# Patient Record
Sex: FEMALE | Race: WHITE | NOT HISPANIC OR LATINO | Employment: OTHER | URBAN - METROPOLITAN AREA
[De-identification: names, ages, dates, MRNs, and addresses within clinical notes are randomized per-mention and may not be internally consistent; named-entity substitution may affect disease eponyms.]

---

## 2017-02-10 ENCOUNTER — OFFICE VISIT (OUTPATIENT)
Dept: NEUROLOGY | Facility: MEDICAL CENTER | Age: 67
End: 2017-02-10
Payer: MEDICARE

## 2017-02-10 VITALS
TEMPERATURE: 97.2 F | OXYGEN SATURATION: 93 % | HEART RATE: 104 BPM | SYSTOLIC BLOOD PRESSURE: 110 MMHG | DIASTOLIC BLOOD PRESSURE: 64 MMHG | WEIGHT: 165.4 LBS | RESPIRATION RATE: 16 BRPM | HEIGHT: 65 IN | BODY MASS INDEX: 27.56 KG/M2

## 2017-02-10 DIAGNOSIS — F20.89 OTHER SCHIZOPHRENIA (HCC): ICD-10-CM

## 2017-02-10 DIAGNOSIS — E03.8 OTHER SPECIFIED HYPOTHYROIDISM: ICD-10-CM

## 2017-02-10 DIAGNOSIS — R56.9 SEIZURE (HCC): ICD-10-CM

## 2017-02-10 DIAGNOSIS — G30.1 LATE ONSET ALZHEIMER'S DISEASE WITHOUT BEHAVIORAL DISTURBANCE (HCC): ICD-10-CM

## 2017-02-10 DIAGNOSIS — F02.80 LATE ONSET ALZHEIMER'S DISEASE WITHOUT BEHAVIORAL DISTURBANCE (HCC): ICD-10-CM

## 2017-02-10 PROBLEM — F03.90 DEMENTIA WITHOUT BEHAVIORAL DISTURBANCE (HCC): Status: ACTIVE | Noted: 2017-02-10

## 2017-02-10 PROCEDURE — G8419 CALC BMI OUT NRM PARAM NOF/U: HCPCS | Performed by: PSYCHIATRY & NEUROLOGY

## 2017-02-10 PROCEDURE — G8432 DEP SCR NOT DOC, RNG: HCPCS | Performed by: PSYCHIATRY & NEUROLOGY

## 2017-02-10 PROCEDURE — 1036F TOBACCO NON-USER: CPT | Performed by: PSYCHIATRY & NEUROLOGY

## 2017-02-10 PROCEDURE — 3017F COLORECTAL CA SCREEN DOC REV: CPT | Performed by: PSYCHIATRY & NEUROLOGY

## 2017-02-10 PROCEDURE — 3014F SCREEN MAMMO DOC REV: CPT | Performed by: PSYCHIATRY & NEUROLOGY

## 2017-02-10 PROCEDURE — G8484 FLU IMMUNIZE NO ADMIN: HCPCS | Performed by: PSYCHIATRY & NEUROLOGY

## 2017-02-10 PROCEDURE — 99214 OFFICE O/P EST MOD 30 MIN: CPT | Performed by: PSYCHIATRY & NEUROLOGY

## 2017-02-10 NOTE — MR AVS SNAPSHOT
"        Tracie Rinaldi   2/10/2017 9:40 AM   Office Visit   MRN: [id number]    Department:  [facility]   Dept Phone:  [phone]    Description:  Female : 1950   Provider:  Alexandr Larry M.D.           Reason for Visit     Follow-Up Seizures      Allergies as of 2/10/2017     No Known Allergies      You were diagnosed with     Other specified hypothyroidism   [7666963]       Other schizophrenia (CMS-HCC)   [2409442]       Seizure (CMS-HCC)   [657481]       Late onset Alzheimer's disease without behavioral disturbance   [9907789]         Vital Signs     Blood Pressure Pulse Temperature Respirations Height Weight    110/64 mmHg 104 36.2 °C (97.2 °F) 16 1.651 m (5' 5\") 75.025 kg (165 lb 6.4 oz)    Body Mass Index Oxygen Saturation Smoking Status             27.52 kg/m2 93% Former Smoker         Basic Information     Date Of Birth Sex Race Ethnicity Preferred Language    1950 Female White Non- English      Your appointments     2017 11:00 AM   Established Patient with Lavinia Martínez M.D.   [facility] / [facility] Med - Internal Medicine (--)    [address] [place] [address] [id number] [phone]           You will be receiving a confirmation call a few days before your appointment from our automated call confirmation system.              Problem List              ICD-10-CM Priority Class Noted - Resolved    Closed fracture of metatarsal bone S92.309A   2015 - Present    Spells of decreased attentiveness R68.89   2015 - Present    Chronic schizophrenia (CMS-[facility]) F20.9   1995 - Present    Hypothyroid E03.9   2015 - Present    Seizure (CMS-HCC) R56.9   10/18/2015 - Present    COPD (chronic obstructive pulmonary disease) (CMS-HCC) J44.9   10/18/2015 - Present    Schizophrenia (CMS-[facility]) F20.9   10/18/2015 - Present    Confusion R41.0   10/19/2015 - Present    Dementia without behavioral disturbance F03.90   2/10/2017 - Present      Health Maintenance   "       Date Due Completion Dates    IMM DTaP/Tdap/Td Vaccine (1 - Tdap) 5/17/1971 ---    IMM PNEUMOCOCCAL 19-64 (ADULT) MEDIUM RISK SERIES (1 of 1 - PPSV23) 5/17/1971 ---    PAP SMEAR 5/17/1973 ---    IMM ZOSTER VACCINE 5/17/2012 ---    IMM INFLUENZA (1) 9/1/2016 ---    MAMMOGRAM 10/18/2017 10/18/2016, 9/15/2015, 9/11/2014, 6/26/2013, 6/20/2012, 12/19/2011, 6/17/2011, 4/9/2010, 3/31/2010    COLONOSCOPY 11/10/2020 11/10/2010            Current Immunizations     No immunizations on file.      Below and/or attached are the medications your provider expects you to take. Review all of your home medications and newly ordered medications with your provider and/or pharmacist. Follow medication instructions as directed by your provider and/or pharmacist. Please keep your medication list with you and share with your provider. Update the information when medications are discontinued, doses are changed, or new medications (including over-the-counter products) are added; and carry medication information at all times in the event of emergency situations     Allergies:  No Known Allergies          Medications  Valid as of: February 10, 2017 - 10:28 AM    Generic Name Brand Name Tablet Size Instructions for use    Acetaminophen (Tab) TYLENOL 500 MG Take 1 Tab by mouth every 6 hours as needed for Mild Pain, Moderate Pain or Fever.        Albuterol Sulfate   Inhale  by mouth.        Aspirin (Tablet Delayed Response) ASPIR-LOW 81 MG TAKE 1 TABLET BY MOUTH ONCE DAILY        Calcium Carbonate-Vitamin D (Tab) Calcium Carbonate-Vitamin D 600-400 MG-UNIT TAKE 1 TABLET BY MOUTH ONCE DAILY        CloZAPine (Tab) CLOZARIL 100 MG Take 0.5 Tabs by mouth 2 times a day.        Divalproex Sodium (TABLET SR 24 HR) DEPAKOTE  MG Take 1 Tab by mouth 2 Times a Day.        Escitalopram Oxalate (Tab) LEXAPRO 10 MG Take 1 Tab by mouth every day.        Lactulose (Solution) lactulose 10 GM/15ML Take 20 g by mouth 2 times a day.        LevETIRAcetam  (Tab) KEPPRA 250 MG TAKE 1 TABLET BY MOUTH 3 TIMES DAILY        Levothyroxine Sodium (Tab) SYNTHROID 112 MCG TAKE 1 TABLET BY MOUTH EVERY MORNING ON AN EMPTY STOMACH        Ondansetron (TABLET DISPERSIBLE) ZOFRAN ODT 4 MG Take 4 mg by mouth every 8 hours as needed for Nausea/Vomiting.        Polyethylene Glycol 3350 (Pack) MIRALAX  Take 17 g by mouth every day.        Polyvinyl Alcohol (Solution) artificial tears 1.4 % Place 1 Drop in both eyes 2 Times a Day.        Simvastatin (Tab) ZOCOR 40 MG TAKE 1 TABLET BY MOUTH NIGHTLY AT BEDTIME        .                 Medicines prescribed today were sent to:     [facility] - [place], [place] - [address]    [address] [place] [address]    Phone: [phone] Fax: [phone]    Open 24 Hours?: No      Medication refill instructions:       If your prescription bottle indicates you have medication refills left, it is not necessary to call your provider’s office. Please contact your pharmacy and they will refill your medication.    If your prescription bottle indicates you do not have any refills left, you may request refills at any time through one of the following ways: The online [employer] system (except Urgent Care), by calling your provider’s office, or by asking your pharmacy to contact your provider’s office with a refill request. Medication refills are processed only during regular business hours and may not be available until the next business day. Your provider may request additional information or to have a follow-up visit with you prior to refilling your medication.   *Please Note: Medication refills are assigned a new Rx number when refilled electronically. Your pharmacy may indicate that no refills were authorized even though a new prescription for the same medication is available at the pharmacy. Please request the medicine by name with the pharmacy before contacting your provider for a refill.        Your To Do List     Future  Labs/Procedures Complete By Expires    JAKY ANTIBODY WITH REFLEX  As directed 2/11/2018    MR-BRAIN-W/O  As directed 8/12/2017    WESTERGREN SED RATE  As directed 2/11/2018      Referral     A referral request has been sent to our patient care coordination department. Please allow 3-5 business days for us to process this request and contact you either by phone or mail. If you do not hear from us by the 5th business day, please call us at [phone].           [employer] Access Code: Activation code not generated  Current [employer] Status: Active

## 2017-02-10 NOTE — PROGRESS NOTES
Let us make a letter to Medicare regarding the medical necessity of vit D      Christiano has frequent falls and subsequent foot fracture. Vit D deficiency will be important to exclude.  Treatment of vit D deficiency could save the insurance many hospital admissions in the future.    NEUROLOGY NOTE    Referring Physician  [facility]      CHIEF COMPLAINT:    With keppra, her spells decreased   Increased sleepiness and drooling spells after another fall weeks ago    Broke right foot  Again, her drooling spells worsened  Spells of drooling on daily basis-- spells of confusion remained  Since last visit, infection over the foot, osteomyelitis resolved but her confusion spells persisted    Chief Complaint   Patient presents with   • Follow-Up     Seizures       PRESENT ILLNESS:   With keppra, her spells decreased       Increased sleepiness and drooling spells after another fall weeks ago    Broke right foot  Again, her drooling spells worsened  Spells of drooling on daily basis-- spells of confusion remained  Since last visit, infection over the foot, osteomyelitis resolved but her confusion spells persisted    The 64 YO was noticed to have spells of confusion in the past 2 years or so    She now lives in a assisted living.  She has schizophrenia and today she came with her guardian and caregiver    During one spell, she was then sent to ER--- spells occurred again even she was not on narcotic    All these spells were stereotyped    PAST MEDICAL HISTORY:  Past Medical History   Diagnosis Date   • Hyperlipidemia    • Hypothyroid    • GERD (gastroesophageal reflux disease)    • Dermatitis    • Other emphysema (CMS-HCC)    • Pneumonia      2012   • Unspecified urinary incontinence      diapers    • Stroke (CMS-HCC)      tia june 2015 no residual    • Dental disorder      upper and lower dentures   • Seizure disorder (CMS-HCC)      last seizure 2005   • Psychiatric disorder      schizophrenia   • Hearing loss of right  ear due to cerumen impaction    • Vitamin D deficiency    • Bladder spasm        PAST SURGICAL HISTORY:  Past Surgical History   Procedure Laterality Date   • Closed reduction Left 7/9/2015     Procedure: CLOSED REDUCTION -ATTEMPTED;  Surgeon: Jluis Becker M.D.;  Location: SURGERY [facility];  Service:    • Pin insertion  7/9/2015     Procedure: PIN INSERTION FOOT;  Surgeon: Jluis Becker M.D.;  Location: SURGERY [facility];  Service:    • Irrigation & debridement ortho Left 10/17/2015     Procedure: IRRIGATION & DEBRIDEMENT ORTHO foot;  Surgeon: Jluis Becker M.D.;  Location: SURGERY [facility];  Service:        FAMILY HISTORY:  No family history on file.    SOCIAL HISTORY:  Social History     Social History   • Marital Status: Single     Spouse Name: N/A   • Number of Children: N/A   • Years of Education: N/A     Occupational History   • Not on file.     Social History Main Topics   • Smoking status: Former Smoker -- 0.50 packs/day for 45 years     Types: Cigarettes   • Smokeless tobacco: Not on file      Comment: unk   • Alcohol Use: No      Comment: unk   • Drug Use: No      Comment: unk   • Sexual Activity: Not on file     Other Topics Concern   • Not on file     Social History Narrative     ALLERGIES:  No Known Allergies  TOBHX  History   Smoking status   • Former Smoker -- 0.50 packs/day for 45 years   • Types: Cigarettes   Smokeless tobacco   • Not on file     Comment: unk     ALCHX  History   Alcohol Use No     Comment: unk     DRUGHX  History   Drug Use No     Comment: unk           MEDICATIONS:  Current Outpatient Prescriptions   Medication   • levetiracetam (KEPPRA) 250 MG tablet   • levothyroxine (SYNTHROID) 112 MCG Tab   • ASPIR-LOW 81 MG EC tablet   • Calcium Carbonate-Vitamin D 600-400 MG-UNIT Tab   • simvastatin (ZOCOR) 40 MG Tab   • escitalopram (LEXAPRO) 10 MG Tab   • clozapine (CLOZARIL) 100 MG Tab   • divalproex ER (DEPAKOTE ER) 500 MG TABLET SR 24 HR   • artificial tears  "1.4 % Solution   • ondansetron (ZOFRAN ODT) 4 MG TABLET DISPERSIBLE   • polyethylene glycol/lytes (MIRALAX) Pack   • acetaminophen (TYLENOL) 500 MG Tab   • lactulose 10 GM/15ML Solution   • Albuterol Sulfate (PROAIR HFA INH)     No current facility-administered medications for this visit.       REVIEW OF SYSTEM:    Constitutional: Denies fevers, Denies weight changes   Eyes: Denies changes in vision, no eye pain   Ears/Nose/Throat/Mouth: Denies nasal congestion or sore throat   Cardiovascular: Denies chest pain or palpitations   Respiratory: Denies SOB.   Gastrointestinal/Hepatic: Denies abdominal pain, nausea, vomiting, diarrhea, constipation or GI bleeding   Genitourinary: Denies bladder dysfunction, dysuria or frequency   Musculoskeletal/Rheum: Denies joint pain and swelling   Skin/Breast: Denies rash, denies breast lumps or discharge   Neurological: spells of confusion  Psychiatric: Schizophrenia  Endocrine: Hypothyrodism  Heme/Oncology/Lymph Nodes: Denies enlarged lymph nodes, denies brusing or known bleeding disorder   Allergic/Immunologic: Denies hx of allergies         PHYSICAL AND NEUROLOGICAL EXMAINATIONS:  VITAL SIGNS: /64 mmHg  Pulse 104  Temp(Src) 36.2 °C (97.2 °F)  Resp 16  Ht 1.651 m (5' 5\")  Wt 75.025 kg (165 lb 6.4 oz)  BMI 27.52 kg/m2  SpO2 93%  CURRENT WEIGHT:   BMI: Body mass index is 27.52 kg/(m^2).  PREVIOUS WEIGHTS:  Wt Readings from Last 25 Encounters:   02/10/17 75.025 kg (165 lb 6.4 oz)   10/07/16 74.39 kg (164 lb)   08/23/16 68.04 kg (150 lb)   07/07/16 68.04 kg (150 lb)   05/02/16 70.308 kg (155 lb)   04/16/16 72.576 kg (160 lb)   04/11/16 70.308 kg (155 lb)   12/14/15 70.308 kg (155 lb)   11/28/15 65.772 kg (145 lb)   11/23/15 68.04 kg (150 lb)   10/17/15 64.9 kg (143 lb 1.3 oz)   08/28/15 71.668 kg (158 lb)   07/09/15 74.4 kg (164 lb 0.4 oz)   06/24/15 80.74 kg (178 lb)   05/16/15 80.74 kg (178 lb)       General appearance of patient: WDWN(+) NAD(+)    EYES  o Fundus : " Papilledem(-) Exudates(-) Hemorrhage(-)  Nervous System  Orientation to time, place and person(+)  Memory normal(-) recall 2/3  Language: aphasia(-)  Knowledge: past(+) Current(+)  Attention(+)  Cranial Nerves  • Nerve 2: intact  • Nerve 3,4,6: intact  • Nerve 5 : intact  • Nerve 7: intact  • Nerve 8: intact  • Nerve 9 & 10: intact  • Nerve 11: intact  • Nerve 12: intact  Muscle Power and muscle tone: symmetric, normal in upper and lower  Sensory System: Pin sensation intact(+)  Reflexes: symmetric throughout  Cerebellar Function FNP normal   Gait : walk with walker--- after left ankle fracture  Heart and Vascular  Peripheral Vasucular system : Edema (-) Swelling(-)  RHB, Breathing sound clear  abdomen bowel sound normoactive  Extremities freely moveable  Joints no contracture     Right ankle fracture-- fell in the bed room    Luba her Guardian is calling and she was told to contact when the pt is having her episodes of confusion. She had on today that has lasted a couple of hours. Please advise. Thank you. WILLIE  Keppra 250mg before sleep for one week  Then up to Keppra 250mg two times per day since the second week    And will see  Order given. YP    Called and LM with going places that the Rx has been ordered. They will give it to either Luba or Kassy who is taking care of Tracie. WILLIE           Stable since last visit  Caregivers noticed that she was given the wrong dose of Clozapine--- was given higher dose-- which could trigger the last seizure  Caregivers 2 came together with her--- they knew her in the last one year  Orientated to place, year and month and person  Recall 3/3    IMPRESSION:    1. Spells of confusion, exhaustion, saliva drooling lasting for hours for one year-- seizures are likely, TIA and migraine are less likely--   Supporting evidence---Stereotyped spells, followed by exhaustion and recovery in hours-- persists-- improved with keppra---  2. Hx of schizophrenia, Hypothyrodism  3. Lives in assisted  living and comes with caregivers  4. Hx of Left leg infection and osteomyelitis 2015, Now right foot fracture  5. Skin infection  6. Smoking-- quit smoking since last visit  7. Dementia-- early    PLAN/RECOMMENDATIONS:      ________________________________________________________________________    Continue Keppra 250mg TID and Depakote 500mg BID  We will get MRI of brain, EEG  To evaluate the dementia status--- that will help her qualified to move into the next     Will see Tracie in 6 months      ________________________________________________________________________    Neurology. 1991 Mar;41(3):369-71.  Clozapine-related seizures.  Sarath O1, Christian G, Lilly J.  ________________________________________________________________________

## 2017-02-13 ENCOUNTER — OFFICE VISIT (OUTPATIENT)
Dept: INTERNAL MEDICINE | Facility: MEDICAL CENTER | Age: 67
End: 2017-02-13
Payer: MEDICARE

## 2017-02-13 ENCOUNTER — TELEPHONE (OUTPATIENT)
Dept: NEUROLOGY | Facility: MEDICAL CENTER | Age: 67
End: 2017-02-13

## 2017-02-13 VITALS
WEIGHT: 165.6 LBS | HEIGHT: 65 IN | HEART RATE: 102 BPM | OXYGEN SATURATION: 95 % | BODY MASS INDEX: 27.59 KG/M2 | SYSTOLIC BLOOD PRESSURE: 118 MMHG | TEMPERATURE: 97.6 F | DIASTOLIC BLOOD PRESSURE: 66 MMHG

## 2017-02-13 DIAGNOSIS — F20.9 CHRONIC SCHIZOPHRENIA (HCC): ICD-10-CM

## 2017-02-13 DIAGNOSIS — H04.123 BILATERAL DRY EYES: ICD-10-CM

## 2017-02-13 PROCEDURE — G8432 DEP SCR NOT DOC, RNG: HCPCS | Mod: GC | Performed by: INTERNAL MEDICINE

## 2017-02-13 PROCEDURE — G8419 CALC BMI OUT NRM PARAM NOF/U: HCPCS | Mod: GC | Performed by: INTERNAL MEDICINE

## 2017-02-13 PROCEDURE — 99213 OFFICE O/P EST LOW 20 MIN: CPT | Mod: GE | Performed by: INTERNAL MEDICINE

## 2017-02-13 PROCEDURE — 3014F SCREEN MAMMO DOC REV: CPT | Mod: GC | Performed by: INTERNAL MEDICINE

## 2017-02-13 PROCEDURE — 3017F COLORECTAL CA SCREEN DOC REV: CPT | Mod: GC | Performed by: INTERNAL MEDICINE

## 2017-02-13 PROCEDURE — 1036F TOBACCO NON-USER: CPT | Mod: GC | Performed by: INTERNAL MEDICINE

## 2017-02-13 PROCEDURE — G8484 FLU IMMUNIZE NO ADMIN: HCPCS | Mod: GC | Performed by: INTERNAL MEDICINE

## 2017-02-13 RX ORDER — POLYVINYL ALCOHOL 14 MG/ML
1 SOLUTION/ DROPS OPHTHALMIC 2 TIMES DAILY
Qty: 1 BOTTLE | Refills: 3 | Status: SHIPPED | OUTPATIENT
Start: 2017-02-13 | End: 2017-05-30 | Stop reason: SDUPTHER

## 2017-02-13 ASSESSMENT — ACTIVITIES OF DAILY LIVING (ADL)
TRANSPORTATION COMMENTS: NO CAR
SHOPPING_COMMENTS: CARE GIVER

## 2017-02-13 ASSESSMENT — PAIN SCALES - GENERAL: PAINLEVEL: NO PAIN

## 2017-02-13 ASSESSMENT — PATIENT HEALTH QUESTIONNAIRE - PHQ9: CLINICAL INTERPRETATION OF PHQ2 SCORE: 0

## 2017-02-13 NOTE — MR AVS SNAPSHOT
"        Tracie Rinaldi   2017 11:00 AM   Office Visit   MRN: [id number]    Department:  [facility] Med - Internal Med   Dept Phone:  [phone]    Description:  Female : 1950   Provider:  Lavinia Martínez M.D.           Reason for Visit     Labs Only review medications    Dementia copd      Allergies as of 2017     No Known Allergies      You were diagnosed with     Bilateral dry eyes   [218738]       Chronic schizophrenia (CMS-HCC)   [445598]         Vital Signs     Blood Pressure Pulse Temperature Height Weight Body Mass Index    118/66 mmHg 102 36.4 °C (97.6 °F) 1.651 m (5' 5\") 75.116 kg (165 lb 9.6 oz) 27.56 kg/m2    Oxygen Saturation Breastfeeding? Smoking Status             95% No Former Smoker         Basic Information     Date Of Birth Sex Race Ethnicity Preferred Language    1950 Female White Non- English      Your appointments     Mar 03, 2017  4:45 PM   MR BRAIN W/O 30 with [address] MRI 2   [facility] MRI - [address]    [address] [facility] [address]   [phone]            2017  7:30 AM   ELECTROENCEPHALOGRAPHY with NEURODIAGNOSTIC LAB [facility] Neurology (--)    [address], [address]  [facility] [address]   [phone]           Limit caffeine. Clean, dry hair, no gels, oils, or hairsprays. If testing for seizures or spells, please allow no more than 4 hours of sleep the night before the exam (sleep 12am-4am).              Problem List              ICD-10-CM Priority Class Noted - Resolved    Closed fracture of metatarsal bone S92.309A   2015 - Present    Spells of decreased attentiveness R68.89   2015 - Present    Chronic schizophrenia (CMS-HCC) F20.9   1995 - Present    Hypothyroid E03.9   2015 - Present    Seizure (CMS-HCC) R56.9   10/18/2015 - Present    COPD (chronic obstructive pulmonary disease) (CMS-HCC) J44.9   10/18/2015 - Present    Schizophrenia (CMS-HCC) F20.9   10/18/2015 - Present   " Confusion R41.0   10/19/2015 - Present    Dementia without behavioral disturbance F03.90   2/10/2017 - Present    Bilateral dry eyes H04.123   2/13/2017 - Present      Health Maintenance        Date Due Completion Dates    PAP SMEAR 5/17/1973 ---    IMM ZOSTER VACCINE 5/17/2012 ---    IMM INFLUENZA (1) 9/1/2016 10/17/2014    MAMMOGRAM 10/18/2017 10/18/2016, 9/15/2015, 9/11/2014, 6/26/2013, 6/20/2012, 12/19/2011, 6/17/2011, 4/9/2010, 3/31/2010    COLONOSCOPY 11/10/2020 11/10/2010    IMM DTaP/Tdap/Td Vaccine (2 - Td) 10/31/2022 10/31/2012            Current Immunizations     Influenza Vaccine Quad Inj (Preserved) 10/17/2014    Pneumococcal polysaccharide vaccine (PPSV-23) 1/22/2016    Tdap Vaccine 10/31/2012      Below and/or attached are the medications your provider expects you to take. Review all of your home medications and newly ordered medications with your provider and/or pharmacist. Follow medication instructions as directed by your provider and/or pharmacist. Please keep your medication list with you and share with your provider. Update the information when medications are discontinued, doses are changed, or new medications (including over-the-counter products) are added; and carry medication information at all times in the event of emergency situations     Allergies:  No Known Allergies          Medications  Valid as of: February 13, 2017 - 12:24 PM    Generic Name Brand Name Tablet Size Instructions for use    Acetaminophen (Tab) TYLENOL 500 MG Take 1 Tab by mouth every 6 hours as needed for Mild Pain, Moderate Pain or Fever.        Albuterol Sulfate   Inhale  by mouth.        Aspirin (Tablet Delayed Response) ASPIR-LOW 81 MG TAKE 1 TABLET BY MOUTH ONCE DAILY        Calcium Carbonate-Vitamin D (Tab) Calcium Carbonate-Vitamin D 600-400 MG-UNIT TAKE 1 TABLET BY MOUTH ONCE DAILY        CloZAPine (Tab) CLOZARIL 100 MG Take 0.5 Tabs by mouth 2 times a day.        Divalproex Sodium (TABLET SR 24 HR) DEPAKOTE ER  500 MG Take 1 Tab by mouth 2 Times a Day.        Escitalopram Oxalate (Tab) LEXAPRO 10 MG Take 1 Tab by mouth every day.        Lactulose (Solution) lactulose 10 GM/15ML Take 20 g by mouth 2 times a day.        LevETIRAcetam (Tab) KEPPRA 250 MG TAKE 1 TABLET BY MOUTH 3 TIMES DAILY        Levothyroxine Sodium (Tab) SYNTHROID 112 MCG TAKE 1 TABLET BY MOUTH EVERY MORNING ON AN EMPTY STOMACH        Ondansetron (TABLET DISPERSIBLE) ZOFRAN ODT 4 MG Take 4 mg by mouth every 8 hours as needed for Nausea/Vomiting.        Polyvinyl Alcohol (Solution) artificial tears 1.4 % Place 1 Drop in both eyes 2 Times a Day.        Simvastatin (Tab) ZOCOR 40 MG TAKE 1 TABLET BY MOUTH NIGHTLY AT BEDTIME        .                 Medicines prescribed today were sent to:     [facility] - [place] - [address]    [address] [place] [address]    Phone: [phone] Fax: [phone]    Open 24 Hours?: No      Medication refill instructions:       If your prescription bottle indicates you have medication refills left, it is not necessary to call your provider’s office. Please contact your pharmacy and they will refill your medication.    If your prescription bottle indicates you do not have any refills left, you may request refills at any time through one of the following ways: The online [employer] system (except Urgent Care), by calling your provider’s office, or by asking your pharmacy to contact your provider’s office with a refill request. Medication refills are processed only during regular business hours and may not be available until the next business day. Your provider may request additional information or to have a follow-up visit with you prior to refilling your medication.   *Please Note: Medication refills are assigned a new Rx number when refilled electronically. Your pharmacy may indicate that no refills were authorized even though a new prescription for the same medication is available at  the pharmacy. Please request the medicine by name with the pharmacy before contacting your provider for a refill.        Referral     A referral request has been sent to our patient care coordination department. Please allow 3-5 business days for us to process this request and contact you either by phone or mail. If you do not hear from us by the 5th business day, please call us at [phone].           [employer] Access Code: Activation code not generated  Current [employer] Status: Active

## 2017-02-14 NOTE — TELEPHONE ENCOUNTER
Called and spoke with Haider at the Group home. They are trying to get her placed in a group home with people of the same age. The biggest concern for them is the sleep depravation. They have to hire an extra staff member to stay with her an make sure she only gets 4 hours of sleep. If she does not have to be sleep deprived for the EEG they have no problem doing the EEG. Please advise. Thank you. WILLIE

## 2017-02-14 NOTE — TELEPHONE ENCOUNTER
Not sure what is the answer they expect?   The new tests will tell us more   But if that is a lot for them to handle, cancel is fine with me     I believe, they want tests to justify nursing home placement?   MRI and EEG are separate tests. YP

## 2017-02-14 NOTE — PROGRESS NOTES
Established Patient    Tracie presents today with the following:    CC: Follow-up on labs    HPI:     Lab follow-up: Patient had CBC, CMP, TSH, free T4 levels which were all within normal limits.    Bilateral dry eyes: She was having issues with dry eyes 2 weeks ago which resulted in her eyes being red she started using artificial tears which have resulted in improvement of her symptoms.    Schizophrenia: Patient does have a  and lives in a group home as she is about to turn 67 she will be needing to change group homes.  eyesight is requesting and occupational therapy and speech therapy referral to assess patient's level of care needs.    Patient Active Problem List    Diagnosis Date Noted   • Bilateral dry eyes 02/13/2017   • Dementia without behavioral disturbance 02/10/2017   • Confusion 10/19/2015   • Seizure (CMS-HCC) 10/18/2015   • COPD (chronic obstructive pulmonary disease) (CMS-HCC) 10/18/2015   • Schizophrenia (CMS-HCC) 10/18/2015   • Spells of decreased attentiveness 08/28/2015   • Hypothyroid 08/28/2015   • Closed fracture of metatarsal bone 07/09/2015   • Chronic schizophrenia (CMS-HCC) 08/28/1995       Current Outpatient Prescriptions   Medication Sig Dispense Refill   • artificial tears 1.4 % Solution Place 1 Drop in both eyes 2 Times a Day. 1 Bottle 3   • levetiracetam (KEPPRA) 250 MG tablet TAKE 1 TABLET BY MOUTH 3 TIMES DAILY 90 Tab 3   • levothyroxine (SYNTHROID) 112 MCG Tab TAKE 1 TABLET BY MOUTH EVERY MORNING ON AN EMPTY STOMACH 30 Tab 3   • ASPIR-LOW 81 MG EC tablet TAKE 1 TABLET BY MOUTH ONCE DAILY 90 Tab 2   • Calcium Carbonate-Vitamin D 600-400 MG-UNIT Tab TAKE 1 TABLET BY MOUTH ONCE DAILY 90 Tab 2   • simvastatin (ZOCOR) 40 MG Tab TAKE 1 TABLET BY MOUTH NIGHTLY AT BEDTIME 90 Tab 2   • escitalopram (LEXAPRO) 10 MG Tab Take 1 Tab by mouth every day. 90 Tab 0   • clozapine (CLOZARIL) 100 MG Tab Take 0.5 Tabs by mouth 2 times a day. 30 Tab    • divalproex ER  "(DEPAKOTE ER) 500 MG TABLET SR 24 HR Take 1 Tab by mouth 2 Times a Day. 180 Tab 0   • Albuterol Sulfate (PROAIR HFA INH) Inhale  by mouth.     • ondansetron (ZOFRAN ODT) 4 MG TABLET DISPERSIBLE Take 4 mg by mouth every 8 hours as needed for Nausea/Vomiting.     • acetaminophen (TYLENOL) 500 MG Tab Take 1 Tab by mouth every 6 hours as needed for Mild Pain, Moderate Pain or Fever. 90 Tab 0   • lactulose 10 GM/15ML Solution Take 20 g by mouth 2 times a day.       No current facility-administered medications for this visit.       ROS: As per HPI. Additional pertinent symptoms as noted below.    Constitutional: patient denies any fever or chills  Eyes: no photophobia, eye discharge  GI: no abdominal pain, constipation, diarrhea  All others negative    /66 mmHg  Pulse 102  Temp(Src) 36.4 °C (97.6 °F)  Ht 1.651 m (5' 5\")  Wt 75.116 kg (165 lb 9.6 oz)  BMI 27.56 kg/m2  SpO2 95%  Breastfeeding? No    Physical Exam   Constitutional:  oriented to person, place, and time. No distress.   Eyes: Pupils are equal, round, and reactive to light. No scleral icterus.  Ears: Wax in both ears. Tympanic membranes clear   Cardiovascular: Normal rate, regular rhythm and normal heart sounds.  Exam reveals no gallop and no friction rub.  No murmur heard.  Pulmonary/Chest: Breath sounds normal. Chest wall is not dull to percussion.       Assessment and Plan    1. Bilateral dry eyes  - Refill of artificial tears prescribed    2. Chronic schizophrenia (CMS-[facility])  - Speech therapy and occupational therapy referrals placed      Followup: Return in about 3 months (around 5/13/2017) for pap.      Signed by: Lvainia Martínez M.D.    "

## 2017-02-14 NOTE — TELEPHONE ENCOUNTER
[facility] is calling and stating that the pt had an EEG done May of 2016. They are asking is it necessary to have another one done with the MRI you have ordered. Please advise. Thank you. KA

## 2017-02-15 NOTE — TELEPHONE ENCOUNTER
No need to be sleep deprived for her   exception for her. YP    Called and spoke with Star at the [facility] and all informations was given. She was happy with all information and they will proceed with the EEG in May. WILLIE

## 2017-03-03 ENCOUNTER — HOSPITAL ENCOUNTER (OUTPATIENT)
Dept: RADIOLOGY | Facility: MEDICAL CENTER | Age: 67
End: 2017-03-03
Attending: PSYCHIATRY & NEUROLOGY
Payer: MEDICARE

## 2017-03-03 DIAGNOSIS — E03.8 OTHER SPECIFIED HYPOTHYROIDISM: ICD-10-CM

## 2017-03-03 DIAGNOSIS — F20.89 OTHER SCHIZOPHRENIA (HCC): ICD-10-CM

## 2017-03-03 DIAGNOSIS — R56.9 SEIZURE (HCC): ICD-10-CM

## 2017-03-03 PROCEDURE — 70551 MRI BRAIN STEM W/O DYE: CPT

## 2017-03-21 RX ORDER — LEVOTHYROXINE SODIUM 112 UG/1
TABLET ORAL
Qty: 30 TAB | Refills: 3 | Status: SHIPPED | OUTPATIENT
Start: 2017-03-21 | End: 2017-05-30 | Stop reason: SDUPTHER

## 2017-03-21 NOTE — TELEPHONE ENCOUNTER
Was the patient seen in the last year in this department? Yes    Last seen: 02/13/17 by Dr. roth  Next appt: 05/30/17 with Dr. Roth      Does patient have an active prescription for medications requested? No     Received Request Via: Pharmacy

## 2017-04-11 NOTE — TELEPHONE ENCOUNTER
Was the patient seen in the last year in this department? Yes  Pt last seen on: 02/13/2017 by Dr. Martínez Next appt on: 05/10/2017      Does patient have an active prescription for medications requested? No     Received Request Via: Pharmacy

## 2017-04-19 RX ORDER — LEVETIRACETAM 250 MG/1
TABLET ORAL
Qty: 270 TAB | Refills: 1 | Status: SHIPPED | OUTPATIENT
Start: 2017-04-19 | End: 2017-10-02 | Stop reason: SDUPTHER

## 2017-05-10 ENCOUNTER — NON-PROVIDER VISIT (OUTPATIENT)
Dept: NEUROLOGY | Facility: MEDICAL CENTER | Age: 67
End: 2017-05-10
Payer: MEDICARE

## 2017-05-10 DIAGNOSIS — R68.89 SPELLS OF DECREASED ATTENTIVENESS: ICD-10-CM

## 2017-05-10 DIAGNOSIS — R56.9 SEIZURE (HCC): ICD-10-CM

## 2017-05-10 PROCEDURE — 95816 EEG AWAKE AND DROWSY: CPT | Performed by: PSYCHIATRY & NEUROLOGY

## 2017-05-10 NOTE — MR AVS SNAPSHOT
Tracie Maloneypalakcorinne   5/10/2017 7:30 AM   Non-Provider Visit   MRN: [id number]    Department:  [facility]   Dept Phone:  [phone]    Description:  Female : 1950   Provider:  NEURODIAGNOSTIC LAB [facility]           Allergies as of 5/10/2017     No Known Allergies      You were diagnosed with     Seizure (CMS-HCC)   [042934]       Spells of decreased attentiveness   [179400]         Vital Signs     Smoking Status                   Former Smoker           Basic Information     Date Of Birth Sex Race Ethnicity Preferred Language    1950 Female White Non- English      Your appointments     May 30, 2017 11:00 AM   Established Patient with Lavinia Martínez M.D.   [facility] - Internal Medicine (--)    [address] [place] [address]  [address] [place] [id number] [phone]           You will be receiving a confirmation call a few days before your appointment from our automated call confirmation system.              Problem List              ICD-10-CM Priority Class Noted - Resolved    Closed fracture of metatarsal bone S92.309A   2015 - Present    Spells of decreased attentiveness R68.89   2015 - Present    Chronic schizophrenia (CMS-HCC) F20.9   1995 - Present    Hypothyroid E03.9   2015 - Present    Seizure (CMS-HCC) R56.9   10/18/2015 - Present    COPD (chronic obstructive pulmonary disease) (CMS-HCC) J44.9   10/18/2015 - Present    Schizophrenia (CMS-HCC) F20.9   10/18/2015 - Present    Confusion R41.0   10/19/2015 - Present    Dementia without behavioral disturbance F03.90   2/10/2017 - Present    Bilateral dry eyes H04.123   2017 - Present      Health Maintenance        Date Due Completion Dates    PAP SMEAR 1973 ---    IMM ZOSTER VACCINE 2012 ---    MAMMOGRAM 10/18/2017 10/18/2016, 9/15/2015, 2014, 2013, 2012, 2011, 2011, 2010, 3/31/2010    COLONOSCOPY 11/10/2020 11/10/2010    IMM DTaP/Tdap/Td Vaccine (2 - Td)  10/31/2022 10/31/2012            Current Immunizations     Influenza Vaccine Quad Inj (Preserved) 10/17/2014    Pneumococcal polysaccharide vaccine (PPSV-23) 1/22/2016    Tdap Vaccine 10/31/2012      Below and/or attached are the medications your provider expects you to take. Review all of your home medications and newly ordered medications with your provider and/or pharmacist. Follow medication instructions as directed by your provider and/or pharmacist. Please keep your medication list with you and share with your provider. Update the information when medications are discontinued, doses are changed, or new medications (including over-the-counter products) are added; and carry medication information at all times in the event of emergency situations     Allergies:  No Known Allergies          Medications  Valid as of: May 10, 2017 -  5:56 PM    Generic Name Brand Name Tablet Size Instructions for use    Acetaminophen (Tab) TYLENOL 500 MG Take 1 Tab by mouth every 6 hours as needed for Mild Pain, Moderate Pain or Fever.        Albuterol Sulfate   Inhale  by mouth.        Aspirin (Tablet Delayed Response) ASPIR-LOW 81 MG TAKE 1 TABLET BY MOUTH ONCE DAILY        Calcium Carbonate-Vitamin D (Tab) Calcium Carbonate-Vitamin D 600-400 MG-UNIT TAKE 1 TABLET BY MOUTH ONCE DAILY        CloZAPine (Tab) CLOZARIL 100 MG Take 0.5 Tabs by mouth 2 times a day.        Diapers & Supplies (Misc) Diapers & Supplies  1 Unknown by Does not apply route as needed. Indications: change 3 times daily diaper depend pull size large number 90        Divalproex Sodium (TABLET SR 24 HR) DEPAKOTE  MG Take 1 Tab by mouth 2 Times a Day.        Escitalopram Oxalate (Tab) LEXAPRO 10 MG Take 1 Tab by mouth every day.        Lactulose (Solution) lactulose 10 GM/15ML Take 20 g by mouth 2 times a day.        LevETIRAcetam (Tab) KEPPRA 250 MG TAKE 1 TABLET BY MOUTH 3 TIMES DAILY        Levothyroxine Sodium (Tab) SYNTHROID 112 MCG TAKE 1 TABLET BY  MOUTH EVERY MORNING ON AN EMPTY STOMACH        Ondansetron (TABLET DISPERSIBLE) ZOFRAN ODT 4 MG Take 4 mg by mouth every 8 hours as needed for Nausea/Vomiting.        Polyvinyl Alcohol (Solution) artificial tears 1.4 % Place 1 Drop in both eyes 2 Times a Day.        Simvastatin (Tab) ZOCOR 40 MG TAKE 1 TABLET BY MOUTH NIGHTLY AT BEDTIME        .                 Medicines prescribed today were sent to:     [facility] - [place], [place] - [address]    [address] [place] [address]    Phone: [phone] Fax: [phone]    Open 24 Hours?: No      Medication refill instructions:       If your prescription bottle indicates you have medication refills left, it is not necessary to call your provider’s office. Please contact your pharmacy and they will refill your medication.    If your prescription bottle indicates you do not have any refills left, you may request refills at any time through one of the following ways: The online [employer] system (except Urgent Care), by calling your provider’s office, or by asking your pharmacy to contact your provider’s office with a refill request. Medication refills are processed only during regular business hours and may not be available until the next business day. Your provider may request additional information or to have a follow-up visit with you prior to refilling your medication.   *Please Note: Medication refills are assigned a new Rx number when refilled electronically. Your pharmacy may indicate that no refills were authorized even though a new prescription for the same medication is available at the pharmacy. Please request the medicine by name with the pharmacy before contacting your provider for a refill.           [employer] Access Code: Activation code not generated  Current [employer] Status: Active

## 2017-05-14 NOTE — PROCEDURES
DATE OF SERVICE:  05/10/2017    ROUTINE ELECTROENCEPHALOGRAM REPORT        INDICATION:     A 56-year-old patient.  The patient have spells of confusion in the past 2 years.      TECHNIQUE: 30 channel routine electroencephalogram (EEG) was performed in accordance with the international 10-20 system. The study was reviewed in bipolar and referential montages. The recording examined the patient during wakeful and drowsy state(s).     DESCRIPTION OF THE RECORD:    A 56-year-old patient.  The patient have spells of confusion in the past 2   years.  The EEG background consists of posterior dominant alpha rhythm 9-10   Hz, positive alpha rhythm, normal reactivity to eye opening.  There are delta   burst.  At times, it is generalized that is around couple of seconds 2 Hz or   so, monomorphic and at times it is more prominent over the left hemisphere,   prominent in the left hemisphere could last a couple of   seconds.  There are no definite electrical seizures though.  There are no   definite epileptiform discharges.  The stage I sleep was obtained.    ACTIVATION PROCEDURES:      Hyperventilation and Photic Stimulation were done    ICTAL AND/OR INTERICTAL FINDINGS:    No focal or generalized epileptiform activity noted.  Some delta burst.  At times, it is generalized that is around couple of seconds 2 Hz or so, monomorphic and at times it is more prominent over the left hemisphere,   No clinical events or seizures were reported or recorded during the study.      EKG: sampling of the EKG recording demonstrated sinus rhythm.        INTERPRETATION:      INTERPRETATION:  This EEG denotes of cortical dysfunction, mild degree with   more emphasis over the left hemisphere.  Clinical correlation may help.       ____________________________________     MD TL BETANCUR / JEAN-CLAUDE    DD:  05/13/2017 16:14:29  DT:  05/13/2017 18:32:17    D#:  [id number]  Job#:  [id number]

## 2017-05-30 ENCOUNTER — OFFICE VISIT (OUTPATIENT)
Dept: INTERNAL MEDICINE | Facility: MEDICAL CENTER | Age: 67
End: 2017-05-30
Payer: MEDICARE

## 2017-05-30 VITALS
TEMPERATURE: 97 F | SYSTOLIC BLOOD PRESSURE: 109 MMHG | BODY MASS INDEX: 28.22 KG/M2 | DIASTOLIC BLOOD PRESSURE: 63 MMHG | WEIGHT: 169.4 LBS | OXYGEN SATURATION: 92 % | HEART RATE: 98 BPM | RESPIRATION RATE: 18 BRPM | HEIGHT: 65 IN

## 2017-05-30 DIAGNOSIS — E78.5 DYSLIPIDEMIA: ICD-10-CM

## 2017-05-30 DIAGNOSIS — J44.9 CHRONIC OBSTRUCTIVE PULMONARY DISEASE, UNSPECIFIED COPD TYPE (HCC): ICD-10-CM

## 2017-05-30 DIAGNOSIS — H04.123 BILATERAL DRY EYES: ICD-10-CM

## 2017-05-30 DIAGNOSIS — R56.9 SEIZURE (HCC): ICD-10-CM

## 2017-05-30 DIAGNOSIS — E03.9 HYPOTHYROIDISM, UNSPECIFIED TYPE: ICD-10-CM

## 2017-05-30 PROCEDURE — G8432 DEP SCR NOT DOC, RNG: HCPCS | Mod: GC | Performed by: INTERNAL MEDICINE

## 2017-05-30 PROCEDURE — 3017F COLORECTAL CA SCREEN DOC REV: CPT | Mod: GC | Performed by: INTERNAL MEDICINE

## 2017-05-30 PROCEDURE — 1101F PT FALLS ASSESS-DOCD LE1/YR: CPT | Mod: 8P,GC | Performed by: INTERNAL MEDICINE

## 2017-05-30 PROCEDURE — 3014F SCREEN MAMMO DOC REV: CPT | Mod: GC | Performed by: INTERNAL MEDICINE

## 2017-05-30 PROCEDURE — 99213 OFFICE O/P EST LOW 20 MIN: CPT | Mod: GE | Performed by: INTERNAL MEDICINE

## 2017-05-30 PROCEDURE — 4040F PNEUMOC VAC/ADMIN/RCVD: CPT | Mod: GC | Performed by: INTERNAL MEDICINE

## 2017-05-30 PROCEDURE — G8419 CALC BMI OUT NRM PARAM NOF/U: HCPCS | Mod: GC | Performed by: INTERNAL MEDICINE

## 2017-05-30 PROCEDURE — 1036F TOBACCO NON-USER: CPT | Mod: GC | Performed by: INTERNAL MEDICINE

## 2017-05-30 RX ORDER — POLYVINYL ALCOHOL 14 MG/ML
1 SOLUTION/ DROPS OPHTHALMIC 2 TIMES DAILY
Qty: 1 BOTTLE | Refills: 3 | Status: SHIPPED | OUTPATIENT
Start: 2017-05-30 | End: 2017-11-20

## 2017-05-30 RX ORDER — LEVOTHYROXINE SODIUM 112 UG/1
112 TABLET ORAL EVERY MORNING
Qty: 30 TAB | Refills: 3 | Status: SHIPPED | OUTPATIENT
Start: 2017-05-30 | End: 2017-10-02 | Stop reason: SDUPTHER

## 2017-05-30 RX ORDER — SIMVASTATIN 40 MG
40 TABLET ORAL
Qty: 90 TAB | Refills: 2 | Status: SHIPPED | OUTPATIENT
Start: 2017-05-30 | End: 2018-04-06 | Stop reason: SDUPTHER

## 2017-05-30 ASSESSMENT — PAIN SCALES - GENERAL: PAINLEVEL: NO PAIN

## 2017-05-30 NOTE — MR AVS SNAPSHOT
"        Tracie Rinaldi   2017 11:00 AM   Office Visit   MRN: [id number]    Department:  [facility] - Internal Med   Dept Phone:  [phone]    Description:  Female : 1950   Provider:  Lavinia Martínez M.D.           Reason for Visit     Thyroid Problem dementia    COPD seizures      Allergies as of 2017     No Known Allergies      You were diagnosed with     Chronic obstructive pulmonary disease, unspecified COPD type (CMS-HCC)   [1458546]       Bilateral dry eyes   [327739]       Hypothyroidism, unspecified type   [4235445]       Dyslipidemia   [965907]       Screening for malignant neoplasm of cervix   [756705]       Seizure (CMS-HCC)   [385353]         Vital Signs     Blood Pressure Pulse Temperature Respirations Height Weight    109/63 mmHg 98 36.1 °C (97 °F) 18 1.651 m (5' 5\") 76.839 kg (169 lb 6.4 oz)    Body Mass Index Oxygen Saturation Breastfeeding? Smoking Status          28.19 kg/m2 92% No Former Smoker        Basic Information     Date Of Birth Sex Race Ethnicity Preferred Language    1950 Female White Non- English      Problem List              ICD-10-CM Priority Class Noted - Resolved    Closed fracture of metatarsal bone S92.309A   2015 - Present    Spells of decreased attentiveness R68.89   2015 - Present    Chronic schizophrenia (CMS-HCC) F20.9   1995 - Present    Hypothyroid E03.9   2015 - Present    Seizure (CMS-HCC) R56.9   10/18/2015 - Present    COPD (chronic obstructive pulmonary disease) (CMS-HCC) J44.9   10/18/2015 - Present    Schizophrenia (CMS-HCC) F20.9   10/18/2015 - Present    Confusion R41.0   10/19/2015 - Present    Dementia without behavioral disturbance F03.90   2/10/2017 - Present    Bilateral dry eyes H04.123   2017 - Present      Health Maintenance        Date Due Completion Dates    PAP SMEAR 1973 ---    IMM ZOSTER VACCINE 2012 ---    IMM PNEUMOCOCCAL 65+ (ADULT) LOW/MEDIUM RISK SERIES (1 of 2 - PCV13) 2017 " 1/22/2016    MAMMOGRAM 10/18/2017 10/18/2016, 9/15/2015, 9/11/2014, 6/26/2013, 6/20/2012, 12/19/2011, 6/17/2011, 4/9/2010, 3/31/2010    COLONOSCOPY 11/10/2020 11/10/2010    BONE DENSITY 5/16/2021 5/16/2016    IMM DTaP/Tdap/Td Vaccine (2 - Td) 10/31/2022 10/31/2012            Current Immunizations     Influenza Vaccine Quad Inj (Preserved) 10/17/2014    Pneumococcal polysaccharide vaccine (PPSV-23) 1/22/2016    Tdap Vaccine 10/31/2012      Below and/or attached are the medications your provider expects you to take. Review all of your home medications and newly ordered medications with your provider and/or pharmacist. Follow medication instructions as directed by your provider and/or pharmacist. Please keep your medication list with you and share with your provider. Update the information when medications are discontinued, doses are changed, or new medications (including over-the-counter products) are added; and carry medication information at all times in the event of emergency situations     Allergies:  No Known Allergies          Medications  Valid as of: May 30, 2017 - 11:32 AM    Generic Name Brand Name Tablet Size Instructions for use    Aspirin (Tablet Delayed Response) ASPIR-LOW 81 MG TAKE 1 TABLET BY MOUTH ONCE DAILY        Calcium Carbonate-Vitamin D (Tab) Calcium Carbonate-Vitamin D 600-400 MG-UNIT TAKE 1 TABLET BY MOUTH ONCE DAILY        CloZAPine (Tab) CLOZARIL 100 MG Take 0.5 Tabs by mouth 2 times a day.        Diapers & Supplies (Misc) Diapers & Supplies  1 Unknown by Does not apply route as needed. Indications: change 3 times daily diaper depend pull size large number 90        Divalproex Sodium (TABLET SR 24 HR) DEPAKOTE  MG Take 1 Tab by mouth 2 Times a Day.        Escitalopram Oxalate (Tab) LEXAPRO 10 MG Take 1 Tab by mouth every day.        Fluticasone-Salmeterol (AEROSOL POWDER, BREATH ACTIVATED) ADVAIR 250-50 MCG/DOSE Inhale 1 Puff by mouth every 12 hours.        LevETIRAcetam (Tab) KEPPRA  250 MG TAKE 1 TABLET BY MOUTH 3 TIMES DAILY        Levothyroxine Sodium (Tab) SYNTHROID 112 MCG Take 1 Tab by mouth every morning. ON A EMPTY STOMACH        Polyvinyl Alcohol (Solution) artificial tears 1.4 % Place 1 Drop in both eyes 2 Times a Day.        Simvastatin (Tab) ZOCOR 40 MG Take 1 Tab by mouth every bedtime.        .                 Medicines prescribed today were sent to:     [facility] - [place], [place] - [address]    [address] [place] [address]    Phone: [phone] Fax: [phone]    Open 24 Hours?: No      Medication refill instructions:       If your prescription bottle indicates you have medication refills left, it is not necessary to call your provider’s office. Please contact your pharmacy and they will refill your medication.    If your prescription bottle indicates you do not have any refills left, you may request refills at any time through one of the following ways: The online [email or web address] system (except Urgent Care), by calling your provider’s office, or by asking your pharmacy to contact your provider’s office with a refill request. Medication refills are processed only during regular business hours and may not be available until the next business day. Your provider may request additional information or to have a follow-up visit with you prior to refilling your medication.   *Please Note: Medication refills are assigned a new Rx number when refilled electronically. Your pharmacy may indicate that no refills were authorized even though a new prescription for the same medication is available at the pharmacy. Please request the medicine by name with the pharmacy before contacting your provider for a refill.           [email or web address] Access Code: Activation code not generated  Current [email or web address] Status: Active

## 2017-05-30 NOTE — PROGRESS NOTES
Established Patient    Tracie presents today with the following:    CC: Pap semar    HPI:     Preventative health: patient is due for a pap smear and visit today is primarily for this. Patient is now refusing pap smear. She is due for prevnar 13 vaccine as well.     Dry eyes: resolved with artificial tears.     Seizures: follows with neurology. No seizures on keppra. No longer having falls after regimen was changed.     Hypothyroidism: has been compliant with 112 mcg of synthroid daily. Denies fatigue, dry skin, constipation.     Dyslipidemia: is in need of refill of simvastatin.     Patient Active Problem List    Diagnosis Date Noted   • Bilateral dry eyes 02/13/2017   • Dementia without behavioral disturbance 02/10/2017   • Confusion 10/19/2015   • Seizure (CMS-HCC) 10/18/2015   • COPD (chronic obstructive pulmonary disease) (CMS-HCC) 10/18/2015   • Schizophrenia (CMS-HCC) 10/18/2015   • Spells of decreased attentiveness 08/28/2015   • Hypothyroid 08/28/2015   • Closed fracture of metatarsal bone 07/09/2015   • Chronic schizophrenia (CMS-HCC) 08/28/1995       Current Outpatient Prescriptions   Medication Sig Dispense Refill   • fluticasone-salmeterol (ADVAIR) 250-50 MCG/DOSE AEROSOL POWDER, BREATH ACTIVATED Inhale 1 Puff by mouth every 12 hours. 1 Inhaler 3   • artificial tears 1.4 % Solution Place 1 Drop in both eyes 2 Times a Day. 1 Bottle 3   • Diapers & Supplies Misc 1 Unknown by Does not apply route as needed. Indications: change 3 times daily diaper depend pull size large number 90 90 Unknown 3   • levothyroxine (SYNTHROID) 112 MCG Tab Take 1 Tab by mouth every morning. ON A EMPTY STOMACH 30 Tab 3   • simvastatin (ZOCOR) 40 MG Tab Take 1 Tab by mouth every bedtime. 90 Tab 2   • levetiracetam (KEPPRA) 250 MG tablet TAKE 1 TABLET BY MOUTH 3 TIMES DAILY 270 Tab 1   • ASPIR-LOW 81 MG EC tablet TAKE 1 TABLET BY MOUTH ONCE DAILY 90 Tab 2   • Calcium Carbonate-Vitamin D 600-400 MG-UNIT Tab TAKE 1 TABLET BY  "MOUTH ONCE DAILY 90 Tab 2   • escitalopram (LEXAPRO) 10 MG Tab Take 1 Tab by mouth every day. 90 Tab 0   • clozapine (CLOZARIL) 100 MG Tab Take 0.5 Tabs by mouth 2 times a day. 30 Tab    • divalproex ER (DEPAKOTE ER) 500 MG TABLET SR 24 HR Take 1 Tab by mouth 2 Times a Day. 180 Tab 0     No current facility-administered medications for this visit.       ROS: As per HPI. Additional pertinent symptoms as noted below.    All others negative    /63 mmHg  Pulse 98  Temp(Src) 36.1 °C (97 °F)  Resp 18  Ht 1.651 m (5' 5\")  Wt 76.839 kg (169 lb 6.4 oz)  BMI 28.19 kg/m2  SpO2 92%  Breastfeeding? No    Physical Exam   Constitutional:  oriented to person, place, and time. No distress.   Eyes: Pupils are equal, round, and reactive to light. No scleral icterus.  Neck: Neck supple. No thyromegaly present.   Cardiovascular: Normal rate, regular rhythm and normal heart sounds.  Exam reveals no gallop and no friction rub.  No murmur heard.  Pulmonary/Chest: Breath sounds normal. Chest wall is not dull to percussion.       Assessment and Plan    1. Chronic obstructive pulmonary disease, unspecified COPD type (CMS-HCC)  - refill of advair provided    2. Bilateral dry eyes  - resolved with artificial tears    3. Hypothyroidism, unspecified type  -  Refill of synthroid provided    4. Dyslipidemia  - refill of simvastatin provided    5. Seizure (CMS-HCC)  - follows with neurology  - no seizure episodes or falls  - EEG earlier this month  - cont keppra    6. Preventive health  - prescription for prevnar 13 provided      Followup: No Follow-up on file.      Signed by: Lavinia Martínez M.D.    "

## 2017-06-28 ENCOUNTER — TELEPHONE (OUTPATIENT)
Dept: INTERNAL MEDICINE | Facility: MEDICAL CENTER | Age: 67
End: 2017-06-28

## 2017-06-28 NOTE — TELEPHONE ENCOUNTER
1. Caller Name: Haider                       Call Back Number: [phone].    2. Message: Patient taking two inhalers Proair and Advair please advice. Patient has appointment on 07/03/2017 at 1:15 pm.    3. Patient approves office to leave a detailed voicemail/MyChart message: yes

## 2017-06-29 NOTE — TELEPHONE ENCOUNTER
Patient should be taking advair daily and proair on an as needed basis. If there is any confusion, I can discuss with patient at appointment.

## 2017-07-03 ENCOUNTER — OFFICE VISIT (OUTPATIENT)
Dept: INTERNAL MEDICINE | Facility: MEDICAL CENTER | Age: 67
End: 2017-07-03
Payer: MEDICARE

## 2017-07-03 VITALS
WEIGHT: 172.6 LBS | HEIGHT: 65 IN | BODY MASS INDEX: 28.76 KG/M2 | TEMPERATURE: 98.8 F | OXYGEN SATURATION: 94 % | SYSTOLIC BLOOD PRESSURE: 114 MMHG | DIASTOLIC BLOOD PRESSURE: 79 MMHG | HEART RATE: 100 BPM | RESPIRATION RATE: 14 BRPM

## 2017-07-03 DIAGNOSIS — J44.9 COPD MIXED TYPE (HCC): ICD-10-CM

## 2017-07-03 DIAGNOSIS — E03.9 HYPOTHYROIDISM, UNSPECIFIED TYPE: ICD-10-CM

## 2017-07-03 PROCEDURE — 99212 OFFICE O/P EST SF 10 MIN: CPT | Mod: GE | Performed by: HOSPITALIST

## 2017-07-03 RX ORDER — POLYETHYLENE GLYCOL 3350 17 G/17G
17 POWDER, FOR SOLUTION ORAL DAILY
COMMUNITY
End: 2017-07-03

## 2017-07-03 RX ORDER — LACTULOSE 10 G/15ML
20 SOLUTION ORAL 2 TIMES DAILY
COMMUNITY
End: 2017-07-03

## 2017-07-03 RX ORDER — ONDANSETRON 4 MG/1
4 TABLET, ORALLY DISINTEGRATING ORAL EVERY 8 HOURS PRN
COMMUNITY
End: 2017-07-03

## 2017-07-03 RX ORDER — ACETAMINOPHEN 500 MG
500-1000 TABLET ORAL EVERY 6 HOURS PRN
COMMUNITY
End: 2017-11-20

## 2017-07-03 RX ORDER — ALBUTEROL SULFATE 90 UG/1
2 AEROSOL, METERED RESPIRATORY (INHALATION) EVERY 6 HOURS PRN
COMMUNITY
End: 2017-07-03

## 2017-07-03 NOTE — PROGRESS NOTES
Established Patient    Tracie presents today with the following:    CC: medication reconciliation    HPI:     68 y/o F here with her  from her group home to reconcile her medications.     Constipation: patient had issues with constipation several years ago but no longer requires her PRN miralax or lactulose. She also no longer has issues with nausea and does not need her PRN zofran.    COPD: she previously had required inhalers. But it seems since she stopped smoking she no longer requires the use of her inhalers. For this reason her advair and proair have been discontinued. She denies any SOB, cough, wheezing.        Patient Active Problem List    Diagnosis Date Noted   • Bilateral dry eyes 02/13/2017   • Dementia without behavioral disturbance 02/10/2017   • Confusion 10/19/2015   • Seizure (CMS-HCC) 10/18/2015   • Schizophrenia (CMS-HCC) 10/18/2015   • Spells of decreased attentiveness 08/28/2015   • Hypothyroid 08/28/2015   • Closed fracture of metatarsal bone 07/09/2015   • Chronic schizophrenia (CMS-HCC) 08/28/1995       Current Outpatient Prescriptions   Medication Sig Dispense Refill   • acetaminophen (TYLENOL) 500 MG Tab Take 500-1,000 mg by mouth every 6 hours as needed.     • artificial tears 1.4 % Solution Place 1 Drop in both eyes 2 Times a Day. 1 Bottle 3   • Diapers & Supplies Misc 1 Unknown by Does not apply route as needed. Indications: change 3 times daily diaper depend pull size large number 90 90 Unknown 3   • levothyroxine (SYNTHROID) 112 MCG Tab Take 1 Tab by mouth every morning. ON A EMPTY STOMACH 30 Tab 3   • simvastatin (ZOCOR) 40 MG Tab Take 1 Tab by mouth every bedtime. 90 Tab 2   • levetiracetam (KEPPRA) 250 MG tablet TAKE 1 TABLET BY MOUTH 3 TIMES DAILY 270 Tab 1   • ASPIR-LOW 81 MG EC tablet TAKE 1 TABLET BY MOUTH ONCE DAILY 90 Tab 2   • Calcium Carbonate-Vitamin D 600-400 MG-UNIT Tab TAKE 1 TABLET BY MOUTH ONCE DAILY 90 Tab 2   • escitalopram (LEXAPRO) 10 MG Tab Take 1  "Tab by mouth every day. 90 Tab 0   • clozapine (CLOZARIL) 100 MG Tab Take 0.5 Tabs by mouth 2 times a day. 30 Tab    • divalproex ER (DEPAKOTE ER) 500 MG TABLET SR 24 HR Take 1 Tab by mouth 2 Times a Day. 180 Tab 0     No current facility-administered medications for this visit.       ROS: As per HPI. Additional pertinent symptoms as noted below.    All others negative    /79 mmHg  Pulse 100  Temp(Src) 37.1 °C (98.8 °F)  Resp 14  Ht 1.651 m (5' 5\")  Wt 78.291 kg (172 lb 9.6 oz)  BMI 28.72 kg/m2  SpO2 94%  Breastfeeding? No    Physical Exam   Constitutional:  oriented to person, place, and time. No distress.   Neck: Neck supple. No thyromegaly present.   Cardiovascular: Normal rate, regular rhythm and normal heart sounds.   Pulmonary/Chest: Breath sounds normal. Chest wall is not dull to percussion.         Assessment and Plan    1. COPD  - PFTs 2012: FEV1 58%  - no longer requiring advair and proair so these have been discontinued    2. Hypothyroidism, unspecified type  - she is compliant with her medication  - pt to get blood work prior to next visit  - no further issues with constipation so miralax and lactulose discontinued        Followup: Return in about 4 months (around 11/3/2017).      Signed by: Lavinia Martínez M.D.    "

## 2017-07-03 NOTE — MR AVS SNAPSHOT
"        Tracie Rinaldi   7/3/2017 1:15 PM   Office Visit   MRN: [id number]    Department:  [facility]   Dept Phone:  [phone]    Description:  Female : 1950   Provider:  Lavinia Martínez M.D.           Reason for Visit     Follow-Up review meds, Advair and pro-air inhalers.      Allergies as of 7/3/2017     No Known Allergies      You were diagnosed with     Bilateral dry eyes   [104755]       Hypothyroidism, unspecified type   [0049364]         Vital Signs     Blood Pressure Pulse Temperature Respirations Height Weight    114/79 mmHg 100 37.1 °C (98.8 °F) 14 1.651 m (5' 5\") 78.291 kg (172 lb 9.6 oz)    Body Mass Index Oxygen Saturation Breastfeeding? Smoking Status          28.72 kg/m2 94% No Former Smoker        Basic Information     Date Of Birth Sex Race Ethnicity Preferred Language    1950 Female White Non- English      Problem List              ICD-10-CM Priority Class Noted - Resolved    Closed fracture of metatarsal bone S92.309A   2015 - Present    Spells of decreased attentiveness R68.89   2015 - Present    Chronic schizophrenia (CMS-HCC) F20.9   1995 - Present    Hypothyroid E03.9   2015 - Present    Seizure (CMS-[facility]) R56.9   10/18/2015 - Present    Schizophrenia (CMS-HCC) F20.9   10/18/2015 - Present    Confusion R41.0   10/19/2015 - Present    Dementia without behavioral disturbance F03.90   2/10/2017 - Present    Bilateral dry eyes H04.123   2017 - Present      Health Maintenance        Date Due Completion Dates    PAP SMEAR 1973 ---    IMM ZOSTER VACCINE 2012 ---    IMM PNEUMOCOCCAL 65+ (ADULT) LOW/MEDIUM RISK SERIES (1 of 2 - PCV13) 2017    IMM INFLUENZA (1) 2017 10/17/2014    MAMMOGRAM 10/18/2017 10/18/2016, 9/15/2015, 2014, 2013, 2012, 2011, 2011, 2010, 3/31/2010    COLONOSCOPY 11/10/2020 11/10/2010    BONE DENSITY 2021    IMM DTaP/Tdap/Td Vaccine (2 - Td) 10/31/2022 " 10/31/2012            Current Immunizations     Influenza Vaccine Quad Inj (Preserved) 10/17/2014    Pneumococcal polysaccharide vaccine (PPSV-23) 1/22/2016    Tdap Vaccine 10/31/2012      Below and/or attached are the medications your provider expects you to take. Review all of your home medications and newly ordered medications with your provider and/or pharmacist. Follow medication instructions as directed by your provider and/or pharmacist. Please keep your medication list with you and share with your provider. Update the information when medications are discontinued, doses are changed, or new medications (including over-the-counter products) are added; and carry medication information at all times in the event of emergency situations     Allergies:  No Known Allergies          Medications  Valid as of: July 03, 2017 -  1:30 PM    Generic Name Brand Name Tablet Size Instructions for use    Acetaminophen (Tab) TYLENOL 500 MG Take 500-1,000 mg by mouth every 6 hours as needed.        Aspirin (Tablet Delayed Response) ASPIR-LOW 81 MG TAKE 1 TABLET BY MOUTH ONCE DAILY        Calcium Carbonate-Vitamin D (Tab) Calcium Carbonate-Vitamin D 600-400 MG-UNIT TAKE 1 TABLET BY MOUTH ONCE DAILY        CloZAPine (Tab) CLOZARIL 100 MG Take 0.5 Tabs by mouth 2 times a day.        Diapers & Supplies (Misc) Diapers & Supplies  1 Unknown by Does not apply route as needed. Indications: change 3 times daily diaper depend pull size large number 90        Divalproex Sodium (TABLET SR 24 HR) DEPAKOTE  MG Take 1 Tab by mouth 2 Times a Day.        Escitalopram Oxalate (Tab) LEXAPRO 10 MG Take 1 Tab by mouth every day.        LevETIRAcetam (Tab) KEPPRA 250 MG TAKE 1 TABLET BY MOUTH 3 TIMES DAILY        Levothyroxine Sodium (Tab) SYNTHROID 112 MCG Take 1 Tab by mouth every morning. ON A EMPTY STOMACH        Polyvinyl Alcohol (Solution) artificial tears 1.4 % Place 1 Drop in both eyes 2 Times a Day.        Simvastatin (Tab) ZOCOR 40 MG  Take 1 Tab by mouth every bedtime.        .                 Medicines prescribed today were sent to:     [facility] - [place], [place] - [address]    [address] [place] [address]    Phone: [phone] Fax: [phone]    Open 24 Hours?: No      Medication refill instructions:       If your prescription bottle indicates you have medication refills left, it is not necessary to call your provider’s office. Please contact your pharmacy and they will refill your medication.    If your prescription bottle indicates you do not have any refills left, you may request refills at any time through one of the following ways: The online [employer] system (except Urgent Care), by calling your provider’s office, or by asking your pharmacy to contact your provider’s office with a refill request. Medication refills are processed only during regular business hours and may not be available until the next business day. Your provider may request additional information or to have a follow-up visit with you prior to refilling your medication.   *Please Note: Medication refills are assigned a new Rx number when refilled electronically. Your pharmacy may indicate that no refills were authorized even though a new prescription for the same medication is available at the pharmacy. Please request the medicine by name with the pharmacy before contacting your provider for a refill.           [employer] Access Code: Activation code not generated  Current [employer] Status: Active

## 2017-09-29 ENCOUNTER — OFFICE VISIT (OUTPATIENT)
Dept: NEUROLOGY | Facility: MEDICAL CENTER | Age: 67
End: 2017-09-29
Payer: MEDICARE

## 2017-09-29 VITALS
SYSTOLIC BLOOD PRESSURE: 106 MMHG | DIASTOLIC BLOOD PRESSURE: 62 MMHG | HEIGHT: 65 IN | OXYGEN SATURATION: 93 % | BODY MASS INDEX: 28.46 KG/M2 | WEIGHT: 170.8 LBS | TEMPERATURE: 96.8 F | HEART RATE: 100 BPM | RESPIRATION RATE: 16 BRPM

## 2017-09-29 DIAGNOSIS — F03.90 DEMENTIA WITHOUT BEHAVIORAL DISTURBANCE, UNSPECIFIED DEMENTIA TYPE: ICD-10-CM

## 2017-09-29 DIAGNOSIS — R56.9 SEIZURE (HCC): ICD-10-CM

## 2017-09-29 DIAGNOSIS — F20.3 UNDIFFERENTIATED SCHIZOPHRENIA (HCC): ICD-10-CM

## 2017-09-29 PROCEDURE — 99214 OFFICE O/P EST MOD 30 MIN: CPT | Performed by: PSYCHIATRY & NEUROLOGY

## 2017-09-29 NOTE — PATIENT INSTRUCTIONS
[facility]     Assisted Living Facility    Phone: [phone] [place] [address]      IMPRESSION:    1. Spells of confusion, exhaustion, saliva drooling lasting for hours for one year-- seizures are likely, TIA and migraine are less likely--   Supporting evidence---Stereotyped spells, followed by exhaustion and recovery in hours-- persists-- improved with keppra---  2. Hx of schizophrenia, Hypothyrodism  3. Lives in assisted living and comes with caregivers--- the same guardian since 2000, the same group home in the past 3 years  4. Hx of Left leg infection and osteomyelitis 2015, Now right foot fracture  5. Skin infection  6. Smoking-- quit smoking since last visit  7. Dementia-- early  8. Insomnia-- advise sleep hygiene first instead of sedative medication ( the patient already had Hx of falling)    PLAN/RECOMMENDATIONS:      ________________________________________________________________________    Continue Keppra 250mg TID and Depakote 500mg BID      Will see Tracie in 6 months    Sleep Hygiene Tips  - Things you can do to promote good sleep    Maintain a regular sleep routine   • Go to bed at the same time. Wake up at the same time. Ideally, your schedule will remain the same (+/- 20 minutes) every night of the week.   Avoid naps if possible   • Naps decrease the ‘Sleep Debt’ that is so necessary for easy sleep onset.  • Each of us needs a certain amount of sleep per 24-hour period. We need that amount, and we don’t need more than that.  • When we take naps, it decreases the amount of sleep that we need the next night - which may cause sleep fragmentation and diffulty initiating sleep, and may lead to insomnia.   Don’t stay in bed awake for more than 5-10 minutes   • If you find your mind racing, or worrying about not being able to sleep during the middle of the night, get out of bed, and sit in a chair in the dark. Do your mind racing in the chair until you are sleepy, then  return to bed. No TV or internet during these periods! That will just stimulate you more than desired.  • If this happens several times during the night, that is OK. Just maintain your regular wake time, and try to avoid naps.   Don’t watch TV or read in bed.   • When you watch TV or read in bed, you associate the bed with wakefulness.     Do not drink caffeine inappropriately   • The effects of caffeine may last for several hours after ingestion. Caffeine can fragment sleep, and cause difficulty initiating sleep. If you drink caffeine, use it only before noon.  • Remember that soda and tea contain caffeine as well.   Avoid inappropriate substances that interfere with sleep   • Cigarettes, alcohol, and over-the-counter medications may cause fragmented sleep.   Exercise regularly   • Exercise before 2 pm every day. Exercise promotes continuous sleep.  • Avoid rigorous exercise before bedtime. Rigorous exercise circulates endorphins into the body which may cause difficulty initiating sleep.   Have a quiet, comfortable bedroom   • Set your bedroom thermostat at a comfortable temperature. Generally, a little cooler is better than a little warmer.  • Turn off the TV and other extraneous noise that may disrupt sleep. Background ‘white noise’ like a fan is OK.  • If your pets awaken you, keep them outside the bedroom.  • Your bedroom should be dark. Turn off bright lights.  • Have a comfortable mattress.   If you are a ‘clock watcher’ at night, hide the clock.   Have a comfortable pre-bedtime routine   • A warm bath, shower  • Meditation, or quiet time  • Avoid large meals before bedtime         Reference  https://www.sleepassociation.org/patients-general-public/insomnia/sleep-hygiene-tips/  http://www.cdc.gov/sleep/about_sleep/sleep_hygiene.htm    3/2017  cerebral atrophy. Ventricles show configuration of borderline colpocephaly with prominence of the trigones, posterior bodies of the lateral ventricles, and right  occipital horn. Similar configuration seen on the prior exam. Nonspecific, likely   developmental finding.        INTERPRETATION:        INTERPRETATION:  This EEG denotes of cortical dysfunction, mild degree with   more emphasis over the left hemisphere.  Clinical correlation may help.        ____________________________________     MD TL BETANCUR / JEAN-CLAUDE     DD:  05/13/2017 16:14:29  DT:  05/13/2017 18:32:17     D#:  [id number]  Job#:  [id number]

## 2017-09-29 NOTE — PROGRESS NOTES
NEUROLOGY NOTE    Referring Physician  [facility]      CHIEF COMPLAINT:      No falls no spells since last visit    With keppra, her spells decreased       Broke right foot  Again, her drooling spells worsened  Spells of drooling on daily basis-- spells of confusion remained  Since last visit, infection over the foot, osteomyelitis resolved but her confusion spells persisted    Chief Complaint   Patient presents with   • Follow-Up     Seizures         PRESENT ILLNESS:       No falls no spells since last visit    With keppra, her spells decreased       Broke right foot  Again, her drooling spells worsened  Spells of drooling on daily basis-- spells of confusion remained  Since last visit, infection over the foot, osteomyelitis resolved but her confusion spells persisted      With keppra, her spells decreased       Increased sleepiness and drooling spells after another fall weeks ago    Broke right foot  Again, her drooling spells worsened  Spells of drooling on daily basis-- spells of confusion remained  Since last visit, infection over the foot, osteomyelitis resolved but her confusion spells persisted    The 66 YO was noticed to have spells of confusion in the past 2 years or so    She now lives in a assisted living.  She has schizophrenia and today she came with her guardian and caregiver    During one spell, she was then sent to ER--- spells occurred again even she was not on narcotic    All these spells were stereotyped    PAST MEDICAL HISTORY:  Past Medical History:   Diagnosis Date   • Bladder spasm    • Dental disorder     upper and lower dentures   • Dermatitis    • GERD (gastroesophageal reflux disease)    • Hearing loss of right ear due to cerumen impaction    • Hyperlipidemia    • Hypothyroid    • Other emphysema (CMS-HCC)    • Pneumonia     2012   • Psychiatric disorder     schizophrenia   • Seizure disorder (CMS-HCC)     last seizure 2005   • Stroke (CMS-HCC)     tia june 2015 no residual     • Unspecified urinary incontinence     diapers    • Vitamin D deficiency        PAST SURGICAL HISTORY:  Past Surgical History:   Procedure Laterality Date   • CLOSED REDUCTION Left 7/9/2015    Procedure: CLOSED REDUCTION -ATTEMPTED;  Surgeon: Jluis Becker M.D.;  Location: [facility];  Service:    • IRRIGATION & DEBRIDEMENT ORTHO Left 10/17/2015    Procedure: IRRIGATION & DEBRIDEMENT ORTHO foot;  Surgeon: Jluis Becker M.D.;  Location: SUR[facility];  Service:    • PIN INSERTION  7/9/2015    Procedure: PIN INSERTION FOOT;  Surgeon: Jluis Becker M.D.;  Location: SUR[facility]ERY [facility];  Service:        FAMILY HISTORY:  No family history on file.    SOCIAL HISTORY:  Social History     Social History   • Marital status: Single     Spouse name: N/A   • Number of children: N/A   • Years of education: N/A     Occupational History   • Not on file.     Social History Main Topics   • Smoking status: Former Smoker     Packs/day: 0.50     Years: 45.00     Types: Cigarettes   • Smokeless tobacco: Never Used      Comment: unk   • Alcohol use No      Comment: unk   • Drug use: No      Comment: unk   • Sexual activity: Not on file     Other Topics Concern   • Not on file     Social History Narrative   • No narrative on file     ALLERGIES:  No Known Allergies  TOBHX  History   Smoking Status   • Former Smoker   • Packs/day: 0.50   • Years: 45.00   • Types: Cigarettes   Smokeless Tobacco   • Never Used     Comment: unk     ALCHX  History   Alcohol Use No     Comment: unk     DRUGHX  History   Drug Use No     Comment: unk           MEDICATIONS:  Current Outpatient Prescriptions   Medication   • ASPIR-LOW 81 MG EC tablet   • Calcium Carbonate-Vitamin D 600-400 MG-UNIT Tab   • acetaminophen (TYLENOL) 500 MG Tab   • artificial tears 1.4 % Solution   • Diapers & Supplies Misc   • levothyroxine (SYNTHROID) 112 MCG Tab   • simvastatin (ZOCOR) 40 MG Tab   • levetiracetam (KEPPRA) 250 MG tablet   •  escitalopram (LEXAPRO) 10 MG Tab   • clozapine (CLOZARIL) 100 MG Tab   • divalproex ER (DEPAKOTE ER) 500 MG TABLET SR 24 HR     No current facility-administered medications for this visit.        REVIEW OF SYSTEM:    Constitutional: Denies fevers, Denies weight changes   Eyes: Denies changes in vision, no eye pain   Ears/Nose/Throat/Mouth: Denies nasal congestion or sore throat   Cardiovascular: Denies chest pain or palpitations   Respiratory: Denies SOB.   Gastrointestinal/Hepatic: Denies abdominal pain, nausea, vomiting, diarrhea, constipation or GI bleeding   Genitourinary: Denies bladder dysfunction, dysuria or frequency   Musculoskeletal/Rheum: Denies joint pain and swelling   Skin/Breast: Denies rash, denies breast lumps or discharge   Neurological: spells of confusion  Psychiatric: Schizophrenia  Endocrine: Hypothyrodism  Heme/Oncology/Lymph Nodes: Denies enlarged lymph nodes, denies brusing or known bleeding disorder   Allergic/Immunologic: Denies hx of allergies         PHYSICAL AND NEUROLOGICAL EXMAINATIONS:  VITAL SIGNS: There were no vitals taken for this visit.  CURRENT WEIGHT:   BMI: There is no height or weight on file to calculate BMI.  PREVIOUS WEIGHTS:  Wt Readings from Last 25 Encounters:   07/03/17 78.3 kg (172 lb 9.6 oz)   05/30/17 76.8 kg (169 lb 6.4 oz)   02/13/17 75.1 kg (165 lb 9.6 oz)   02/10/17 75 kg (165 lb 6.4 oz)   10/07/16 74.4 kg (164 lb)   08/23/16 68 kg (150 lb)   07/07/16 68 kg (150 lb)   05/02/16 70.3 kg (155 lb)   04/16/16 72.6 kg (160 lb)   04/11/16 70.3 kg (155 lb)   12/14/15 70.3 kg (155 lb)   11/28/15 65.8 kg (145 lb)   11/23/15 68 kg (150 lb)   10/17/15 64.9 kg (143 lb 1.3 oz)   08/28/15 71.7 kg (158 lb)   07/09/15 74.4 kg (164 lb 0.4 oz)   06/24/15 80.7 kg (178 lb)   05/16/15 80.7 kg (178 lb)       General appearance of patient: WDWN(+) NAD(+)    EYES  o Fundus : Papilledem(-) Exudates(-) Hemorrhage(-)  Nervous System  Orientation to time, place and person(+)  Memory  normal(-) recall 2/3  Language: aphasia(-)  Knowledge: past(+) Current(+)  Attention(+)  Cranial Nerves  • Nerve 2: intact  • Nerve 3,4,6: intact  • Nerve 5 : intact  • Nerve 7: intact  • Nerve 8: intact  • Nerve 9 & 10: intact  • Nerve 11: intact  • Nerve 12: intact  Muscle Power and muscle tone: symmetric, normal in upper and lower  Sensory System: Pin sensation intact(+)  Reflexes: symmetric throughout  Cerebellar Function FNP normal   Gait : walk with walker--- after left ankle fracture  Heart and Vascular  Peripheral Vasucular system : Edema (-) Swelling(-)  RHB, Breathing sound clear  abdomen bowel sound normoactive  Extremities freely moveable  Joints no contracture     Right ankle fracture-- fell in the bed room    Luba her Guardian is calling and she was told to contact when the pt is having her episodes of confusion. She had on today that has lasted a couple of hours. Please advise. Thank you. WILLIE  Keppra 250mg before sleep for one week  Then up to Keppra 250mg two times per day since the second week    And will see  Order given. YP    Called and LM with going places that the Rx has been ordered. They will give it to either Luba or Kassy who is taking care of Tracie. WILLIE           Stable since last visit  Caregivers noticed that she was given the wrong dose of Clozapine--- was given higher dose-- which could trigger the last seizure  Caregivers 2 came together with her--- they knew her in the last one year  Orientated to place, year and month and person    Recall 3/3    [facility]     Assisted Living Facility    Phone: [phone] [facility] [phone]      IMPRESSION:    1. Spells of confusion, exhaustion, saliva drooling lasting for hours for one year-- seizures are likely, TIA and migraine are less likely--   Supporting evidence---Stereotyped spells, followed by exhaustion and recovery in hours-- persists-- improved with keppra---  2. Hx of schizophrenia, Hypothyrodism  3.  Lives in assisted living and comes with caregivers--- the same guardian since 2000, the same group home in the past 3 years  4. Hx of Left leg infection and osteomyelitis 2015, Now right foot fracture  5. Skin infection  6. Smoking-- quit smoking since last visit  7. Dementia-- early  8. Insomnia-- advise sleep hygiene first instead of sedative medication ( the patient already had Hx of falling)    PLAN/RECOMMENDATIONS:      ________________________________________________________________________    Continue Keppra 250mg TID and Depakote 500mg BID      Will see Tracie in 6 months    Sleep Hygiene Tips  - Things you can do to promote good sleep    Maintain a regular sleep routine   • Go to bed at the same time. Wake up at the same time. Ideally, your schedule will remain the same (+/- 20 minutes) every night of the week.   Avoid naps if possible   • Naps decrease the ‘Sleep Debt’ that is so necessary for easy sleep onset.  • Each of us needs a certain amount of sleep per 24-hour period. We need that amount, and we don’t need more than that.  • When we take naps, it decreases the amount of sleep that we need the next night - which may cause sleep fragmentation and diffulty initiating sleep, and may lead to insomnia.   Don’t stay in bed awake for more than 5-10 minutes   • If you find your mind racing, or worrying about not being able to sleep during the middle of the night, get out of bed, and sit in a chair in the dark. Do your mind racing in the chair until you are sleepy, then return to bed. No TV or internet during these periods! That will just stimulate you more than desired.  • If this happens several times during the night, that is OK. Just maintain your regular wake time, and try to avoid naps.   Don’t watch TV or read in bed.   • When you watch TV or read in bed, you associate the bed with wakefulness.     Do not drink caffeine inappropriately   • The effects of caffeine may last for several hours after  ingestion. Caffeine can fragment sleep, and cause difficulty initiating sleep. If you drink caffeine, use it only before noon.  • Remember that soda and tea contain caffeine as well.   Avoid inappropriate substances that interfere with sleep   • Cigarettes, alcohol, and over-the-counter medications may cause fragmented sleep.   Exercise regularly   • Exercise before 2 pm every day. Exercise promotes continuous sleep.  • Avoid rigorous exercise before bedtime. Rigorous exercise circulates endorphins into the body which may cause difficulty initiating sleep.   Have a quiet, comfortable bedroom   • Set your bedroom thermostat at a comfortable temperature. Generally, a little cooler is better than a little warmer.  • Turn off the TV and other extraneous noise that may disrupt sleep. Background ‘white noise’ like a fan is OK.  • If your pets awaken you, keep them outside the bedroom.  • Your bedroom should be dark. Turn off bright lights.  • Have a comfortable mattress.   If you are a ‘clock watcher’ at night, hide the clock.   Have a comfortable pre-bedtime routine   • A warm bath, shower  • Meditation, or quiet time  • Avoid large meals before bedtime         Reference  https://www.sleepassociation.org/patients-general-public/insomnia/sleep-hygiene-tips/  http://www.cdc.gov/sleep/about_sleep/sleep_hygiene.htm    3/2017  cerebral atrophy. Ventricles show configuration of borderline colpocephaly with prominence of the trigones, posterior bodies of the lateral ventricles, and right occipital horn. Similar configuration seen on the prior exam. Nonspecific, likely   developmental finding.        INTERPRETATION:        INTERPRETATION:  This EEG denotes of cortical dysfunction, mild degree with   more emphasis over the left hemisphere.  Clinical correlation may help.        ____________________________________     MD TL BETANCUR / JEAN-CLAUDE     DD:  05/13/2017 16:14:29  DT:  05/13/2017 18:32:17     D#:  [id number]  Job#:   [id number]

## 2017-10-02 DIAGNOSIS — E03.9 HYPOTHYROIDISM, UNSPECIFIED TYPE: ICD-10-CM

## 2017-10-02 RX ORDER — LEVETIRACETAM 250 MG/1
TABLET ORAL
Qty: 270 TAB | Refills: 1 | Status: SHIPPED | OUTPATIENT
Start: 2017-10-02 | End: 2018-05-14 | Stop reason: SDUPTHER

## 2017-10-02 RX ORDER — LEVOTHYROXINE SODIUM 112 UG/1
TABLET ORAL
Qty: 90 TAB | Refills: 0 | Status: SHIPPED | OUTPATIENT
Start: 2017-10-02 | End: 2017-12-26 | Stop reason: SDUPTHER

## 2017-10-02 NOTE — TELEPHONE ENCOUNTER
Was the patient seen in the last year in this department? Yes   9/29/17    Does patient have an active prescription for medications requested? Yes     Received Request Via: Pharmacy     Next scheduled follow up appointment: 4/2/18

## 2017-10-02 NOTE — TELEPHONE ENCOUNTER
Last seen: 07/03/17 by Dr. Martínez  Next appt: 11/20/17 with Dr. Martínez    Was the patient seen in the last year in this department? Yes   Does patient have an active prescription for medications requested? No   Received Request Via: Pharmacy

## 2017-10-20 ENCOUNTER — HOSPITAL ENCOUNTER (OUTPATIENT)
Dept: RADIOLOGY | Facility: MEDICAL CENTER | Age: 67
End: 2017-10-20
Attending: INTERNAL MEDICINE
Payer: MEDICARE

## 2017-10-20 DIAGNOSIS — Z12.31 SCREENING MAMMOGRAM, ENCOUNTER FOR: ICD-10-CM

## 2017-10-20 PROCEDURE — G0202 SCR MAMMO BI INCL CAD: HCPCS

## 2017-11-20 ENCOUNTER — OFFICE VISIT (OUTPATIENT)
Dept: INTERNAL MEDICINE | Facility: MEDICAL CENTER | Age: 67
End: 2017-11-20
Payer: MEDICARE

## 2017-11-20 VITALS
BODY MASS INDEX: 28.39 KG/M2 | OXYGEN SATURATION: 90 % | RESPIRATION RATE: 16 BRPM | WEIGHT: 170.4 LBS | HEIGHT: 65 IN | DIASTOLIC BLOOD PRESSURE: 68 MMHG | HEART RATE: 100 BPM | TEMPERATURE: 96.4 F | SYSTOLIC BLOOD PRESSURE: 124 MMHG

## 2017-11-20 DIAGNOSIS — E03.9 HYPOTHYROIDISM, UNSPECIFIED TYPE: ICD-10-CM

## 2017-11-20 DIAGNOSIS — R56.9 SEIZURE (HCC): ICD-10-CM

## 2017-11-20 DIAGNOSIS — H04.123 BILATERAL DRY EYES: ICD-10-CM

## 2017-11-20 DIAGNOSIS — J44.9 COPD MIXED TYPE (HCC): ICD-10-CM

## 2017-11-20 PROCEDURE — 99214 OFFICE O/P EST MOD 30 MIN: CPT | Mod: GC | Performed by: INTERNAL MEDICINE

## 2017-11-20 RX ORDER — POLYVINYL ALCOHOL 14 MG/ML
1 SOLUTION/ DROPS OPHTHALMIC PRN
Qty: 1 BOTTLE | Refills: 2 | Status: SHIPPED | OUTPATIENT
Start: 2017-11-20 | End: 2018-04-11

## 2017-11-20 NOTE — PROGRESS NOTES
Established Patient    Tracie presents today with the following:    CC: Dry eyes    HPI:     67-year-old female presenting with:    Dry eyes: She has been using these eyedrops regularly which has resulted in marked improvement. As she is living in an assisted living facility with caregivers this is given to a scheduled basis. She would like to make this a when necessary medication at this time.    COPD: She has quit smoking several months ago and at that time had endorsed no longer needing her inhalers. There were discontinued at her last visit. She has had no shortness of breath, dyspnea, cough without her inhalers. She has continued to abstain from smoking.    Hypothyroidism: She is compliant with her medications. She denies any constipation or dry skin.    Seizure disorder: She follows up with neurology and was last seen in September. She notes improvement of her drooling spells and confused state with her Depakote and Keppra.    Preventative health: She is not interested in both the flu vaccination or a Pap smear.    Patient Active Problem List    Diagnosis Date Noted   • COPD mixed type (CMS-HCC) 07/03/2017   • Bilateral dry eyes 02/13/2017   • Dementia without behavioral disturbance 02/10/2017   • Seizure (CMS-HCC) 10/18/2015   • Schizophrenia (CMS-HCC) 10/18/2015   • Spells of decreased attentiveness 08/28/2015   • Hypothyroid 08/28/2015   • Chronic schizophrenia (CMS-HCC) 08/28/1995       Current Outpatient Prescriptions   Medication Sig Dispense Refill   • artificial tears 1.4 % Solution Place 1 Drop in both eyes as needed. 1 Bottle 2   • levothyroxine (SYNTHROID) 112 MCG Tab TAKE 1 TABLET BY MOUTH EVERY MORNING ON AN EMPTY STOMACH 90 Tab 0   • levetiracetam (KEPPRA) 250 MG tablet TAKE 1 TABLET BY MOUTH 3 TIMES DAILY 270 Tab 1   • ASPIR-LOW 81 MG EC tablet TAKE 1 TABLET BY MOUTH ONCE DAILY 90 Tab 2   • Calcium Carbonate-Vitamin D 600-400 MG-UNIT Tab TAKE 1 TABLET BY MOUTH ONCE DAILY 90 Tab 2   •  "simvastatin (ZOCOR) 40 MG Tab Take 1 Tab by mouth every bedtime. 90 Tab 2   • escitalopram (LEXAPRO) 10 MG Tab Take 1 Tab by mouth every day. 90 Tab 0   • divalproex ER (DEPAKOTE ER) 500 MG TABLET SR 24 HR Take 1 Tab by mouth 2 Times a Day. 180 Tab 0   • Diapers & Supplies Misc 1 Unknown by Does not apply route as needed. Indications: change 3 times daily diaper depend pull size large number 90 90 Unknown 3   • clozapine (CLOZARIL) 100 MG Tab Take 0.5 Tabs by mouth 2 times a day. 30 Tab      No current facility-administered medications for this visit.        ROS: As per HPI. Additional pertinent symptoms as noted below.    All others negative    /68   Pulse 100   Temp (!) 35.8 °C (96.4 °F)   Resp 16   Ht 1.651 m (5' 5\")   Wt 77.3 kg (170 lb 6.4 oz)   SpO2 90%   BMI 28.36 kg/m²     Physical Exam   Constitutional:  oriented to person, place, and time. No distress.   Eyes: Pupils are equal, round, and reactive to light. No scleral icterus.  Neck: Neck supple. No thyromegaly present.   Cardiovascular: Normal rate, regular rhythm and normal heart sounds.  No murmur heard.  Pulmonary/Chest: Breath sounds normal. No crackles or wheezing.  Musculoskeletal:   no edema.   Neurological: alert and oriented to person, place, and time.   Skin: No cyanosis. Nails show no clubbing.    Note: I have reviewed all pertinent labs and diagnostic tests associated with this visit with specific comments listed under the assessment and plan below    Assessment and Plan    1. Bilateral dry eyes  - Patient would like to start taking her drops when necessary instead of daily  - She lives in assisted living and has caregivers. So medication changed and signed on paperwork to make this when necessary.    2. Hypothyroidism, unspecified type  - Compliant with her medication  - No refills needed at this time. We'll get blood work prior to next visit    3. Seizure (CMS-HCC)  - Last saw Dr. Larry in September  - Doing well on Keppra and " Depakote    4. COPD  - Inhalers were discontinued at last visit. Continuing to not smoke.  - No issues with breathing without inhalers  - We'll continue to monitor    Followup: Return in about 6 months (around 5/20/2018).      Signed by: Lavinia Martínez M.D.

## 2017-12-08 ENCOUNTER — APPOINTMENT (OUTPATIENT)
Dept: RADIOLOGY | Facility: MEDICAL CENTER | Age: 67
End: 2017-12-08
Attending: EMERGENCY MEDICINE
Payer: MEDICARE

## 2017-12-08 ENCOUNTER — HOSPITAL ENCOUNTER (EMERGENCY)
Facility: MEDICAL CENTER | Age: 67
End: 2017-12-09
Attending: EMERGENCY MEDICINE
Payer: MEDICARE

## 2017-12-08 DIAGNOSIS — R53.1 WEAKNESS: ICD-10-CM

## 2017-12-08 DIAGNOSIS — F03.91 DEMENTIA WITH BEHAVIORAL DISTURBANCE, UNSPECIFIED DEMENTIA TYPE: ICD-10-CM

## 2017-12-08 LAB
ALBUMIN SERPL BCP-MCNC: 3.5 G/DL (ref 3.2–4.9)
ALBUMIN/GLOB SERPL: 1.5 G/DL
ALP SERPL-CCNC: 67 U/L (ref 30–99)
ALT SERPL-CCNC: 9 U/L (ref 2–50)
ANION GAP SERPL CALC-SCNC: 7 MMOL/L (ref 0–11.9)
AST SERPL-CCNC: 12 U/L (ref 12–45)
BASOPHILS # BLD AUTO: 0.6 % (ref 0–1.8)
BASOPHILS # BLD: 0.04 K/UL (ref 0–0.12)
BILIRUB SERPL-MCNC: 0.4 MG/DL (ref 0.1–1.5)
BNP SERPL-MCNC: 25 PG/ML (ref 0–100)
BUN SERPL-MCNC: 9 MG/DL (ref 8–22)
CALCIUM SERPL-MCNC: 9.5 MG/DL (ref 8.5–10.5)
CHLORIDE SERPL-SCNC: 103 MMOL/L (ref 96–112)
CO2 SERPL-SCNC: 24 MMOL/L (ref 20–33)
CREAT SERPL-MCNC: 0.76 MG/DL (ref 0.5–1.4)
EOSINOPHIL # BLD AUTO: 0.01 K/UL (ref 0–0.51)
EOSINOPHIL NFR BLD: 0.1 % (ref 0–6.9)
ERYTHROCYTE [DISTWIDTH] IN BLOOD BY AUTOMATED COUNT: 43.1 FL (ref 35.9–50)
GFR SERPL CREATININE-BSD FRML MDRD: >60 ML/MIN/1.73 M 2
GLOBULIN SER CALC-MCNC: 2.4 G/DL (ref 1.9–3.5)
GLUCOSE SERPL-MCNC: 106 MG/DL (ref 65–99)
HCT VFR BLD AUTO: 38.4 % (ref 37–47)
HGB BLD-MCNC: 12.6 G/DL (ref 12–16)
IMM GRANULOCYTES # BLD AUTO: 0.05 K/UL (ref 0–0.11)
IMM GRANULOCYTES NFR BLD AUTO: 0.7 % (ref 0–0.9)
LYMPHOCYTES # BLD AUTO: 1.86 K/UL (ref 1–4.8)
LYMPHOCYTES NFR BLD: 26.8 % (ref 22–41)
MCH RBC QN AUTO: 27.4 PG (ref 27–33)
MCHC RBC AUTO-ENTMCNC: 32.8 G/DL (ref 33.6–35)
MCV RBC AUTO: 83.5 FL (ref 81.4–97.8)
MONOCYTES # BLD AUTO: 1.02 K/UL (ref 0–0.85)
MONOCYTES NFR BLD AUTO: 14.7 % (ref 0–13.4)
NEUTROPHILS # BLD AUTO: 3.95 K/UL (ref 2–7.15)
NEUTROPHILS NFR BLD: 57.1 % (ref 44–72)
NRBC # BLD AUTO: 0 K/UL
NRBC BLD AUTO-RTO: 0 /100 WBC
PLATELET # BLD AUTO: 208 K/UL (ref 164–446)
PMV BLD AUTO: 11.4 FL (ref 9–12.9)
POTASSIUM SERPL-SCNC: 4 MMOL/L (ref 3.6–5.5)
PROT SERPL-MCNC: 5.9 G/DL (ref 6–8.2)
RBC # BLD AUTO: 4.6 M/UL (ref 4.2–5.4)
SODIUM SERPL-SCNC: 134 MMOL/L (ref 135–145)
TROPONIN I SERPL-MCNC: <0.01 NG/ML (ref 0–0.04)
WBC # BLD AUTO: 6.9 K/UL (ref 4.8–10.8)

## 2017-12-08 PROCEDURE — 84484 ASSAY OF TROPONIN QUANT: CPT

## 2017-12-08 PROCEDURE — 71010 DX-CHEST-PORTABLE (1 VIEW): CPT

## 2017-12-08 PROCEDURE — 99284 EMERGENCY DEPT VISIT MOD MDM: CPT

## 2017-12-08 PROCEDURE — 85025 COMPLETE CBC W/AUTO DIFF WBC: CPT

## 2017-12-08 PROCEDURE — 80053 COMPREHEN METABOLIC PANEL: CPT

## 2017-12-08 PROCEDURE — 83880 ASSAY OF NATRIURETIC PEPTIDE: CPT

## 2017-12-08 ASSESSMENT — LIFESTYLE VARIABLES: DO YOU DRINK ALCOHOL: NO

## 2017-12-08 ASSESSMENT — PAIN SCALES - GENERAL: PAINLEVEL_OUTOF10: 0

## 2017-12-09 ENCOUNTER — PATIENT OUTREACH (OUTPATIENT)
Dept: HEALTH INFORMATION MANAGEMENT | Facility: OTHER | Age: 67
End: 2017-12-09

## 2017-12-09 VITALS
BODY MASS INDEX: 26.52 KG/M2 | HEIGHT: 66 IN | DIASTOLIC BLOOD PRESSURE: 88 MMHG | WEIGHT: 165 LBS | HEART RATE: 78 BPM | TEMPERATURE: 98.1 F | SYSTOLIC BLOOD PRESSURE: 123 MMHG | OXYGEN SATURATION: 95 % | RESPIRATION RATE: 18 BRPM

## 2017-12-09 NOTE — DISCHARGE PLANNING
4/16/17 SW note indicate pt resides at [facility].    TC to [facility] ([phone]), LM requesting call back.    TC to [facility] LM for on-call guardian, requesting call back.

## 2017-12-09 NOTE — DISCHARGE INSTRUCTIONS
Weakness  Weakness is a lack of strength. It may be felt all over the body (generalized) or in one specific part of the body (focal). Some causes of weakness can be serious. You may need further medical evaluation, especially if you are elderly or you have a history of immunosuppression (such as chemotherapy or HIV), kidney disease, heart disease, or diabetes.  CAUSES   Weakness can be caused by many different things, including:  · Infection.  · Physical exhaustion.  · Internal bleeding or other blood loss that results in a lack of red blood cells (anemia).  · Dehydration. This cause is more common in elderly people.  · Side effects or electrolyte abnormalities from medicines, such as pain medicines or sedatives.  · Emotional distress, anxiety, or depression.  · Circulation problems, especially severe peripheral arterial disease.  · Heart disease, such as rapid atrial fibrillation, bradycardia, or heart failure.  · Nervous system disorders, such as Guillain-Barré syndrome, multiple sclerosis, or stroke.  DIAGNOSIS   To find the cause of your weakness, your caregiver will take your history and perform a physical exam. Lab tests or X-rays may also be ordered, if needed.  TREATMENT   Treatment of weakness depends on the cause of your symptoms and can vary greatly.  HOME CARE INSTRUCTIONS   · Rest as needed.  · Eat a well-balanced diet.  · Try to get some exercise every day.  · Only take over-the-counter or prescription medicines as directed by your caregiver.  SEEK MEDICAL CARE IF:   · Your weakness seems to be getting worse or spreads to other parts of your body.  · You develop new aches or pains.  SEEK IMMEDIATE MEDICAL CARE IF:   · You cannot perform your normal daily activities, such as getting dressed and feeding yourself.  · You cannot walk up and down stairs, or you feel exhausted when you do so.  · You have shortness of breath or chest pain.  · You have difficulty moving parts of your body.  · You have weakness  in only one area of the body or on only one side of the body.  · You have a fever.  · You have trouble speaking or swallowing.  · You cannot control your bladder or bowel movements.  · You have black or bloody vomit or stools.  MAKE SURE YOU:  · Understand these instructions.  · Will watch your condition.  · Will get help right away if you are not doing well or get worse.     This information is not intended to replace advice given to you by your health care provider. Make sure you discuss any questions you have with your health care provider.     Document Released: 12/18/2006 Document Revised: 06/18/2013 Document Reviewed: 02/15/2013  [employer] Interactive Patient Education ©2016 [employer]

## 2017-12-09 NOTE — ED NOTES
Pt lives at a group home and reported she was acting strange and split water and tip toeing around the room.  Pt today is lethargic, sleeping during interview, pt has pinpoint pupils. AAOx4, denies pain.

## 2017-12-09 NOTE — ED NOTES
Pt to bed discharged to assisted living facility, SW contacted to facilitate transfer.  Pt understands poc

## 2017-12-09 NOTE — DISCHARGE PLANNING
Spoke with Magaly with [facility] ([phone]). Pt lives in a [facility] group home, asked that pt be cabbed back to [facility], no staff available for .    Pt lives at [facility] @ [address] [place] [address].

## 2017-12-09 NOTE — ED NOTES
Pt has ride to group home set up through [facility].  Pt to lobby waiting for ride.  Pt is a/o to baseline, stands with a steady gait, verbalizes understanding of dc instructions

## 2017-12-09 NOTE — ED PROVIDER NOTES
ED Provider Note    Scribed for Dr. Varghese Martin M.D. by Wei Shaw. 12/8/2017  6:08 PM    Primary care provider: Lavinia Martínez M.D.  Means of arrival: EMS  History obtained from: Patient  History limited by: None    CHIEF COMPLAINT  Chief Complaint   Patient presents with   • Weakness     possible benzo overdose     HPI  Tracie Rinaldi is a 67 y.o. female who presents to the Emergency Department complaining of weakness onset 3 days ago with associated loss of appetite and low fluid intake. Last night, nurses at her group home said she was acting strange last night after she dropped a cup of water on the ground and began tip toeing around the room. Patient is medicated regularly with Klonopin in the group home which she says makes her drool when she takes it in high dosages. She confirms a history of COPD, anxiety, dementia, and seizure disorder.   Patient has also had a chronic cough for a prolonged period of time and quit smoking 3 years ago.   Patient denies sore throat.    REVIEW OF SYSTEMS  Pertinent positives include weakness, loss of appetite, low fluid intake, abnormal behavior. Pertinent negatives include no sore throat. As above, all other systems reviewed and are negative. C.  See HPI for further details.     PAST MEDICAL HISTORY   has a past medical history of Bladder spasm; Dental disorder; Dermatitis; GERD (gastroesophageal reflux disease); Hearing loss of right ear due to cerumen impaction; Hyperlipidemia; Hypothyroid; Other emphysema (CMS-HCC); Pneumonia; Psychiatric disorder; Seizure disorder (CMS-HCC); Stroke (CMS-HCC); Unspecified urinary incontinence; and Vitamin D deficiency.    SURGICAL HISTORY   has a past surgical history that includes closed reduction (Left, 7/9/2015); pin insertion (7/9/2015); and irrigation & debridement ortho (Left, 10/17/2015).    SOCIAL HISTORY  Social History   Substance Use Topics   • Smoking status: Former Smoker     Packs/day: 0.50     Years: 45.00      "Types: Cigarettes   • Smokeless tobacco: Never Used      Comment: unk   • Alcohol use No      Comment: unk      History   Drug Use No     Comment: unk       FAMILY HISTORY  No pertinent past family history.    CURRENT MEDICATIONS    Current Outpatient Prescriptions on File Prior to Encounter   Medication Sig Dispense Refill   • artificial tears 1.4 % Solution Place 1 Drop in both eyes as needed. 1 Bottle 2   • levothyroxine (SYNTHROID) 112 MCG Tab TAKE 1 TABLET BY MOUTH EVERY MORNING ON AN EMPTY STOMACH 90 Tab 0   • levetiracetam (KEPPRA) 250 MG tablet TAKE 1 TABLET BY MOUTH 3 TIMES DAILY 270 Tab 1   • ASPIR-LOW 81 MG EC tablet TAKE 1 TABLET BY MOUTH ONCE DAILY 90 Tab 2   • Calcium Carbonate-Vitamin D 600-400 MG-UNIT Tab TAKE 1 TABLET BY MOUTH ONCE DAILY 90 Tab 2   • Diapers & Supplies Misc 1 Unknown by Does not apply route as needed. Indications: change 3 times daily diaper depend pull size large number 90 90 Unknown 3   • simvastatin (ZOCOR) 40 MG Tab Take 1 Tab by mouth every bedtime. 90 Tab 2   • escitalopram (LEXAPRO) 10 MG Tab Take 1 Tab by mouth every day. 90 Tab 0   • clozapine (CLOZARIL) 100 MG Tab Take 0.5 Tabs by mouth 2 times a day. 30 Tab    • divalproex ER (DEPAKOTE ER) 500 MG TABLET SR 24 HR Take 1 Tab by mouth 2 Times a Day. 180 Tab 0      ALLERGIES  No Known Allergies    PHYSICAL EXAM  VITAL SIGNS: /79   Pulse 82   Temp 36.7 °C (98.1 °F)   Resp 16   Ht 1.676 m (5' 6\")   Wt 74.8 kg (165 lb)   SpO2 99%   BMI 26.63 kg/m²     Constitutional: Well developed, Well nourished, no distress. Drowsy.  HENT: Normocephalic, Atraumatic, Bilateral external ears normal, Dry mouth, No oral exudates.   Eyes: PERRLA, EOMI, Conjunctiva normal, No discharge.   Neck: No tenderness, Supple, No stridor.   Lymphatic: No lymphadenopathy noted.   Cardiovascular: Normal heart rate, Normal rhythm.   Thorax & Lungs: No respiratory distress, No wheezing, No crackles. Gurgling respirations.   Abdomen: Soft, No " tenderness, No masses, No pulsatile masses.   Skin: Warm, Dry, No erythema, No rash.   Extremities:, No edema No cyanosis.   Musculoskeletal: No tenderness to palpation or major deformities noted.  Intact distal pulses  Neurologic: Awake. Moves all extremities spontaneously. Not oriented to time.  Psychiatric: Affect normal, Judgment normal, Mood normal.     LABS  Results for orders placed or performed during the hospital encounter of 12/08/17   CBC WITH DIFFERENTIAL   Result Value Ref Range    WBC 6.9 4.8 - 10.8 K/uL    RBC 4.60 4.20 - 5.40 M/uL    Hemoglobin 12.6 12.0 - 16.0 g/dL    Hematocrit 38.4 37.0 - 47.0 %    MCV 83.5 81.4 - 97.8 fL    MCH 27.4 27.0 - 33.0 pg    MCHC 32.8 (L) 33.6 - 35.0 g/dL    RDW 43.1 35.9 - 50.0 fL    Platelet Count 208 164 - 446 K/uL    MPV 11.4 9.0 - 12.9 fL    Neutrophils-Polys 57.10 44.00 - 72.00 %    Lymphocytes 26.80 22.00 - 41.00 %    Monocytes 14.70 (H) 0.00 - 13.40 %    Eosinophils 0.10 0.00 - 6.90 %    Basophils 0.60 0.00 - 1.80 %    Immature Granulocytes 0.70 0.00 - 0.90 %    Nucleated RBC 0.00 /100 WBC    Neutrophils (Absolute) 3.95 2.00 - 7.15 K/uL    Lymphs (Absolute) 1.86 1.00 - 4.80 K/uL    Monos (Absolute) 1.02 (H) 0.00 - 0.85 K/uL    Eos (Absolute) 0.01 0.00 - 0.51 K/uL    Baso (Absolute) 0.04 0.00 - 0.12 K/uL    Immature Granulocytes (abs) 0.05 0.00 - 0.11 K/uL    NRBC (Absolute) 0.00 K/uL   COMP METABOLIC PANEL   Result Value Ref Range    Sodium 134 (L) 135 - 145 mmol/L    Potassium 4.0 3.6 - 5.5 mmol/L    Chloride 103 96 - 112 mmol/L    Co2 24 20 - 33 mmol/L    Anion Gap 7.0 0.0 - 11.9    Glucose 106 (H) 65 - 99 mg/dL    Bun 9 8 - 22 mg/dL    Creatinine 0.76 0.50 - 1.40 mg/dL    Calcium 9.5 8.5 - 10.5 mg/dL    AST(SGOT) 12 12 - 45 U/L    ALT(SGPT) 9 2 - 50 U/L    Alkaline Phosphatase 67 30 - 99 U/L    Total Bilirubin 0.4 0.1 - 1.5 mg/dL    Albumin 3.5 3.2 - 4.9 g/dL    Total Protein 5.9 (L) 6.0 - 8.2 g/dL    Globulin 2.4 1.9 - 3.5 g/dL    A-G Ratio 1.5 g/dL   BTYPE  NATRIURETIC PEPTIDE   Result Value Ref Range    B Natriuretic Peptide 25 0 - 100 pg/mL   TROPONIN   Result Value Ref Range    Troponin I <0.01 0.00 - 0.04 ng/mL   ESTIMATED GFR   Result Value Ref Range    GFR If African American >60 >60 mL/min/1.73 m 2    GFR If Non African American >60 >60 mL/min/1.73 m 2      All labs reviewed by me.    RADIOLOGY  DX-CHEST-PORTABLE (1 VIEW)   Final Result      1.  No acute cardiopulmonary disease.   2.  No significant change from prior exam.        The radiologist's interpretation of all radiological studies have been reviewed by me.    COURSE & MEDICAL DECISION MAKING  Pertinent Labs & Imaging studies reviewed. (See chart for details)    Review of past medical records showed a history of spells of confusion and drooling on a daily basis.    6:08 PM - Patient seen and examined at bedside. Her symptoms are likely due to a high dosage of one of the medications that she takes regularly, suspected to be Klonopin or Keppra. Ordered DX chest, urinalysis, BNP, troponin, CBC, and CMP to evaluate her symptoms. The differential diagnoses include but are not limited to: oversedation, sepsis, pneumonia.    8:55 PM Re-check: Patient is much more awake since last evaluation and is stable for discharge home at this time. Discussed return precautions with the patient and she will return for any new or worsening symptoms. She agrees to the plan of care.    Decision Making:  Patient does not appear to be having any acute medical condition at this time will be discharged back to her group home    The patient will return for new or worsening symptoms and is stable at the time of discharge.    The patient is referred to a primary physician for blood pressure management, diabetic screening, and for all other preventative health concerns.    DISPOSITION:  Patient will be discharged home in stable condition.    FINAL IMPRESSION  1. Weakness    2. Dementia with behavioral disturbance, unspecified dementia  type          I, Wei Shaw (Scribmickey), am scribing for, and in the presence of, Varghese Martin M.D..    Electronically signed by: Wei Shaw (Reina), 12/8/2017    IVarghese M.D. personally performed the services described in this documentation, as scribed by Wei Shaw in my presence, and it is both accurate and complete.    The note accurately reflects work and decisions made by me.  Varghese Martin  12/10/2017  1:21 AM

## 2017-12-09 NOTE — ED NOTES
Assumed care of patient from Betzaida RN.  Patient cleaned up, sheets changed.  Pt has caregiver at bedside

## 2017-12-10 ENCOUNTER — PATIENT OUTREACH (OUTPATIENT)
Dept: HEALTH INFORMATION MANAGEMENT | Facility: OTHER | Age: 67
End: 2017-12-10

## 2017-12-10 NOTE — PROGRESS NOTES
Chart reviewed.  Pt was discharged from [facility] ER to group home 12/9/17 and does not qualify for discharge outreach phone call.

## 2017-12-26 DIAGNOSIS — E03.9 HYPOTHYROIDISM, UNSPECIFIED TYPE: ICD-10-CM

## 2017-12-26 NOTE — TELEPHONE ENCOUNTER
Last seen: 11/20/17 by Dr. Martínez  Next appt: 05/14/18 with Dr. Martínez    Was the patient seen in the last year in this department? Yes   Does patient have an active prescription for medications requested? No   Received Request Via: Pharmacy

## 2017-12-27 RX ORDER — LEVOTHYROXINE SODIUM 112 UG/1
TABLET ORAL
Qty: 90 TAB | Refills: 1 | Status: SHIPPED | OUTPATIENT
Start: 2017-12-27 | End: 2018-06-29 | Stop reason: SDUPTHER

## 2018-01-28 LAB
ALBUMIN SERPL-MCNC: 3.9 G/DL (ref 3.6–4.8)
ALBUMIN/GLOB SERPL: 1.5 {RATIO} (ref 1.2–2.2)
ALP SERPL-CCNC: 88 IU/L (ref 39–117)
ALT SERPL-CCNC: 12 IU/L (ref 0–32)
AST SERPL-CCNC: 12 IU/L (ref 0–40)
BILIRUB SERPL-MCNC: 0.4 MG/DL (ref 0–1.2)
BUN SERPL-MCNC: 7 MG/DL (ref 8–27)
BUN/CREAT SERPL: 8 (ref 12–28)
CALCIUM SERPL-MCNC: 9.5 MG/DL (ref 8.7–10.3)
CHLORIDE SERPL-SCNC: 102 MMOL/L (ref 96–106)
CO2 SERPL-SCNC: 25 MMOL/L (ref 18–29)
CREAT SERPL-MCNC: 0.92 MG/DL (ref 0.57–1)
GFR SERPLBLD CREATININE-BSD FMLA CKD-EPI: 66 ML/MIN/1.73
GFR SERPLBLD CREATININE-BSD FMLA CKD-EPI: 76 ML/MIN/1.73
GLOBULIN SER CALC-MCNC: 2.6 G/DL (ref 1.5–4.5)
GLUCOSE SERPL-MCNC: 119 MG/DL (ref 65–99)
POTASSIUM SERPL-SCNC: 4.6 MMOL/L (ref 3.5–5.2)
PROT SERPL-MCNC: 6.5 G/DL (ref 6–8.5)
SODIUM SERPL-SCNC: 140 MMOL/L (ref 134–144)
T4 FREE SERPL-MCNC: 1.31 NG/DL (ref 0.82–1.77)
TSH SERPL DL<=0.005 MIU/L-ACNC: 5.15 UIU/ML (ref 0.45–4.5)

## 2018-01-31 ENCOUNTER — OFFICE VISIT (OUTPATIENT)
Dept: INTERNAL MEDICINE | Facility: MEDICAL CENTER | Age: 68
End: 2018-01-31
Payer: MEDICARE

## 2018-01-31 VITALS
HEIGHT: 66 IN | WEIGHT: 164 LBS | SYSTOLIC BLOOD PRESSURE: 124 MMHG | HEART RATE: 104 BPM | DIASTOLIC BLOOD PRESSURE: 76 MMHG | TEMPERATURE: 97.1 F | OXYGEN SATURATION: 89 % | BODY MASS INDEX: 26.36 KG/M2

## 2018-01-31 DIAGNOSIS — F33.41 RECURRENT MAJOR DEPRESSIVE DISORDER, IN PARTIAL REMISSION (HCC): ICD-10-CM

## 2018-01-31 DIAGNOSIS — J44.9 COPD MIXED TYPE (HCC): ICD-10-CM

## 2018-01-31 DIAGNOSIS — F20.9 CHRONIC SCHIZOPHRENIA (HCC): ICD-10-CM

## 2018-01-31 PROCEDURE — 99213 OFFICE O/P EST LOW 20 MIN: CPT | Mod: GE | Performed by: INTERNAL MEDICINE

## 2018-01-31 RX ORDER — ESCITALOPRAM OXALATE 10 MG/1
20 TABLET ORAL DAILY
COMMUNITY
Start: 2018-01-31 | End: 2023-02-27

## 2018-01-31 RX ORDER — DIVALPROEX SODIUM 500 MG/1
500 TABLET, EXTENDED RELEASE ORAL 2 TIMES DAILY
COMMUNITY
Start: 2018-01-31 | End: 2019-02-26 | Stop reason: SDUPTHER

## 2018-01-31 NOTE — PROGRESS NOTES
Per hospital discharge summary:    Patient was admitted for sepsis secondary to UTI and left lower lobe pneumonia. She was treated with rocpehin. Her blood cultures remained negative and her Urine culture grew Proteus mirabilis which was resistant to Macrobid. Her leukocytosis resolved. She had continued oxygen needs at time of discharge and was discharged to [place] for skilled needs for 1 week. She now endorses feeling great with no complaints.

## 2018-01-31 NOTE — PROGRESS NOTES
Established Patient    Tracie presents today with the following:    CC: Post hospital discharge follow-up    HPI:     Patient was recently admitted at [place] for sepsis secondary to urinary tract infection. Upon discharge she had gone to [place] for a week. She is now residing in new group home since last Thursday. She really enjoys it there. She denies any symptoms of lightheadedness, dizziness, fever, burning on urination, abdominal pain.    COPD: We'll get annual spirometry. Patient denies any shortness of breath or difficulty breathing. She does have a dry cough. She also quit smoking about 3-4 months ago.    Medication management: Her dose of her Depakote and Lexapro were updated.    Preventative health: She refused the flu shot.    Patient Active Problem List    Diagnosis Date Noted   • COPD mixed type (CMS-HCC) 07/03/2017   • Bilateral dry eyes 02/13/2017   • Dementia without behavioral disturbance 02/10/2017   • Seizure (CMS-HCC) 10/18/2015   • Schizophrenia (CMS-HCC) 10/18/2015   • Spells of decreased attentiveness 08/28/2015   • Hypothyroid 08/28/2015   • Chronic schizophrenia (CMS-HCC) 08/28/1995       Current Outpatient Prescriptions   Medication Sig Dispense Refill   • escitalopram (LEXAPRO) 10 MG Tab Take 2 Tabs by mouth every day.     • divalproex ER (DEPAKOTE ER) 500 MG TABLET SR 24 HR Take 1 Tab by mouth every day.     • MAPAP 500 MG Tab TAKE 1 TABLET BY MOUTH EVERY 6 HOURS AS NEEDED FOR MILD PAIN, MODERATE PAIN OR FEVER 90 Tab 0   • levothyroxine (SYNTHROID) 112 MCG Tab TAKE 1 TABLET BY MOUTH EVERY MORNING ON AN EMPTY STOMACH 90 Tab 1   • artificial tears 1.4 % Solution Place 1 Drop in both eyes as needed. 1 Bottle 2   • levetiracetam (KEPPRA) 250 MG tablet TAKE 1 TABLET BY MOUTH 3 TIMES DAILY 270 Tab 1   • ASPIR-LOW 81 MG EC tablet TAKE 1 TABLET BY MOUTH ONCE DAILY 90 Tab 2   • Calcium Carbonate-Vitamin D 600-400 MG-UNIT Tab TAKE 1 TABLET BY MOUTH ONCE DAILY 90 Tab 2   • Diapers &  "Supplies Misc 1 Unknown by Does not apply route as needed. Indications: change 3 times daily diaper depend pull size large number 90 90 Unknown 3   • simvastatin (ZOCOR) 40 MG Tab Take 1 Tab by mouth every bedtime. 90 Tab 2   • clozapine (CLOZARIL) 100 MG Tab Take 150 mg by mouth every day. 30 Tab      No current facility-administered medications for this visit.        ROS: As per HPI. Additional pertinent symptoms as noted below.    All others negative    /76   Pulse (!) 104   Temp 36.2 °C (97.1 °F)   Ht 1.676 m (5' 6\")   Wt 74.4 kg (164 lb)   SpO2 89%   BMI 26.47 kg/m²     Physical Exam   Constitutional:  oriented to person, place, and time. No distress.   Eyes: Pupils are equal, round, and reactive to light. No scleral icterus.  Neck: Neck supple. No thyromegaly present.   Cardiovascular: Tachycardic, regular rhythm and normal heart sounds.  Exam reveals no gallop and no friction rub.  No murmur heard.  Pulmonary/Chest: Breath sounds normal. Chest wall is not dull to percussion.   Skin: One-inch by 2 inch bandlike rash present over site where patient was wearing armband at [place]    Note: I have reviewed all pertinent labs and diagnostic tests associated with this visit with specific comments listed under the assessment and plan below    Assessment and Plan    1. Recurrent major depressive disorder, in partial remission (CMS-HCC)  - Patient's Lexapro was increased from 10 mg to 20 mg by her psychiatrist    2. Chronic schizophrenia (CMS-HCC)  - Her clozapine had been increased from 100 mg to 150 mg daily by her psychiatrist    3. COPD mixed type (CMS-HCC)  - Annual PFT ordered      Followup: Return in about 5 months (around 6/30/2018).      Signed by: Lavinia Martínez M.D.    "

## 2018-02-15 ENCOUNTER — TELEPHONE (OUTPATIENT)
Dept: INTERNAL MEDICINE | Facility: MEDICAL CENTER | Age: 68
End: 2018-02-15

## 2018-02-15 NOTE — TELEPHONE ENCOUNTER
1. Caller Name: Haider with [employer]                      Call Back Number: [phone]    2. Message: Pt planning a trip to visit family in [place]. Her group home needs a note that states it's ok for her to leave the state and take her rx's with her. Fax to [phone]    3. Patient approves office to leave a detailed voicemail/MyChart message: yes

## 2018-02-27 ENCOUNTER — TELEPHONE (OUTPATIENT)
Dept: INTERNAL MEDICINE | Facility: MEDICAL CENTER | Age: 68
End: 2018-02-27

## 2018-02-27 NOTE — TELEPHONE ENCOUNTER
1. Caller Name: Haider with [employer]                       Call Back Number: [phone]    2. Message: You ordered a spirometry at visit, and they are wondering where to schedule.  A simple spirometry can be done here in the office, or do you want a full PFT?     3. Patient approves office to leave a detailed voicemail/MyChart message: yes

## 2018-04-02 ENCOUNTER — APPOINTMENT (OUTPATIENT)
Dept: NEUROLOGY | Facility: MEDICAL CENTER | Age: 68
End: 2018-04-02
Payer: MEDICARE

## 2018-04-02 ENCOUNTER — PATIENT OUTREACH (OUTPATIENT)
Dept: HEALTH INFORMATION MANAGEMENT | Facility: OTHER | Age: 68
End: 2018-04-02

## 2018-04-05 ENCOUNTER — HOME HEALTH ADMISSION (OUTPATIENT)
Dept: HOME HEALTH SERVICES | Facility: HOME HEALTHCARE | Age: 68
End: 2018-04-05
Payer: MEDICARE

## 2018-04-09 ENCOUNTER — APPOINTMENT (OUTPATIENT)
Dept: INTERNAL MEDICINE | Facility: MEDICAL CENTER | Age: 68
End: 2018-04-09
Payer: MEDICARE

## 2018-04-11 ENCOUNTER — OFFICE VISIT (OUTPATIENT)
Dept: INTERNAL MEDICINE | Facility: MEDICAL CENTER | Age: 68
End: 2018-04-11
Payer: MEDICARE

## 2018-04-11 VITALS
TEMPERATURE: 97 F | DIASTOLIC BLOOD PRESSURE: 66 MMHG | BODY MASS INDEX: 26.41 KG/M2 | OXYGEN SATURATION: 88 % | HEART RATE: 97 BPM | SYSTOLIC BLOOD PRESSURE: 84 MMHG | HEIGHT: 66 IN | WEIGHT: 164.3 LBS

## 2018-04-11 DIAGNOSIS — J44.9 COPD MIXED TYPE (HCC): ICD-10-CM

## 2018-04-11 DIAGNOSIS — J40 BRONCHITIS: ICD-10-CM

## 2018-04-11 DIAGNOSIS — I95.9 HYPOTENSION, UNSPECIFIED HYPOTENSION TYPE: ICD-10-CM

## 2018-04-11 PROCEDURE — 99213 OFFICE O/P EST LOW 20 MIN: CPT | Mod: GE | Performed by: INTERNAL MEDICINE

## 2018-04-11 RX ORDER — ALBUTEROL SULFATE 90 UG/1
AEROSOL, METERED RESPIRATORY (INHALATION)
COMMUNITY
Start: 2018-04-02 | End: 2022-12-29 | Stop reason: SDUPTHER

## 2018-04-11 RX ORDER — CLOZAPINE 150 MG/1
225 TABLET, ORALLY DISINTEGRATING ORAL 2 TIMES DAILY
COMMUNITY
End: 2018-11-05

## 2018-04-11 NOTE — PROGRESS NOTES
Established Patient    Tracie presents today with the following:    CC: Hospital follow-up    HPI:     67-year-old female presenting with:    She was admitted at [facility] from March 31 of April 2 for acute bronchitis. She is managed with duo nebs, levofloxacin, and IV fluids. She was discharged on the second. Her  with patient stated patient was rather somnolent with decreased appetite upon returning from the hospital. But over the last several days patient is doing much better. Patient is hypotensive today with blood pressure 84/66 with repeat blood pressure showing 90/68. Patient denies any symptoms of lightheadedness, dizziness, palpitations. Home health was otoniel was established for patient and she was seen yesterday and worked with physical therapy today. She has continued to require 3 L of oxygen. Today she was saturating 88% on room air. On prior visits her oxygen levels have ranged from 89-91% on room air. Patient denies any fevers, chills, shortness of breath, cough.    Patient Active Problem List    Diagnosis Date Noted   • COPD mixed type (CMS-HCC) 07/03/2017   • Bilateral dry eyes 02/13/2017   • Dementia without behavioral disturbance 02/10/2017   • Seizure (CMS-HCC) 10/18/2015   • Schizophrenia (CMS-HCC) 10/18/2015   • Spells of decreased attentiveness 08/28/2015   • Hypothyroid 08/28/2015   • Chronic schizophrenia (CMS-HCC) 08/28/1995       Current Outpatient Prescriptions   Medication Sig Dispense Refill   • VENTOLIN  (90 Base) MCG/ACT Aero Soln inhalation aerosol      • Clozapine 150 MG TABLET DISPERSIBLE Take 150 mg by mouth 2 Times a Day.     • simvastatin (ZOCOR) 40 MG Tab TAKE 1 TABLET BY MOUTH NIGHTLY AT BEDTIME 90 Tab 2   • Nutritional Supplements (ENSURE COMPLETE) Liquid Take 1 Unknown by mouth 3 times a day, with meals. 90 Bottle 2   • Diapers & Supplies Misc CHANGE 3 TIMES DAILY 96 Each 6   • escitalopram (LEXAPRO) 10 MG Tab Take 2 Tabs by mouth  "every day.     • divalproex ER (DEPAKOTE ER) 500 MG TABLET SR 24 HR Take 500 mg by mouth 2 Times a Day.     • levothyroxine (SYNTHROID) 112 MCG Tab TAKE 1 TABLET BY MOUTH EVERY MORNING ON AN EMPTY STOMACH 90 Tab 1   • levetiracetam (KEPPRA) 250 MG tablet TAKE 1 TABLET BY MOUTH 3 TIMES DAILY 270 Tab 1   • ASPIR-LOW 81 MG EC tablet TAKE 1 TABLET BY MOUTH ONCE DAILY 90 Tab 2   • Calcium Carbonate-Vitamin D 600-400 MG-UNIT Tab TAKE 1 TABLET BY MOUTH ONCE DAILY 90 Tab 2   • MAPAP 500 MG Tab TAKE 1 TABLET BY MOUTH EVERY 6 HOURS AS NEEDED FOR MILD PAIN, MODERATE PAIN OR FEVER 90 Tab 0     No current facility-administered medications for this visit.        ROS: As per HPI. Additional pertinent symptoms as noted below.    All others negative    BP (!) 84/66   Pulse 97   Temp 36.1 °C (97 °F)   Ht 1.676 m (5' 6\")   Wt 74.5 kg (164 lb 4.8 oz)   SpO2 88% Comment:  92% With 3lpm O2   BMI 26.52 kg/m²     Physical Exam   Constitutional:  oriented to person, place, and time. No distress.   Eyes: Pupils are equal, round, and reactive to light. No scleral icterus.  Neck: Neck supple. No thyromegaly present.   Cardiovascular: Normal rate, regular rhythm and normal heart sounds.  Exam reveals no gallop and no friction rub.  No murmur heard.  Pulmonary/Chest: Breath sounds normal. Chest wall is not dull to percussion.   Neurological: alert and oriented to person, place, and time.       Note: I have reviewed all pertinent labs and diagnostic tests associated with this visit with specific comments listed under the assessment and plan below    Assessment and Plan    1. COPD mixed type (CMS-HCC)  - Patient will likely require long-term continuous oxygen  - DME order placed for portable tank  - Discussed with  to utilize albuterol inhaler as needed for shortness of breath and wheezing  - We'll do routine in's from a tree next month once patient's acute illness is fully resolved    2. Bronchitis  - Resolved. Post hospital " follow-up. Patient was admitted and [facility] from March 31 to April 2  - She is slowly improving with her degree of mobility and fatigue and will continue to have home health with PT working with her and monitoring her blood pressure    3. Hypotension  - discussed continued hypotension with patient and care worker. She's currently asymptomatic with this. encouraged increasing amount of water she is drinking. Along with taking measures such as walking slowly and going from a sitting to standing position slowly to avoid hypotension.  - home health will continue to monitor BP      Followup: Return in about 5 weeks (around 5/16/2018).      Signed by: Lavinia Martínez M.D.

## 2018-05-14 ENCOUNTER — OFFICE VISIT (OUTPATIENT)
Dept: INTERNAL MEDICINE | Facility: MEDICAL CENTER | Age: 68
End: 2018-05-14
Payer: MEDICARE

## 2018-05-14 VITALS
HEIGHT: 66 IN | TEMPERATURE: 97.2 F | SYSTOLIC BLOOD PRESSURE: 94 MMHG | OXYGEN SATURATION: 94 % | HEART RATE: 105 BPM | WEIGHT: 170 LBS | DIASTOLIC BLOOD PRESSURE: 65 MMHG | BODY MASS INDEX: 27.32 KG/M2

## 2018-05-14 DIAGNOSIS — E03.9 HYPOTHYROIDISM, UNSPECIFIED TYPE: ICD-10-CM

## 2018-05-14 DIAGNOSIS — J44.9 COPD MIXED TYPE (HCC): ICD-10-CM

## 2018-05-14 PROCEDURE — 99213 OFFICE O/P EST LOW 20 MIN: CPT | Mod: GE | Performed by: INTERNAL MEDICINE

## 2018-05-14 ASSESSMENT — PATIENT HEALTH QUESTIONNAIRE - PHQ9: CLINICAL INTERPRETATION OF PHQ2 SCORE: 0

## 2018-05-14 ASSESSMENT — PAIN SCALES - GENERAL: PAINLEVEL: NO PAIN

## 2018-05-14 NOTE — PROGRESS NOTES
Established Patient    Tracie presents today with the following:    CC: COPD follow up    HPI:     66 y/o F presenting with:    COPD: she was supposed to get her spirometry testing last visit but do to her illness this was postponed. Otherwise, her breathing is doing well on supplemental oxygen and she denies any fevers, chills, cough, SOB, dyspnea.    Bronchitis: she is doing very well since her last visit. Per her SW she is back to her baseline and completed PT 2 weeks ago.    Hypothyroidism: her last TSH in Jan was 5.15. She denies any constipation, dry skin, muscle aches, confusion.    Patient Active Problem List    Diagnosis Date Noted   • COPD mixed type ([facility]) 07/03/2017   • Bilateral dry eyes 02/13/2017   • Dementia without behavioral disturbance 02/10/2017   • Seizure ([facility]) 10/18/2015   • Schizophrenia ([facility]) 10/18/2015   • Spells of decreased attentiveness 08/28/2015   • Hypothyroid 08/28/2015   • Chronic schizophrenia ([facility]) 08/28/1995       Current Outpatient Prescriptions   Medication Sig Dispense Refill   • Calcium Carbonate-Vitamin D 600-400 MG-UNIT Tab TAKE 1 TABLET BY MOUTH ONCE DAILY 90 Tab 2   • ASPIR-LOW 81 MG EC tablet TAKE 1 TABLET BY MOUTH ONCE DAILY 90 Tab 2   • Clozapine 150 MG TABLET DISPERSIBLE Take 150 mg by mouth 2 Times a Day.     • simvastatin (ZOCOR) 40 MG Tab TAKE 1 TABLET BY MOUTH NIGHTLY AT BEDTIME 90 Tab 2   • Diapers & Supplies Misc CHANGE 3 TIMES DAILY 96 Each 6   • escitalopram (LEXAPRO) 10 MG Tab Take 2 Tabs by mouth every day.     • divalproex ER (DEPAKOTE ER) 500 MG TABLET SR 24 HR Take 500 mg by mouth 2 Times a Day.     • MAPAP 500 MG Tab TAKE 1 TABLET BY MOUTH EVERY 6 HOURS AS NEEDED FOR MILD PAIN, MODERATE PAIN OR FEVER 90 Tab 0   • levothyroxine (SYNTHROID) 112 MCG Tab TAKE 1 TABLET BY MOUTH EVERY MORNING ON AN EMPTY STOMACH 90 Tab 1   • levetiracetam (KEPPRA) 250 MG tablet TAKE 1 TABLET BY MOUTH 3 TIMES DAILY 270 Tab 1   • VENTOLIN  (90 Base) MCG/ACT Aero Soln  "inhalation aerosol      • Nutritional Supplements (ENSURE COMPLETE) Liquid Take 1 Unknown by mouth 3 times a day, with meals. 90 Bottle 2     No current facility-administered medications for this visit.        ROS: As per HPI. Additional pertinent symptoms as noted below.    All others negative    BP (!) 94/65   Pulse (!) 105   Temp 36.2 °C (97.2 °F)   Ht 1.676 m (5' 6\")   Wt 77.1 kg (170 lb)   SpO2 94%   BMI 27.44 kg/m²     Physical Exam   Constitutional:  oriented to person, place, and time. No distress.   Eyes: Pupils are equal, round, and reactive to light. No scleral icterus.  Neck: Neck supple. No thyromegaly present.   Cardiovascular: Normal rate, regular rhythm and normal heart sounds.  Exam reveals no gallop and no friction rub.  No murmur heard.  Pulmonary/Chest: Breath sounds normal. Chest wall is not dull to percussion.       Note: I have reviewed all pertinent labs and diagnostic tests associated with this visit with specific comments listed under the assessment and plan below    Assessment and Plan    1. Hypothyroidism, unspecified type  - last TSH in Jan was 5.15, will repeat levels and increase synthroid if elevated    2. COPD mixed type (HCC)  - she has not been requiring her albuterol inhaler recently  - at next visit will reassess resting and ambulatory oxygen  - screening PFT done today showed: FVC 60%, FEV1 53% and FEV1/FVC 67% indicative of moderate COPD      Followup: Return in about 6 months (around 11/14/2018).      Signed by: Lavinia Martínez M.D.    "

## 2018-05-15 RX ORDER — LEVETIRACETAM 250 MG/1
TABLET ORAL
Qty: 270 TAB | Refills: 1 | Status: SHIPPED | OUTPATIENT
Start: 2018-05-15 | End: 2019-02-26 | Stop reason: SDUPTHER

## 2018-05-23 LAB — TSH SERPL DL<=0.005 MIU/L-ACNC: 1.57 UIU/ML (ref 0.45–4.5)

## 2018-06-29 DIAGNOSIS — E03.9 HYPOTHYROIDISM, UNSPECIFIED TYPE: ICD-10-CM

## 2018-06-29 NOTE — TELEPHONE ENCOUNTER
Last seen: 05/14/18 by Dr. Martínez  Next appt: 11/05/18 with Dr. Headley    Was the patient seen in the last year in this department? Yes   Does patient have an active prescription for medications requested? No   Received Request Via: Pharmacy

## 2018-07-07 RX ORDER — LEVOTHYROXINE SODIUM 112 UG/1
TABLET ORAL
Qty: 90 TAB | Refills: 0 | Status: SHIPPED | OUTPATIENT
Start: 2018-07-07 | End: 2018-10-29 | Stop reason: SDUPTHER

## 2018-07-30 ENCOUNTER — OFFICE VISIT (OUTPATIENT)
Dept: INTERNAL MEDICINE | Facility: MEDICAL CENTER | Age: 68
End: 2018-07-30
Payer: MEDICARE

## 2018-07-30 VITALS
BODY MASS INDEX: 26.2 KG/M2 | WEIGHT: 163 LBS | OXYGEN SATURATION: 94 % | HEIGHT: 66 IN | DIASTOLIC BLOOD PRESSURE: 62 MMHG | TEMPERATURE: 97 F | HEART RATE: 108 BPM | SYSTOLIC BLOOD PRESSURE: 106 MMHG

## 2018-07-30 DIAGNOSIS — J44.9 COPD MIXED TYPE (HCC): ICD-10-CM

## 2018-07-30 DIAGNOSIS — E87.1 HYPONATREMIA: ICD-10-CM

## 2018-07-30 DIAGNOSIS — F20.9 CHRONIC SCHIZOPHRENIA (HCC): ICD-10-CM

## 2018-07-30 DIAGNOSIS — F03.90 DEMENTIA WITHOUT BEHAVIORAL DISTURBANCE, UNSPECIFIED DEMENTIA TYPE: ICD-10-CM

## 2018-07-30 DIAGNOSIS — E03.9 HYPOTHYROIDISM, UNSPECIFIED TYPE: ICD-10-CM

## 2018-07-30 PROCEDURE — 99214 OFFICE O/P EST MOD 30 MIN: CPT | Mod: GC | Performed by: INTERNAL MEDICINE

## 2018-07-30 RX ORDER — SENNOSIDES 8.6 MG
TABLET ORAL
Refills: 0 | COMMUNITY
Start: 2018-07-20 | End: 2018-09-17 | Stop reason: SDUPTHER

## 2018-07-30 RX ORDER — CLOZAPINE 100 MG/1
TABLET ORAL
Refills: 3 | COMMUNITY
Start: 2018-07-20 | End: 2018-07-30

## 2018-07-30 RX ORDER — POLYETHYLENE GLYCOL 3350 17 G/17G
POWDER, FOR SOLUTION ORAL
Refills: 0 | COMMUNITY
Start: 2018-07-16 | End: 2018-11-05

## 2018-07-30 NOTE — PROGRESS NOTES
Established Patient    Tracie presents today with the following:    CC: Hospital follow up    HPI: This is a 68 year old female, with a history of dementia and schizophrenia, who presents with her guardian for hospital follow up.  She was admitted at [facility] 07/15/2018 for severe constipation and UTI.  She lives in a nurse home, and she did not know that she was constipated.  Abdominal and pelvis CT showed a large amount of stool, and patient was given aggressive hydration and bowel regimen.  For UTI she was treated with ceftriaxone, which was later changed to cefuroxime.  There has been extra effort made at the nursing home, for her to keep hydrated, and to have regular bowel movements.  She currently has a soft bowel movement every 3 days, and does not strain.  Denies hematochezia, melena, pain on defecation, or dysuria.  No abdominal pain, or nausea.  She knows to let someone know if she cannot have a bowel movement or if she has too many bowel movements.  She is currently on 2L nasal canula O2, and does not have dyspnea.      Patient Active Problem List    Diagnosis Date Noted   • COPD mixed type (Former[facility]) 07/03/2017   • Bilateral dry eyes 02/13/2017   • Dementia without behavioral disturbance 02/10/2017   • Seizure (Formerly [facility]) 10/18/2015   • Schizophrenia (Formerly [facility]) 10/18/2015   • Spells of decreased attentiveness 08/28/2015   • Hypothyroid 08/28/2015   • Chronic schizophrenia (Formerly [facility]) 08/28/1995       Current Outpatient Prescriptions   Medication Sig Dispense Refill   • levothyroxine (SYNTHROID) 112 MCG Tab TAKE 1 TABLET BY MOUTH EVERY MORNING ON AN EMPTY STOMACH 90 Tab 0   • levETIRAcetam (KEPPRA) 250 MG tablet TAKE 1 TABLET BY MOUTH 3 TIMES DAILY 270 Tab 1   • Calcium Carbonate-Vitamin D 600-400 MG-UNIT Tab TAKE 1 TABLET BY MOUTH ONCE DAILY 90 Tab 2   • ASPIR-LOW 81 MG EC tablet TAKE 1 TABLET BY MOUTH ONCE DAILY 90 Tab 2   • VENTOLIN  (90 Base) MCG/ACT Aero Soln inhalation aerosol      • Clozapine 150  "MG TABLET DISPERSIBLE Take 150 mg by mouth 2 Times a Day.     • Nutritional Supplements (ENSURE COMPLETE) Liquid Take 1 Unknown by mouth 3 times a day, with meals. 90 Bottle 2   • Diapers & Supplies Misc CHANGE 3 TIMES DAILY 96 Each 6   • escitalopram (LEXAPRO) 10 MG Tab Take 2 Tabs by mouth every day.     • divalproex ER (DEPAKOTE ER) 500 MG TABLET SR 24 HR Take 500 mg by mouth 2 Times a Day.     • MAPAP 500 MG Tab TAKE 1 TABLET BY MOUTH EVERY 6 HOURS AS NEEDED FOR MILD PAIN, MODERATE PAIN OR FEVER 90 Tab 0   • polyethylene glycol 3350 (MIRALAX) Powder MIX 17GM  1 CAPFUL  IN LIQUID AND DRINK IT BY MOUTH DAILY FOR 7 DAYS  0   • Sennosides (SENNA) 8.6 MG Tab TAKE 2 TABLETS BY MOUTH DAILY AS NEEDED FOR CONSTIPATION  0   • simvastatin (ZOCOR) 40 MG Tab TAKE 1 TABLET BY MOUTH NIGHTLY AT BEDTIME 90 Tab 2     No current facility-administered medications for this visit.        Social History     Social History   • Marital status: Single     Spouse name: N/A   • Number of children: N/A   • Years of education: N/A     Occupational History   • Not on file.     Social History Main Topics   • Smoking status: Former Smoker     Packs/day: 0.50     Years: 45.00     Types: Cigarettes   • Smokeless tobacco: Never Used      Comment: unk   • Alcohol use No      Comment: unk   • Drug use: No      Comment: unk   • Sexual activity: Not on file     Other Topics Concern   • Not on file     Social History Narrative   • No narrative on file       Family History   Problem Relation Age of Onset   • Alzheimer's Disease Mother    • Heart Disease Father        ROS: As per HPI. Additional pertinent symptoms as noted below.    All others negative    /62   Pulse (!) 108   Temp 36.1 °C (97 °F)   Ht 1.676 m (5' 6\")   Wt 73.9 kg (163 lb)   SpO2 94%   BMI 26.31 kg/m²     Physical Exam    General:  Alert and oriented, No apparent distress.  Altered mental status, decreased attention span.    Eyes: Pupils equal and reactive. No scleral " icterus.    Throat: Clear no erythema or exudates noted.    Neck: Supple. No lymphadenopathy noted. Thyroid not enlarged.    Lungs: Clear to auscultation and percussion bilaterally. No wheezing, rales or ronchi.    Cardiovascular: Regular rate and rhythm. No murmurs, rubs or gallops.    Abdomen:  Benign. No rebound or guarding noted.    Extremities: No clubbing, cyanosis, edema.    Skin: Clear. No rash or suspicious skin lesions noted.      Assessment and Plan    1. Dementia without behavioral disturbance, unspecified dementia type  - has a neurology appointment for September 2018  - will follow up     2. COPD mixed type (HCC)  - currently good O2 sat on 2L nasal canula; 94%  - no labored breathing, no wheezing heard  - taking ventolin 108mcg 1 puff PRN    3. Chronic schizophrenia (HCC)  - sees psychiatrist Dr. Verduzco  - levetiracetam 250mg, clozapine 150mg BID, escitalopram 10mg    4. Hypothyroidism, unspecified type  - denies fatigue, dry skin, or current constipation  - last TSH was July 16 (0.31)  - currently taking levothyroxine 112mcg  - will re-check TSH in about 6 months    5. Hyponatremia  - hyponatremia during last admission, 132  - could have been from dehydration or hypothyroidism  - will get CMP to assess Na+, as she has a history of seizures      Signed by: Agustin Desir M.D.

## 2018-08-03 LAB
ALBUMIN SERPL-MCNC: 4.1 G/DL (ref 3.6–4.8)
ALBUMIN/GLOB SERPL: 1.6 {RATIO} (ref 1.2–2.2)
ALP SERPL-CCNC: 84 IU/L (ref 39–117)
ALT SERPL-CCNC: 8 IU/L (ref 0–32)
AST SERPL-CCNC: 12 IU/L (ref 0–40)
BILIRUB SERPL-MCNC: 0.3 MG/DL (ref 0–1.2)
BUN SERPL-MCNC: 3 MG/DL (ref 8–27)
BUN/CREAT SERPL: 4 (ref 12–28)
CALCIUM SERPL-MCNC: 9.7 MG/DL (ref 8.7–10.3)
CHLORIDE SERPL-SCNC: 89 MMOL/L (ref 96–106)
CO2 SERPL-SCNC: 23 MMOL/L (ref 20–29)
CREAT SERPL-MCNC: 0.75 MG/DL (ref 0.57–1)
GLOBULIN SER CALC-MCNC: 2.5 G/DL (ref 1.5–4.5)
GLUCOSE SERPL-MCNC: 105 MG/DL (ref 65–99)
IF AFRICAN AMERICAN  100797: 96 ML/MIN/1.73
IF NON AFRICAN AMER 100791: 83 ML/MIN/1.73
POTASSIUM SERPL-SCNC: 4.4 MMOL/L (ref 3.5–5.2)
PROT SERPL-MCNC: 6.6 G/DL (ref 6–8.5)
SODIUM SERPL-SCNC: 127 MMOL/L (ref 134–144)

## 2018-08-05 ENCOUNTER — TELEPHONE (OUTPATIENT)
Dept: INTERNAL MEDICINE | Facility: MEDICAL CENTER | Age: 68
End: 2018-08-05

## 2018-08-05 DIAGNOSIS — E87.1 HYPONATREMIA: ICD-10-CM

## 2018-08-05 NOTE — TELEPHONE ENCOUNTER
"Patient has a history of hyponatremia during last hospitalization.  Her recent lab work showed Na+ of 127.  Called patient and spoke with caregiver.  Patient is not experiencing headache, dizziness, nausea, vomiting, change in gait, or change in mental status.  As per caregiver \"she is doing fine.\"  Caregiver advised to add salt to diet, in order to bring up the sodium.  If she develops any of the above mentioned symptoms, caregiver advised to bring patient to the emergency room for treatment.  Will order repeat Na+ in 2 weeks.  "

## 2018-08-27 ENCOUNTER — OFFICE VISIT (OUTPATIENT)
Dept: NEUROLOGY | Facility: MEDICAL CENTER | Age: 68
End: 2018-08-27
Payer: MEDICARE

## 2018-08-27 VITALS
DIASTOLIC BLOOD PRESSURE: 66 MMHG | HEIGHT: 66 IN | TEMPERATURE: 96.4 F | SYSTOLIC BLOOD PRESSURE: 128 MMHG | OXYGEN SATURATION: 98 % | HEART RATE: 102 BPM | BODY MASS INDEX: 25.94 KG/M2 | WEIGHT: 161.4 LBS

## 2018-08-27 DIAGNOSIS — R56.9 SEIZURE (HCC): ICD-10-CM

## 2018-08-27 PROCEDURE — 99214 OFFICE O/P EST MOD 30 MIN: CPT | Performed by: PSYCHIATRY & NEUROLOGY

## 2018-08-27 NOTE — PATIENT INSTRUCTIONS
IMPRESSION:    1. Spells of confusion, exhaustion, saliva drooling lasting for hours for one year-- seizures are likely, TIA and migraine are less likely--   Supporting evidence---Stereotyped spells, followed by exhaustion and recovery in hours-- persists-- improved with keppra---  2. Hx of schizophrenia, Hypothyrodism  3. Lives in assisted living and comes with caregivers  4. Hx of Left leg infection and osteomyelitis 2015  5. Hx of Skin infection, UTI, Pneumonia  6. Dementia-- early  7. Hx of Insomnia, O2 dependent after Jan 2018 ( pneumonia)      PLAN/RECOMMENDATIONS:      ________________________________________________________________________    Continue Keppra 250mg TID and Depakote 500mg BID      Will see Tracie in 6 months    3/2017  cerebral atrophy. Ventricles show configuration of borderline colpocephaly with prominence of the trigones, posterior bodies of the lateral ventricles, and right occipital horn. Similar configuration seen on the prior exam. Nonspecific, likely   developmental finding.        INTERPRETATION:        INTERPRETATION:  This EEG denotes of cortical dysfunction, mild degree with   more emphasis over the left hemisphere.  Clinical correlation may help.        ____________________________________     MD TL BETANCUR / JEAN-CLAUDE     DD:  05/13/2017 16:14:29  DT:  05/13/2017 18:32:17     D#:  [id number]  Job#:  [id number]

## 2018-08-27 NOTE — PROGRESS NOTES
NEUROLOGY NOTE    Referring Physician  [facility]      CHIEF COMPLAINT:      No falls no spells since last visit    With keppra, her spells decreased     Since last visit, 2 hospital visits, UTI and pneumonia    Chief Complaint   Patient presents with   • Follow-Up     Seizures         PRESENT ILLNESS:         No falls no spells since last visit    With keppra, her spells decreased     Since last visit, 2 hospital visits, UTI and pneumonia      Broke right foot  Again, her drooling spells worsened  Spells of drooling on daily basis-- spells of confusion remained  Since last visit, infection over the foot, osteomyelitis resolved but her confusion spells persisted      With keppra, her spells decreased         The 69 YO was noticed to have spells of confusion since 2015  She now lives in a assisted living.  She has schizophrenia and today she came with her caregiver    All these spells were stereotyped-- subsided with medication    PAST MEDICAL HISTORY:  Past Medical History:   Diagnosis Date   • Bladder spasm    • Dental disorder     upper and lower dentures   • Dermatitis    • GERD (gastroesophageal reflux disease)    • Hearing loss of right ear due to cerumen impaction    • Hyperlipidemia    • Hypothyroid    • Other emphysema (HCC)    • Pneumonia     2012   • Psychiatric disorder     schizophrenia   • Seizure disorder (HCC)     last seizure 2005   • Stroke ([facility])     tia june 2015 no residual    • Unspecified urinary incontinence     diapers    • Vitamin D deficiency        PAST SURGICAL HISTORY:  Past Surgical History:   Procedure Laterality Date   • IRRIGATION & DEBRIDEMENT ORTHO Left 10/17/2015    Procedure: IRRIGATION & DEBRIDEMENT ORTHO foot;  Surgeon: Jluis Becker M.D.;  Location: [facility];  Service:    • CLOSED REDUCTION Left 7/9/2015    Procedure: CLOSED REDUCTION -ATTEMPTED;  Surgeon: Jluis Becker M.D.;  Location: [facility];  Service:    • PIN INSERTION   7/9/2015    Procedure: PIN INSERTION FOOT;  Surgeon: Jluis Becker M.D.;  Location: [facility];  Service:        FAMILY HISTORY:  Family History   Problem Relation Age of Onset   • Alzheimer's Disease Mother    • Heart Disease Father        SOCIAL HISTORY:  Social History     Social History   • Marital status: Single     Spouse name: N/A   • Number of children: N/A   • Years of education: N/A     Occupational History   • Not on file.     Social History Main Topics   • Smoking status: Former Smoker     Packs/day: 0.50     Years: 45.00     Types: Cigarettes   • Smokeless tobacco: Never Used      Comment: unk   • Alcohol use No      Comment: unk   • Drug use: No      Comment: unk   • Sexual activity: Not on file     Other Topics Concern   • Not on file     Social History Narrative   • No narrative on file     ALLERGIES:  No Known Allergies  TOBHX  History   Smoking Status   • Former Smoker   • Packs/day: 0.50   • Years: 45.00   • Types: Cigarettes   Smokeless Tobacco   • Never Used     Comment: unk     ALCHX  History   Alcohol Use No     Comment: unk     DRUGHX  History   Drug Use No     Comment: unk           MEDICATIONS:  Current Outpatient Prescriptions   Medication   • polyethylene glycol 3350 (MIRALAX) Powder   • Sennosides (SENNA) 8.6 MG Tab   • levothyroxine (SYNTHROID) 112 MCG Tab   • levETIRAcetam (KEPPRA) 250 MG tablet   • Calcium Carbonate-Vitamin D 600-400 MG-UNIT Tab   • ASPIR-LOW 81 MG EC tablet   • Clozapine 150 MG TABLET DISPERSIBLE   • simvastatin (ZOCOR) 40 MG Tab   • Diapers & Supplies Misc   • escitalopram (LEXAPRO) 10 MG Tab   • divalproex ER (DEPAKOTE ER) 500 MG TABLET SR 24 HR   • MAPAP 500 MG Tab   • VENTOLIN  (90 Base) MCG/ACT Aero Soln inhalation aerosol   • Nutritional Supplements (ENSURE COMPLETE) Liquid     No current facility-administered medications for this visit.        REVIEW OF SYSTEM:    Constitutional: Denies fevers, Denies weight changes   Eyes: Denies changes  "in vision, no eye pain   Ears/Nose/Throat/Mouth: Denies nasal congestion or sore throat   Cardiovascular: Denies chest pain or palpitations   Respiratory: Denies SOB.   Gastrointestinal/Hepatic: Denies abdominal pain, nausea, vomiting, diarrhea, constipation or GI bleeding   Genitourinary: Denies bladder dysfunction, dysuria or frequency   Musculoskeletal/Rheum: Denies joint pain and swelling   Skin/Breast: Denies rash, denies breast lumps or discharge   Neurological: spells of confusion  Psychiatric: Schizophrenia  Endocrine: Hypothyrodism  Heme/Oncology/Lymph Nodes: Denies enlarged lymph nodes, denies brusing or known bleeding disorder   Allergic/Immunologic: Denies hx of allergies         PHYSICAL AND NEUROLOGICAL EXMAINATIONS:  VITAL SIGNS: /66   Pulse (!) 102   Temp (!) 35.8 °C (96.4 °F)   Ht 1.676 m (5' 6\")   Wt 73.2 kg (161 lb 6.4 oz)   SpO2 98%   BMI 26.05 kg/m²   CURRENT WEIGHT:   BMI: Body mass index is 26.05 kg/m².  PREVIOUS WEIGHTS:  Wt Readings from Last 25 Encounters:   08/27/18 73.2 kg (161 lb 6.4 oz)   07/30/18 73.9 kg (163 lb)   05/14/18 77.1 kg (170 lb)   04/11/18 74.5 kg (164 lb 4.8 oz)   01/31/18 74.4 kg (164 lb)   12/08/17 74.8 kg (165 lb)   11/20/17 77.3 kg (170 lb 6.4 oz)   09/29/17 77.5 kg (170 lb 12.8 oz)   07/03/17 78.3 kg (172 lb 9.6 oz)   05/30/17 76.8 kg (169 lb 6.4 oz)   02/13/17 75.1 kg (165 lb 9.6 oz)   02/10/17 75 kg (165 lb 6.4 oz)   10/07/16 74.4 kg (164 lb)   08/23/16 68 kg (150 lb)   07/07/16 68 kg (150 lb)   05/02/16 70.3 kg (155 lb)   04/16/16 72.6 kg (160 lb)   04/11/16 70.3 kg (155 lb)   12/14/15 70.3 kg (155 lb)   11/28/15 65.8 kg (145 lb)   11/23/15 68 kg (150 lb)   10/17/15 64.9 kg (143 lb 1.3 oz)   08/28/15 71.7 kg (158 lb)   07/09/15 74.4 kg (164 lb 0.4 oz)   06/24/15 80.7 kg (178 lb)       General appearance of patient: WDWN(+) NAD(+)    EYES  o Fundus : Papilledem(-) Exudates(-) Hemorrhage(-)  Nervous System  Orientation to time, place and person(+)  Memory " normal(-) recall 2/3      ________________________________________________________________________      We also asked the patient to copy the pentagons, draw clocks , writing  The patient's work will be scanned into the media section    ________________________________________________________________________  Language: aphasia(-)  Knowledge: past(+) Current(+)  Attention(+)  Cranial Nerves  • Nerve 2: intact  • Nerve 3,4,6: intact  • Nerve 5 : intact  • Nerve 7: intact  • Nerve 8: intact  • Nerve 9 & 10: intact  • Nerve 11: intact  • Nerve 12: intact  Muscle Power and muscle tone: symmetric, normal in upper and lower  Sensory System: Pin sensation intact(+)  Reflexes: symmetric throughout  Cerebellar Function FNP normal   Gait : walk with walker--- after left ankle fracture  Heart and Vascular  Peripheral Vasucular system : Edema (-) Swelling(-)  RHB, Breathing sound clear  abdomen bowel sound normoactive  Extremities freely moveable  Joints no contracture     Right ankle fracture-- fell in the bed room    Luba her Guardian is calling and she was told to contact when the pt is having her episodes of confusion. She had on today that has lasted a couple of hours. Please advise. Thank you. WILLIE  Keppra 250mg before sleep for one week  Then up to Keppra 250mg two times per day since the second week    And will see  Order given. YP    Called and LM with going places that the Rx has been ordered. They will give it to either Luba or Kassy who is taking care of Tracie. WILLIE           Stable since last visit  Caregivers noticed that she was given the wrong dose of Clozapine--- was given higher dose-- which could trigger the last seizure  Caregivers 2 came together with her--- they knew her in the last one year  Orientated to place, year and month and person    Recall 3/3    [facility]     Assisted Living Facility    Phone: [phone] [facility] [phone]      IMPRESSION:    1. Spells of  confusion, exhaustion, saliva drooling lasting for hours for one year-- seizures are likely, TIA and migraine are less likely--   Supporting evidence---Stereotyped spells, followed by exhaustion and recovery in hours-- persists-- improved with keppra---  2. Hx of schizophrenia, Hypothyrodism  3. Lives in assisted living and comes with caregivers  4. Hx of Left leg infection and osteomyelitis 2015  5. Hx of Skin infection, UTI, Pneumonia  6. Dementia-- early  7. Hx of Insomnia, O2 dependent after Jan 2018 ( pneumonia)      PLAN/RECOMMENDATIONS:      ________________________________________________________________________    Continue Keppra 250mg TID and Depakote 500mg BID      Will see Tracie in 6 months    3/2017  cerebral atrophy. Ventricles show configuration of borderline colpocephaly with prominence of the trigones, posterior bodies of the lateral ventricles, and right occipital horn. Similar configuration seen on the prior exam. Nonspecific, likely   developmental finding.        INTERPRETATION:        INTERPRETATION:  This EEG denotes of cortical dysfunction, mild degree with   more emphasis over the left hemisphere.  Clinical correlation may help.        ____________________________________     MD TL BETANCUR / JEAN-CLAUDE     DD:  05/13/2017 16:14:29  DT:  05/13/2017 18:32:17     D#:  [id number]  Job#:  [id number]

## 2018-08-28 ENCOUNTER — APPOINTMENT (RX ONLY)
Dept: URBAN - METROPOLITAN AREA CLINIC 4 | Facility: CLINIC | Age: 68
Setting detail: DERMATOLOGY
End: 2018-08-28

## 2018-08-28 DIAGNOSIS — L57.0 ACTINIC KERATOSIS: ICD-10-CM

## 2018-08-28 DIAGNOSIS — D18.0 HEMANGIOMA: ICD-10-CM

## 2018-08-28 DIAGNOSIS — D485 NEOPLASM OF UNCERTAIN BEHAVIOR OF SKIN: ICD-10-CM

## 2018-08-28 DIAGNOSIS — L82.1 OTHER SEBORRHEIC KERATOSIS: ICD-10-CM

## 2018-08-28 DIAGNOSIS — L81.4 OTHER MELANIN HYPERPIGMENTATION: ICD-10-CM

## 2018-08-28 DIAGNOSIS — D22 MELANOCYTIC NEVI: ICD-10-CM

## 2018-08-28 PROBLEM — D22.71 MELANOCYTIC NEVI OF RIGHT LOWER LIMB, INCLUDING HIP: Status: ACTIVE | Noted: 2018-08-28

## 2018-08-28 PROBLEM — D22.72 MELANOCYTIC NEVI OF LEFT LOWER LIMB, INCLUDING HIP: Status: ACTIVE | Noted: 2018-08-28

## 2018-08-28 PROBLEM — D48.5 NEOPLASM OF UNCERTAIN BEHAVIOR OF SKIN: Status: ACTIVE | Noted: 2018-08-28

## 2018-08-28 PROBLEM — D22.61 MELANOCYTIC NEVI OF RIGHT UPPER LIMB, INCLUDING SHOULDER: Status: ACTIVE | Noted: 2018-08-28

## 2018-08-28 PROBLEM — D22.62 MELANOCYTIC NEVI OF LEFT UPPER LIMB, INCLUDING SHOULDER: Status: ACTIVE | Noted: 2018-08-28

## 2018-08-28 PROBLEM — D18.01 HEMANGIOMA OF SKIN AND SUBCUTANEOUS TISSUE: Status: ACTIVE | Noted: 2018-08-28

## 2018-08-28 PROBLEM — D22.5 MELANOCYTIC NEVI OF TRUNK: Status: ACTIVE | Noted: 2018-08-28

## 2018-08-28 PROCEDURE — 99213 OFFICE O/P EST LOW 20 MIN: CPT | Mod: 25

## 2018-08-28 PROCEDURE — ? SUNSCREEN RECOMMENDATIONS

## 2018-08-28 PROCEDURE — ? COUNSELING

## 2018-08-28 PROCEDURE — 17003 DESTRUCT PREMALG LES 2-14: CPT

## 2018-08-28 PROCEDURE — 11100: CPT | Mod: 59

## 2018-08-28 PROCEDURE — ? BIOPSY BY SHAVE METHOD

## 2018-08-28 PROCEDURE — ? LIQUID NITROGEN

## 2018-08-28 PROCEDURE — 17000 DESTRUCT PREMALG LESION: CPT

## 2018-08-28 ASSESSMENT — LOCATION SIMPLE DESCRIPTION DERM
LOCATION SIMPLE: LEFT ANTERIOR NECK
LOCATION SIMPLE: LEFT THIGH
LOCATION SIMPLE: RIGHT POSTERIOR UPPER ARM
LOCATION SIMPLE: RIGHT CHEEK
LOCATION SIMPLE: RIGHT FOREARM
LOCATION SIMPLE: RIGHT LOWER BACK
LOCATION SIMPLE: ABDOMEN
LOCATION SIMPLE: LEFT POSTERIOR UPPER ARM
LOCATION SIMPLE: CHEST
LOCATION SIMPLE: LEFT HAND
LOCATION SIMPLE: RIGHT THIGH
LOCATION SIMPLE: LEFT FOREARM
LOCATION SIMPLE: LEFT CHEEK
LOCATION SIMPLE: LEFT UPPER BACK
LOCATION SIMPLE: UPPER BACK
LOCATION SIMPLE: RIGHT HAND
LOCATION SIMPLE: RIGHT TEMPLE

## 2018-08-28 ASSESSMENT — LOCATION DETAILED DESCRIPTION DERM
LOCATION DETAILED: UPPER STERNUM
LOCATION DETAILED: LEFT CENTRAL MALAR CHEEK
LOCATION DETAILED: RIGHT PROXIMAL DORSAL FOREARM
LOCATION DETAILED: LEFT PROXIMAL POSTERIOR UPPER ARM
LOCATION DETAILED: RIGHT ANTERIOR PROXIMAL THIGH
LOCATION DETAILED: LEFT ANTERIOR PROXIMAL THIGH
LOCATION DETAILED: LEFT SUPERIOR MEDIAL UPPER BACK
LOCATION DETAILED: RIGHT RIB CAGE
LOCATION DETAILED: PERIUMBILICAL SKIN
LOCATION DETAILED: RIGHT CENTRAL MALAR CHEEK
LOCATION DETAILED: LEFT INFERIOR CENTRAL MALAR CHEEK
LOCATION DETAILED: RIGHT RADIAL DORSAL HAND
LOCATION DETAILED: LEFT INFERIOR ANTERIOR NECK
LOCATION DETAILED: RIGHT MEDIAL TEMPLE
LOCATION DETAILED: RIGHT DISTAL POSTERIOR UPPER ARM
LOCATION DETAILED: SUPERIOR THORACIC SPINE
LOCATION DETAILED: LEFT ULNAR DORSAL HAND
LOCATION DETAILED: RIGHT SUPERIOR MEDIAL MIDBACK
LOCATION DETAILED: LEFT PROXIMAL DORSAL FOREARM

## 2018-08-28 ASSESSMENT — LOCATION ZONE DERM
LOCATION ZONE: NECK
LOCATION ZONE: ARM
LOCATION ZONE: TRUNK
LOCATION ZONE: LEG
LOCATION ZONE: HAND
LOCATION ZONE: FACE

## 2018-08-28 NOTE — HPI: FULL BODY SKIN EXAMINATION
How Severe Are Your Spot(S)?: mild
What Is The Reason For Today's Visit?: Full Body Skin Examination with No Concerns
What Is The Reason For Today's Visit? (Being Monitored For X): the risk of recurrence of previously treated lesion(s)
Additional History: No concerns. FBE.

## 2018-08-28 NOTE — PROCEDURE: BIOPSY BY SHAVE METHOD
Hemostasis: Aluminum Chloride and Electrocautery
Wound Care: Vaseline
Anesthesia Volume In Cc: 2
Silver Nitrate Text: The wound bed was treated with silver nitrate after the biopsy was performed.
Bill 79186 For Specimen Handling/Conveyance To Laboratory?: no
Anesthesia Type: 1% lidocaine with epinephrine
X Size Of Lesion In Cm: 0
Lab: [id number]
Lab Facility: 
Notification Instructions: Patient will be notified of biopsy results. However, patient instructed to call the office if not contacted within 2 weeks.
Electrodesiccation And Curettage Text: The wound bed was treated with electrodesiccation and curettage after the biopsy was performed.
Cryotherapy Text: The wound bed was treated with cryotherapy after the biopsy was performed.
Destruction After The Procedure: Yes
Billing Type: Third-Party Bill
Type Of Destruction Used: Electrodesiccation
Biopsy Type: H and E
Detail Level: Detailed
Dressing: bandage
Biopsy Method: Personna blade
Electrodesiccation Text: The wound bed was treated with electrodesiccation after the biopsy was performed.
Curettage Text: The wound bed was treated with curettage after the biopsy was performed.
Consent: Written consent was obtained and risks were reviewed including but not limited to scarring, infection, bleeding, scabbing, incomplete removal, nerve damage and allergy to anesthesia.
Post-Care Instructions: I reviewed with the patient in detail post-care instructions. Patient is to keep the biopsy site dry overnight, and then apply bacitracin twice daily until healed. Patient may apply hydrogen peroxide soaks to remove any crusting.
Depth Of Biopsy: dermis

## 2018-08-28 NOTE — PROCEDURE: LIQUID NITROGEN
Detail Level: Simple
Post-Care Instructions: I reviewed with the patient in detail post-care instructions. Patient is to wear sunprotection, and avoid picking at any of the treated lesions. Pt may apply Vaseline to crusted or scabbing areas.
Number Of Freeze-Thaw Cycles: 2 freeze-thaw cycles
Render Post-Care Instructions In Note?: no
Consent: The patient's consent was obtained including but not limited to risks of crusting, scabbing, blistering, scarring, darker or lighter pigmentary change, recurrence, incomplete removal and infection.
Duration Of Freeze Thaw-Cycle (Seconds): 3

## 2018-09-17 NOTE — TELEPHONE ENCOUNTER
Last seen: 07/03/18 by Dr. Desir  Next appt: 11/05/18 with Dr. Headley    Was the patient seen in the last year in this department? Yes   Does patient have an active prescription for medications requested? No   Received Request Via: Pharmacy

## 2018-09-20 RX ORDER — SENNOSIDES 8.6 MG
TABLET ORAL
Qty: 60 TAB | Refills: 0 | Status: SHIPPED | OUTPATIENT
Start: 2018-09-20 | End: 2018-12-03 | Stop reason: SDUPTHER

## 2018-10-29 DIAGNOSIS — E03.9 HYPOTHYROIDISM, UNSPECIFIED TYPE: ICD-10-CM

## 2018-10-29 NOTE — TELEPHONE ENCOUNTER
Last seen: 07/30/18 by Dr. Desir  Next appt: 11/05/18 with Dr. Headley    Was the patient seen in the last year in this department? Yes   Does patient have an active prescription for medications requested? No   Received Request Via: Pharmacy

## 2018-11-01 RX ORDER — LEVOTHYROXINE SODIUM 112 UG/1
TABLET ORAL
Qty: 90 TAB | Refills: 0 | Status: SHIPPED | OUTPATIENT
Start: 2018-11-01 | End: 2019-02-12 | Stop reason: SDUPTHER

## 2018-11-01 RX ORDER — LEVOTHYROXINE SODIUM 112 UG/1
112 TABLET ORAL EVERY MORNING
Qty: 90 TAB | Refills: 0 | OUTPATIENT
Start: 2018-11-01

## 2018-11-05 ENCOUNTER — OFFICE VISIT (OUTPATIENT)
Dept: INTERNAL MEDICINE | Facility: MEDICAL CENTER | Age: 68
End: 2018-11-05
Payer: MEDICARE

## 2018-11-05 VITALS
BODY MASS INDEX: 27.16 KG/M2 | DIASTOLIC BLOOD PRESSURE: 72 MMHG | SYSTOLIC BLOOD PRESSURE: 114 MMHG | WEIGHT: 169 LBS | HEIGHT: 66 IN | TEMPERATURE: 97 F | OXYGEN SATURATION: 96 % | HEART RATE: 102 BPM

## 2018-11-05 DIAGNOSIS — E74.89 OTHER SPECIFIED DISORDERS OF CARBOHYDRATE METABOLISM (HCC): ICD-10-CM

## 2018-11-05 DIAGNOSIS — E03.9 HYPOTHYROIDISM, UNSPECIFIED TYPE: ICD-10-CM

## 2018-11-05 DIAGNOSIS — Z12.31 ENCOUNTER FOR SCREENING MAMMOGRAM FOR MALIGNANT NEOPLASM OF BREAST: ICD-10-CM

## 2018-11-05 DIAGNOSIS — F20.3 UNDIFFERENTIATED SCHIZOPHRENIA (HCC): ICD-10-CM

## 2018-11-05 DIAGNOSIS — J44.9 COPD MIXED TYPE (HCC): ICD-10-CM

## 2018-11-05 DIAGNOSIS — E87.1 HYPONATREMIA: ICD-10-CM

## 2018-11-05 DIAGNOSIS — Z00.00 HEALTH CARE MAINTENANCE: ICD-10-CM

## 2018-11-05 PROCEDURE — 99214 OFFICE O/P EST MOD 30 MIN: CPT | Mod: GC | Performed by: INTERNAL MEDICINE

## 2018-11-05 RX ORDER — CLOZAPINE 100 MG/1
150 TABLET ORAL 2 TIMES DAILY
COMMUNITY
Start: 2018-10-10 | End: 2022-12-29 | Stop reason: SDUPTHER

## 2018-11-05 NOTE — PROGRESS NOTES
Established Patient    Tracie presents today with the following:    CC: Establish care, follow up of labs     HPI: Patient is a 68-year-old female with a past medical history of schizophrenia, hypothyroidism, seizure disorder, dementia and COPD who is here today to establish care.  She is here today with her guardian.    Schizophrenia: Patient follows up with Dr. Jose at [facility] every 3 months.  She takes clozapine 150 mg twice daily.  Per the guardian, CBC is monitored periodically.  Patient's symptoms are well controlled.    Seizure disorder: Follows up with Dr. Larry, last visit was on 08/27.  Her seizures are in the form of confusion and blank stares.  Her symptoms are stable with Keppra and Depakote.     COPD: Former smoker, quit about 2 years back.  Smoked for 45 years prior to that.  She is currently on 2 L home oxygen 24 hours a day.  She uses albuterol inhaler as needed.  Spirometry testing is pending.    Hypothyroidism: Her last TSH in May was  normal. She denies any constipation, dry skin or fatigue.    Patient has no complaints today.  Her labs from 08/02 indicated a sodium level of 127.  Repeat labs were ordered in 2 weeks which however has not been done.  Patient denies any nausea , vomiting or episodes of confusion.       Patient Active Problem List    Diagnosis Date Noted   • COPD mixed type ([facility]) 07/03/2017   • Bilateral dry eyes 02/13/2017   • Dementia without behavioral disturbance 02/10/2017   • Seizure ([facility]) 10/18/2015   • Schizophrenia ([facility]) 10/18/2015   • Spells of decreased attentiveness 08/28/2015   • Hypothyroid 08/28/2015   • Chronic schizophrenia ([facility]) 08/28/1995       Current Outpatient Prescriptions   Medication Sig Dispense Refill   • cloZAPine (CLOZARIL) 100 MG Tab Take 150 mg by mouth 2 times a day.     • levothyroxine (SYNTHROID) 112 MCG Tab TAKE 1 TABLET BY MOUTH EVERY MORNING ON AN EMPTY STOMACH 90 Tab 0   • Sennosides (SENNA) 8.6 MG Tab TAKE 2 TABLETS BY MOUTH DAILY AS  "NEEDED FOR CONSTIPATION 60 Tab 0   • levETIRAcetam (KEPPRA) 250 MG tablet TAKE 1 TABLET BY MOUTH 3 TIMES DAILY 270 Tab 1   • Calcium Carbonate-Vitamin D 600-400 MG-UNIT Tab TAKE 1 TABLET BY MOUTH ONCE DAILY 90 Tab 2   • ASPIR-LOW 81 MG EC tablet TAKE 1 TABLET BY MOUTH ONCE DAILY 90 Tab 2   • VENTOLIN  (90 Base) MCG/ACT Aero Soln inhalation aerosol      • simvastatin (ZOCOR) 40 MG Tab TAKE 1 TABLET BY MOUTH NIGHTLY AT BEDTIME 90 Tab 2   • Nutritional Supplements (ENSURE COMPLETE) Liquid Take 1 Unknown by mouth 3 times a day, with meals. 90 Bottle 2   • Diapers & Supplies Misc CHANGE 3 TIMES DAILY 96 Each 6   • escitalopram (LEXAPRO) 10 MG Tab Take 2 Tabs by mouth every day.     • divalproex ER (DEPAKOTE ER) 500 MG TABLET SR 24 HR Take 500 mg by mouth 2 Times a Day.     • MAPAP 500 MG Tab TAKE 1 TABLET BY MOUTH EVERY 6 HOURS AS NEEDED FOR MILD PAIN, MODERATE PAIN OR FEVER 90 Tab 0     No current facility-administered medications for this visit.        ROS: A 12 point review of systems negative except as mentioned in the HPI    /72 (BP Location: Left arm, Patient Position: Sitting, BP Cuff Size: Adult)   Pulse (!) 102   Temp 36.1 °C (97 °F) (Temporal)   Ht 1.676 m (5' 6\")   Wt 76.7 kg (169 lb)   SpO2 96%   BMI 27.28 kg/m²     Physical Exam   Constitutional: Well developed, well nourished female, oriented to person, place, and time. No distress. On 2 L oxygen. Visibly drooling.  Eyes: Pupils are equal, round, and reactive to light. No scleral icterus.  Neck: Neck supple. No thyromegaly present.   Cardiovascular: Normal rate, regular rhythm and normal heart sounds. 2/6 systolic murmur, most prominent in the left sternal border    Pulmonary/Chest: Breath sounds normal. Chest wall is not dull to percussion.   Musculoskeletal:   no edema.   Lymphadenopathy: no cervical adenopathy  Neurological: alert and oriented to person, place, and time.   Skin: No cyanosis. Nails show no clubbing.      Note: I have " reviewed all pertinent labs and diagnostic tests associated with this visit with specific comments listed under the assessment and plan below    Assessment and Plan    1. Undifferentiated schizophrenia (HCC)  - On clozapine 150 mg BID. Follows up with [facility] every 3 months with periodic monitoring of CBC  - Will get baseline HbA1c and lipid panel  - continue current management    2. Hypothyroidism, unspecified type  - Last TSH from 05/22 was within normal limits-1.570  - On levothyroxine 112 mcg daily  - Stable      3. COPD mixed type (HCC)  - Spirometry pending  - On albuterol as needed and home oxygen  - Stable    4. Health care maintenance  - Declined flu shot      5. Hyponatremia  - Could be secondary to dehydration. Per the guardian, patient does not drink enough water.   - Repeat BMP ordered  - Patient advised to drink plenty of water during the day          Followup: Return in about 6 months (around 5/5/2019).      Signed by: Luan Hedaley M.D.

## 2018-11-19 NOTE — TELEPHONE ENCOUNTER
Last seen: 11/05/18 by Dr. Headley  Next appt: 05/01/19 with Dr. Headley    Was the patient seen in the last year in this department? Yes   Does patient have an active prescription for medications requested? No   Received Request Via: Pharmacy

## 2018-12-03 RX ORDER — SENNOSIDES 8.6 MG
TABLET ORAL
Qty: 60 TAB | Refills: 0 | Status: SHIPPED | OUTPATIENT
Start: 2018-12-03 | End: 2019-01-03 | Stop reason: SDUPTHER

## 2018-12-04 ENCOUNTER — HOSPITAL ENCOUNTER (OUTPATIENT)
Facility: MEDICAL CENTER | Age: 68
End: 2018-12-04
Attending: INTERNAL MEDICINE
Payer: MEDICARE

## 2018-12-04 ENCOUNTER — OFFICE VISIT (OUTPATIENT)
Dept: INTERNAL MEDICINE | Facility: MEDICAL CENTER | Age: 68
End: 2018-12-04
Payer: MEDICARE

## 2018-12-04 ENCOUNTER — HOSPITAL ENCOUNTER (OUTPATIENT)
Dept: RADIOLOGY | Facility: MEDICAL CENTER | Age: 68
End: 2018-12-04
Attending: STUDENT IN AN ORGANIZED HEALTH CARE EDUCATION/TRAINING PROGRAM
Payer: MEDICARE

## 2018-12-04 ENCOUNTER — PATIENT MESSAGE (OUTPATIENT)
Dept: INTERNAL MEDICINE | Facility: MEDICAL CENTER | Age: 68
End: 2018-12-04

## 2018-12-04 VITALS
OXYGEN SATURATION: 97 % | DIASTOLIC BLOOD PRESSURE: 64 MMHG | BODY MASS INDEX: 26.08 KG/M2 | HEIGHT: 66 IN | TEMPERATURE: 97.4 F | SYSTOLIC BLOOD PRESSURE: 112 MMHG | HEART RATE: 91 BPM | WEIGHT: 162.25 LBS

## 2018-12-04 DIAGNOSIS — N30.00 ACUTE CYSTITIS WITHOUT HEMATURIA: ICD-10-CM

## 2018-12-04 DIAGNOSIS — R68.89 SPELLS OF DECREASED ATTENTIVENESS: ICD-10-CM

## 2018-12-04 DIAGNOSIS — F20.9 CHRONIC SCHIZOPHRENIA (HCC): ICD-10-CM

## 2018-12-04 DIAGNOSIS — R53.1 WEAKNESS: ICD-10-CM

## 2018-12-04 DIAGNOSIS — Z12.31 ENCOUNTER FOR SCREENING MAMMOGRAM FOR MALIGNANT NEOPLASM OF BREAST: ICD-10-CM

## 2018-12-04 DIAGNOSIS — Z00.00 HEALTH CARE MAINTENANCE: ICD-10-CM

## 2018-12-04 LAB
APPEARANCE UR: ABNORMAL
APPEARANCE UR: NORMAL
BACTERIA #/AREA URNS HPF: ABNORMAL /HPF
COLOR UR AUTO: NORMAL
COLOR UR: YELLOW
EPI CELLS #/AREA URNS HPF: ABNORMAL /HPF
GLUCOSE UR STRIP.AUTO-MCNC: NEGATIVE MG/DL
GLUCOSE UR STRIP.AUTO-MCNC: NORMAL MG/DL
KETONES UR STRIP.AUTO-MCNC: NEGATIVE MG/DL
KETONES UR STRIP.AUTO-MCNC: NORMAL MG/DL
LEUKOCYTE ESTERASE UR QL STRIP.AUTO: ABNORMAL
LEUKOCYTE ESTERASE UR QL STRIP.AUTO: NORMAL
MICRO URNS: ABNORMAL
MUCOUS THREADS #/AREA URNS HPF: ABNORMAL /HPF
NITRITE UR QL STRIP.AUTO: NORMAL
NITRITE UR QL STRIP.AUTO: POSITIVE
PH UR STRIP.AUTO: 7 [PH] (ref 5–8)
PH UR STRIP.AUTO: 8 [PH]
PROT UR QL STRIP: NEGATIVE MG/DL
PROT UR QL STRIP: NORMAL MG/DL
RBC # URNS HPF: ABNORMAL /HPF
RBC UR QL AUTO: NEGATIVE
RBC UR QL AUTO: NORMAL
SP GR UR STRIP.AUTO: 1.01
SP GR UR STRIP.AUTO: 1.01
WBC #/AREA URNS HPF: ABNORMAL /HPF

## 2018-12-04 PROCEDURE — 81001 URINALYSIS AUTO W/SCOPE: CPT

## 2018-12-04 PROCEDURE — 77067 SCR MAMMO BI INCL CAD: CPT

## 2018-12-04 PROCEDURE — 87077 CULTURE AEROBIC IDENTIFY: CPT

## 2018-12-04 PROCEDURE — 87186 SC STD MICRODIL/AGAR DIL: CPT

## 2018-12-04 PROCEDURE — 87086 URINE CULTURE/COLONY COUNT: CPT

## 2018-12-04 PROCEDURE — 99214 OFFICE O/P EST MOD 30 MIN: CPT | Performed by: INTERNAL MEDICINE

## 2018-12-04 PROCEDURE — 99000 SPECIMEN HANDLING OFFICE-LAB: CPT | Performed by: INTERNAL MEDICINE

## 2018-12-04 RX ORDER — BENZTROPINE MESYLATE 1 MG/1
1 TABLET ORAL
COMMUNITY
End: 2018-12-04

## 2018-12-04 RX ORDER — NITROFURANTOIN 25; 75 MG/1; MG/1
100 CAPSULE ORAL 2 TIMES DAILY
Qty: 14 CAP | Refills: 0 | Status: SHIPPED | OUTPATIENT
Start: 2018-12-04 | End: 2018-12-06

## 2018-12-04 NOTE — PATIENT INSTRUCTIONS
Please discontinue the benztropine 1 mg at night.    If the patient develops worsening neck pain or fevers chills or confusion please go to the emergency department.

## 2018-12-04 NOTE — TELEPHONE ENCOUNTER
From: rTacie Rinaldi  To: Luan Headley M.D.  Sent: 12/4/2018 10:20 AM PST  Subject: Procedure Question    Hi Dr. Headley,   Ms. Rinaldi is experiencing symptoms of a possible UTI. She has dark urine and is experiencing weakness. Is it possible for you to write an order for a UA and send it to [facility] fax number [phone]. Or can someone in your office see her today?   Thanks,   [facility] -

## 2018-12-04 NOTE — PATIENT COMMUNICATION
Called and spoke with Star at [facility]. She said pt is having Extremity weakness since Last week Thursday.  She was using a walker and now she is in a wheelchair.  Also she said pt has Dark urine. I asked did the pt c/o of burning or frequency. She said it's hard for pt to notify them of this. She said also pt was recently put on cogentin from another provider.    Made pt an appt this afternoon 1pm with Dr Miller

## 2018-12-04 NOTE — PROGRESS NOTES
Urgent Care Clinic Note  Patient: Tracie Rinaldi  Age: 68 y.o.   Gender: female  Author: Ravinder Miller M.D.  PCP: Luan Headley M.D.    Today's date: 12/04/18  Chief Complaint   Patient presents with   • Extremity Weakness     Since last Thursday. Yesterday noticed more weakness, walking   • Dysuria     Dark   • Neck Pain       HPI:  History obtained from patient, medical chart and family (guardian and caregiver).    PMH of schizophrenia, hypothyroidism, recurrent UTI and constipation.     Patient is a limited historian.     Lives in group home. Coming in with weakness since Thursday.  This happened before and improved with UTI treatment and she required hospitalization for that at [facility] in July 2018. No dysuria, no worsening confusion, some incontinence at baseline, patient does have drooling at baseline. No back pain. Patient is able to normally ambulate without issues and today was able to stand without assistance.     On chronic oxygen due to COPD. Continues not to smoke. No increase in oxygen.     Upon speaking with the patient she endorses neck pain that just started today. It was after a long day. She denies it feeling stiff then endorsed it being stiff. She said it worsened with looking left and right.         Started on cogentin 11/7. Started for drooling 1 mg QHS, did not help.  This was done by psychiatry    The caregiver describes that the patient ambulated independently up until about 2 weeks ago.  At that point the group home started to give her a walker however upon the assessment the caregiver the patient did not really needs a walker.  She does endorse considerable weakness that has gotten worse since Thursday however.      Assessment: 68-year-old female coming in with weakness with concern for UTI    Plan:  1. Weakness  2. Acute cystitis without hematuria  Patient's point-of-care UA significant for nitrites, small leukocyte esterase and some blood.  We will get a full  microscopic examination and culture.  I discussed with the family how I was not completely convincing for UTI based on the patient's UA however given her symptoms and history seems reasonable to treat.  I ordered nitrofurantoin Macrobid 100 mg twice daily for 7 days    I did  help neck pain could be a sign of meningitis and that if any worsening confusion and/or neck pain occur they need to go to the hospital, however neither the caregiver and the guardian felt the patient was significantly more abnormal mentation wise or physically than her baseline.  - POC URINALYSIS; Standing  - POC URINALYSIS  - URINALYSIS,CULTURE IF INDICATED; Future  - URINALYSIS,CULTURE IF INDICATED; Future    3. Chronic schizophrenia (HCC)  Patient was started on benztropine 1 mg nightly to see if it helps with her drooling.  It appears to worsen the patient's mentation and balance.  I recommended to discontinue this medication and write wrote an order for the patient.  They will follow-up with psychiatry about this discontinue medication    4. Spells of decreased attentiveness  Apparently these are baseline per the caregivers and guardian and they are not concerned about confusion.  Continue baseline treatment with concerns for base underlying seizures.    Return if symptoms worsen or fail to improve.     ROS:    A 14 point ROS is negative, other than as stated above.     Past Medical History:   Diagnosis Date   • Bladder spasm    • Dental disorder     upper and lower dentures   • Dermatitis    • GERD (gastroesophageal reflux disease)    • Hearing loss of right ear due to cerumen impaction    • Hyperlipidemia    • Hypothyroid    • Other emphysema ([facility])    • Pneumonia     2012   • Psychiatric disorder     schizophrenia   • Seizure disorder ([facility])     last seizure 2005   • Stroke ([facility])     tia june 2015 no residual    • Unspecified urinary incontinence     diapers    • Vitamin D deficiency      Social History     Social History   •  Marital status: Single     Spouse name: N/A   • Number of children: N/A   • Years of education: N/A     Occupational History   • Not on file.     Social History Main Topics   • Smoking status: Former Smoker     Packs/day: 0.50     Years: 45.00     Types: Cigarettes   • Smokeless tobacco: Never Used      Comment: unk   • Alcohol use No      Comment: unk   • Drug use: No      Comment: unk   • Sexual activity: Not on file     Other Topics Concern   • Not on file     Social History Narrative   • No narrative on file     Family History   Problem Relation Age of Onset   • Alzheimer's Disease Mother    • Heart Disease Father      Allergies: Patient has no known allergies.  Current Outpatient Prescriptions on File Prior to Visit   Medication Sig Dispense Refill   • Sennosides (SENNA) 8.6 MG Tab TAKE 2 TABLETS BY MOUTH DAILY AS NEEDED FOR CONSTIPATION 60 Tab 0   • Diapers & Supplies Misc Pull ups AC prevail  Each 0   • cloZAPine (CLOZARIL) 100 MG Tab Take 150 mg by mouth 2 times a day.     • levothyroxine (SYNTHROID) 112 MCG Tab TAKE 1 TABLET BY MOUTH EVERY MORNING ON AN EMPTY STOMACH 90 Tab 0   • levETIRAcetam (KEPPRA) 250 MG tablet TAKE 1 TABLET BY MOUTH 3 TIMES DAILY 270 Tab 1   • Calcium Carbonate-Vitamin D 600-400 MG-UNIT Tab TAKE 1 TABLET BY MOUTH ONCE DAILY 90 Tab 2   • ASPIR-LOW 81 MG EC tablet TAKE 1 TABLET BY MOUTH ONCE DAILY 90 Tab 2   • VENTOLIN  (90 Base) MCG/ACT Aero Soln inhalation aerosol      • simvastatin (ZOCOR) 40 MG Tab TAKE 1 TABLET BY MOUTH NIGHTLY AT BEDTIME 90 Tab 2   • Nutritional Supplements (ENSURE COMPLETE) Liquid Take 1 Unknown by mouth 3 times a day, with meals. 90 Bottle 2   • escitalopram (LEXAPRO) 10 MG Tab Take 2 Tabs by mouth every day.     • divalproex ER (DEPAKOTE ER) 500 MG TABLET SR 24 HR Take 500 mg by mouth 2 Times a Day.     • MAPAP 500 MG Tab TAKE 1 TABLET BY MOUTH EVERY 6 HOURS AS NEEDED FOR MILD PAIN, MODERATE PAIN OR FEVER 90 Tab 0     No current  "facility-administered medications on file prior to visit.        Physical Exam:  /64 (BP Location: Left arm, Patient Position: Sitting, BP Cuff Size: Adult)   Pulse 91   Temp 36.3 °C (97.4 °F) (Temporal)   Ht 1.676 m (5' 6\")   Wt 73.6 kg (162 lb 4 oz)   SpO2 97%   PF (!) 2 L/min   BMI 26.19 kg/m² Body mass index is 26.19 kg/m².    Gen: General, alert, no acute distress, did have episodes where she appeared to \"zone out\"; patient was not willing to get up on the exam table for an exam.  She had to be examined in her wheelchair  HEENT: No cervical spine tenderness no para cervical spine tenderness, no limited range of motion with moving head up and down, limited moving left and right.  Patient refused examining the rest of her head that she wanted to leave  CV: Regular rate and rhythm  Lungs: Clear to auscultation bilaterally, except prolonged respiratory phase, normal effort on nasal cannula  Abd: Soft nontender nondistended  Ext: Minimal lower extremity edema noted, some excoriations noted to bilateral shins bilaterally  Neuro: No pronator drift, cranial nerves appear intact, patient drools, she is able to stand up without assistance.  Psych: Does not appear to be responding to internal stimuli    Labs: Reviewed most recent chemistry panel    Ravinder Miller M.D.  Geriatric Physician    This note was partially dictated with voice recognition software, for any confusion please do not hesitate to contact me.     "

## 2018-12-05 ENCOUNTER — TELEPHONE (OUTPATIENT)
Dept: INTERNAL MEDICINE | Facility: MEDICAL CENTER | Age: 68
End: 2018-12-05

## 2018-12-05 LAB
ALBUMIN SERPL-MCNC: 3.9 G/DL (ref 3.6–4.8)
ALBUMIN/GLOB SERPL: 1.7 {RATIO} (ref 1.2–2.2)
ALP SERPL-CCNC: 75 IU/L (ref 39–117)
ALT SERPL-CCNC: 8 IU/L (ref 0–32)
AST SERPL-CCNC: 10 IU/L (ref 0–40)
BILIRUB SERPL-MCNC: 0.3 MG/DL (ref 0–1.2)
BUN SERPL-MCNC: 7 MG/DL (ref 8–27)
BUN/CREAT SERPL: 8 (ref 12–28)
CALCIUM SERPL-MCNC: 9.6 MG/DL (ref 8.7–10.3)
CHLORIDE SERPL-SCNC: 95 MMOL/L (ref 96–106)
CHOLEST SERPL-MCNC: 117 MG/DL (ref 100–199)
CO2 SERPL-SCNC: 24 MMOL/L (ref 20–29)
CREAT SERPL-MCNC: 0.93 MG/DL (ref 0.57–1)
GLOBULIN SER CALC-MCNC: 2.3 G/DL (ref 1.5–4.5)
GLUCOSE SERPL-MCNC: 105 MG/DL (ref 65–99)
HBA1C MFR BLD: 5.7 % (ref 4.8–5.6)
HDLC SERPL-MCNC: 35 MG/DL
IF AFRICAN AMERICAN  100797: 74 ML/MIN/1.73
IF NON AFRICAN AMER 100791: 64 ML/MIN/1.73
LABORATORY COMMENT REPORT: ABNORMAL
LDLC SERPL CALC-MCNC: 61 MG/DL (ref 0–99)
POTASSIUM SERPL-SCNC: 4.4 MMOL/L (ref 3.5–5.2)
PROT SERPL-MCNC: 6.2 G/DL (ref 6–8.5)
SODIUM SERPL-SCNC: 132 MMOL/L (ref 134–144)
TRIGL SERPL-MCNC: 105 MG/DL (ref 0–149)
VLDLC SERPL CALC-MCNC: 21 MG/DL (ref 5–40)

## 2018-12-05 NOTE — TELEPHONE ENCOUNTER
Attempted to get hold of Kassy to discuss UA results with regard to patient's symptoms. Concern that if she does not improve that another etiology to her changes may need to be considered given unimpressive pyuria. There is potenial for her Urine findings to be a red herring and she may have another underlying illness  .   Will call back at noon as Kassy was in a meeting based on discussion with guardian office.     Called back at 1215; went to , no VM left.    Called back at [address] with person on other end for guardian to call me back on cell phone.     I was able to get a hold of the guardian around 330 this afternoon.  I shared my concerns about the about UA being unlikely UTI given the minimal pyuria and if patient's weakness does not improve she needs to get re-evaluated sooner rather than later.  The guardian remarked that she had heard from her the patient's group home and that the patient was initially weak in the morning and required a wheelchair, but had improved over the day when was using a walker.  She will plan on checking on the patient later today.  I discussed that with her change in changes functionality, the patient being a poor historian and the unimpressive urinalysis there may be something else going on.  We will continue the nitrofurantoin and the guardian voiced understanding of the need for possible need for clinic or ER follow-up.    The guardian also told me that the psychiatrist's office agreed with the discontinuation of the benzotroponin

## 2018-12-06 ENCOUNTER — TELEPHONE (OUTPATIENT)
Dept: INTERNAL MEDICINE | Facility: MEDICAL CENTER | Age: 68
End: 2018-12-06

## 2018-12-06 LAB
BACTERIA UR CULT: ABNORMAL
BACTERIA UR CULT: ABNORMAL
SIGNIFICANT IND 70042: ABNORMAL
SITE SITE: ABNORMAL
SOURCE SOURCE: ABNORMAL

## 2018-12-06 NOTE — TELEPHONE ENCOUNTER
Called Kassy.  Discussed urine culture results.  Discussed of the bacteria that grew is unlikely causing an infection based on his species. Bacteria was also resistant to nitrofurantoin.  I recommended discontinue the nitrofurantoin.   The patient says that per report the patient's neck is improved.  However she continues not to want to walk and use the wheelchair while mobilizing.  I discussed other benztropine could be causing this however she likely needs to get reevaluated. I answered all questions and transferred her to the scheduling department so she can make an appointment for Tracie tomorrow.    I will have the MA faxed over this order to [phone]    Physician order:  Discontinue nitrofurantoin. see updated med list attached  Ravinder Miller M.D.

## 2018-12-12 ENCOUNTER — APPOINTMENT (OUTPATIENT)
Dept: INTERNAL MEDICINE | Facility: MEDICAL CENTER | Age: 68
End: 2018-12-12
Payer: MEDICARE

## 2019-01-17 ENCOUNTER — NON-PROVIDER VISIT (OUTPATIENT)
Dept: URGENT CARE | Facility: PHYSICIAN GROUP | Age: 69
End: 2019-01-17
Payer: MEDICARE

## 2019-01-17 DIAGNOSIS — Z11.1 PPD SCREENING TEST: ICD-10-CM

## 2019-01-17 PROCEDURE — 86580 TB INTRADERMAL TEST: CPT | Performed by: PHYSICIAN ASSISTANT

## 2019-01-17 NOTE — NON-PROVIDER
Tracie Rinaldi is a 68 y.o. female here for a non-provider visit for PPD placement -- Step 1 of 1    Reason for PPD:  work requirement    1. TB evaluation questionnaire completed by patient? Yes      -  If any answers marked yes did you contact a provider prior to placing? Not Indicated  2.  Patient notified to return to clinic for reading on: 01/19/ or 01/20/19  3.  PPD Placement documentation completed on TB evaluation questionnaire? Yes  4.  Location of TB evaluation questionnaire filed: [facility]

## 2019-01-18 ENCOUNTER — OFFICE VISIT (OUTPATIENT)
Dept: INTERNAL MEDICINE | Facility: MEDICAL CENTER | Age: 69
End: 2019-01-18
Payer: MEDICARE

## 2019-01-18 VITALS
HEART RATE: 105 BPM | HEIGHT: 66 IN | SYSTOLIC BLOOD PRESSURE: 107 MMHG | BODY MASS INDEX: 26.03 KG/M2 | OXYGEN SATURATION: 96 % | WEIGHT: 162 LBS | DIASTOLIC BLOOD PRESSURE: 70 MMHG | TEMPERATURE: 97.5 F

## 2019-01-18 DIAGNOSIS — J44.9 COPD MIXED TYPE (HCC): ICD-10-CM

## 2019-01-18 DIAGNOSIS — E87.1 HYPONATREMIA: ICD-10-CM

## 2019-01-18 DIAGNOSIS — R56.9 SEIZURE (HCC): ICD-10-CM

## 2019-01-18 DIAGNOSIS — Z09 HOSPITAL DISCHARGE FOLLOW-UP: ICD-10-CM

## 2019-01-18 DIAGNOSIS — F20.3 UNDIFFERENTIATED SCHIZOPHRENIA (HCC): ICD-10-CM

## 2019-01-18 DIAGNOSIS — R73.02 IMPAIRED GLUCOSE TOLERANCE: ICD-10-CM

## 2019-01-18 PROCEDURE — 99213 OFFICE O/P EST LOW 20 MIN: CPT | Mod: GE | Performed by: INTERNAL MEDICINE

## 2019-01-18 ASSESSMENT — PATIENT HEALTH QUESTIONNAIRE - PHQ9: CLINICAL INTERPRETATION OF PHQ2 SCORE: 0

## 2019-01-19 RX ORDER — FACIAL-BODY WIPES
EACH TOPICAL
Qty: 30 EACH | Refills: 1 | Status: SHIPPED | OUTPATIENT
Start: 2019-01-19 | End: 2019-02-26

## 2019-01-19 NOTE — PROGRESS NOTES
Established Patient    Tracie presents today with the following:    CC: Follow up of recent hospital admission    HPI: Patient is a 68-year-old female with a past medical history of schizophrenia, hypothyroidism, seizure disorder, dementia and COPD. Patient is here today with her .    Hospital discharge follow up: As per the , patient was admitted to [facility] for about a week in December for lethargy and weakness. Records from the hospital stay are not available at this [facility]. Per the , it appears that her symptoms were attributed to urinary tract infection and the patient was treated with IV antibiotics. She was discharged from the hospital to [facility], where she stayed for a month before being discharged back to [facility] on 01/02. Patient is now back to her baseline. She denies any weakness, dysuria, abdominal pain, fever or chills. She is getting home health PT and nursing about 1-2 times/week. Patient states that she enjoyed her stay at [facility].    Schizophrenia: Patient follows up with Dr. Jose at [facility] every 3 months.  She takes clozapine 150 mg twice daily. Patient has a constant drool secondary to the medication.  She was started on cogentin in November, which however was discontinued shortly after as it was thought to contribute to her altered mental status. Per the , CBC is monitored periodically. Patient's symptoms are well controlled.    Seizure disorder: Follows up with Dr. Larry, next appointment on 02/26.  Her seizures are in the form of confusion and blank stares.  Her symptoms are stable with Keppra and Depakote.     COPD: Former smoker, quit about 2 years back.  Smoked for 45 years prior to that.  She is currently on 2 L home oxygen 24 hours a day.  She uses albuterol inhaler as needed.     Hyponatremia: At her last visit, patient was asked to repeat labs which showed an improvement in sodium from 127 to  132. Labs from the hospital admission are not available at this time.    Patient Active Problem List    Diagnosis Date Noted   • COPD mixed type ([facility]) 07/03/2017   • Bilateral dry eyes 02/13/2017   • Dementia without behavioral disturbance 02/10/2017   • Seizure ([facility]) 10/18/2015   • Schizophrenia ([facility]) 10/18/2015   • Spells of decreased attentiveness 08/28/2015   • Hypothyroid 08/28/2015   • Chronic schizophrenia ([facility]) 08/28/1995       Current Outpatient Prescriptions   Medication Sig Dispense Refill   • Diapers & Supplies Misc Pull ups AC prevail  Each 0   • Sennosides (SENNA) 8.6 MG Tab Take 1 Tab by mouth 1 time daily as needed. 60 Tab 1   • cloZAPine (CLOZARIL) 100 MG Tab Take 150 mg by mouth 2 times a day.     • levothyroxine (SYNTHROID) 112 MCG Tab TAKE 1 TABLET BY MOUTH EVERY MORNING ON AN EMPTY STOMACH 90 Tab 0   • levETIRAcetam (KEPPRA) 250 MG tablet TAKE 1 TABLET BY MOUTH 3 TIMES DAILY 270 Tab 1   • Calcium Carbonate-Vitamin D 600-400 MG-UNIT Tab TAKE 1 TABLET BY MOUTH ONCE DAILY 90 Tab 2   • ASPIR-LOW 81 MG EC tablet TAKE 1 TABLET BY MOUTH ONCE DAILY 90 Tab 2   • VENTOLIN  (90 Base) MCG/ACT Aero Soln inhalation aerosol      • simvastatin (ZOCOR) 40 MG Tab TAKE 1 TABLET BY MOUTH NIGHTLY AT BEDTIME 90 Tab 2   • Nutritional Supplements (ENSURE COMPLETE) Liquid Take 1 Unknown by mouth 3 times a day, with meals. 90 Bottle 2   • escitalopram (LEXAPRO) 10 MG Tab Take 2 Tabs by mouth every day.     • divalproex ER (DEPAKOTE ER) 500 MG TABLET SR 24 HR Take 500 mg by mouth 2 Times a Day.     • MAPAP 500 MG Tab TAKE 1 TABLET BY MOUTH EVERY 6 HOURS AS NEEDED FOR MILD PAIN, MODERATE PAIN OR FEVER 90 Tab 0     No current facility-administered medications for this visit.        ROS: A 12 point review of systems negative except as mentioned in the HPI.       /70 (BP Location: Left arm, Patient Position: Sitting, BP Cuff Size: Adult)   Pulse (!) 105   Temp 36.4 °C (97.5 °F) (Temporal)   Ht 1.676 m  "(5' 6\")   Wt 73.5 kg (162 lb)   SpO2 96%   BMI 26.15 kg/m²     Physical Exam   Constitutional: Well developed, well nourished female, oriented to person, place, and time. No distress. On 2 L oxygen. Visibly drooling.  Eyes: Pupils are equal, round, and reactive to light. No scleral icterus.  Neck: Neck supple. No thyromegaly present.   Cardiovascular: Normal rate, regular rhythm and normal heart sounds. 2/6 systolic murmur, most prominent in the left sternal border    Pulmonary/Chest: Breath sounds normal. Chest wall is not dull to percussion.   Musculoskeletal:   no edema.   Lymphadenopathy: no cervical adenopathy  Neurological: alert and oriented to person, place, and time.   Skin: No cyanosis. Nails show no clubbing.    Note: I have reviewed all pertinent labs and diagnostic tests associated with this visit with specific comments listed under the assessment and plan below    Assessment and Plan    1. Hospital discharge follow-up  - Patient appears to be back to baseline  - Advised good water intake    2. Undifferentiated schizophrenia (HCC)  - On clozapine 150 mg BID. Follows up with [facility] every 3 months with periodic monitoring of CBC  -  Stable    3. Impaired glucose tolerance  - 12/4 HbA1c 5.7  - Advised healthy diet and regular exercise.  - HEMOGLOBIN A1C; Future    4. Hyponatremia  - Improved on repeat labs  - Secondary to dehydration vs side effects from SSRI  - Asymptomatic  - BASIC METABOLIC PANEL; Future    5. COPD mixed type (HCC)  - On home oxygen and albuterol as needed    6. Seizure (HCC)  - Spells of confusion and exhaustion  - On keppra and depakote  - Neurology appointment on 02/26        Followup: Return in about 3 months (around 4/18/2019).      Signed by: Luan Headley M.D.  "

## 2019-01-20 ENCOUNTER — NON-PROVIDER VISIT (OUTPATIENT)
Dept: URGENT CARE | Facility: PHYSICIAN GROUP | Age: 69
End: 2019-01-20
Payer: MEDICARE

## 2019-01-20 LAB — TB WHEAL 3D P 5 TU DIAM: 0 MM

## 2019-02-12 DIAGNOSIS — E03.9 HYPOTHYROIDISM, UNSPECIFIED TYPE: ICD-10-CM

## 2019-02-12 RX ORDER — ASPIRIN 81 MG/1
81 TABLET ORAL DAILY
Qty: 90 TAB | Refills: 1 | Status: SHIPPED | OUTPATIENT
Start: 2019-02-12 | End: 2022-05-24

## 2019-02-12 RX ORDER — LEVOTHYROXINE SODIUM 112 UG/1
112 TABLET ORAL EVERY MORNING
Qty: 90 TAB | Refills: 1 | Status: SHIPPED | OUTPATIENT
Start: 2019-02-12 | End: 2021-11-04 | Stop reason: SDUPTHER

## 2019-02-12 NOTE — TELEPHONE ENCOUNTER
Last seen: 01/18/19 by Dr. Headley  Next appt: 05/01/19 with Dr. Headley    Was the patient seen in the last year in this department? Yes   Does patient have an active prescription for medications requested? No   Received Request Via: Pharmacy

## 2019-02-13 DIAGNOSIS — E78.5 DYSLIPIDEMIA: ICD-10-CM

## 2019-02-13 RX ORDER — SIMVASTATIN 40 MG
40 TABLET ORAL
Qty: 90 TAB | Refills: 2 | Status: SHIPPED | OUTPATIENT
Start: 2019-02-13 | End: 2022-03-31

## 2019-02-13 NOTE — TELEPHONE ENCOUNTER
Last seen: 1/18/19 by Dr. Headley  Next appt: 5/1/19 with Dr. Headley    Was the patient seen in the last year in this department? Yes   Does patient have an active prescription for medications requested? No   Received Request Via: Pharmacy

## 2019-02-26 ENCOUNTER — OFFICE VISIT (OUTPATIENT)
Dept: NEUROLOGY | Facility: MEDICAL CENTER | Age: 69
End: 2019-02-26
Payer: MEDICARE

## 2019-02-26 VITALS
WEIGHT: 155 LBS | BODY MASS INDEX: 24.91 KG/M2 | HEIGHT: 66 IN | TEMPERATURE: 97.8 F | SYSTOLIC BLOOD PRESSURE: 110 MMHG | OXYGEN SATURATION: 91 % | DIASTOLIC BLOOD PRESSURE: 68 MMHG | HEART RATE: 101 BPM

## 2019-02-26 DIAGNOSIS — F20.9 CHRONIC SCHIZOPHRENIA (HCC): ICD-10-CM

## 2019-02-26 DIAGNOSIS — G20.A1 PARKINSON DISEASE: ICD-10-CM

## 2019-02-26 DIAGNOSIS — R56.9 SEIZURE (HCC): ICD-10-CM

## 2019-02-26 DIAGNOSIS — F02.80 DEMENTIA ASSOCIATED WITH OTHER UNDERLYING DISEASE WITHOUT BEHAVIORAL DISTURBANCE (HCC): ICD-10-CM

## 2019-02-26 DIAGNOSIS — J44.9 COPD MIXED TYPE (HCC): ICD-10-CM

## 2019-02-26 PROCEDURE — 99214 OFFICE O/P EST MOD 30 MIN: CPT | Performed by: PSYCHIATRY & NEUROLOGY

## 2019-02-26 RX ORDER — LEVETIRACETAM 250 MG/1
250 TABLET ORAL 2 TIMES DAILY
Qty: 180 TAB | Refills: 1 | Status: SHIPPED | OUTPATIENT
Start: 2019-02-26 | End: 2019-02-26 | Stop reason: SDUPTHER

## 2019-02-26 RX ORDER — LEVETIRACETAM 250 MG/1
250 TABLET ORAL 3 TIMES DAILY
Qty: 270 TAB | Refills: 1 | Status: SHIPPED | OUTPATIENT
Start: 2019-02-26 | End: 2019-08-02 | Stop reason: SDUPTHER

## 2019-02-26 RX ORDER — DIVALPROEX SODIUM 500 MG/1
500 TABLET, EXTENDED RELEASE ORAL DAILY
Qty: 90 TAB | Refills: 1 | Status: SHIPPED | OUTPATIENT
Start: 2019-02-26 | End: 2019-10-04 | Stop reason: SDUPTHER

## 2019-02-26 NOTE — PROGRESS NOTES
NEUROLOGY NOTE    Referring Physician  [facility]      CHIEF COMPLAINT:    Since the last visit, the patient was sent to hospital one time-- for UTI -- [facility]    No falls no spells since last visit  With keppra, her spells decreased       Chief Complaint   Patient presents with   • Follow-Up     seizures         PRESENT ILLNESS:       Since the last visit, the patient was sent to hospital one time-- for UTI -- [facility]    No falls no spells since last visit  With keppra, her spells decreased     No falls no spells since last visit    With keppra, her spells decreased     Since last visit, 2 hospital visits, UTI and pneumonia      Broke right foot  Again, her drooling spells worsened  Spells of drooling on daily basis-- spells of confusion remained  Since last visit, infection over the foot, osteomyelitis resolved but her confusion spells persisted      With keppra, her spells decreased         The 67 YO was noticed to have spells of confusion since 2015  She now lives in a assisted living.  She has schizophrenia and today she came with her caregiver    All these spells were stereotyped-- subsided with medication    PAST MEDICAL HISTORY:  Past Medical History:   Diagnosis Date   • Bladder spasm    • Dental disorder     upper and lower dentures   • Dermatitis    • GERD (gastroesophageal reflux disease)    • Hearing loss of right ear due to cerumen impaction    • Hyperlipidemia    • Hypothyroid    • Other emphysema (HCC)    • Pneumonia     2012   • Psychiatric disorder     schizophrenia   • Seizure disorder (HCC)     last seizure 2005   • Stroke ([facility])     tia june 2015 no residual    • Unspecified urinary incontinence     diapers    • Vitamin D deficiency        PAST SURGICAL HISTORY:  Past Surgical History:   Procedure Laterality Date   • IRRIGATION & DEBRIDEMENT ORTHO Left 10/17/2015    Procedure: IRRIGATION & DEBRIDEMENT ORTHO foot;  Surgeon: Jluis Becker M.D.;   Location: SURGERY [facility];  Service:    • CLOSED REDUCTION Left 7/9/2015    Procedure: CLOSED REDUCTION -ATTEMPTED;  Surgeon: Jluis Becker M.D.;  Location: SURGERY [facility];  Service:    • PIN INSERTION  7/9/2015    Procedure: PIN INSERTION FOOT;  Surgeon: Jluis Becker M.D.;  Location: SURGERY [facility];  Service:        FAMILY HISTORY:  Family History   Problem Relation Age of Onset   • Alzheimer's Disease Mother    • Heart Disease Father        SOCIAL HISTORY:  Social History     Social History   • Marital status: Single     Spouse name: N/A   • Number of children: N/A   • Years of education: N/A     Occupational History   • Not on file.     Social History Main Topics   • Smoking status: Former Smoker     Packs/day: 0.50     Years: 45.00     Types: Cigarettes   • Smokeless tobacco: Never Used      Comment: unk   • Alcohol use No      Comment: unk   • Drug use: No      Comment: unk   • Sexual activity: Not on file     Other Topics Concern   • Not on file     Social History Narrative   • No narrative on file     ALLERGIES:  No Known Allergies  TOBHX  History   Smoking Status   • Former Smoker   • Packs/day: 0.50   • Years: 45.00   • Types: Cigarettes   Smokeless Tobacco   • Never Used     Comment: unk     ALCHX  History   Alcohol Use No     Comment: unk     DRUGHX  History   Drug Use No     Comment: unk           MEDICATIONS:  Current Outpatient Prescriptions   Medication   • simvastatin (ZOCOR) 40 MG Tab   • aspirin (ASPIR-LOW) 81 MG EC tablet   • levothyroxine (SYNTHROID) 112 MCG Tab   • Sennosides (SENNA) 8.6 MG Tab   • cloZAPine (CLOZARIL) 100 MG Tab   • levETIRAcetam (KEPPRA) 250 MG tablet   • escitalopram (LEXAPRO) 10 MG Tab   • divalproex ER (DEPAKOTE ER) 500 MG TABLET SR 24 HR   • Infant Care Products (BABY WIPES) Misc   • Diapers & Supplies Misc   • Calcium Carbonate-Vitamin D 600-400 MG-UNIT Tab   • VENTOLIN  (90 Base) MCG/ACT Aero Soln inhalation aerosol   • Nutritional  "Supplements (ENSURE COMPLETE) Liquid   • MAPAP 500 MG Tab     No current facility-administered medications for this visit.        REVIEW OF SYSTEM:    Constitutional: Denies fevers, Denies weight changes   Eyes: Denies changes in vision, no eye pain   Ears/Nose/Throat/Mouth: Denies nasal congestion or sore throat   Cardiovascular: Denies chest pain or palpitations   Respiratory: Denies SOB.   Gastrointestinal/Hepatic: Denies abdominal pain, nausea, vomiting, diarrhea, constipation or GI bleeding   Genitourinary: Denies bladder dysfunction, dysuria or frequency   Musculoskeletal/Rheum: Denies joint pain and swelling   Skin/Breast: Denies rash, denies breast lumps or discharge   Neurological: spells of confusion  Psychiatric: Schizophrenia  Endocrine: Hypothyrodism  Heme/Oncology/Lymph Nodes: Denies enlarged lymph nodes, denies brusing or known bleeding disorder   Allergic/Immunologic: Denies hx of allergies         PHYSICAL AND NEUROLOGICAL EXMAINATIONS:  VITAL SIGNS: /68 (BP Location: Left arm, Patient Position: Sitting, BP Cuff Size: Adult)   Pulse (!) 101   Temp 36.6 °C (97.8 °F) (Temporal)   Ht 1.676 m (5' 6\")   Wt 70.3 kg (155 lb)   SpO2 91%   BMI 25.02 kg/m²   CURRENT WEIGHT:   BMI: Body mass index is 25.02 kg/m².  PREVIOUS WEIGHTS:  Wt Readings from Last 25 Encounters:   02/26/19 70.3 kg (155 lb)   01/18/19 73.5 kg (162 lb)   12/04/18 73.6 kg (162 lb 4 oz)   11/05/18 76.7 kg (169 lb)   08/27/18 73.2 kg (161 lb 6.4 oz)   07/30/18 73.9 kg (163 lb)   05/14/18 77.1 kg (170 lb)   04/11/18 74.5 kg (164 lb 4.8 oz)   01/31/18 74.4 kg (164 lb)   12/08/17 74.8 kg (165 lb)   11/20/17 77.3 kg (170 lb 6.4 oz)   09/29/17 77.5 kg (170 lb 12.8 oz)   07/03/17 78.3 kg (172 lb 9.6 oz)   05/30/17 76.8 kg (169 lb 6.4 oz)   02/13/17 75.1 kg (165 lb 9.6 oz)   02/10/17 75 kg (165 lb 6.4 oz)   10/07/16 74.4 kg (164 lb)   08/23/16 68 kg (150 lb)   07/07/16 68 kg (150 lb)   05/02/16 70.3 kg (155 lb)   04/16/16 72.6 kg (160 " lb)   04/11/16 70.3 kg (155 lb)   12/14/15 70.3 kg (155 lb)   11/28/15 65.8 kg (145 lb)   11/23/15 68 kg (150 lb)       General appearance of patient: WDWN(+) NAD(+)    EYES  o Fundus : Papilledem(-) Exudates(-) Hemorrhage(-)  Nervous System  Orientation to time, place and person(+)  Memory normal(-) recall 2/3      ________________________________________________________________________      We also asked the patient to copy the pentagons, draw clocks , writing  The patient's work will be scanned into the media section    ________________________________________________________________________  Language: aphasia(-)  Knowledge: past(+) Current(+)  Attention(+)  Cranial Nerves  • Nerve 2: intact  • Nerve 3,4,6: intact  • Nerve 5 : intact  • Nerve 7: intact  • Nerve 8: intact  • Nerve 9 & 10: intact  • Nerve 11: intact  • Nerve 12: intact  Muscle Power and muscle tone: symmetric, normal in upper and lower  Sensory System: Pin sensation intact(+)  Reflexes: symmetric throughout  Cerebellar Function FNP normal   Gait : walk with walker--- after left ankle fracture  Heart and Vascular  Peripheral Vasucular system : Edema (-) Swelling(-)  RHB, Breathing sound clear  abdomen bowel sound normoactive  Extremities freely moveable  Joints no contracture     Right ankle fracture-- fell in the bed room    Luba her Guardian is calling and she was told to contact when the pt is having her episodes of confusion. She had on today that has lasted a couple of hours. Please advise. Thank you. KA  Keppra 250mg before sleep for one week  Then up to Keppra 250mg two times per day since the second week    And will see  Order given. YP    Called and LM with going places that the Rx has been ordered. They will give it to either Luba or Kassy who is taking care of Tracie. WILLIE           Stable since last visit  Caregivers noticed that she was given the wrong dose of Clozapine--- was given higher dose-- which could trigger the last  seizure  Caregivers 2 came together with her--- they knew her in the last one year  Orientated to place, year and month and person    Recall 3/3    [employer]     Assisted Living Facility    Phone: [phone] [address] [place] [id number]      IMPRESSION:    1. Spells of confusion, exhaustion, saliva drooling lasting for hours for one year-- seizures are likely, TIA and migraine are less likely--   Supporting evidence---Stereotyped spells, followed by exhaustion and recovery in hours-- persists-- improved with keppra---  2. Hx of schizophrenia, Hypothyrodism  3. Lives in assisted living and comes with caregivers  4. Hx of Left leg infection and osteomyelitis 2015  5. Hx of Skin infection, UTI, Pneumonia  6. Dementia-- early  7. Hx of Insomnia, O2 dependent after Jan 2018 ( pneumonia)  8. Lost weight since 2017-- around 10 lb  9. Recurrent hospital admission of UTI and Pneumonia  10. Parkinsonism-- bradykinesia, saliva drooling      PLAN/RECOMMENDATIONS:      ________________________________________________________________________    Continue Keppra 250mg TID   TRY reduce Depakote to 500mg AM ( the rationale: the patient got weaker and lost weight since last visit)    We will get blood tests    First , we will reduce medication first-- it might be a good idea to reduce other medication too   Will see Tracie in 4 months to try parkinson medication  ________________________________________________________________________        3/2017  cerebral atrophy. Ventricles show configuration of borderline colpocephaly with prominence of the trigones, posterior bodies of the lateral ventricles, and right occipital horn. Similar configuration seen on the prior exam. Nonspecific, likely   developmental finding.        INTERPRETATION:        INTERPRETATION:  This EEG denotes of cortical dysfunction, mild degree with   more emphasis over the left hemisphere.  Clinical correlation may help.         ____________________________________     MD TL BETANCUR     DD:  05/13/2017 16:14:29  DT:  05/13/2017 18:32:17     D#:  [id number]  Job#:  [id number]

## 2019-02-26 NOTE — PATIENT INSTRUCTIONS
IMPRESSION:    1. Spells of confusion, exhaustion, saliva drooling lasting for hours for one year-- seizures are likely, TIA and migraine are less likely--   Supporting evidence---Stereotyped spells, followed by exhaustion and recovery in hours-- persists-- improved with keppra---  2. Hx of schizophrenia, Hypothyrodism  3. Lives in assisted living and comes with caregivers  4. Hx of Left leg infection and osteomyelitis 2015  5. Hx of Skin infection, UTI, Pneumonia  6. Dementia-- early  7. Hx of Insomnia, O2 dependent after Jan 2018 ( pneumonia)  8. Lost weight since 2017-- around 10 lb  9. Recurrent hospital admission of UTI and Pneumonia  10. Parkinsonism-- bradykinesia, saliva drooling      PLAN/RECOMMENDATIONS:      ________________________________________________________________________    Continue Keppra 250mg TID   TRY reduce Depakote to 500mg AM ( the rationale: the patient got weaker and lost weight since last visit)    We will get blood tests    First , we will reduce medication first-- it might be a good idea to reduce other medication too   Will see Tracie in 4 months to try parkinson medication  ________________________________________________________________________

## 2019-04-04 DIAGNOSIS — J44.9 COPD MIXED TYPE (HCC): ICD-10-CM

## 2019-04-04 DIAGNOSIS — J96.11 CHRONIC HYPOXEMIC RESPIRATORY FAILURE (HCC): ICD-10-CM

## 2019-04-19 ENCOUNTER — TELEPHONE (OUTPATIENT)
Dept: INTERNAL MEDICINE | Facility: MEDICAL CENTER | Age: 69
End: 2019-04-19

## 2019-04-19 NOTE — TELEPHONE ENCOUNTER
"· DME paperwork received from  requiring provider signature.     · All appropriate fields completed by Medical Assistant: No    · Paperwork placed in \"MA to Provider\" folder/basket. Awaiting provider completion/signature.    "

## 2019-04-20 RX ORDER — DIAPER,BRIEF,ADULT, DISPOSABLE
EACH MISCELLANEOUS
Qty: 150 EACH | Refills: 0 | Status: SHIPPED | OUTPATIENT
Start: 2019-04-20 | End: 2022-01-18

## 2019-04-23 LAB
BUN SERPL-MCNC: 4 MG/DL (ref 8–27)
BUN/CREAT SERPL: 5 (ref 12–28)
CALCIUM SERPL-MCNC: 9.9 MG/DL (ref 8.7–10.3)
CHLORIDE SERPL-SCNC: 92 MMOL/L (ref 96–106)
CO2 SERPL-SCNC: 21 MMOL/L (ref 20–29)
CREAT SERPL-MCNC: 0.83 MG/DL (ref 0.57–1)
GLUCOSE SERPL-MCNC: 108 MG/DL (ref 65–99)
HBA1C MFR BLD: 5.7 % (ref 4.8–5.6)
POTASSIUM SERPL-SCNC: 4.3 MMOL/L (ref 3.5–5.2)
SODIUM SERPL-SCNC: 130 MMOL/L (ref 134–144)

## 2019-04-25 LAB
CARDIOLIPIN IGA SER IA-ACNC: <9 APL U/ML (ref 0–11)
CARDIOLIPIN IGG SER IA-ACNC: <9 GPL U/ML (ref 0–14)
CARDIOLIPIN IGM SER IA-ACNC: <9 MPL U/ML (ref 0–12)
ENA SS-A AB SER-ACNC: <0.2 AI (ref 0–0.9)
ENA SS-B AB SER-ACNC: <0.2 AI (ref 0–0.9)
THYROGLOB AB SERPL-ACNC: <1 IU/ML (ref 0–0.9)
THYROPEROXIDASE AB SERPL-ACNC: 9 IU/ML (ref 0–34)
VALPROATE SERPL-MCNC: 59 UG/ML (ref 50–100)
VIT B12 SERPL-MCNC: 541 PG/ML (ref 232–1245)

## 2019-05-01 ENCOUNTER — OFFICE VISIT (OUTPATIENT)
Dept: INTERNAL MEDICINE | Facility: MEDICAL CENTER | Age: 69
End: 2019-05-01
Payer: MEDICARE

## 2019-05-01 VITALS
HEIGHT: 66 IN | TEMPERATURE: 97.7 F | SYSTOLIC BLOOD PRESSURE: 103 MMHG | OXYGEN SATURATION: 97 % | HEART RATE: 101 BPM | BODY MASS INDEX: 25.58 KG/M2 | DIASTOLIC BLOOD PRESSURE: 70 MMHG | WEIGHT: 159.2 LBS

## 2019-05-01 DIAGNOSIS — R56.9 SEIZURE (HCC): ICD-10-CM

## 2019-05-01 DIAGNOSIS — J44.9 COPD MIXED TYPE (HCC): ICD-10-CM

## 2019-05-01 DIAGNOSIS — R73.01 IMPAIRED FASTING GLUCOSE: ICD-10-CM

## 2019-05-01 DIAGNOSIS — E87.1 HYPONATREMIA: ICD-10-CM

## 2019-05-01 DIAGNOSIS — F20.3 UNDIFFERENTIATED SCHIZOPHRENIA (HCC): ICD-10-CM

## 2019-05-01 DIAGNOSIS — E03.9 HYPOTHYROIDISM, UNSPECIFIED TYPE: ICD-10-CM

## 2019-05-01 PROBLEM — H04.123 BILATERAL DRY EYES: Status: RESOLVED | Noted: 2017-02-13 | Resolved: 2019-05-01

## 2019-05-01 PROCEDURE — 99214 OFFICE O/P EST MOD 30 MIN: CPT | Mod: GC | Performed by: INTERNAL MEDICINE

## 2019-05-01 ASSESSMENT — PAIN SCALES - GENERAL: PAINLEVEL: NO PAIN

## 2019-05-01 NOTE — PROGRESS NOTES
Established Patient    Tracie presents today with the following:    CC: Follow up, hyponatremia    HPI:  Patient is a 68-year-old female with a past medical history of schizophrenia, hypothyroidism, seizure disorder, dementia and COPD who is here for follow up.  Patient is here today with her .    Hyponatremia: First noted on labs in 08/2018 with a sodium of 127.  Improved to 132 in December.  Recent labs from 04/22 showed sodium of 130.  Patient is asymptomatic.    Schizophrenia: Stable.  Patient follows up with Dr. Jose at [facility] every 3 months.  She takes clozapine 150 mg twice daily.  She is also on Lexapro 20 mg daily.  The drooling secondary to clozapine has improved.  Per the  CBCs monitored periodically.    Seizure disorder: Seizures in the form of confusion and blank stares.  She is on Keppra 250 mg 3 times daily and Depakote 500 mg daily.  She follows up with neurology and her next appointment is on 06/26.    Hypothyroidism: Stable.  She takes levothyroxine 112 mcg daily.    COPD: Patient is on 2 L oxygen 24 hours a day.  She uses albuterol inhaler as needed.    Patient attends ' [employer]' program twice a week and enjoys it thoroughly.  Orders for portable oxygen were placed recently to enable that.    Patient Active Problem List    Diagnosis Date Noted   • Parkinson disease ([facility]) 02/26/2019   • COPD mixed type ([facility]) 07/03/2017   • Bilateral dry eyes 02/13/2017   • Dementia without behavioral disturbance 02/10/2017   • Seizure ([facility]) 10/18/2015   • Schizophrenia ([facility]) 10/18/2015   • Spells of decreased attentiveness 08/28/2015   • Hypothyroid 08/28/2015       Current Outpatient Prescriptions   Medication Sig Dispense Refill   • divalproex ER (DEPAKOTE ER) 500 MG TABLET SR 24 HR Take 1 Tab by mouth every day. 90 Tab 1   • levETIRAcetam (KEPPRA) 250 MG tablet Take 1 Tab by mouth 3 times a day. 270 Tab 1   • simvastatin (ZOCOR) 40 MG Tab Take 1 Tab by mouth every  "bedtime. 90 Tab 2   • aspirin (ASPIR-LOW) 81 MG EC tablet Take 1 Tab by mouth every day. 90 Tab 1   • levothyroxine (SYNTHROID) 112 MCG Tab Take 1 Tab by mouth every morning. ON A EMPTY STOMACH 90 Tab 1   • cloZAPine (CLOZARIL) 100 MG Tab Take 150 mg by mouth 2 times a day.     • VENTOLIN  (90 Base) MCG/ACT Aero Soln inhalation aerosol      • escitalopram (LEXAPRO) 10 MG Tab Take 2 Tabs by mouth every day.     • Incontinence Supply Disposable (FQ PROTECTIVE UNDERWEAR) Misc CHANGE 3 TIMES DAILY 150 Each 0   • Sennosides (SENNA) 8.6 MG Tab Take 1 Tab by mouth 1 time daily as needed. 60 Tab 1     No current facility-administered medications for this visit.        ROS: As per HPI. Additional pertinent systems as noted below.  Constitutional: Negative for chills and fever.   HENT: Negative for ear pain and sore throat.    Eyes: Negative for discharge and redness.   Respiratory: Negative for cough, hemoptysis, wheezing and stridor.    Cardiovascular: Negative for chest pain, palpitations and leg swelling.   Gastrointestinal: Negative for abdominal pain, constipation, diarrhea, heartburn, nausea and vomiting.   Genitourinary: Negative for dysuria, flank pain and hematuria.   Musculoskeletal: Negative for falls and myalgias.   Skin: Negative for itching and rash.   Neurological: Negative for dizziness, seizures, loss of consciousness and headaches.   Endo/Heme/Allergies: Negative for polydipsia. Does not bruise/bleed easily.   Psychiatric/Behavioral: Negative for substance abuse and suicidal ideas.       /70 (BP Location: Left arm, Patient Position: Sitting)   Pulse (!) 101   Temp 36.5 °C (97.7 °F) (Temporal)   Ht 1.676 m (5' 5.98\")   Wt 72.2 kg (159 lb 3.2 oz)   SpO2 97%   BMI 25.71 kg/m²     Physical Exam   Constitutional: Well developed, well nourished, oriented to person, place, and time. No distress. On 2 L oxygen.  Eyes: Pupils are equal, round, and reactive to light. No scleral icterus.  Neck: Neck " supple. No thyromegaly present.   Cardiovascular: Normal rate, regular rhythm and normal heart sounds. 2/6 systolic murmur present. Exam reveals no gallop and no friction rub.   Pulmonary/Chest: Breath sounds normal. Chest wall is not dull to percussion.   Musculoskeletal:   no edema.   Lymphadenopathy: no cervical adenopathy  Neurological: alert and oriented to person, place, and time.   Skin: No cyanosis. Nails show no clubbing.      Note: I have reviewed all pertinent labs and diagnostic tests associated with this visit with specific comments listed under the assessment and plan below    Assessment and Plan    1. Hyponatremia  -Unclear etiology.  Likely secondary to dehydration versus side effects from medications.  -Asymptomatic  -Will order tests for further evaluation  - URINE SODIUM RANDOM; Future  - OSMOLALITY URINE; Future  - Basic Metabolic Panel; Future  - TSH WITH REFLEX TO FT4; Future    2. COPD mixed type (HCC)  -On 2 L oxygen 24 hours a day  -Stable  -Continue albuterol as needed    3. Undifferentiated schizophrenia (HCC)  -On clozapine 150 mg twice daily  -Continue follow-up with psychiatrist  -Labs requested    4. Seizure (HCC)  -Stable  -Follow-up with Dr. Larry as scheduled (06/26)  -Continue Keppra and Depakote      5. Hypothyroidism, unspecified type  -Stable  - TSH WITH REFLEX TO FT4; Future    6. Impaired fasting glucose  - A1c 5.7 (04/22/2019)  - Counseled on cutting back on sodas and eating healthier.        Followup: Return in about 5 weeks (around 6/5/2019).      Signed by: Luan Headley M.D.

## 2019-06-18 LAB
BUN SERPL-MCNC: 6 MG/DL (ref 8–27)
BUN/CREAT SERPL: 8 (ref 12–28)
CALCIUM SERPL-MCNC: 10.2 MG/DL (ref 8.7–10.3)
CHLORIDE SERPL-SCNC: 94 MMOL/L (ref 96–106)
CO2 SERPL-SCNC: 23 MMOL/L (ref 20–29)
CREAT SERPL-MCNC: 0.79 MG/DL (ref 0.57–1)
GLUCOSE SERPL-MCNC: 101 MG/DL (ref 65–99)
POTASSIUM SERPL-SCNC: 5 MMOL/L (ref 3.5–5.2)
SODIUM SERPL-SCNC: 130 MMOL/L (ref 134–144)
TSH SERPL DL<=0.005 MIU/L-ACNC: 0.41 UIU/ML (ref 0.45–4.5)

## 2019-06-20 DIAGNOSIS — E03.9 HYPOTHYROIDISM, UNSPECIFIED TYPE: ICD-10-CM

## 2019-06-21 LAB — SODIUM 24H UR-SCNC: <20 MMOL/L

## 2019-06-24 ENCOUNTER — HOSPITAL ENCOUNTER (EMERGENCY)
Facility: MEDICAL CENTER | Age: 69
End: 2019-06-25
Attending: EMERGENCY MEDICINE
Payer: MEDICARE

## 2019-06-24 ENCOUNTER — APPOINTMENT (OUTPATIENT)
Dept: RADIOLOGY | Facility: MEDICAL CENTER | Age: 69
End: 2019-06-24
Attending: EMERGENCY MEDICINE
Payer: MEDICARE

## 2019-06-24 DIAGNOSIS — E87.1 HYPONATREMIA: ICD-10-CM

## 2019-06-24 LAB
ALBUMIN SERPL BCP-MCNC: 3.6 G/DL (ref 3.2–4.9)
ALBUMIN/GLOB SERPL: 1.6 G/DL
ALP SERPL-CCNC: 94 U/L (ref 30–99)
ALT SERPL-CCNC: 11 U/L (ref 2–50)
ANION GAP SERPL CALC-SCNC: 8 MMOL/L (ref 0–11.9)
APPEARANCE UR: CLEAR
AST SERPL-CCNC: 13 U/L (ref 12–45)
BASOPHILS # BLD AUTO: 0.6 % (ref 0–1.8)
BASOPHILS # BLD: 0.04 K/UL (ref 0–0.12)
BILIRUB SERPL-MCNC: 0.6 MG/DL (ref 0.1–1.5)
BILIRUB UR QL STRIP.AUTO: NEGATIVE
BUN SERPL-MCNC: 6 MG/DL (ref 8–22)
CALCIUM SERPL-MCNC: 8.8 MG/DL (ref 8.4–10.2)
CHLORIDE SERPL-SCNC: 91 MMOL/L (ref 96–112)
CO2 SERPL-SCNC: 27 MMOL/L (ref 20–33)
COLOR UR: YELLOW
CREAT SERPL-MCNC: 0.86 MG/DL (ref 0.5–1.4)
EOSINOPHIL # BLD AUTO: 0.19 K/UL (ref 0–0.51)
EOSINOPHIL NFR BLD: 2.8 % (ref 0–6.9)
ERYTHROCYTE [DISTWIDTH] IN BLOOD BY AUTOMATED COUNT: 41.1 FL (ref 35.9–50)
GLOBULIN SER CALC-MCNC: 2.2 G/DL (ref 1.9–3.5)
GLUCOSE SERPL-MCNC: 95 MG/DL (ref 65–99)
GLUCOSE UR STRIP.AUTO-MCNC: NEGATIVE MG/DL
HCT VFR BLD AUTO: 36.7 % (ref 37–47)
HGB BLD-MCNC: 12.4 G/DL (ref 12–16)
IMM GRANULOCYTES # BLD AUTO: 0.02 K/UL (ref 0–0.11)
IMM GRANULOCYTES NFR BLD AUTO: 0.3 % (ref 0–0.9)
KETONES UR STRIP.AUTO-MCNC: NEGATIVE MG/DL
LEUKOCYTE ESTERASE UR QL STRIP.AUTO: NEGATIVE
LIPASE SERPL-CCNC: 24 U/L (ref 7–58)
LYMPHOCYTES # BLD AUTO: 2.52 K/UL (ref 1–4.8)
LYMPHOCYTES NFR BLD: 36.9 % (ref 22–41)
MCH RBC QN AUTO: 26.6 PG (ref 27–33)
MCHC RBC AUTO-ENTMCNC: 33.8 G/DL (ref 33.6–35)
MCV RBC AUTO: 78.8 FL (ref 81.4–97.8)
MICRO URNS: NORMAL
MONOCYTES # BLD AUTO: 0.63 K/UL (ref 0–0.85)
MONOCYTES NFR BLD AUTO: 9.2 % (ref 0–13.4)
NEUTROPHILS # BLD AUTO: 3.43 K/UL (ref 2–7.15)
NEUTROPHILS NFR BLD: 50.2 % (ref 44–72)
NITRITE UR QL STRIP.AUTO: NEGATIVE
NRBC # BLD AUTO: 0 K/UL
NRBC BLD-RTO: 0 /100 WBC
PH UR STRIP.AUTO: 7 [PH]
PLATELET # BLD AUTO: 223 K/UL (ref 164–446)
PMV BLD AUTO: 10.2 FL (ref 9–12.9)
POTASSIUM SERPL-SCNC: 3.8 MMOL/L (ref 3.6–5.5)
PROT SERPL-MCNC: 5.8 G/DL (ref 6–8.2)
PROT UR QL STRIP: NEGATIVE MG/DL
RBC # BLD AUTO: 4.66 M/UL (ref 4.2–5.4)
RBC UR QL AUTO: NEGATIVE
SODIUM SERPL-SCNC: 126 MMOL/L (ref 135–145)
SP GR UR STRIP.AUTO: <=1.005
WBC # BLD AUTO: 6.8 K/UL (ref 4.8–10.8)

## 2019-06-24 PROCEDURE — 700105 HCHG RX REV CODE 258: Performed by: EMERGENCY MEDICINE

## 2019-06-24 PROCEDURE — 83690 ASSAY OF LIPASE: CPT

## 2019-06-24 PROCEDURE — 85025 COMPLETE CBC W/AUTO DIFF WBC: CPT

## 2019-06-24 PROCEDURE — 81003 URINALYSIS AUTO W/O SCOPE: CPT

## 2019-06-24 PROCEDURE — 74177 CT ABD & PELVIS W/CONTRAST: CPT

## 2019-06-24 PROCEDURE — 700117 HCHG RX CONTRAST REV CODE 255: Performed by: EMERGENCY MEDICINE

## 2019-06-24 PROCEDURE — 80053 COMPREHEN METABOLIC PANEL: CPT

## 2019-06-24 PROCEDURE — 36415 COLL VENOUS BLD VENIPUNCTURE: CPT

## 2019-06-24 PROCEDURE — 99285 EMERGENCY DEPT VISIT HI MDM: CPT

## 2019-06-24 RX ORDER — SODIUM CHLORIDE 9 MG/ML
500 INJECTION, SOLUTION INTRAVENOUS ONCE
Status: COMPLETED | OUTPATIENT
Start: 2019-06-24 | End: 2019-06-24

## 2019-06-24 RX ADMIN — IOHEXOL 100 ML: 350 INJECTION, SOLUTION INTRAVENOUS at 22:43

## 2019-06-24 RX ADMIN — SODIUM CHLORIDE 500 ML: 9 INJECTION, SOLUTION INTRAVENOUS at 23:22

## 2019-06-24 ASSESSMENT — PAIN DESCRIPTION - DESCRIPTORS: DESCRIPTORS: BURNING

## 2019-06-25 VITALS
BODY MASS INDEX: 23.74 KG/M2 | WEIGHT: 147.71 LBS | HEART RATE: 85 BPM | RESPIRATION RATE: 20 BRPM | SYSTOLIC BLOOD PRESSURE: 115 MMHG | OXYGEN SATURATION: 91 % | TEMPERATURE: 98.4 F | DIASTOLIC BLOOD PRESSURE: 74 MMHG | HEIGHT: 66 IN

## 2019-06-25 NOTE — ED NOTES
asst x 1 up to the [facility] steady.  Void. Cc ua collected w/o diff.  Ana Rosa care given.  Back to bed.

## 2019-06-25 NOTE — DISCHARGE INSTRUCTIONS
Return here if there are new or worsening symptoms otherwise follow-up with primary care physician this week

## 2019-06-25 NOTE — ED PROVIDER NOTES
ED Provider Note    CHIEF COMPLAINT  Chief Complaint   Patient presents with   • Abdominal Pain     cramping abd pain. per ems/cartaker urine very dark and pt reported burning during urination.        HPI  Tracie Rinaldi is a 69 y.o. female who presents to the emergency department brought in from her group home reportedly with a complaint of dark urine and discomfort with urination for more than 1 month.  The patient is a very poor historian with a history of dementia and really cannot provide any helpful information at all.  There is no caregiver accompany the patient in the emergency department.    REVIEW OF SYSTEMS the patient denies any pain no chest pain no difficulty breathing no abdominal pain.  All other systems negative    PAST MEDICAL HISTORY  Past Medical History:   Diagnosis Date   • Bladder spasm    • Dental disorder     upper and lower dentures   • Dermatitis    • GERD (gastroesophageal reflux disease)    • Hearing loss of right ear due to cerumen impaction    • Hyperlipidemia    • Hypothyroid    • Other emphysema ([facility])    • Pneumonia     2012   • Psychiatric disorder     schizophrenia   • Seizure disorder ([facility])     last seizure 2005   • Stroke ([facility])     tia june 2015 no residual    • Unspecified urinary incontinence     diapers    • Vitamin D deficiency        FAMILY HISTORY  Family History   Problem Relation Age of Onset   • Alzheimer's Disease Mother    • Heart Disease Father        SOCIAL HISTORY  Social History     Social History   • Marital status: Single     Spouse name: N/A   • Number of children: N/A   • Years of education: N/A     Social History Main Topics   • Smoking status: Former Smoker     Packs/day: 0.50     Years: 45.00     Types: Cigarettes   • Smokeless tobacco: Never Used      Comment: unk   • Alcohol use No      Comment: unk   • Drug use: No      Comment: unk   • Sexual activity: Not on file     Other Topics Concern   • Not on file     Social History Narrative   • No narrative  "on file       SURGICAL HISTORY  Past Surgical History:   Procedure Laterality Date   • IRRIGATION & DEBRIDEMENT ORTHO Left 10/17/2015    Procedure: IRRIGATION & DEBRIDEMENT ORTHO foot;  Surgeon: Jluis Becker M.D.;  Location: SURGERY [facility];  Service:    • CLOSED REDUCTION Left 7/9/2015    Procedure: CLOSED REDUCTION -ATTEMPTED;  Surgeon: Jluis Becker M.D.;  Location: SURGERY [facility];  Service:    • PIN INSERTION  7/9/2015    Procedure: PIN INSERTION FOOT;  Surgeon: Jluis Becker M.D.;  Location: SURGERY [facility];  Service:        CURRENT MEDICATIONS  Home Medications     Reviewed by García Sher R.N. (Registered Nurse) on 06/24/19 at 2114  Med List Status: Not Addressed   Medication Last Dose Status   aspirin (ASPIR-LOW) 81 MG EC tablet 6/24/2019 Active   Calcium Carbonate-Vitamin D (CALCIUM 600 + D) 600-400 MG-UNIT Tab  Active   cloZAPine (CLOZARIL) 100 MG Tab 6/24/2019 Active   divalproex ER (DEPAKOTE ER) 500 MG TABLET SR 24 HR 6/24/2019 Active   escitalopram (LEXAPRO) 10 MG Tab 6/24/2019 Active   Incontinence Supply Disposable (FQ PROTECTIVE UNDERWEAR) Misc  Active   Incontinence Supply Disposable (FQ PROTECTIVE UNDERWEAR) Misc  Active   Incontinence Supply Disposable (FQ PROTECTIVE UNDERWEAR) Misc  Active   levETIRAcetam (KEPPRA) 250 MG tablet 6/24/2019 Active   levothyroxine (SYNTHROID) 112 MCG Tab 6/24/2019 Active   Sennosides (SENNA) 8.6 MG Tab  Active   simvastatin (ZOCOR) 40 MG Tab 6/24/2019 Active   VENTOLIN  (90 Base) MCG/ACT Aero Soln inhalation aerosol  Active                ALLERGIES  No Known Allergies    PHYSICAL EXAM  VITAL SIGNS: /74   Pulse 78   Temp 36.9 °C (98.4 °F) (Temporal)   Resp 20   Ht 1.676 m (5' 6\")   Wt 67 kg (147 lb 11.3 oz)   SpO2 98%   BMI 23.84 kg/m²    Oxygen saturation is interpreted as adequate  Constitutional: Awake verbal talkative but confused consistent with dementia, she does not appear distressed  HENT: Mucous " membranes are moist  Eyes: No erythema discharge or jaundice  Neck: Trachea midline no JVD  Cardiovascular: Regular rate and rhythm  Lungs: Clear and equal bilaterally with no apparent difficulty breathing  Abdomen/Back: Soft nondistended normoactive bowel sounds completely nontender, I was able to palpate deeply and vigorously in all 4 abdominal quadrants and this elicited no discomfort whatsoever.  There are no peritoneal findings.  Skin: Warm and dry  Musculoskeletal: No acute bony deformity  Neurologic: Awake verbal and moving all extremities    Laboratory  CBC shows white blood cell count of 6.8 hemoglobin is adequate at 12.4 urinalysis negative for nitrite and leukocyte esterase and blood basic metabolic panel is unremarkable except for a moderately low sodium of 126.  LFTs and lipase are unremarkable urinalysis negative for nitrite leukocyte esterase and blood    EKG interpretation  Twelve-lead EKG shows sinus rhythm 100 bpm there is no pathologic ST elevation there is baseline wandering in V4 through V6    MEDICAL DECISION MAKING and DISPOSITION  In the emergency department the patient was given intravenous normal saline for hyponatremia.  I reexamined her including repeat abdominal exam and the abdominal exam is completely benign and completely nontender.  At this point in time I think it is safe for the patient to go back to her group home I have recommended that she be returned here if there are new or worsening symptoms and otherwise she is to follow-up with her primary care doctor during the week for recheck    IMPRESSION  1.  2 months of abdominal discomfort and dark urine  2.  Dementia         Electronically signed by: Jake Bill, 6/25/2019 12:25 AM

## 2019-06-25 NOTE — ED NOTES
Pt roomed, attached to vitals machine and cardiac monitoring. VSS. Call light in reach. Blood drawn and sent to lab. Urine collected and sent to lab. Fall risk precautions in place. Waiting for MD

## 2019-06-25 NOTE — ED NOTES
A phone call was made to the pt staff at the Group Home to let them know the pt was on her way back home.

## 2019-06-25 NOTE — ED TRIAGE NOTES
Pt bib remsa for reports of lower abdominal pain and burning with urination and dark urine for approx a month. Pt states that she get frequent urges to urinate. Pt also states she has leg and arm pain bilaterally. Pt appears to be poor historian with hx of dementia.

## 2019-06-25 NOTE — ED NOTES
Bedside report given to [facility] staff.  The pt is stable, being tx back to facility. D/c inst included in the d/c packet.

## 2019-08-02 DIAGNOSIS — R56.9 SEIZURE (HCC): ICD-10-CM

## 2019-08-06 RX ORDER — LEVETIRACETAM 250 MG/1
TABLET ORAL
Qty: 90 TAB | Refills: 1 | Status: SHIPPED | OUTPATIENT
Start: 2019-08-06 | End: 2019-10-04 | Stop reason: SDUPTHER

## 2019-08-06 NOTE — TELEPHONE ENCOUNTER
Was the patient seen in the last year in this department? Yes    Does patient have an active prescription for medications requested? Yes    Received Request Via: Pharmacy    Pt scheduled to see dr Rand

## 2019-08-08 ENCOUNTER — HOSPITAL ENCOUNTER (OUTPATIENT)
Dept: PULMONOLOGY | Facility: MEDICAL CENTER | Age: 69
End: 2019-08-08
Attending: STUDENT IN AN ORGANIZED HEALTH CARE EDUCATION/TRAINING PROGRAM
Payer: MEDICARE

## 2019-08-08 PROCEDURE — 94060 EVALUATION OF WHEEZING: CPT | Mod: 26 | Performed by: INTERNAL MEDICINE

## 2019-08-08 PROCEDURE — 94726 PLETHYSMOGRAPHY LUNG VOLUMES: CPT | Mod: 26 | Performed by: INTERNAL MEDICINE

## 2019-08-08 PROCEDURE — 94726 PLETHYSMOGRAPHY LUNG VOLUMES: CPT

## 2019-08-08 PROCEDURE — 94729 DIFFUSING CAPACITY: CPT | Mod: 26 | Performed by: INTERNAL MEDICINE

## 2019-08-08 PROCEDURE — 94060 EVALUATION OF WHEEZING: CPT

## 2019-08-08 PROCEDURE — 94729 DIFFUSING CAPACITY: CPT

## 2019-08-08 ASSESSMENT — PULMONARY FUNCTION TESTS
FVC_PERCENT_PREDICTED: 75
FEV1: 1.9
FEV1_LLN: 2.35
FEV1/FVC_PERCENT_PREDICTED: 90
FEV1/FVC_PERCENT_PREDICTED: 91
FVC: 2.75
FEV1/FVC_PERCENT_CHANGE: 71
FEV1_PREDICTED: 2.81
FEV1_LLN: 2.35
FEV1/FVC_PERCENT_LLN: 64
FVC_PERCENT_PREDICTED: 70
FEV1/FVC_PERCENT_PREDICTED: 89
FEV1/FVC_PREDICTED: 77
FEV1/FVC_PERCENT_PREDICTED: 89
FEV1/FVC_PERCENT_PREDICTED: 77
FVC_PREDICTED: 3.64
FEV1/FVC: 69
FEV1_PERCENT_CHANGE: 7
FEV1/FVC: 71
FEV1/FVC_PERCENT_LLN: 64
FEV1_PERCENT_CHANGE: 5
FVC_LLN: 3.04
FEV1/FVC_PERCENT_CHANGE: -1
FEV1/FVC: 69.09
FEV1: 1.8
FEV1/FVC: 70
FVC_LLN: 3.04
FEV1_PERCENT_PREDICTED: 63
FEV1_PERCENT_PREDICTED: 67
FVC: 2.55

## 2019-08-11 NOTE — PROCEDURES
PULMONARY FUNCTION TEST INTERPRETATION    DATE OF TEST:  08/08/2019    Technician comments, the patient had good effort and cooperation.  The results   of the test meet the ATS standards for acceptability and repeatability.    SPIROMETRY:  Showed FVC of 2.75 liters, 75% of predicted.  FEV1 was 1.9   liters, 67% of predicted.  FEV1/FVC ratio was 69%.  There was no significant   response to bronchodilators.    LUNG VOLUMES:  Showed severe air trapping with residual volume of 3.8 liters,   179% of predicted.  Total lung capacity was normal at 101% of predicted.    Diffusion capacity was 101% of predicted.    IMPRESSION:  The patient has moderate obstructive ventilatory defect with   decreased FEV1 and decreased FEV1/FVC ratio.  The patient also has severe air   trapping.  These findings are probably secondary to chronic obstructive   pulmonary disease given the patient excessive smoking history.  Normal   diffusion capacity would make the diagnosis of emphysema less likely.    Clinical correlation is required.       ____________________________________     MD MIGEL Irvin / JEAN-CLAUDE    DD:  08/11/2019 09:17:55  DT:  08/11/2019 10:30:39    D#:  [id number]  Job#:  [id number]

## 2019-08-30 ENCOUNTER — APPOINTMENT (RX ONLY)
Dept: URBAN - METROPOLITAN AREA CLINIC 4 | Facility: CLINIC | Age: 69
Setting detail: DERMATOLOGY
End: 2019-08-30

## 2019-08-30 DIAGNOSIS — D22 MELANOCYTIC NEVI: ICD-10-CM

## 2019-08-30 DIAGNOSIS — D18.0 HEMANGIOMA: ICD-10-CM

## 2019-08-30 DIAGNOSIS — L81.4 OTHER MELANIN HYPERPIGMENTATION: ICD-10-CM

## 2019-08-30 DIAGNOSIS — L57.0 ACTINIC KERATOSIS: ICD-10-CM

## 2019-08-30 DIAGNOSIS — L82.1 OTHER SEBORRHEIC KERATOSIS: ICD-10-CM

## 2019-08-30 PROBLEM — D22.71 MELANOCYTIC NEVI OF RIGHT LOWER LIMB, INCLUDING HIP: Status: ACTIVE | Noted: 2019-08-30

## 2019-08-30 PROBLEM — D22.61 MELANOCYTIC NEVI OF RIGHT UPPER LIMB, INCLUDING SHOULDER: Status: ACTIVE | Noted: 2019-08-30

## 2019-08-30 PROBLEM — D22.72 MELANOCYTIC NEVI OF LEFT LOWER LIMB, INCLUDING HIP: Status: ACTIVE | Noted: 2019-08-30

## 2019-08-30 PROBLEM — D22.62 MELANOCYTIC NEVI OF LEFT UPPER LIMB, INCLUDING SHOULDER: Status: ACTIVE | Noted: 2019-08-30

## 2019-08-30 PROBLEM — D18.01 HEMANGIOMA OF SKIN AND SUBCUTANEOUS TISSUE: Status: ACTIVE | Noted: 2019-08-30

## 2019-08-30 PROBLEM — D22.5 MELANOCYTIC NEVI OF TRUNK: Status: ACTIVE | Noted: 2019-08-30

## 2019-08-30 PROCEDURE — 99213 OFFICE O/P EST LOW 20 MIN: CPT | Mod: 25

## 2019-08-30 PROCEDURE — 17000 DESTRUCT PREMALG LESION: CPT

## 2019-08-30 PROCEDURE — ? SUNSCREEN RECOMMENDATIONS

## 2019-08-30 PROCEDURE — 17003 DESTRUCT PREMALG LES 2-14: CPT

## 2019-08-30 PROCEDURE — ? OBSERVATION

## 2019-08-30 PROCEDURE — ? LIQUID NITROGEN

## 2019-08-30 PROCEDURE — ? COUNSELING

## 2019-08-30 ASSESSMENT — LOCATION SIMPLE DESCRIPTION DERM
LOCATION SIMPLE: LEFT UPPER BACK
LOCATION SIMPLE: RIGHT CHEEK
LOCATION SIMPLE: RIGHT TEMPLE
LOCATION SIMPLE: LEFT CHEEK
LOCATION SIMPLE: LEFT FOREARM
LOCATION SIMPLE: LEFT THIGH
LOCATION SIMPLE: RIGHT THIGH
LOCATION SIMPLE: RIGHT HAND
LOCATION SIMPLE: LEFT ANTERIOR NECK
LOCATION SIMPLE: RIGHT NOSE
LOCATION SIMPLE: RIGHT LOWER BACK
LOCATION SIMPLE: RIGHT FOREARM
LOCATION SIMPLE: LEFT POSTERIOR UPPER ARM
LOCATION SIMPLE: NOSE
LOCATION SIMPLE: INFERIOR FOREHEAD
LOCATION SIMPLE: ABDOMEN
LOCATION SIMPLE: RIGHT POSTERIOR UPPER ARM
LOCATION SIMPLE: LEFT HAND

## 2019-08-30 ASSESSMENT — LOCATION DETAILED DESCRIPTION DERM
LOCATION DETAILED: RIGHT DISTAL POSTERIOR UPPER ARM
LOCATION DETAILED: RIGHT PROXIMAL DORSAL FOREARM
LOCATION DETAILED: LEFT INFERIOR ANTERIOR NECK
LOCATION DETAILED: LEFT ANTERIOR PROXIMAL THIGH
LOCATION DETAILED: LEFT CENTRAL MALAR CHEEK
LOCATION DETAILED: NASAL ROOT
LOCATION DETAILED: LEFT PROXIMAL DORSAL FOREARM
LOCATION DETAILED: RIGHT CENTRAL MALAR CHEEK
LOCATION DETAILED: RIGHT NASAL SIDEWALL
LOCATION DETAILED: LEFT ULNAR DORSAL HAND
LOCATION DETAILED: LEFT PROXIMAL POSTERIOR UPPER ARM
LOCATION DETAILED: LEFT MEDIAL UPPER BACK
LOCATION DETAILED: RIGHT ANTERIOR PROXIMAL THIGH
LOCATION DETAILED: INFERIOR MID FOREHEAD
LOCATION DETAILED: RIGHT RADIAL DORSAL HAND
LOCATION DETAILED: RIGHT RIB CAGE
LOCATION DETAILED: RIGHT SUPERIOR MEDIAL MIDBACK
LOCATION DETAILED: LEFT SUPERIOR MEDIAL UPPER BACK
LOCATION DETAILED: RIGHT CENTRAL TEMPLE
LOCATION DETAILED: PERIUMBILICAL SKIN

## 2019-08-30 ASSESSMENT — LOCATION ZONE DERM
LOCATION ZONE: TRUNK
LOCATION ZONE: NOSE
LOCATION ZONE: HAND
LOCATION ZONE: ARM
LOCATION ZONE: FACE
LOCATION ZONE: NECK
LOCATION ZONE: LEG

## 2019-10-04 ENCOUNTER — OFFICE VISIT (OUTPATIENT)
Dept: NEUROLOGY | Facility: MEDICAL CENTER | Age: 69
End: 2019-10-04
Payer: MEDICARE

## 2019-10-04 VITALS
HEIGHT: 66 IN | OXYGEN SATURATION: 92 % | HEART RATE: 75 BPM | BODY MASS INDEX: 23.63 KG/M2 | TEMPERATURE: 98.4 F | SYSTOLIC BLOOD PRESSURE: 112 MMHG | DIASTOLIC BLOOD PRESSURE: 78 MMHG | RESPIRATION RATE: 16 BRPM | WEIGHT: 147 LBS

## 2019-10-04 DIAGNOSIS — F20.9 CHRONIC SCHIZOPHRENIA (HCC): ICD-10-CM

## 2019-10-04 DIAGNOSIS — R56.9 SEIZURE (HCC): ICD-10-CM

## 2019-10-04 PROCEDURE — 99215 OFFICE O/P EST HI 40 MIN: CPT | Performed by: PSYCHIATRY & NEUROLOGY

## 2019-10-04 RX ORDER — LEVETIRACETAM 250 MG/1
TABLET ORAL
Qty: 270 TAB | Refills: 3 | Status: SHIPPED | OUTPATIENT
Start: 2019-10-04 | End: 2020-08-19 | Stop reason: SDUPTHER

## 2019-10-04 RX ORDER — DIVALPROEX SODIUM 250 MG/1
250 TABLET, EXTENDED RELEASE ORAL DAILY
Qty: 90 TAB | Refills: 3 | Status: SHIPPED | OUTPATIENT
Start: 2019-10-04 | End: 2022-01-18 | Stop reason: SDUPTHER

## 2019-10-04 ASSESSMENT — ENCOUNTER SYMPTOMS
LOSS OF CONSCIOUSNESS: 0
FOCAL WEAKNESS: 1
SEIZURES: 0
WHEEZING: 0
COUGH: 0
MEMORY LOSS: 1

## 2019-10-04 NOTE — PROGRESS NOTES
Subjective:      Tracie Rinaldi is a 69 y.o. female who presents with with her she caregiver and , a former patient of Dr. Larry, with a history of focal onset seizures with secondary generalization and cognitive impairment of unclear etiology.    HPI    Tracie has been doing well since last seen by Dr. vann 6 months ago.  The events of altered sensorium where she would suddenly stare, drool a little bit more than she usually does, etc.,  Have stopped.  She has had no generalized seizures.  Because she lives in a group home, her medications are provided and she takes them consistently.    According to record, she has been on Keppra 250 mg, 3 times daily, and with this institution by Dr. Larry, these events have stopped completely.  She has been on Depakote  mg daily, unclear whether this is for seizures and also her schizophrenia and mood disorder, but the drug was reduced to 250 mg daily, and unknown interval of time ago.  From a seizure standpoint, things remain stable.    Cognitive symptoms remain a little problematic.  She is only a little more forgetful, but she is still the most part independent where she lives.  She is noncompliant with her oxygen which she needs for her COPD.  She is still prone to UTIs, though told, she still does not drink enough fluids.  She eats regular.    They deny any issues with tremor, softening of voice, swallowing difficulties, frequent falls, etc.  She does have a history of drooling, though this is intermittent at worst.    Review of the electronic health record shows her last EEG from May, 2017 revealed left hemispheric dysfunction without epileptiform activity.  MRI from March, 2017 revealed evidence of borderline colpocephaly, no evidence of selective mesial temporal sclerosis or atrophy, and minimal chronic microvascular ischemic changes in the white matter bilaterally.    Medical, surgical and family histories are reviewed in the electronic health record,  "there are no new drug allergies.  She is not aware of anyone in her family, though she knows only a limited amount of information, has a history of dementia.    She is on Keppra 250 mg, 3 times daily, Depakote ER to 50 mg daily, Zocor, Synthroid, Lexapro, Clozaril 150 mg, twice daily, baby aspirin daily, the rest as per the electronic health record, noncontributory from my standpoint.    Review of Systems   Constitutional: Negative for malaise/fatigue.   Respiratory: Negative for cough and wheezing.    Neurological: Positive for focal weakness. Negative for seizures and loss of consciousness.   Psychiatric/Behavioral: Positive for memory loss.   All other systems reviewed and are negative.       Objective:     /78 (BP Location: Right arm, Patient Position: Sitting, BP Cuff Size: Adult)   Pulse 75   Temp 36.9 °C (98.4 °F) (Temporal)   Resp 16   Ht 1.676 m (5' 5.98\")   Wt 66.7 kg (147 lb)   SpO2 92%   BMI 23.74 kg/m²      Physical Exam    She appears in no acute distress.  Her vital signs are stable.  There is no malar rash.  The neck is supple, range of motion is full.  She is not drooling.  Cardiac evaluation reveals a regular rhythm.  There is mild bilateral lower extremity edema.    She is fully oriented, there is no aphasia, apraxia, or inattention.    PERRLA/EOMI, there is mild hypophonia but no bradykinesia or dysarthria.  Facial movements are symmetric, sensory exam is intact to temperature bilaterally, the tongue and uvula are midline.  Shoulder shrug and head rotation are intact.    Musculoskeletal exam reveals normal tone bilaterally, even with distraction.  There is no tremor or drift.  Strength is grossly intact throughout, though her effort is at times inconsistent.  There are no reflex asymmetries.    She stands slowly but does so easily, stride length is diminished but she is not apractic, she does not shuffle.  There is no appendicular dystaxia in the upper extremity.  Repetitive " movements show symmetric, but slowed movements with good amplitudes throughout.    Sensory exam is intact to vibration and temperature.     Assessment/Plan:     1. Seizure (HCC)  Her seizures are by history of a focal onset with altered sensorium, she has had no secondarily generalized events.  She is on Keppra 250 mg, 3 times daily, which seems to be doing the best for her in regards to his seizures.  It is unclear whether the Depakote is being used for seizure control as well as for its psychoactive component.  The doses are being adjusted by her therapists, I will allow them to do so.  At 250 mg daily, it is not likely to be providing much antiepileptic effect, regardless.  They certainly know what seizures look like for this patient, they can call the office if they were to spontaneously increase.  We can follow-up in 1 year.    - levETIRAcetam (KEPPRA) 250 MG tablet; TAKE TAKE ONE TABLET BY MOUTH 3 TIMES DAILY  Dispense: 270 Tab; Refill: 3  - divalproex ER (DEPAKOTE ER) 250 MG TABLET SR 24 HR; Take 1 Tab by mouth every day.  Dispense: 90 Tab; Refill: 3    2.  Memory loss of unknown cause  She seems to be doing well, formal neuropsychological evaluation has not been done in quite some time.  For now we can simply observe.  Imaging is ruled out structural pathology, over time blood work has been drawn, the for other reasons, and has ruled out the typical treatable causes of cognitive impairment.    Time: 40 minutes spent face-to-face for exam, review, discussion, and education, of this over 70% of the time spent face-to-face counseling and coordinating care.

## 2020-01-07 ENCOUNTER — NON-PROVIDER VISIT (OUTPATIENT)
Dept: URGENT CARE | Facility: PHYSICIAN GROUP | Age: 70
End: 2020-01-07

## 2020-01-07 ENCOUNTER — HOSPITAL ENCOUNTER (OUTPATIENT)
Dept: RADIOLOGY | Facility: MEDICAL CENTER | Age: 70
End: 2020-01-07
Attending: STUDENT IN AN ORGANIZED HEALTH CARE EDUCATION/TRAINING PROGRAM
Payer: MEDICARE

## 2020-01-07 DIAGNOSIS — Z12.31 VISIT FOR SCREENING MAMMOGRAM: ICD-10-CM

## 2020-01-07 DIAGNOSIS — Z11.1 ENCOUNTER FOR PPD TEST: Primary | ICD-10-CM

## 2020-01-07 PROCEDURE — 86580 TB INTRADERMAL TEST: CPT | Performed by: PHYSICIAN ASSISTANT

## 2020-01-07 PROCEDURE — 77067 SCR MAMMO BI INCL CAD: CPT

## 2020-01-07 NOTE — PROGRESS NOTES
Tracie Rinaldi is a 69 y.o. female here for a non-provider visit for PPD placement -- Step 1 of 1    Reason for PPD:  group home requirement    1. TB evaluation questionnaire completed by patient? Yes      -  If any answers marked yes did you contact a provider prior to placing? Not Indicated  2.  Patient notified to return to clinic for reading on: Thursday 1/7/20   3.  PPD Placement documentation completed on TB evaluation questionnaire? Yes  4.  Location of TB evaluation questionnaire filed: [facility]

## 2020-01-09 ENCOUNTER — NON-PROVIDER VISIT (OUTPATIENT)
Dept: URGENT CARE | Facility: PHYSICIAN GROUP | Age: 70
End: 2020-01-09

## 2020-01-09 LAB — TB WHEAL 3D P 5 TU DIAM: 0 MM

## 2020-01-09 NOTE — PROGRESS NOTES
Tracie Rinaldi is a 69 y.o. female here for a non-provider visit for PPD reading -- Step 1 of 1.      1.  Resulted in Epic under enter/edit results? Yes   2.  TB evaluation questionnaire scanned into chart and original given to patient?Yes      3. Was induration greater than 0 mm? No.    Routed to PCP? No

## 2020-06-15 ENCOUNTER — APPOINTMENT (RX ONLY)
Dept: URBAN - METROPOLITAN AREA CLINIC 4 | Facility: CLINIC | Age: 70
Setting detail: DERMATOLOGY
End: 2020-06-15

## 2020-06-15 DIAGNOSIS — L81.4 OTHER MELANIN HYPERPIGMENTATION: ICD-10-CM

## 2020-06-15 DIAGNOSIS — L57.0 ACTINIC KERATOSIS: ICD-10-CM

## 2020-06-15 DIAGNOSIS — L82.1 OTHER SEBORRHEIC KERATOSIS: ICD-10-CM

## 2020-06-15 DIAGNOSIS — L30.8 OTHER SPECIFIED DERMATITIS: ICD-10-CM

## 2020-06-15 DIAGNOSIS — D485 NEOPLASM OF UNCERTAIN BEHAVIOR OF SKIN: ICD-10-CM

## 2020-06-15 PROBLEM — D48.5 NEOPLASM OF UNCERTAIN BEHAVIOR OF SKIN: Status: ACTIVE | Noted: 2020-06-15

## 2020-06-15 PROCEDURE — 99214 OFFICE O/P EST MOD 30 MIN: CPT | Mod: 25

## 2020-06-15 PROCEDURE — 11102 TANGNTL BX SKIN SINGLE LES: CPT

## 2020-06-15 PROCEDURE — ? LIQUID NITROGEN

## 2020-06-15 PROCEDURE — 17003 DESTRUCT PREMALG LES 2-14: CPT

## 2020-06-15 PROCEDURE — ? PRESCRIPTION

## 2020-06-15 PROCEDURE — ? BIOPSY BY SHAVE METHOD

## 2020-06-15 PROCEDURE — ? COUNSELING

## 2020-06-15 PROCEDURE — ? ADDITIONAL NOTES

## 2020-06-15 PROCEDURE — ? REFERRAL

## 2020-06-15 PROCEDURE — ? DIAGNOSIS COMMENT

## 2020-06-15 PROCEDURE — 17000 DESTRUCT PREMALG LESION: CPT | Mod: 59

## 2020-06-15 RX ORDER — CLOBETASOL PROPIONATE 0.5 MG/G
1 OINTMENT TOPICAL BID
Qty: 1 | Refills: 3 | Status: ERX | COMMUNITY
Start: 2020-06-15

## 2020-06-15 RX ADMIN — CLOBETASOL PROPIONATE 1: 0.5 OINTMENT TOPICAL at 00:00

## 2020-06-15 ASSESSMENT — LOCATION ZONE DERM
LOCATION ZONE: TRUNK
LOCATION ZONE: EYELID
LOCATION ZONE: FACE

## 2020-06-15 ASSESSMENT — LOCATION SIMPLE DESCRIPTION DERM
LOCATION SIMPLE: LEFT UPPER BACK
LOCATION SIMPLE: LEFT INFERIOR EYELID
LOCATION SIMPLE: RIGHT FOREHEAD
LOCATION SIMPLE: RIGHT UPPER BACK
LOCATION SIMPLE: RIGHT TEMPLE
LOCATION SIMPLE: LEFT FOREHEAD

## 2020-06-15 ASSESSMENT — LOCATION DETAILED DESCRIPTION DERM
LOCATION DETAILED: LEFT SUPERIOR MEDIAL FOREHEAD
LOCATION DETAILED: LEFT MEDIAL INFERIOR EYELID
LOCATION DETAILED: RIGHT SUPERIOR FOREHEAD
LOCATION DETAILED: RIGHT SUPERIOR MEDIAL UPPER BACK
LOCATION DETAILED: RIGHT MEDIAL TEMPLE
LOCATION DETAILED: LEFT SUPERIOR MEDIAL UPPER BACK
LOCATION DETAILED: RIGHT LATERAL FOREHEAD

## 2020-06-15 NOTE — PROCEDURE: LIQUID NITROGEN
Consent: The patient's consent was obtained including but not limited to risks of crusting, scabbing, blistering, scarring, darker or lighter pigmentary change, recurrence, incomplete removal and infection.
Duration Of Freeze Thaw-Cycle (Seconds): 5
Number Of Freeze-Thaw Cycles: 2 freeze-thaw cycles
Post-Care Instructions: I reviewed with the patient in detail post-care instructions. Patient is to wear sunprotection, and avoid picking at any of the treated lesions. Pt may apply Vaseline to crusted or scabbing areas.
Render Note In Bullet Format When Appropriate: No
Detail Level: Detailed

## 2020-06-15 NOTE — PROCEDURE: ADDITIONAL NOTES
Additional Notes: She is a patient over at [facility], call made today to attempt to get urgent appointment for bx of eyelid margin; left a VM
Detail Level: Simple

## 2020-06-15 NOTE — HPI: SKIN LESION
Is This A New Presentation, Or A Follow-Up?: Growth
How Severe Is Your Skin Lesion?: moderate
Has Your Skin Lesion Been Treated?: not been treated
Is This A New Presentation, Or A Follow-Up?: Skin Lesion
What Type Of Note Output Would You Prefer (Optional)?: Standard Output
How Severe Is Your Skin Lesion?: mild

## 2020-08-19 DIAGNOSIS — R56.9 SEIZURE (HCC): ICD-10-CM

## 2020-08-20 RX ORDER — LEVETIRACETAM 250 MG/1
TABLET ORAL
Qty: 270 TAB | Refills: 3 | Status: SHIPPED | OUTPATIENT
Start: 2020-08-20 | End: 2020-10-08 | Stop reason: SDUPTHER

## 2020-10-08 ENCOUNTER — OFFICE VISIT (OUTPATIENT)
Dept: NEUROLOGY | Facility: MEDICAL CENTER | Age: 70
End: 2020-10-08
Payer: MEDICARE

## 2020-10-08 VITALS
DIASTOLIC BLOOD PRESSURE: 82 MMHG | RESPIRATION RATE: 16 BRPM | TEMPERATURE: 97.6 F | HEART RATE: 84 BPM | OXYGEN SATURATION: 99 % | SYSTOLIC BLOOD PRESSURE: 128 MMHG

## 2020-10-08 DIAGNOSIS — R56.9 SEIZURE (HCC): Primary | ICD-10-CM

## 2020-10-08 PROCEDURE — 99214 OFFICE O/P EST MOD 30 MIN: CPT | Performed by: PSYCHIATRY & NEUROLOGY

## 2020-10-08 RX ORDER — LEVETIRACETAM 250 MG/1
TABLET ORAL
Qty: 270 TAB | Refills: 3 | Status: SHIPPED | OUTPATIENT
Start: 2020-10-08 | End: 2021-11-01

## 2020-10-08 ASSESSMENT — ENCOUNTER SYMPTOMS
SEIZURES: 0
LOSS OF CONSCIOUSNESS: 0
SPEECH CHANGE: 0
HALLUCINATIONS: 0
MEMORY LOSS: 0

## 2020-10-08 NOTE — PROGRESS NOTES
Subjective:      Tracie Rinaldi is a 70 y.o. female who presents with her friend and caregiver, Diana, for follow-up, with a history of seizures and cognitive impairment.    HPI    Seizures: Over the last year, Tracie has had no further seizures.  Still on Keppra 250 mg, 3 times daily, she is always compliant.  At the group home, medications are provided, she has never had an issue with refusal.  There have been no reports of personality changes, drowsiness, etc.    From a psychiatric standpoint, she remains on Clozaril 150 mg, twice daily, this is been unchanged.  Depakote 250 mg daily also has remained unchanged.  There is no documentation of any EPS signs.    Her cognitive impairment is still there, she feels that she is more forgetful.  Tracie still states that she feels good.  Her mental processing is still slow, learning new things is still difficult, no worse.  Diana states that there have been no major changes with her ability to function.  She still is independent with most of her activities.  With COVID- isolation, this has made it difficult for her, but they have now purchased an iPad tablet for her, it takes her a while to begin using the applications, but she is doing well.  She is trying to talk with her sister who was in [place].    Medical, surgical and family histories are reviewed, there are no known drug allergies.  Other than the Keppra, Depakote and Clozaril as above, she is also on Zocor, Synthroid, Lexapro, baby aspirin, senna, vitamin D with calcium, oxygen, CPAP and her Ventolin inhaler.  She is more compliant with the oxygen and her CPAP, though the use of the latter is still suboptimal.    Review of Systems   Constitutional: Negative for malaise/fatigue.   Neurological: Negative for speech change, seizures and loss of consciousness.   Psychiatric/Behavioral: Negative for hallucinations and memory loss.   All other systems reviewed and are negative.       Objective:     /82 (BP  Location: Left leg, Patient Position: Sitting)   Pulse 84   Temp 36.4 °C (97.6 °F) (Temporal)   Resp 16   SpO2 99%      Physical Exam    She appears in no acute distress.  She is quite cooperative.  There is no malar rash.  The neck is supple, range of motion is full.  Glabellar tap is present.  Cardiac evaluation is unremarkable.    Her mental processing is a little slowed, but she is fully oriented.  There is no aphasia, agnosia, apraxia, or inattention.  She can be perseverative, a little field dependent, though she can be redirected easily.    PERRLA/EOMI, visual fields are full.  There is some mild bradykinesia and hypophonia but no significant dysarthria or tachyphemia.  Facial movements are symmetric, sensory exam is intact to temperature.  There is no bulbar dysfunction.  Shoulder shrug is symmetric.    Musculoskeletal exam reveals normal tone without tremor or drift.  Strength is intact throughout.  There are no obvious reflex asymmetries.    She stands slowly but easily, stride length is symmetric, station is normal.  Repetitive movements with the hands and feet are intact and symmetric with normal amplitudes and frequencies.  There is no appendicular dystaxia.    Sensory exam is intact to temperature and vibration.     Assessment/Plan:     1. Seizure (HCC)  She is doing well, we will continue the Keppra unchanged.  I doubt Depakote is really playing a major role, she still is on fairly high doses of clozapine, a drug known to lower seizure threshold, yet has had no seizure recurrences.  She is tolerating the Keppra without issue.  From this standpoint, a one-year follow-up is all that is required.    - levETIRAcetam (KEPPRA) 250 MG tablet; TAKE TAKE ONE TABLET BY MOUTH 3 TIMES DAILY  Dispense: 270 Tab; Refill: 3    Time: 25-minute spent face-to-face for exam, review, discussion, and education, of this over 50% of the time spent counseling and coordinating care.

## 2020-12-08 ENCOUNTER — APPOINTMENT (RX ONLY)
Dept: URBAN - METROPOLITAN AREA CLINIC 4 | Facility: CLINIC | Age: 70
Setting detail: DERMATOLOGY
End: 2020-12-08

## 2020-12-08 DIAGNOSIS — L30.8 OTHER SPECIFIED DERMATITIS: ICD-10-CM

## 2020-12-08 PROBLEM — L30.9 DERMATITIS, UNSPECIFIED: Status: ACTIVE | Noted: 2020-12-08

## 2020-12-08 PROCEDURE — ? ORDER TESTS

## 2020-12-08 PROCEDURE — ? PRESCRIPTION

## 2020-12-08 PROCEDURE — 99213 OFFICE O/P EST LOW 20 MIN: CPT

## 2020-12-08 PROCEDURE — ? COUNSELING

## 2020-12-08 RX ORDER — CLOBETASOL PROPIONATE 0.5 MG/G
1 OINTMENT TOPICAL BID
Qty: 1 | Refills: 3 | Status: ERX

## 2020-12-08 RX ORDER — MUPIROCIN 20 MG/G
1 OINTMENT TOPICAL BID
Qty: 1 | Refills: 0 | Status: ERX | COMMUNITY
Start: 2020-12-08

## 2020-12-08 RX ADMIN — MUPIROCIN 1: 20 OINTMENT TOPICAL at 00:00

## 2020-12-08 NOTE — PROCEDURE: ORDER TESTS
Billing Type: Third-Party Bill
Performing Laboratory: -[id number]
Bill For Surgical Tray: no
Expected Date Of Service: 12/08/2020

## 2020-12-08 NOTE — PROCEDURE: MIPS QUALITY
Detail Level: Detailed
Quality 130: Documentation Of Current Medications In The Medical Record: Current Medications Documented
Quality 111:Pneumonia Vaccination Status For Older Adults: Pneumococcal Vaccination Previously Received
Quality 226: Preventive Care And Screening: Tobacco Use: Screening And Cessation Intervention: Patient screened for tobacco use and is an ex/non-smoker
Quality 265: Biopsy Follow-Up: Biopsy results reviewed, communicated, tracked, and documented
Quality 431: Preventive Care And Screening: Unhealthy Alcohol Use - Screening: Patient screened for unhealthy alcohol use using a single question and scores less than 2 times per year

## 2020-12-11 RX ORDER — CLOBETASOL PROPIONATE 0.5 MG/G
OINTMENT TOPICAL BID
Qty: 1 | Refills: 3

## 2020-12-11 RX ORDER — MUPIROCIN 20 MG/G
OINTMENT TOPICAL BID
Qty: 1 | Refills: 0

## 2021-01-19 ENCOUNTER — APPOINTMENT (RX ONLY)
Dept: URBAN - METROPOLITAN AREA CLINIC 4 | Facility: CLINIC | Age: 71
Setting detail: DERMATOLOGY
End: 2021-01-19

## 2021-01-19 DIAGNOSIS — L30.8 OTHER SPECIFIED DERMATITIS: ICD-10-CM | Status: IMPROVED

## 2021-01-19 DIAGNOSIS — L57.0 ACTINIC KERATOSIS: ICD-10-CM

## 2021-01-19 PROCEDURE — 17000 DESTRUCT PREMALG LESION: CPT

## 2021-01-19 PROCEDURE — 17003 DESTRUCT PREMALG LES 2-14: CPT

## 2021-01-19 PROCEDURE — ? PRESCRIPTION

## 2021-01-19 PROCEDURE — ? LIQUID NITROGEN

## 2021-01-19 PROCEDURE — ? COUNSELING

## 2021-01-19 PROCEDURE — ? TREATMENT REGIMEN

## 2021-01-19 PROCEDURE — 99213 OFFICE O/P EST LOW 20 MIN: CPT | Mod: 25

## 2021-01-19 RX ORDER — CLOBETASOL PROPIONATE 0.5 MG/G
1 OINTMENT TOPICAL BID
Qty: 1 | Refills: 1 | Status: ERX

## 2021-01-19 ASSESSMENT — LOCATION SIMPLE DESCRIPTION DERM
LOCATION SIMPLE: RIGHT FOREHEAD
LOCATION SIMPLE: LEFT HAND
LOCATION SIMPLE: POSTERIOR SCALP
LOCATION SIMPLE: RIGHT HAND

## 2021-01-19 ASSESSMENT — LOCATION DETAILED DESCRIPTION DERM
LOCATION DETAILED: 3RD WEB SPACE RIGHT HAND
LOCATION DETAILED: RIGHT SUPERIOR MEDIAL FOREHEAD
LOCATION DETAILED: RIGHT INFERIOR FOREHEAD
LOCATION DETAILED: POSTERIOR MID-PARIETAL SCALP
LOCATION DETAILED: 3RD WEB SPACE LEFT HAND

## 2021-01-19 ASSESSMENT — LOCATION ZONE DERM
LOCATION ZONE: FACE
LOCATION ZONE: HAND
LOCATION ZONE: SCALP

## 2021-01-19 NOTE — PROCEDURE: TREATMENT REGIMEN
Otc Regimen: Recommend CeraVe Moisturizer OTC to be used liberally throughout the day
Detail Level: Zone
Plan: RTC if flaring/uncontrolled
Continue Regimen: Clobetasol twice daily for 1 week as needed for flares (only use when hands are very red, scaly, itchy/inflamed)

## 2021-01-19 NOTE — PROCEDURE: MIPS QUALITY
Detail Level: Detailed
Quality 130: Documentation Of Current Medications In The Medical Record: Current Medications Documented
Quality 111:Pneumonia Vaccination Status For Older Adults: Pneumococcal Vaccination Previously Received
Quality 226: Preventive Care And Screening: Tobacco Use: Screening And Cessation Intervention: Patient screened for tobacco use and is an ex/non-smoker
Quality 265: Biopsy Follow-Up: Biopsy results reviewed, communicated, tracked, and documented

## 2021-03-03 DIAGNOSIS — Z23 NEED FOR VACCINATION: ICD-10-CM

## 2021-07-06 ENCOUNTER — APPOINTMENT (RX ONLY)
Dept: URBAN - METROPOLITAN AREA CLINIC 4 | Facility: CLINIC | Age: 71
Setting detail: DERMATOLOGY
End: 2021-07-06

## 2021-07-06 DIAGNOSIS — L30.8 OTHER SPECIFIED DERMATITIS: ICD-10-CM | Status: INADEQUATELY CONTROLLED

## 2021-07-06 PROCEDURE — ? PRESCRIPTION

## 2021-07-06 PROCEDURE — ? TREATMENT REGIMEN

## 2021-07-06 PROCEDURE — 99214 OFFICE O/P EST MOD 30 MIN: CPT

## 2021-07-06 PROCEDURE — ? COUNSELING

## 2021-07-06 RX ORDER — CLOBETASOL PROPIONATE 0.5 MG/G
1 OINTMENT TOPICAL BID
Qty: 1 | Refills: 2 | Status: ERX

## 2021-07-06 ASSESSMENT — LOCATION ZONE DERM: LOCATION ZONE: HAND

## 2021-07-06 ASSESSMENT — LOCATION SIMPLE DESCRIPTION DERM
LOCATION SIMPLE: RIGHT HAND
LOCATION SIMPLE: LEFT HAND

## 2021-07-06 ASSESSMENT — LOCATION DETAILED DESCRIPTION DERM
LOCATION DETAILED: 3RD WEB SPACE LEFT HAND
LOCATION DETAILED: 3RD WEB SPACE RIGHT HAND

## 2021-07-06 NOTE — PROCEDURE: TREATMENT REGIMEN
Otc Regimen: Recommended the Eucerin eczema cream
Detail Level: Zone
Plan: RTC if flaring/uncontrolled
Continue Regimen: Clobetasol twice daily for 2-3 weeks as needed for flares (only use when hands are very red, scaly, itchy/inflamed)

## 2021-08-20 ENCOUNTER — APPOINTMENT (RX ONLY)
Dept: URBAN - METROPOLITAN AREA CLINIC 4 | Facility: CLINIC | Age: 71
Setting detail: DERMATOLOGY
End: 2021-08-20

## 2021-08-20 DIAGNOSIS — L30.8 OTHER SPECIFIED DERMATITIS: ICD-10-CM | Status: IMPROVED

## 2021-08-20 PROCEDURE — ? COUNSELING

## 2021-08-20 PROCEDURE — 99213 OFFICE O/P EST LOW 20 MIN: CPT

## 2021-08-20 PROCEDURE — ? PRESCRIPTION

## 2021-08-20 PROCEDURE — ? TREATMENT REGIMEN

## 2021-08-20 RX ORDER — CLOBETASOL PROPIONATE 0.5 MG/G
1 CREAM TOPICAL BID
Qty: 1 | Refills: 3 | Status: ERX | COMMUNITY
Start: 2021-08-20

## 2021-08-20 RX ADMIN — CLOBETASOL PROPIONATE 1: 0.5 CREAM TOPICAL at 00:00

## 2021-08-20 ASSESSMENT — LOCATION ZONE DERM: LOCATION ZONE: HAND

## 2021-08-20 ASSESSMENT — LOCATION SIMPLE DESCRIPTION DERM
LOCATION SIMPLE: RIGHT HAND
LOCATION SIMPLE: LEFT HAND

## 2021-08-20 ASSESSMENT — LOCATION DETAILED DESCRIPTION DERM
LOCATION DETAILED: 3RD WEB SPACE LEFT HAND
LOCATION DETAILED: 3RD WEB SPACE RIGHT HAND

## 2021-08-20 NOTE — PROCEDURE: TREATMENT REGIMEN
Otc Regimen: Continue Eucerin eczema cream BID
Detail Level: Zone
Plan: RTC if flaring/uncontrolled
Continue Regimen: Clobetasol twice daily for 2 weeks as needed for flares (only use when hands are very red, scaly, itchy/inflamed)

## 2021-10-28 ENCOUNTER — TELEPHONE (OUTPATIENT)
Dept: INTERNAL MEDICINE | Facility: OTHER | Age: 71
End: 2021-10-28

## 2021-10-28 DIAGNOSIS — R56.9 SEIZURE (HCC): ICD-10-CM

## 2021-10-28 DIAGNOSIS — N39.46 MIXED STRESS AND URGE URINARY INCONTINENCE: ICD-10-CM

## 2021-10-28 NOTE — TELEPHONE ENCOUNTER
1. Caller Name: Haider varela)                      Call Back Number: [phone]    2. Message: [facility] 10/27 stating patient was seen in Sept and was told if urinary issues still happening she can get a referral to urology or and order to start floor pelvic training    3. Patient approves office to leave a detailed voicemail/MyChart message: N\A

## 2021-11-01 RX ORDER — LEVETIRACETAM 250 MG/1
TABLET ORAL
Qty: 90 TABLET | Refills: 2 | Status: SHIPPED | OUTPATIENT
Start: 2021-11-01 | End: 2022-01-18 | Stop reason: SDUPTHER

## 2021-11-02 NOTE — TELEPHONE ENCOUNTER
Haider called once again asking if referral is possible to be placed. I let her know I would pass the message over to Dr. Talamantes

## 2021-11-02 NOTE — TELEPHONE ENCOUNTER
Please inform patient we need to check bladder scan for post void residual urine. She needs to continue kegel exercise. Have patient schedule a follow up once bladder scan is done to discuss.  Order placed.

## 2021-11-04 DIAGNOSIS — E03.9 HYPOTHYROIDISM, UNSPECIFIED TYPE: ICD-10-CM

## 2021-11-04 RX ORDER — LEVOTHYROXINE SODIUM 112 UG/1
112 TABLET ORAL
Qty: 30 TABLET | Refills: 0 | Status: SHIPPED | OUTPATIENT
Start: 2021-11-04 | End: 2021-12-14 | Stop reason: SDUPTHER

## 2021-11-04 NOTE — TELEPHONE ENCOUNTER
Last seen: 09/20/21 by Dr. Anant Childs appt: None    Was the patient seen in the last year in this department? Yes   Does patient have an active prescription for medications requested? No   Received Request Via: Pharmacy

## 2021-11-10 ENCOUNTER — HOSPITAL ENCOUNTER (OUTPATIENT)
Dept: RADIOLOGY | Facility: MEDICAL CENTER | Age: 71
End: 2021-11-10
Attending: STUDENT IN AN ORGANIZED HEALTH CARE EDUCATION/TRAINING PROGRAM
Payer: MEDICARE

## 2021-11-10 DIAGNOSIS — N39.46 MIXED STRESS AND URGE URINARY INCONTINENCE: ICD-10-CM

## 2021-11-10 PROCEDURE — 76857 US EXAM PELVIC LIMITED: CPT

## 2021-11-12 ENCOUNTER — TELEPHONE (OUTPATIENT)
Dept: INTERNAL MEDICINE | Facility: OTHER | Age: 71
End: 2021-11-12

## 2021-11-12 DIAGNOSIS — R32 INCONTINENCE IN FEMALE: ICD-10-CM

## 2021-11-12 NOTE — TELEPHONE ENCOUNTER
Please inform patient urology referal given for incontincence, thickening of bladder seen but otherwise normal Ultrasound. Advice to continue kegel exercies.

## 2021-11-12 NOTE — TELEPHONE ENCOUNTER
Spoke with pts caregiver Star letting her know this information, pts caregiver said that she is unable to do the kegel exercises, they are requesting that she gets a referral for pelvic floor therapy as she is unable to remember or do those on her own.

## 2021-11-19 NOTE — TELEPHONE ENCOUNTER
I placed a order for physical therapy and in my comments mentioned pelvic floor therapy. Please inform patient.

## 2021-11-29 DIAGNOSIS — E03.9 HYPOTHYROIDISM, UNSPECIFIED TYPE: ICD-10-CM

## 2021-12-02 RX ORDER — LEVOTHYROXINE SODIUM 112 UG/1
112 TABLET ORAL
Qty: 90 TABLET | Refills: 0 | Status: CANCELLED | OUTPATIENT
Start: 2021-12-02

## 2021-12-02 NOTE — TELEPHONE ENCOUNTER
Please have patient recheck TSH before any further refills. Will place order, please mail it over and inform.

## 2021-12-03 ENCOUNTER — HOSPITAL ENCOUNTER (OUTPATIENT)
Dept: RADIOLOGY | Facility: MEDICAL CENTER | Age: 71
End: 2021-12-03
Attending: INTERNAL MEDICINE
Payer: MEDICARE

## 2021-12-03 ENCOUNTER — HOSPITAL ENCOUNTER (OUTPATIENT)
Dept: LAB | Facility: MEDICAL CENTER | Age: 71
End: 2021-12-03
Attending: STUDENT IN AN ORGANIZED HEALTH CARE EDUCATION/TRAINING PROGRAM
Payer: MEDICARE

## 2021-12-03 DIAGNOSIS — Z78.0 POSTMENOPAUSAL STATUS: ICD-10-CM

## 2021-12-03 DIAGNOSIS — Z13.820 SCREENING FOR OSTEOPOROSIS: ICD-10-CM

## 2021-12-03 DIAGNOSIS — E03.9 HYPOTHYROIDISM, UNSPECIFIED TYPE: ICD-10-CM

## 2021-12-03 LAB — TSH SERPL DL<=0.005 MIU/L-ACNC: 1.12 UIU/ML (ref 0.38–5.33)

## 2021-12-03 PROCEDURE — 84443 ASSAY THYROID STIM HORMONE: CPT

## 2021-12-03 PROCEDURE — 36415 COLL VENOUS BLD VENIPUNCTURE: CPT

## 2021-12-03 PROCEDURE — 77080 DXA BONE DENSITY AXIAL: CPT

## 2021-12-03 NOTE — TELEPHONE ENCOUNTER
Called Haider and notified tracey would need to get labs done before she gets another refill on her levothyroxine. Star will take her in to get it done

## 2021-12-03 NOTE — TELEPHONE ENCOUNTER
Left message for john Maloney to see where they would like the lab order sent and to let them know the Levothyroxine cant be filled until she gets that lab work done, asked them to call back asap

## 2021-12-04 RX ORDER — LEVOTHYROXINE SODIUM 112 UG/1
112 TABLET ORAL
Qty: 30 TABLET | Refills: 3 | Status: CANCELLED | OUTPATIENT
Start: 2021-12-04 | End: 2022-03-31

## 2021-12-07 ENCOUNTER — OFFICE VISIT (OUTPATIENT)
Dept: INTERNAL MEDICINE | Facility: OTHER | Age: 71
End: 2021-12-07
Payer: MEDICARE

## 2021-12-07 VITALS
TEMPERATURE: 96.5 F | DIASTOLIC BLOOD PRESSURE: 73 MMHG | BODY MASS INDEX: 30.2 KG/M2 | WEIGHT: 187 LBS | HEART RATE: 93 BPM | SYSTOLIC BLOOD PRESSURE: 126 MMHG | OXYGEN SATURATION: 98 %

## 2021-12-07 DIAGNOSIS — E03.9 HYPOTHYROIDISM, UNSPECIFIED TYPE: Primary | ICD-10-CM

## 2021-12-07 DIAGNOSIS — M81.0 AGE-RELATED OSTEOPOROSIS WITHOUT CURRENT PATHOLOGICAL FRACTURE: ICD-10-CM

## 2021-12-07 DIAGNOSIS — N39.46 MIXED STRESS AND URGE URINARY INCONTINENCE: ICD-10-CM

## 2021-12-07 PROCEDURE — 99214 OFFICE O/P EST MOD 30 MIN: CPT | Mod: GC | Performed by: STUDENT IN AN ORGANIZED HEALTH CARE EDUCATION/TRAINING PROGRAM

## 2021-12-07 RX ORDER — ALENDRONATE SODIUM 70 MG/1
70 TABLET ORAL
Qty: 4 TABLET | Refills: 5 | Status: SHIPPED | OUTPATIENT
Start: 2021-12-07 | End: 2022-05-11

## 2021-12-07 RX ORDER — IBUPROFEN 600 MG/1
600 TABLET ORAL EVERY 8 HOURS PRN
Qty: 30 TABLET | Refills: 0 | Status: SHIPPED | OUTPATIENT
Start: 2021-12-07 | End: 2022-12-29

## 2021-12-07 ASSESSMENT — ENCOUNTER SYMPTOMS
SHORTNESS OF BREATH: 0
BLURRED VISION: 0
FEVER: 0
INSOMNIA: 0
DOUBLE VISION: 0
FALLS: 0
CHILLS: 0
VOMITING: 0
MYALGIAS: 0
DIZZINESS: 0
COUGH: 0
NAUSEA: 0
ABDOMINAL PAIN: 0
HEADACHES: 0

## 2021-12-07 ASSESSMENT — LIFESTYLE VARIABLES: SUBSTANCE_ABUSE: 0

## 2021-12-07 NOTE — PATIENT INSTRUCTIONS
-take alendronate first thing in am once a week and remain upright for 30 mins.  -f/u with therapy and urology as scheduled.

## 2021-12-07 NOTE — PROGRESS NOTES
Subjective:     CC: Follow-up TSH results and DEXA scan    HPI:   Tracie presents today with caregiver.  She is a 71-year-old lady with a past medical history of hypothyroidism, schizophrenia and dementia managed by psychiatry, COPD.  -TSH is within normal limits at 1.12, repeat of her thyroid levothyroxine requested no complaints in this regard  -DEXA scan revealed T score of -2.6 in the left femur, T score of -1.5 in the lumbar spine.  Discussed with patient about the option of bisphosphonates she is agreeable to use it.  -Patient does have a history of mixed incontinence unable to perform pelvic floor exercises by herself in the setting of dementia.  Urine analysis was normal and PVR was also normal in the past.  Referral for pelvic floor therapy neurology has already been placed patient to follow-up.  -Patient declines any sort of cancer screening including colonoscopy and mammogram.  -Denies any chest pain, shortness of breath, headaches, vision changes, abdominal pain, blood in urine or stool, joint pain or muscle aches, mood changes.          ROS:  Review of Systems   Constitutional: Negative for chills and fever.   HENT: Negative for hearing loss and tinnitus.    Eyes: Negative for blurred vision and double vision.   Respiratory: Negative for cough and shortness of breath.    Cardiovascular: Negative for leg swelling.   Gastrointestinal: Negative for abdominal pain, nausea and vomiting.   Genitourinary: Positive for frequency and urgency. Negative for dysuria and hematuria.   Musculoskeletal: Negative for falls and myalgias.   Neurological: Negative for dizziness and headaches.   Psychiatric/Behavioral: Negative for substance abuse. The patient does not have insomnia.        Objective:     Exam:  /73 (BP Location: Left arm, Patient Position: Sitting, BP Cuff Size: Adult)   Pulse 93   Temp 35.8 °C (96.5 °F) (Temporal)   Wt 84.8 kg (187 lb)   SpO2 98%   BMI 30.20 kg/m²  Body mass index is 30.2  kg/m².    Physical Exam  Constitutional:       Appearance: Normal appearance.   HENT:      Head: Normocephalic and atraumatic.      Mouth/Throat:      Mouth: Mucous membranes are moist.      Pharynx: Oropharynx is clear.   Eyes:      Extraocular Movements: Extraocular movements intact.      Pupils: Pupils are equal, round, and reactive to light.   Cardiovascular:      Heart sounds: Normal heart sounds. No murmur heard.      Pulmonary:      Effort: No respiratory distress.      Breath sounds: Normal breath sounds.   Abdominal:      General: There is no distension.      Palpations: Abdomen is soft. There is no mass.   Musculoskeletal:      Right lower leg: No edema.      Left lower leg: No edema.   Neurological:      General: No focal deficit present.      Mental Status: She is alert and oriented to person, place, and time.   Psychiatric:         Mood and Affect: Mood normal.         Judgment: Judgment normal.             Assessment & Plan:     71 y.o. female with the following -     Problem List Items Addressed This Visit     Hypothyroid - Primary  -TSH 1.12.    -Refill for levothyroxine 112 provided        Other Visit Diagnoses     Age-related osteoporosis without current pathological fracture      -DEXA scan revealed T score of -2.6 of the left femur.  -Patient agreeable to start alendronate 70 once a week.  Advised to remain employed for 30 minutes after taking the medication.  Medication to be taken first thing in the morning on empty stomach with a glass of water.    Mixed stress and urge urinary incontinence      -Follow-up with pelvic floor therapy and neurology as scheduled          I spent a total of 20 minutes with record review, exam, communication with the patient, communication with other providers, and documentation of this encounter.        Please note that this dictation was created using voice recognition software. I have made every reasonable attempt to correct obvious errors, but I expect that  there are errors of grammar and possibly content that I did not discover before finalizing the note.

## 2021-12-14 DIAGNOSIS — E03.9 HYPOTHYROIDISM, UNSPECIFIED TYPE: ICD-10-CM

## 2021-12-14 RX ORDER — LEVOTHYROXINE SODIUM 112 UG/1
112 TABLET ORAL
Qty: 90 TABLET | Refills: 0 | Status: SHIPPED | OUTPATIENT
Start: 2021-12-14 | End: 2022-04-04

## 2021-12-14 NOTE — TELEPHONE ENCOUNTER
Last seen: 12/07/21 by Dr. Talamantes  Next appt: 03/22/22 with Dr. Talamantes    Was the patient seen in the last year in this department? Yes   Does patient have an active prescription for medications requested? No   Received Request Via: Pharmacy

## 2022-01-18 ENCOUNTER — OFFICE VISIT (OUTPATIENT)
Dept: NEUROLOGY | Facility: MEDICAL CENTER | Age: 72
End: 2022-01-18
Attending: PSYCHIATRY & NEUROLOGY
Payer: MEDICARE

## 2022-01-18 VITALS — WEIGHT: 191.8 LBS | BODY MASS INDEX: 30.97 KG/M2

## 2022-01-18 DIAGNOSIS — F20.9 CHRONIC SCHIZOPHRENIA (HCC): ICD-10-CM

## 2022-01-18 DIAGNOSIS — F02.80 DEMENTIA ASSOCIATED WITH OTHER UNDERLYING DISEASE WITHOUT BEHAVIORAL DISTURBANCE (HCC): ICD-10-CM

## 2022-01-18 DIAGNOSIS — R56.9 SEIZURE (HCC): ICD-10-CM

## 2022-01-18 PROBLEM — N32.89 BLADDER SPASM: Status: ACTIVE | Noted: 2022-01-18

## 2022-01-18 PROBLEM — R32 URINARY INCONTINENCE: Status: ACTIVE | Noted: 2021-09-20

## 2022-01-18 PROCEDURE — 99213 OFFICE O/P EST LOW 20 MIN: CPT | Performed by: PSYCHIATRY & NEUROLOGY

## 2022-01-18 RX ORDER — LEVETIRACETAM 250 MG/1
TABLET ORAL
Qty: 270 TABLET | Refills: 3 | Status: SHIPPED | OUTPATIENT
Start: 2022-01-18 | End: 2022-06-01

## 2022-01-18 RX ORDER — DIVALPROEX SODIUM 250 MG/1
250 TABLET, EXTENDED RELEASE ORAL DAILY
Qty: 90 TABLET | Refills: 3 | Status: SHIPPED | OUTPATIENT
Start: 2022-01-18 | End: 2023-06-07

## 2022-01-18 ASSESSMENT — ENCOUNTER SYMPTOMS
LOSS OF CONSCIOUSNESS: 0
MEMORY LOSS: 1
SEIZURES: 0
TREMORS: 0

## 2022-01-18 NOTE — PROGRESS NOTES
Subjective     Tracie Rinaldi is a 71 y.o. female who presents with her , Bibi, for follow-up, with a history of dementia and seizures.    HPI    Seizures: Tracie is not aware of the seizures that she has had, typically in the past they were characterized by staring and unresponsiveness, she has not had a seizure like this in a while.  Observations by staff at the group home have never documented anything recently.  She is compliant with her Keppra 250 mg twice daily regimen.    Dementia: She is continuing to do well at her group home.  She does have room where she lives with a roommate.  She seems to get along, though she has been through quite a number of roommates over the time she has been there.  She has been engaging in the limited social activities offered at her group home, though with COVID-19 restrictions, these activities have been lost, she now goes to [facility] twice a week with a monitor for regular physical activities.  She enjoys this immensely.  At her group home it is pretty much limited to watching TV and self entertainment.    She is compliant with her medications.  She has been having more difficulties with urinary control, she is now seeing a urologist as things have worsened.  There are no issues with bowel control.  She is always slow to walk, stride length remains diminished, she has not been falling with regularity.  Movements in general are still slower.  She denies tremor.  Appetite is good, she swallows easily though she is drooling still.  She has had no problems with loss of smell.  There is no tremor.  Though it takes her a long time to finish things, she does complete tasks.  Multitasking is still difficult.  She has had no problems with behavioral changes, hallucinations or delirium.    Medical, surgical and family histories are reviewed, there are no new drug allergies.  She is on Keppra 250 mg, 3 times daily, Depakote 250 mg daily, Fosamax, vitamin D  with calcium, Clozaril 150 mg twice daily, Zocor, Synthroid and Lexapro.    Review of Systems   Neurological: Negative for tremors, seizures and loss of consciousness.   Psychiatric/Behavioral: Positive for memory loss.   All other systems reviewed and are negative.    Objective     Wt 87 kg (191 lb 12.8 oz)   BMI 30.97 kg/m²      Physical Exam    She appears in no acute distress.  Vital signs are stable though they are limited  patient refused blood pressure and pulse checks.  There is no malar rash, but there is sialorrhea.  Neck is supple, range of motion is full.  Cardiac evaluation is unremarkable.     Neurological Exam    She is fully oriented, her mental processing speed is slowed, but she names and repeats as well, there are no paraphasic errors use.  She is perseverative, can be field dependent.  Multistep commands are difficult for her to complete.  There is no aphasia, apraxia, or inattention, there is hand-object substitution.    PERRLA/EOMI, there is hypophonia and bradykinesia, facial movements are symmetric but eye blink frequency is diminished.  There is no dysarthria.  Sensory exam is intact to light touch.  The tongue and uvula are midline.  Visual fields are grossly full with double simultaneous stimulation.  Shoulder shrug is symmetric.    Musculoskeletal exam again shows the generalized bradykinesia, there is no rigidity, even with distraction, in the upper extremities.  There is no tremor at rest nor with sustained posture against gravity.  There is no asterixis or drift.  Strength is grossly intact throughout.    She stands very slowly, stride length is notably diminished, she does not shuffle, but she is hunched and tends to look at the ground as she walks.  There is notable postural instability when pulled backwards.  Armswing is nearly absent bilaterally as she walks.  There is no appendicular dystaxia.  Repetitive movements are somewhat irregular but not severely reduced in  amplitude.    Sensory exam does reveal a rostrocaudal extinction with double simultaneous stimulation.  There is no lateralized sensory loss with temperature or light touch.    Assessment & Plan     1. Chronic schizophrenia (HCC)  Stable, Depakote will be continued, and though I suspect it is contributing to the EPS symptoms she is having, it is also providing very good benefit with her schizophrenia.  She is tolerating Clozaril at its present dosing, fortunately without seizure recurrences, in a patient using a drug that is known to reduce seizure threshold.  This drug is also creating a rather significant secondary parkinsonian state.    - divalproex ER (DEPAKOTE ER) 250 MG TABLET SR 24 HR; Take 1 Tablet by mouth every day.  Dispense: 90 Tablet; Refill: 3    2. Seizure (HCC)  Stable, we will continue the Keppra unchanged.  There is no reason to check drug levels.    - divalproex ER (DEPAKOTE ER) 250 MG TABLET SR 24 HR; Take 1 Tablet by mouth every day.  Dispense: 90 Tablet; Refill: 3  - levETIRAcetam (KEPPRA) 250 MG tablet; TAKE 1 TABLET BY MOUTH 3 TIMES DAILY  Dispense: 270 Tablet; Refill: 3    3. Dementia associated with other underlying disease without behavioral disturbance (HCC)  Also stable, this has been static in severity for quite some time.  She is functioning well at the group home.  She and I can follow-up in 1 year.    Time: 20 min in total spent on patient care including precharting, record review, discussions with healthcare staff and documentation.  This includes face-to-face time with the patient for exam, review, as well as education, counseling and coordinating care with her , Terrie

## 2022-01-27 NOTE — TELEPHONE ENCOUNTER
Received request via: Pharmacy    Was the patient seen in the last year in this department? Yes    Does last seen 11/05/2021 Dr Grierthe patient have an active prescription (recently filled or refills available) for medication(s) requested? No

## 2022-02-15 ENCOUNTER — APPOINTMENT (RX ONLY)
Dept: URBAN - METROPOLITAN AREA CLINIC 6 | Facility: CLINIC | Age: 72
Setting detail: DERMATOLOGY
End: 2022-02-15

## 2022-02-15 DIAGNOSIS — L30.8 OTHER SPECIFIED DERMATITIS: ICD-10-CM | Status: INADEQUATELY CONTROLLED

## 2022-02-15 DIAGNOSIS — L24 IRRITANT CONTACT DERMATITIS: ICD-10-CM

## 2022-02-15 PROBLEM — L24.9 IRRITANT CONTACT DERMATITIS, UNSPECIFIED CAUSE: Status: ACTIVE | Noted: 2022-02-15

## 2022-02-15 PROCEDURE — ? TREATMENT REGIMEN

## 2022-02-15 PROCEDURE — ? PRESCRIPTION

## 2022-02-15 PROCEDURE — ? COUNSELING

## 2022-02-15 PROCEDURE — ? PRESCRIPTION MEDICATION MANAGEMENT

## 2022-02-15 PROCEDURE — 99214 OFFICE O/P EST MOD 30 MIN: CPT

## 2022-02-15 RX ORDER — MENTHOL
1 GEL (GRAM) TOPICAL BID
Qty: 454 | Refills: 3 | Status: ERX | COMMUNITY
Start: 2022-02-15

## 2022-02-15 RX ORDER — CLOBETASOL PROPIONATE 0.5 MG/G
1 CREAM TOPICAL BID
Qty: 45 | Refills: 3 | Status: ERX | COMMUNITY
Start: 2022-02-15

## 2022-02-15 RX ADMIN — Medication 1: at 00:00

## 2022-02-15 RX ADMIN — CLOBETASOL PROPIONATE 1: 0.5 CREAM TOPICAL at 00:00

## 2022-02-15 ASSESSMENT — LOCATION ZONE DERM
LOCATION ZONE: TRUNK
LOCATION ZONE: HAND

## 2022-02-15 ASSESSMENT — LOCATION SIMPLE DESCRIPTION DERM
LOCATION SIMPLE: LEFT HAND
LOCATION SIMPLE: RIGHT HAND
LOCATION SIMPLE: LEFT BUTTOCK
LOCATION SIMPLE: RIGHT BUTTOCK

## 2022-02-15 ASSESSMENT — LOCATION DETAILED DESCRIPTION DERM
LOCATION DETAILED: RIGHT BUTTOCK
LOCATION DETAILED: 3RD WEB SPACE RIGHT HAND
LOCATION DETAILED: LEFT BUTTOCK
LOCATION DETAILED: 3RD WEB SPACE LEFT HAND

## 2022-02-15 NOTE — PROCEDURE: TREATMENT REGIMEN
Otc Regimen: Continue Eucerin eczema cream BID
Detail Level: Zone
Plan: RTC if flaring/uncontrolled
Continue Regimen: Clobetasol twice daily for 2 weeks as needed for flares (only use when hands are very red, scaly, itchy/inflamed) put cream on at night once or twice a week then cover hands with gloves

## 2022-02-23 ENCOUNTER — RX ONLY (OUTPATIENT)
Age: 72
Setting detail: RX ONLY
End: 2022-02-23

## 2022-02-23 RX ORDER — BETAMETHASONE DIPROPIONATE 0.5 MG/G
OINTMENT TOPICAL
Qty: 45 | Refills: 3 | Status: ERX | COMMUNITY
Start: 2022-02-23

## 2022-03-01 ENCOUNTER — TELEPHONE (OUTPATIENT)
Dept: INTERNAL MEDICINE | Facility: OTHER | Age: 72
End: 2022-03-01
Payer: MEDICARE

## 2022-03-01 DIAGNOSIS — R32 INCONTINENCE IN FEMALE: ICD-10-CM

## 2022-03-22 ENCOUNTER — APPOINTMENT (OUTPATIENT)
Dept: INTERNAL MEDICINE | Facility: OTHER | Age: 72
End: 2022-03-22
Payer: MEDICARE

## 2022-03-31 ENCOUNTER — OFFICE VISIT (OUTPATIENT)
Dept: INTERNAL MEDICINE | Facility: OTHER | Age: 72
End: 2022-03-31
Payer: MEDICARE

## 2022-03-31 VITALS
SYSTOLIC BLOOD PRESSURE: 104 MMHG | WEIGHT: 189.6 LBS | HEART RATE: 94 BPM | TEMPERATURE: 97.3 F | OXYGEN SATURATION: 92 % | BODY MASS INDEX: 30.62 KG/M2 | DIASTOLIC BLOOD PRESSURE: 60 MMHG

## 2022-03-31 DIAGNOSIS — E55.9 VITAMIN D DEFICIENCY: ICD-10-CM

## 2022-03-31 DIAGNOSIS — E03.9 HYPOTHYROIDISM, UNSPECIFIED TYPE: ICD-10-CM

## 2022-03-31 DIAGNOSIS — E78.5 HYPERLIPIDEMIA, UNSPECIFIED HYPERLIPIDEMIA TYPE: ICD-10-CM

## 2022-03-31 DIAGNOSIS — Z13.228 SCREENING FOR METABOLIC DISORDER: ICD-10-CM

## 2022-03-31 DIAGNOSIS — R56.9 SEIZURE (HCC): ICD-10-CM

## 2022-03-31 DIAGNOSIS — M81.0 AGE-RELATED OSTEOPOROSIS WITHOUT CURRENT PATHOLOGICAL FRACTURE: ICD-10-CM

## 2022-03-31 DIAGNOSIS — E78.5 DYSLIPIDEMIA: ICD-10-CM

## 2022-03-31 DIAGNOSIS — F20.9 SCHIZOPHRENIA, UNSPECIFIED TYPE (HCC): ICD-10-CM

## 2022-03-31 DIAGNOSIS — N39.46 MIXED STRESS AND URGE URINARY INCONTINENCE: ICD-10-CM

## 2022-03-31 PROCEDURE — 99213 OFFICE O/P EST LOW 20 MIN: CPT | Mod: GE

## 2022-03-31 RX ORDER — BETAMETHASONE DIPROPIONATE 0.05 %
OINTMENT (GRAM) TOPICAL
COMMUNITY
Start: 2022-02-23 | End: 2023-02-27

## 2022-03-31 RX ORDER — TIOTROPIUM BROMIDE 18 UG/1
CAPSULE ORAL; RESPIRATORY (INHALATION)
COMMUNITY
Start: 2022-03-08 | End: 2022-04-18

## 2022-03-31 RX ORDER — CLOBETASOL PROPIONATE 0.5 MG/G
CREAM TOPICAL
COMMUNITY
Start: 2022-02-15 | End: 2023-02-27

## 2022-03-31 RX ORDER — SIMVASTATIN 40 MG
TABLET ORAL
Qty: 30 TABLET | Refills: 11 | Status: SHIPPED | OUTPATIENT
Start: 2022-03-31 | End: 2023-05-01 | Stop reason: SDUPTHER

## 2022-03-31 NOTE — PROGRESS NOTES
Date of Service:  3/31/22    CC: 3 month followup    HPI:  Tracie Rinaldi is a 71 y.o. female here for 3 month followup. Patient here with Hilda (new to pt x1 week) who brought her here from group home. Patient doing well, have no acute complaints. Patient going to [facility] for pelvic floor therapy to help with her urinary incontinence, pt states it is going well but per Vilma very minimal improvement. Patient was seen by psychiatry service yesterday, and no medication changes made. Per note, pt seen by neurology on 1/18/22, no medication change as well for seizure and chronic schizophrenia    Review of systems:  Review of Systems   Constitutional: Negative.    HENT: Negative.    Eyes: Negative.    Respiratory: Negative for cough, sputum production and shortness of breath.    Cardiovascular: Negative for chest pain and leg swelling.   Gastrointestinal: Negative for abdominal pain, nausea and vomiting.   Musculoskeletal: Negative.    Skin: Negative.    Neurological: Negative.       Past Medical History:  Patient Active Problem List    Diagnosis Date Noted   • Bladder spasm 01/18/2022   • Urinary incontinence 09/20/2021   • Hyponatremia 05/01/2019   • Impaired fasting glucose 05/01/2019   • COPD mixed type ([facility]) 07/03/2017   • Dementia without behavioral disturbance ([facility]) 02/10/2017   • Seizure ([facility]) 10/18/2015   • Schizophrenia ([facility]) 10/18/2015   • Spells of decreased attentiveness 08/28/2015   • Hypothyroid 08/28/2015       Past Surgical History:    has a past surgical history that includes closed reduction (Left, 7/9/2015); pin insertion (7/9/2015); and irrigation & debridement ortho (Left, 10/17/2015).    Medications:  Current Outpatient Medications   Medication Sig Dispense Refill   • betamethasone dipropionate (DIPROLENE) 0.05 % Ointment      • SPIRIVA HANDIHALER 18 MCG Cap      • levETIRAcetam (KEPPRA) 250 MG tablet TAKE 1 TABLET BY MOUTH 3 TIMES DAILY 270 Tablet 3   • levothyroxine (SYNTHROID) 112  MCG Tab Take 1 Tablet by mouth every morning on an empty stomach. 90 Tablet 0   • ibuprofen (MOTRIN) 600 MG Tab Take 1 Tablet by mouth every 8 hours as needed for Moderate Pain. 30 Tablet 0   • alendronate (FOSAMAX) 70 MG Tab Take 1 Tablet by mouth every 7 days. 4 Tablet 5   • simvastatin (ZOCOR) 40 MG Tab Take 1 Tab by mouth every bedtime. 90 Tab 2   • aspirin (ASPIR-LOW) 81 MG EC tablet Take 1 Tab by mouth every day. 90 Tab 1   • Sennosides (SENNA) 8.6 MG Tab Take 1 Tab by mouth 1 time daily as needed. 60 Tab 1   • cloZAPine (CLOZARIL) 100 MG Tab Take 150 mg by mouth 2 times a day.     • VENTOLIN  (90 Base) MCG/ACT Aero Soln inhalation aerosol      • escitalopram (LEXAPRO) 10 MG Tab Take 2 Tabs by mouth every day.     • clobetasol (TEMOVATE) 0.05 % Cream      • Diapers & Supplies Misc 90 Bags in the morning, at noon, and at bedtime. Patient needs large pullups Prevail adult  90 Each 3   • divalproex ER (DEPAKOTE ER) 250 MG TABLET SR 24 HR Take 1 Tablet by mouth every day. 90 Tablet 3   • Calcium Carbonate-Vitamin D 600-400 MG-UNIT Tab Take  by mouth.       No current facility-administered medications for this visit.       Allergies:  No Known Allergies    Family History:   family history includes Alzheimer's Disease in her mother; Heart Disease in her father.     Social History:    Social History     Tobacco Use   • Smoking status: Former Smoker     Packs/day: 0.50     Years: 45.00     Pack years: 22.50     Types: Cigarettes   • Smokeless tobacco: Never Used   • Tobacco comment: unk   Substance Use Topics   • Alcohol use: No     Comment: unk   • Drug use: No     Comment: unk       Physical Exam:  Vitals:    03/31/22 1343   BP: 104/60   Pulse: 94   Temp: 36.3 °C (97.3 °F)   SpO2: 92%     Body mass index is 30.62 kg/m².  Physical Exam  Vitals reviewed.   Constitutional:       General: She is not in acute distress.     Appearance: Normal appearance.   HENT:      Head: Normocephalic and atraumatic.       Right Ear: External ear normal.      Left Ear: External ear normal.      Nose: Nose normal.   Eyes:      General: No scleral icterus.     Extraocular Movements: Extraocular movements intact.      Conjunctiva/sclera: Conjunctivae normal.   Cardiovascular:      Rate and Rhythm: Normal rate and regular rhythm.      Heart sounds: Normal heart sounds. No murmur heard.  Pulmonary:      Effort: No respiratory distress.      Breath sounds: Normal breath sounds. No wheezing, rhonchi or rales.   Abdominal:      General: Bowel sounds are normal.      Palpations: Abdomen is soft.      Tenderness: There is no abdominal tenderness.   Musculoskeletal:      Right lower leg: No edema.      Left lower leg: No edema.   Skin:     General: Skin is warm and dry.   Neurological:      General: No focal deficit present.      Mental Status: She is alert.   Psychiatric:         Mood and Affect: Mood normal.         Behavior: Behavior normal.        Assessment/Plan:  1. Hyperlipidemia, unspecified hyperlipidemia type  - Lipid Profile; Future  08/2021- , HDL 47, TG 58, LDL 73    2. Hypothyroidism, unspecified type  - TSH+FREE T4  - continue current levothyroxine dose    3. History of Seizure   - follows with neurology in 01/2022, no change to medications. No recent seizure eps   - continue divalproex, keppra     4. Schizophrenia  - follows with psychiatry regularly, last seen yesterday. bloodwork done regularly as pt on clozapine.  - no medication change  - continue currenl meds- clozapine and depakote    5. Age related osteoporosis without current pathological fx  6. Vitamin D deficiency  - VITAMIN D,25 HYDROXY; Future  DEXA scan revealed T score of -2.6 in the left femur, T score of -1.5 in the lumbar spine. Pt initiated on Alendronate 70 once a week in Dec 2021. Will continue therapy.    7. Mixed stress and urge urinary incontinence  Patient does have a history of mixed incontinence unable to perform pelvic floor exercises by herself  in the setting of dementia.  Urine analysis was normal and PVR was also normal in the past.   - pt currently going to [facility] for pelvic floor exercises. Per pt, she is doing well with the exercises.     8. Screening for metabolic disorder  - Comp Metabolic Panel; Future    9. Health maintenance  -Patient declines any sort of cancer screening including colonoscopy and mammogram.    Other orders  - betamethasone dipropionate (DIPROLENE) 0.05 % Ointment  - clobetasol (TEMOVATE) 0.05 % Cream  - SPIRIVA HANDIHALER 18 MCG Cap     All imaging results and lab results and consult notes are reviewed at this visit.  Followup: Return in about 6 months (around 9/30/2022).    Amrita Espinal, DO  Internal Medicine PGY-1

## 2022-04-04 DIAGNOSIS — E03.9 HYPOTHYROIDISM, UNSPECIFIED TYPE: ICD-10-CM

## 2022-04-04 RX ORDER — LEVOTHYROXINE SODIUM 112 UG/1
112 TABLET ORAL
Qty: 30 TABLET | Refills: 10 | Status: SHIPPED | OUTPATIENT
Start: 2022-04-04 | End: 2023-04-19 | Stop reason: SDUPTHER

## 2022-04-04 ASSESSMENT — ENCOUNTER SYMPTOMS
EYES NEGATIVE: 1
NAUSEA: 0
ABDOMINAL PAIN: 0
NEUROLOGICAL NEGATIVE: 1
VOMITING: 0
MUSCULOSKELETAL NEGATIVE: 1
CONSTITUTIONAL NEGATIVE: 1
SHORTNESS OF BREATH: 0
COUGH: 0
SPUTUM PRODUCTION: 0

## 2022-04-04 NOTE — TELEPHONE ENCOUNTER
Levothyroxine Refill    Last seen: 3/31/22 by Dr. Espinal  Next appt: None    Was the patient seen in the last year in this department? Yes   Does patient have an active prescription for medications requested? No   Received Request Via: Pharmacy

## 2022-04-12 RX ORDER — SENNOSIDES 8.6 MG
TABLET ORAL
Qty: 90 TABLET | Refills: 0 | Status: SHIPPED | OUTPATIENT
Start: 2022-04-12 | End: 2023-03-20

## 2022-04-12 NOTE — TELEPHONE ENCOUNTER
Last seen: 03/31/22 by Dr. Espinal  Next appt: None     Was the patient seen in the last year in this department? Yes   Does patient have an active prescription for medications requested? No   Received Request Via: Pharmacy

## 2022-04-18 RX ORDER — TIOTROPIUM BROMIDE 18 UG/1
CAPSULE ORAL; RESPIRATORY (INHALATION)
Qty: 30 CAPSULE | Refills: 11 | Status: SHIPPED | OUTPATIENT
Start: 2022-04-18 | End: 2022-07-26

## 2022-04-18 NOTE — TELEPHONE ENCOUNTER
Last seen: 03/31/2022 by Dr. Espinal  Next appt: None    Was the patient seen in the last year in this department? Yes   Does patient have an active prescription for medications requested? No   Received Request Via: Pharmacy

## 2022-04-28 ENCOUNTER — HOSPITAL ENCOUNTER (OUTPATIENT)
Dept: LAB | Facility: MEDICAL CENTER | Age: 72
End: 2022-04-28
Payer: MEDICARE

## 2022-04-28 ENCOUNTER — HOSPITAL ENCOUNTER (OUTPATIENT)
Dept: LAB | Facility: MEDICAL CENTER | Age: 72
End: 2022-04-28
Attending: PHYSICIAN ASSISTANT
Payer: MEDICARE

## 2022-04-28 DIAGNOSIS — E78.5 HYPERLIPIDEMIA, UNSPECIFIED HYPERLIPIDEMIA TYPE: ICD-10-CM

## 2022-04-28 DIAGNOSIS — E55.9 VITAMIN D DEFICIENCY: ICD-10-CM

## 2022-04-28 DIAGNOSIS — Z13.228 SCREENING FOR METABOLIC DISORDER: ICD-10-CM

## 2022-04-28 LAB
25(OH)D3 SERPL-MCNC: 17 NG/ML (ref 30–100)
ALBUMIN SERPL BCP-MCNC: 4.6 G/DL (ref 3.2–4.9)
ALBUMIN/GLOB SERPL: 1.8 G/DL
ALP SERPL-CCNC: 122 U/L (ref 30–99)
ALT SERPL-CCNC: 12 U/L (ref 2–50)
ANION GAP SERPL CALC-SCNC: 9 MMOL/L (ref 7–16)
AST SERPL-CCNC: 13 U/L (ref 12–45)
BILIRUB SERPL-MCNC: 0.3 MG/DL (ref 0.1–1.5)
BUN SERPL-MCNC: 5 MG/DL (ref 8–22)
CALCIUM SERPL-MCNC: 9.4 MG/DL (ref 8.5–10.5)
CHLORIDE SERPL-SCNC: 96 MMOL/L (ref 96–112)
CHOLEST SERPL-MCNC: 128 MG/DL (ref 100–199)
CO2 SERPL-SCNC: 25 MMOL/L (ref 20–33)
CREAT SERPL-MCNC: 0.66 MG/DL (ref 0.5–1.4)
FASTING STATUS PATIENT QL REPORTED: NORMAL
GFR SERPLBLD CREATININE-BSD FMLA CKD-EPI: 94 ML/MIN/1.73 M 2
GLOBULIN SER CALC-MCNC: 2.5 G/DL (ref 1.9–3.5)
GLUCOSE SERPL-MCNC: 106 MG/DL (ref 65–99)
HDLC SERPL-MCNC: 54 MG/DL
LDLC SERPL CALC-MCNC: 62 MG/DL
POTASSIUM SERPL-SCNC: 4.4 MMOL/L (ref 3.6–5.5)
PROT SERPL-MCNC: 7.1 G/DL (ref 6–8.2)
SODIUM SERPL-SCNC: 130 MMOL/L (ref 135–145)
T4 FREE SERPL-MCNC: 1.22 NG/DL (ref 0.93–1.7)
TRIGL SERPL-MCNC: 58 MG/DL (ref 0–149)
TSH SERPL DL<=0.005 MIU/L-ACNC: 3.7 UIU/ML (ref 0.38–5.33)

## 2022-04-28 PROCEDURE — 84443 ASSAY THYROID STIM HORMONE: CPT

## 2022-04-28 PROCEDURE — 80061 LIPID PANEL: CPT

## 2022-04-28 PROCEDURE — 87086 URINE CULTURE/COLONY COUNT: CPT

## 2022-04-28 PROCEDURE — 80053 COMPREHEN METABOLIC PANEL: CPT

## 2022-04-28 PROCEDURE — 82306 VITAMIN D 25 HYDROXY: CPT

## 2022-04-28 PROCEDURE — 36415 COLL VENOUS BLD VENIPUNCTURE: CPT

## 2022-04-28 PROCEDURE — 84439 ASSAY OF FREE THYROXINE: CPT

## 2022-04-30 LAB
BACTERIA UR CULT: NORMAL
SIGNIFICANT IND 70042: NORMAL
SITE SITE: NORMAL
SOURCE SOURCE: NORMAL

## 2022-05-16 RX ORDER — ALENDRONATE SODIUM 70 MG/1
TABLET ORAL
Qty: 4 TABLET | Refills: 0 | Status: SHIPPED | OUTPATIENT
Start: 2022-05-16 | End: 2022-06-17

## 2022-05-24 RX ORDER — ASPIRIN 81 MG/1
TABLET, COATED ORAL
Qty: 30 TABLET | Refills: 11 | Status: SHIPPED | OUTPATIENT
Start: 2022-05-24 | End: 2023-06-12 | Stop reason: SDUPTHER

## 2022-05-24 NOTE — TELEPHONE ENCOUNTER
Asprin Refill    Last seen: 3/31/22 by Dr. Espinal  Next appt: 5/27/22 with Dr. José    Was the patient seen in the last year in this department? Yes   Does patient have an active prescription for medications requested? No   Received Request Via: Pharmacy

## 2022-05-27 ENCOUNTER — OFFICE VISIT (OUTPATIENT)
Dept: INTERNAL MEDICINE | Facility: OTHER | Age: 72
End: 2022-05-27
Payer: MEDICARE

## 2022-05-27 VITALS
BODY MASS INDEX: 30.12 KG/M2 | HEART RATE: 85 BPM | OXYGEN SATURATION: 96 % | WEIGHT: 187.4 LBS | DIASTOLIC BLOOD PRESSURE: 69 MMHG | HEIGHT: 66 IN | TEMPERATURE: 96.4 F | SYSTOLIC BLOOD PRESSURE: 121 MMHG

## 2022-05-27 DIAGNOSIS — E87.1 CHRONIC HYPONATREMIA: ICD-10-CM

## 2022-05-27 DIAGNOSIS — R56.9 SEIZURE (HCC): ICD-10-CM

## 2022-05-27 PROCEDURE — 99213 OFFICE O/P EST LOW 20 MIN: CPT | Mod: GE | Performed by: STUDENT IN AN ORGANIZED HEALTH CARE EDUCATION/TRAINING PROGRAM

## 2022-05-27 ASSESSMENT — PATIENT HEALTH QUESTIONNAIRE - PHQ9: CLINICAL INTERPRETATION OF PHQ2 SCORE: 0

## 2022-05-27 NOTE — PATIENT INSTRUCTIONS
Thank you for visiting today!  Please stay well hydrated for now  Please follow-up in 2-3 weeks  Please Schedule an appointment to see nuurology   Please get lab work done at least 2 days before next visit.  Please try and eat healthy, get at least 30 minutes of cardiovascular exercise a day to help keep your health as best as it can be.  If you have any questions or concerns please feel free to contact us at [phone].  If you feel like you are experiencing a medical emergency please seek immediate medical attention at an urgent care or in the emergency department.

## 2022-05-28 NOTE — PROGRESS NOTES
Established Patient    Patient Care Team:  Faiza Talamantes M.D. as PCP - General (Internal Medicine)    Tracie Rinaldi is a 72 y.o. female who presents today with the following Chief Complaint(s): Follow up for Diagnoses of Chronic hyponatremia and Seizure (HCC) were pertinent to this visit.    HPI:  Ms. Rinaldi is a very pleasant 73 yo female with a past medical history of dementia, seizures, stroke, schizophrenia, COPD, chronic hyponatremia last 130 has been as low as 126 on yearly labs in the past, impaired fasting glucose and mixed stress and urge urinary incontinence who was hospitalized on 5/19 for altered mental status ultimately found to have concerns for hyponatremia improved with fluids was discharged home on 5/20.  Patient is poor historian, does not remember the events leading up to her hospitalization, guardian (new, former nurse) who was not at group home at the time states that it was her understanding that the patient was found altered in the morning, brought to the hospital for altered mental status, was irritable on admission and found to have an acute on chronic hyponatremia of 122, discharged at 134 improved with IV fluids.  Patient was agitated during early hospitalization and mental change was thought to be secondary to hyponatremia, on discharge patient was smiling, conversive and at her baseline discharge back to group home.  Patient with a serum awesome's of 268, urine sodium of 106.  Patient endorses having well-hydrated, patient denies any altered mental status, per guardian back to baseline, without any acute concern.  Patient denies any tongue biting, urinary or fecal incontinence that is out of the norm, denies any recent seizures in the group home.  Patient denies any new medication changes has been taking medication as directed including long-term use of Depakote and Keppra.  Patient is very pleasant without any new acute complaints today.  12 point review of systems  negative as below.        ROS:     General: No fevers, chills, night sweats, weight loss or gain  HEENT: No hearing changes, vision changes, eye pain, ear pain, nasal discharge, sore throat  Neck: No swelling in neck  Pulmonary: No shortness of breath, cough, sputum, or hemoptysis  Cardiovascular: No chest pain, palpitations, or LE swelling  GI: No nausea, vomiting, diarrhea, constipation, abdominal pain, hematochezia or melena  : No dysuria or frequency  Neuro: No focal weakness, no general weakness, no headaches, no lightheadedness, no dizziness  Psych: No anxiety or depression    Past Medical History:   Diagnosis Date   • Bladder spasm    • Dental disorder     upper and lower dentures   • Dermatitis    • GERD (gastroesophageal reflux disease)    • Hearing loss of right ear due to cerumen impaction    • Hyperlipidemia    • Hypothyroid    • Other emphysema (HCC)    • Pneumonia     2012   • Psychiatric disorder     schizophrenia   • Stroke (HCC)     tia june 2015 no residual    • Unspecified urinary incontinence     diapers    • Vitamin D deficiency      Social History     Tobacco Use   • Smoking status: Former Smoker     Packs/day: 0.50     Years: 45.00     Pack years: 22.50     Types: Cigarettes   • Smokeless tobacco: Never Used   • Tobacco comment: unk   Vaping Use   • Vaping Use: Never used   Substance Use Topics   • Alcohol use: No     Comment: unk   • Drug use: No     Comment: unk     Current Outpatient Medications   Medication Sig Dispense Refill   • Nutritional Supplements (ENSURE ACTIVE HIGH PROTEIN PO) Take  by mouth.     • ASPIRIN LOW DOSE 81 MG EC tablet TAKE 1 TABLET BY MOUTH ONCE DAILY 30 Tablet 11   • alendronate (FOSAMAX) 70 MG Tab TAKE 1 TABLET BY MOUTH EVERY 7 DAYS, TAKE FIRST THING IN THE MORNING, ON AN EMPTY STOMACH,WITH A FULL GLASS OF WATER & REMAIN UPRIGHT FOR 30MIN AFTER. 4 Tablet 0   • SPIRIVA HANDIHALER 18 MCG Cap INHALE THE CONTENTS OF 1 CAPSULE VIA HANDIHALER ONCE DAILY 30 Capsule 11  "  • Sennosides (SENNA) 8.6 MG Tab TAKE 1 TABLET BY MOUTH ONCE DAILY AS NEEDED FOR CONSTIPATION 90 Tablet 0   • levothyroxine (SYNTHROID) 112 MCG Tab TAKE 1 TABLET BY MOUTH EVERY MORNING ON AN EMPTY STOMACH. 30 Tablet 10   • betamethasone dipropionate (DIPROLENE) 0.05 % Ointment      • clobetasol (TEMOVATE) 0.05 % Cream      • simvastatin (ZOCOR) 40 MG Tab TAKE 1 TABLET BY MOUTH AT BEDTIME FOR HLD 30 Tablet 11   • Diapers & Supplies Misc 90 Bags in the morning, at noon, and at bedtime. Patient needs large pullups Prevail adult  90 Each 3   • divalproex ER (DEPAKOTE ER) 250 MG TABLET SR 24 HR Take 1 Tablet by mouth every day. 90 Tablet 3   • levETIRAcetam (KEPPRA) 250 MG tablet TAKE 1 TABLET BY MOUTH 3 TIMES DAILY 270 Tablet 3   • ibuprofen (MOTRIN) 600 MG Tab Take 1 Tablet by mouth every 8 hours as needed for Moderate Pain. (Patient taking differently: Take 800 mg by mouth every 8 hours as needed for Moderate Pain.) 30 Tablet 0   • Calcium Carbonate-Vitamin D 600-400 MG-UNIT Tab Take  by mouth.     • cloZAPine (CLOZARIL) 100 MG Tab Take 150 mg by mouth 2 times a day.     • VENTOLIN  (90 Base) MCG/ACT Aero Soln inhalation aerosol      • escitalopram (LEXAPRO) 10 MG Tab Take 2 Tabs by mouth every day.       No current facility-administered medications for this visit.       Physical Exam:  /69 (BP Location: Left arm, Patient Position: Sitting, BP Cuff Size: Large adult long)   Pulse 85   Temp (!) 35.8 °C (96.4 °F) (Temporal)   Ht 1.676 m (5' 6\")   Wt 85 kg (187 lb 6.4 oz)   SpO2 96%   BMI 30.25 kg/m²   General: Well developed, well nourished female, in no distress.  HEENT: NC/AT, PERRL, EOMI, no scleral icterus or conjunctival pallor, fair dentition, no nasal discharge or oral erythema or exudates.   Neck: Supple, No cervical or supraclavicular LAD  CV:RRR, no murmurs gallops or Rubs, no JVD  Pulm:LCAB, no wheezing or crackles  GI: Normal bowel sounds, abdomen soft,MSK: Radial and dorsalis pedis " pulses 2+ and equal bilaterally, respectively.  Strength 5 out of 5 in upper and lower extremities.  No lower extremity edema  Neuro: Patient is alert and oriented x3, no focal deficits, CNII-XII intact, no dysmetria, normal gait  Psych: Appropriate mood and affect       Assessment and Plan:   1. Chronic hyponatremia  Hpotonic Hyponatremia, appeared dehydrated, responded to IVF in hospital, drinking 3 glasses of water and some soda and juice daily . Na 122 on admission and 132 on discharge, Urine Na 106 Serum Osms hyposmolar, likely acute on chronic, BP okay no dizziness, currently asymptomatic. Patients Depakote could cuse SIADH, Cerebral salt wasting and   -Stay well hydrated, Follow up with labs as below to determine   -Follow up with Neurology   - Basic Metabolic Panel; Future  - OSMOLALITY SERUM; Future  - OSMOLALITY URINE; Future  - URINE SODIUM RANDOM; Future    2. Seizure (HCC)  Hx of siezure disorder, cannot rule out post-ictal state as cause of AMS, follows regurally with neurology. Not worked up in hosptital per record, no further AMS, denies seizure. No tounge biting present.    -Follow with neurology          Return in about 2 weeks (around 6/10/2022).    Patient Instructions   Thank you for visiting today!  Please stay well hydrated for now  Please follow-up in 2-3 weeks  Please Schedule an appointment to see nuurology   Please get lab work done at least 2 days before next visit.  Please try and eat healthy, get at least 30 minutes of cardiovascular exercise a day to help keep your health as best as it can be.  If you have any questions or concerns please feel free to contact us at [phone].  If you feel like you are experiencing a medical emergency please seek immediate medical attention at an urgent care or in the emergency department.       Violeta José M.D. PGY I  [employer]    This note was created using voice recognition software.  While every attempt is  made to ensure accuracy of transcription, occasionally errors occur.

## 2022-05-31 DIAGNOSIS — R56.9 SEIZURE (HCC): ICD-10-CM

## 2022-06-01 RX ORDER — LEVETIRACETAM 250 MG/1
TABLET ORAL
Qty: 90 TABLET | Refills: 11 | Status: SHIPPED | OUTPATIENT
Start: 2022-06-01 | End: 2023-05-16 | Stop reason: SDUPTHER

## 2022-06-03 DIAGNOSIS — E87.1 CHRONIC HYPONATREMIA: ICD-10-CM

## 2022-06-17 RX ORDER — ALENDRONATE SODIUM 70 MG/1
TABLET ORAL
Qty: 4 TABLET | Refills: 0 | Status: SHIPPED | OUTPATIENT
Start: 2022-06-17 | End: 2022-09-28

## 2022-06-17 NOTE — TELEPHONE ENCOUNTER
Last seen: 05/2722 by Dr. José  Next appt: 07/05/22 with Dr. Espinal    Was the patient seen in the last year in this department? Yes   Does patient have an active prescription for medications requested? No   Received Request Via: Pharmacy

## 2022-07-01 RX ORDER — DIAPER,BRIEF,ADULT, DISPOSABLE
EACH MISCELLANEOUS
Qty: 90 EACH | Refills: 11 | Status: SHIPPED | OUTPATIENT
Start: 2022-07-01 | End: 2023-08-15

## 2022-07-01 NOTE — TELEPHONE ENCOUNTER
Fq Protective Underwear Refill    Last seen: 5/27/22 by Dr. José  Next appt: 7/5/22 with Dr. Espinal    Was the patient seen in the last year in this department? Yes   Does patient have an active prescription for medications requested? No   Received Request Via: Pharmacy

## 2022-07-05 ENCOUNTER — OFFICE VISIT (OUTPATIENT)
Dept: INTERNAL MEDICINE | Facility: OTHER | Age: 72
End: 2022-07-05
Payer: MEDICARE

## 2022-07-05 DIAGNOSIS — Z12.12 SCREENING FOR COLORECTAL CANCER: ICD-10-CM

## 2022-07-05 DIAGNOSIS — M81.0 AGE-RELATED OSTEOPOROSIS WITHOUT CURRENT PATHOLOGICAL FRACTURE: ICD-10-CM

## 2022-07-05 DIAGNOSIS — R32 URINARY INCONTINENCE, UNSPECIFIED TYPE: ICD-10-CM

## 2022-07-05 DIAGNOSIS — Z12.11 SCREENING FOR COLORECTAL CANCER: ICD-10-CM

## 2022-07-05 DIAGNOSIS — E66.9 OBESITY (BMI 30-39.9): ICD-10-CM

## 2022-07-05 DIAGNOSIS — E87.1 CHRONIC HYPONATREMIA: ICD-10-CM

## 2022-07-05 DIAGNOSIS — Z12.31 ENCOUNTER FOR SCREENING MAMMOGRAM FOR BREAST CANCER: ICD-10-CM

## 2022-07-05 DIAGNOSIS — J44.9 COPD MIXED TYPE (HCC): ICD-10-CM

## 2022-07-05 DIAGNOSIS — E55.9 VITAMIN D DEFICIENCY: ICD-10-CM

## 2022-07-05 PROCEDURE — 99214 OFFICE O/P EST MOD 30 MIN: CPT | Mod: GC

## 2022-07-05 NOTE — PROGRESS NOTES
Date of Service:  7/5/22    CC: hyponatremia followup    HPI:  Tracie Rinaldi (goes by Brook) is a 72 y.o. female here with caregiver from group home for hyponatremia followup. Patient was hospitalized 5/19-5/20 for hyponatremia which improved with fluids. Followed up with Dr. José after hospitalization and some bloodwork/urine studies were obtained to assess etiology of hyponatremia and pt was instructed to stay well hydrated till today's followup visit. Patient states she has been staying well hydrated with coffee, giorgi tea, water, and states that she does drink more than 8 glasses of fluid, but caregiver states that this is unlikely for her. Patient states she is doing well, denies any acute complaints, denies any headache, nausea, vomiting, dizziness, lightheadedness, chest pain, SOB.     Urinary incontinence- still working with [facility] for pelvic floor therapy. Patient notes minimal improvement. Caregiver mentioned about post tibial nerve stimulation but not sure if covered under insurance here in [place].    Healthcare maintenance:   Mammogram - 01/2020  Pap smear- previously declined  Colonoscopy - last 11/2010, per chart review had previously declined colonoscopy, ok for cologuard test.    Review of systems:  Review of Systems   Constitutional: Negative for chills and fever.   HENT: Negative for congestion and sore throat.    Respiratory: Negative for cough and shortness of breath.    Cardiovascular: Negative for chest pain and palpitations.   Gastrointestinal: Negative for nausea and vomiting.   Genitourinary: Positive for urgency. Negative for dysuria and hematuria.   Skin: Negative for rash.   Neurological: Negative for dizziness, focal weakness and weakness.      Past Medical History:  Patient Active Problem List    Diagnosis Date Noted   • Obesity (BMI 30-39.9) 07/05/2022   • Bladder spasm 01/18/2022   • Urinary incontinence 09/20/2021   • Hyponatremia 05/01/2019   • Impaired fasting  glucose 05/01/2019   • COPD mixed type ([facility]) 07/03/2017   • Dementia without behavioral disturbance ([facility]) 02/10/2017   • Seizure ([facility]) 10/18/2015   • Schizophrenia ([facility]) 10/18/2015   • Spells of decreased attentiveness 08/28/2015   • Hypothyroid 08/28/2015       Past Surgical History:    has a past surgical history that includes closed reduction (Left, 7/9/2015); pin insertion (7/9/2015); and irrigation & debridement ortho (Left, 10/17/2015).    Medications:  Current Outpatient Medications   Medication Sig Dispense Refill   • Incontinence Supply Disposable (FQ PROTECTIVE UNDERWEAR) Misc CHANGE 3 TIMES DAILY AS DIRECTED 90 Each 11   • alendronate (FOSAMAX) 70 MG Tab TAKE 1 TAB BY MOUTH EVERY 7 DAYS IN THE MORNING, ON AN EMPTY STOMACH,WITH A GLASS OF WATER & REMAIN UPRIGHT FOR 30MIN AFTER. 4 Tablet 0   • levETIRAcetam (KEPPRA) 250 MG tablet TAKE 1 TABLET BY MOUTH 3 TIMES DAILY 90 Tablet 11   • Nutritional Supplements (ENSURE ACTIVE HIGH PROTEIN PO) Take  by mouth.     • ASPIRIN LOW DOSE 81 MG EC tablet TAKE 1 TABLET BY MOUTH ONCE DAILY 30 Tablet 11   • SPIRIVA HANDIHALER 18 MCG Cap INHALE THE CONTENTS OF 1 CAPSULE VIA HANDIHALER ONCE DAILY 30 Capsule 11   • Sennosides (SENNA) 8.6 MG Tab TAKE 1 TABLET BY MOUTH ONCE DAILY AS NEEDED FOR CONSTIPATION 90 Tablet 0   • levothyroxine (SYNTHROID) 112 MCG Tab TAKE 1 TABLET BY MOUTH EVERY MORNING ON AN EMPTY STOMACH. 30 Tablet 10   • betamethasone dipropionate (DIPROLENE) 0.05 % Ointment      • clobetasol (TEMOVATE) 0.05 % Cream      • simvastatin (ZOCOR) 40 MG Tab TAKE 1 TABLET BY MOUTH AT BEDTIME FOR HLD 30 Tablet 11   • Diapers & Supplies Misc 90 Bags in the morning, at noon, and at bedtime. Patient needs large pullups Prevail adult  90 Each 3   • divalproex ER (DEPAKOTE ER) 250 MG TABLET SR 24 HR Take 1 Tablet by mouth every day. 90 Tablet 3   • ibuprofen (MOTRIN) 600 MG Tab Take 1 Tablet by mouth every 8 hours as needed for Moderate Pain. (Patient taking differently:  Take 800 mg by mouth every 8 hours as needed for Moderate Pain.) 30 Tablet 0   • Calcium Carbonate-Vitamin D 600-400 MG-UNIT Tab Take  by mouth.     • cloZAPine (CLOZARIL) 100 MG Tab Take 150 mg by mouth 2 times a day.     • VENTOLIN  (90 Base) MCG/ACT Aero Soln inhalation aerosol      • escitalopram (LEXAPRO) 10 MG Tab Take 2 Tabs by mouth every day.       No current facility-administered medications for this visit.     Allergies:  No Known Allergies    Family History:   family history includes Alzheimer's Disease in her mother; Heart Disease in her father.     Social History:    Social History     Tobacco Use   • Smoking status: Former Smoker     Packs/day: 0.50     Years: 45.00     Pack years: 22.50     Types: Cigarettes   • Smokeless tobacco: Never Used   • Tobacco comment: unk   Vaping Use   • Vaping Use: Never used   Substance Use Topics   • Alcohol use: No     Comment: unk   • Drug use: No     Comment: unk     Physical Exam:  Vitals:    07/05/22 1010   BP: 138/78   Pulse:    Temp:    SpO2:      Body mass index is 30.67 kg/m².  Physical Exam  Constitutional:       General: She is not in acute distress.     Appearance: Normal appearance.   HENT:      Head: Normocephalic and atraumatic.      Right Ear: External ear normal.      Left Ear: External ear normal.   Eyes:      General: No scleral icterus.     Extraocular Movements: Extraocular movements intact.      Conjunctiva/sclera: Conjunctivae normal.   Cardiovascular:      Rate and Rhythm: Normal rate and regular rhythm.      Heart sounds: Normal heart sounds.   Pulmonary:      Effort: No respiratory distress.      Breath sounds: Normal breath sounds.   Abdominal:      General: There is no distension.      Palpations: Abdomen is soft.      Tenderness: There is no abdominal tenderness.   Musculoskeletal:         General: No swelling or tenderness.      Right lower leg: No edema.      Left lower leg: No edema.   Skin:     General: Skin is warm and dry.    Neurological:      General: No focal deficit present.      Mental Status: She is alert.      Motor: No weakness.      Gait: Gait normal.   Psychiatric:         Mood and Affect: Mood normal.         Behavior: Behavior normal.        Assessment/Plan:    1. Chronic hyponatremia  hospitalized 5/19-5/20 for hyponatremia which improved with fluids.   - 6/2/22 bloodwork: Na 132, serum osmo 275 (low), random urine sodium 11 (low), urine osmolality 137   - sodium improving with oral hydration  - hypotonic hyponatremia likely secondary to dehydration (no clinical signs of edematous disorders like HF/cirrhosis/nephrotic syndrome)   - encourage PO fluid intake, caregiver states they will try to encourage and monitor how much fluid patient is taking.     2. COPD mixed type (HCC)  - stable, no SOB, on spiriva and ventolin prn    3. Urinary incontinence  still working with [facility] for pelvic floor therapy  - follows up with urologist  - continue home myretriq     4. Obesity (BMI 30-39.9)  - Patient identified as having weight management issue.  Appropriate orders and counseling given.    5. Vitamin D deficiency   - vitamin D level 17 in 04/2022  - recommend 800-1000IU daily and repeat level 3 months of therapy. Will recheck at next visit.    6. Age related osteoporosis without current pathological fx  DEXA scan revealed T score of -2.6 in the left femur, T score of -1.5 in the lumbar spine. Pt initiated on Alendronate 70 once a week in Dec 2021. Will continue therapy.    7. Screening for colorectal cancer  last colonoscopy 11/2010  - pt declined colonoscopy, agreeable to cologuard testing  - COLOGUARD (FIT DNA)    8. Encounter for screening mammogram for breast cancer  - MA-SCREENING MAMMO BILAT W/TOMOSYNTHESIS W/CAD; Future      All imaging results and lab results and consult notes are reviewed at this visit.  Followup: Return in about 3 months (around 10/5/2022).    Amrita Espinal, DO  Internal Medicine PGY-2

## 2022-07-06 VITALS
OXYGEN SATURATION: 94 % | BODY MASS INDEX: 30.53 KG/M2 | TEMPERATURE: 97.6 F | HEART RATE: 93 BPM | DIASTOLIC BLOOD PRESSURE: 78 MMHG | WEIGHT: 190 LBS | SYSTOLIC BLOOD PRESSURE: 138 MMHG | HEIGHT: 66 IN

## 2022-07-06 ASSESSMENT — ENCOUNTER SYMPTOMS
SORE THROAT: 0
PALPITATIONS: 0
VOMITING: 0
WEAKNESS: 0
FEVER: 0
SHORTNESS OF BREATH: 0
COUGH: 0
DIZZINESS: 0
NAUSEA: 0
FOCAL WEAKNESS: 0
CHILLS: 0

## 2022-07-19 ENCOUNTER — TELEPHONE (OUTPATIENT)
Dept: INTERNAL MEDICINE | Facility: OTHER | Age: 72
End: 2022-07-19
Payer: MEDICARE

## 2022-07-19 DIAGNOSIS — J44.9 COPD MIXED TYPE (HCC): ICD-10-CM

## 2022-07-19 NOTE — LETTER
To whom this may concern:     This document confirms the medication changes made for patient Tracie Hauser. Patient's inhaler will be switched from Spiriva to Incruse ellipta due to change in prescription coverage by patient's insurance.     Thank you,   Dr. Amrita Espinal, DO

## 2022-07-19 NOTE — TELEPHONE ENCOUNTER
LOV 11.08.21 Last seen: 03/31/22 by Dr. Espinal  Next appt: None    Was the patient seen in the last year in this department? Yes   Does patient have an active prescription for medications requested? No   Received Request Via: Pharmacy

## 2022-07-19 NOTE — TELEPHONE ENCOUNTER
DOCUMENTATION OF PAR STATUS:    1. Name of Medication & Dose: Spiriva Handihaler     2. Name of Prescription Coverage Company & phone #: [employer]    3. Date Prior Auth Submitted: 07/19/22    4. What information was given to obtain insurance decision? ICD-10 code COPD    5. Prior Auth Status? Pending    6. Patient Notified: no

## 2022-07-20 NOTE — TELEPHONE ENCOUNTER
Received denial, patient needs to try and fail Anoro Ellipta, Breo Ellipta or Incruse Ellipta before covering Spiriva

## 2022-07-22 NOTE — TELEPHONE ENCOUNTER
Insurance changes twice a year sometimes. They would like for patient to try the preferred meds first before covering spiriva

## 2022-07-22 NOTE — TELEPHONE ENCOUNTER
Pt was previously prescribed Spiriva with no issue, the prescription was just refilled at most recent visit. Not sure why this medication is denied this time. Can we look into this. Thanks.

## 2022-07-26 NOTE — TELEPHONE ENCOUNTER
Will change patient's spiriva to incruse which is in the same LAMA class of medication. Prescription sent to [facility] specialty clinic. Please let patient know.

## 2022-07-27 NOTE — TELEPHONE ENCOUNTER
Vilma called back stating the facility Brook is in needs a D/C letter for spiriva handihaler and stating she was prescribed the new incruse ellipta inhaler and should be started on that.    F: [facility] [phone]

## 2022-07-29 NOTE — TELEPHONE ENCOUNTER
Will write note under communications documenting change in inhalers due to insurance. Please let cali know, we can print or send this document to them.

## 2022-08-02 ENCOUNTER — HOSPITAL ENCOUNTER (OUTPATIENT)
Dept: RADIOLOGY | Facility: MEDICAL CENTER | Age: 72
End: 2022-08-02
Payer: MEDICARE

## 2022-08-02 DIAGNOSIS — Z12.31 ENCOUNTER FOR SCREENING MAMMOGRAM FOR BREAST CANCER: ICD-10-CM

## 2022-08-02 PROCEDURE — 77063 BREAST TOMOSYNTHESIS BI: CPT

## 2022-08-16 ENCOUNTER — APPOINTMENT (RX ONLY)
Dept: URBAN - METROPOLITAN AREA CLINIC 6 | Facility: CLINIC | Age: 72
Setting detail: DERMATOLOGY
End: 2022-08-16

## 2022-08-16 ENCOUNTER — RX ONLY (OUTPATIENT)
Age: 72
Setting detail: RX ONLY
End: 2022-08-16

## 2022-08-16 DIAGNOSIS — L30.8 OTHER SPECIFIED DERMATITIS: ICD-10-CM | Status: INADEQUATELY CONTROLLED

## 2022-08-16 PROBLEM — L30.9 DERMATITIS, UNSPECIFIED: Status: ACTIVE | Noted: 2022-08-16

## 2022-08-16 PROCEDURE — ? MEDICATION COUNSELING

## 2022-08-16 PROCEDURE — ? COUNSELING

## 2022-08-16 PROCEDURE — 99214 OFFICE O/P EST MOD 30 MIN: CPT

## 2022-08-16 PROCEDURE — ? PRESCRIPTION

## 2022-08-16 PROCEDURE — ? TREATMENT REGIMEN

## 2022-08-16 RX ORDER — TACROLIMUS 1 MG/G
1 OINTMENT TOPICAL BID
Qty: 100 | Refills: 3 | Status: ERX | COMMUNITY
Start: 2022-08-16

## 2022-08-16 RX ORDER — PREDNISONE 10 MG/1
1 TABLET ORAL QD
Qty: 20 | Refills: 0

## 2022-08-16 RX ORDER — TACROLIMUS 1 MG/G
1 OINTMENT TOPICAL BID
Qty: 100 | Refills: 0

## 2022-08-16 RX ORDER — PREDNISONE 10 MG/1
TABLET ORAL
Qty: 20 | Refills: 0 | Status: ERX | COMMUNITY
Start: 2022-08-16

## 2022-08-16 RX ADMIN — PREDNISONE: 10 TABLET ORAL at 00:00

## 2022-08-16 RX ADMIN — TACROLIMUS 1: 1 OINTMENT TOPICAL at 00:00

## 2022-08-16 ASSESSMENT — LOCATION SIMPLE DESCRIPTION DERM
LOCATION SIMPLE: LEFT HAND
LOCATION SIMPLE: RIGHT HAND

## 2022-08-16 ASSESSMENT — LOCATION DETAILED DESCRIPTION DERM
LOCATION DETAILED: 3RD WEB SPACE LEFT HAND
LOCATION DETAILED: 3RD WEB SPACE RIGHT HAND

## 2022-08-16 ASSESSMENT — LOCATION ZONE DERM: LOCATION ZONE: HAND

## 2022-08-16 NOTE — PROCEDURE: TREATMENT REGIMEN
Otc Regimen: Recommended the use of Aquaphor or Vaseline while severely flared.
Discontinue Regimen: Clobetasol
Detail Level: Zone
Plan: RTC if flaring/uncontrolled\\nPatient is to stop taking Aspirin for 2 weeks while taking Prednisone. Recommended the use of Calcium and Vitamin D supplement while taking Prednisone.\\nConsider patch testing in the future
Initiate Treatment: Tacrolimus bid\\nPrednisone taper

## 2022-08-16 NOTE — PROCEDURE: MEDICATION COUNSELING
Tremfya Counseling: I discussed with the patient the risks of guselkumab including but not limited to immunosuppression, serious infections, and drug reactions.  The patient understands that monitoring is required including a PPD at baseline and must alert us or the primary physician if symptoms of infection or other concerning signs are noted.
Rhofade Counseling: Rhofade is a topical medication which can decrease superficial blood flow where applied. Side effects are uncommon and include stinging, redness and allergic reactions.
Topical Clindamycin Pregnancy And Lactation Text: This medication is Pregnancy Category B and is considered safe during pregnancy. It is unknown if it is excreted in breast milk.
Cellcept Counseling:  I discussed with the patient the risks of mycophenolate mofetil including but not limited to infection/immunosuppression, GI upset, hypokalemia, hypercholesterolemia, bone marrow suppression, lymphoproliferative disorders, malignancy, GI ulceration/bleed/perforation, colitis, interstitial lung disease, kidney failure, progressive multifocal leukoencephalopathy, and birth defects.  The patient understands that monitoring is required including a baseline creatinine and regular CBC testing. In addition, patient must alert us immediately if symptoms of infection or other concerning signs are noted.
Dupixent Pregnancy And Lactation Text: This medication likely crosses the placenta but the risk for the fetus is uncertain. This medication is excreted in breast milk.
Spironolactone Pregnancy And Lactation Text: This medication can cause feminization of the male fetus and should be avoided during pregnancy. The active metabolite is also found in breast milk.
Doxepin Pregnancy And Lactation Text: This medication is Pregnancy Category C and it isn't known if it is safe during pregnancy. It is also excreted in breast milk and breast feeding isn't recommended.
Siliq Pregnancy And Lactation Text: The risk during pregnancy and breastfeeding is uncertain with this medication.
Adbry Counseling: I discussed with the patient the risks of tralokinumab including but not limited to eye infection and irritation, cold sores, injection site reactions, worsening of asthma, allergic reactions and increased risk of parasitic infection.  Live vaccines should be avoided while taking tralokinumab. The patient understands that monitoring is required and they must alert us or the primary physician if symptoms of infection or other concerning signs are noted.
Niacinamide Pregnancy And Lactation Text: These medications are considered safe during pregnancy.
Erythromycin Counseling:  I discussed with the patient the risks of erythromycin including but not limited to GI upset, allergic reaction, drug rash, diarrhea, increase in liver enzymes, and yeast infections.
Sarecycline Counseling: Patient advised regarding possible photosensitivity and discoloration of the teeth, skin, lips, tongue and gums.  Patient instructed to avoid sunlight, if possible.  When exposed to sunlight, patients should wear protective clothing, sunglasses, and sunscreen.  The patient was instructed to call the office immediately if the following severe adverse effects occur:  hearing changes, easy bruising/bleeding, severe headache, or vision changes.  The patient verbalized understanding of the proper use and possible adverse effects of sarecycline.  All of the patient's questions and concerns were addressed.
Otezla Counseling: The side effects of Otezla were discussed with the patient, including but not limited to worsening or new depression, weight loss, diarrhea, nausea, upper respiratory tract infection, and headache. Patient instructed to call the office should any adverse effect occur.  The patient verbalized understanding of the proper use and possible adverse effects of Otezla.  All the patient's questions and concerns were addressed.
Rifampin Pregnancy And Lactation Text: This medication is Pregnancy Category C and it isn't know if it is safe during pregnancy. It is also excreted in breast milk and should not be used if you are breast feeding.
Colchicine Pregnancy And Lactation Text: This medication is Pregnancy Category C and isn't considered safe during pregnancy. It is excreted in breast milk.
Mirvaso Counseling: Mirvaso is a topical medication which can decrease superficial blood flow where applied. Side effects are uncommon and include stinging, redness and allergic reactions.
Eucrisa Counseling: Patient may experience a mild burning sensation during topical application. Eucrisa is not approved in children less than 2 years of age.
Gabapentin Counseling: I discussed with the patient the risks of gabapentin including but not limited to dizziness, somnolence, fatigue and ataxia.
Calcipotriene Pregnancy And Lactation Text: This medication has not been proven safe during pregnancy. It is unknown if this medication is excreted in breast milk.
Zyclara Pregnancy And Lactation Text: This medication is Pregnancy Category C. It is unknown if this medication is excreted in breast milk.
Olumiant Pregnancy And Lactation Text: Based on animal studies, Olumiant may cause embryo-fetal harm when administered to pregnant women.  The medication should not be used in pregnancy.  Breastfeeding is not recommended during treatment.
Itraconazole Pregnancy And Lactation Text: This medication is Pregnancy Category C and it isn't know if it is safe during pregnancy. It is also excreted in breast milk.
Aklief counseling:  Patient advised to apply a pea-sized amount only at bedtime and wait 30 minutes after washing their face before applying.  If too drying, patient may add a non-comedogenic moisturizer.  The most commonly reported side effects including irritation, redness, scaling, dryness, stinging, burning, itching, and increased risk of sunburn.  The patient verbalized understanding of the proper use and possible adverse effects of retinoids.  All of the patient's questions and concerns were addressed.
Cellcept Pregnancy And Lactation Text: This medication is Pregnancy Category D and isn't considered safe during pregnancy. It is unknown if this medication is excreted in breast milk.
Topical Ketoconazole Counseling: Patient counseled that this medication may cause skin irritation or allergic reactions.  In the event of skin irritation, the patient was advised to reduce the amount of the drug applied or use it less frequently.   The patient verbalized understanding of the proper use and possible adverse effects of ketoconazole.  All of the patient's questions and concerns were addressed.
Enbrel Counseling:  I discussed with the patient the risks of etanercept including but not limited to myelosuppression, immunosuppression, autoimmune hepatitis, demyelinating diseases, lymphoma, and infections.  The patient understands that monitoring is required including a PPD at baseline and must alert us or the primary physician if symptoms of infection or other concerning signs are noted.
Simponi Counseling:  I discussed with the patient the risks of golimumab including but not limited to myelosuppression, immunosuppression, autoimmune hepatitis, demyelinating diseases, lymphoma, and serious infections.  The patient understands that monitoring is required including a PPD at baseline and must alert us or the primary physician if symptoms of infection or other concerning signs are noted.
Rhofade Pregnancy And Lactation Text: This medication has not been assigned a Pregnancy Risk Category. It is unknown if the medication is excreted in breast milk.
SSKI Counseling:  I discussed with the patient the risks of SSKI including but not limited to thyroid abnormalities, metallic taste, GI upset, fever, headache, acne, arthralgias, paraesthesias, lymphadenopathy, easy bleeding, arrhythmias, and allergic reaction.
Nsaids Counseling: NSAID Counseling: I discussed with the patient that NSAIDs should be taken with food. Prolonged use of NSAIDs can result in the development of stomach ulcers.  Patient advised to stop taking NSAIDs if abdominal pain occurs.  The patient verbalized understanding of the proper use and possible adverse effects of NSAIDs.  All of the patient's questions and concerns were addressed.
Azithromycin Counseling:  I discussed with the patient the risks of azithromycin including but not limited to GI upset, allergic reaction, drug rash, diarrhea, and yeast infections.
Adbry Pregnancy And Lactation Text: It is unknown if this medication will adversely affect pregnancy or breast feeding.
Otezla Pregnancy And Lactation Text: This medication is Pregnancy Category C and it isn't known if it is safe during pregnancy. It is unknown if it is excreted in breast milk.
Doxycycline Pregnancy And Lactation Text: This medication is Pregnancy Category D and not consider safe during pregnancy. It is also excreted in breast milk but is considered safe for shorter treatment courses.
Hydroxyzine Counseling: Patient advised that the medication is sedating and not to drive a car after taking this medication.  Patient informed of potential adverse effects including but not limited to dry mouth, urinary retention, and blurry vision.  The patient verbalized understanding of the proper use and possible adverse effects of hydroxyzine.  All of the patient's questions and concerns were addressed.
Rifampin Counseling: I discussed with the patient the risks of rifampin including but not limited to liver damage, kidney damage, red-orange body fluids, nausea/vomiting and severe allergy.
Sarecycline Pregnancy And Lactation Text: This medication is Pregnancy Category D and not consider safe during pregnancy. It is also excreted in breast milk.
Acitretin Counseling:  I discussed with the patient the risks of acitretin including but not limited to hair loss, dry lips/skin/eyes, liver damage, hyperlipidemia, depression/suicidal ideation, photosensitivity.  Serious rare side effects can include but are not limited to pancreatitis, pseudotumor cerebri, bony changes, clot formation/stroke/heart attack.  Patient understands that alcohol is contraindicated since it can result in liver toxicity and significantly prolong the elimination of the drug by many years.
Minoxidil Pregnancy And Lactation Text: This medication has not been assigned a Pregnancy Risk Category but animal studies failed to show danger with the topical medication. It is unknown if the medication is excreted in breast milk.
Dapsone Counseling: I discussed with the patient the risks of dapsone including but not limited to hemolytic anemia, agranulocytosis, rashes, methemoglobinemia, kidney failure, peripheral neuropathy, headaches, GI upset, and liver toxicity.  Patients who start dapsone require monitoring including baseline LFTs and weekly CBCs for the first month, then every month thereafter.  The patient verbalized understanding of the proper use and possible adverse effects of dapsone.  All of the patient's questions and concerns were addressed.
Aklief Pregnancy And Lactation Text: It is unknown if this medication is safe to use during pregnancy.  It is unknown if this medication is excreted in breast milk.  Breastfeeding women should use the topical cream on the smallest area of the skin for the shortest time needed while breastfeeding.  Do not apply to nipple and areola.
Cantharidin Counseling: Calcipotriene Counseling:  I discussed with the patient the risks of calcipotriene including but not limited to erythema, scaling, itching, and irritation.
Azithromycin Pregnancy And Lactation Text: This medication is considered safe during pregnancy and is also secreted in breast milk.
Rinvoq Counseling: I discussed with the patient the risks of Rinvoq therapy including but not limited to upper respiratory tract infections, shingles, cold sores, bronchitis, nausea, cough, fever, acne, and headache. Live vaccines should be avoided.  This medication has been linked to serious infections; higher rate of mortality; malignancy and lymphoproliferative disorders; major adverse cardiovascular events; thrombosis; thrombocytopenia, anemia, and neutropenia; lipid elevations; liver enzyme elevations; and gastrointestinal perforations.
Cyclophosphamide Counseling:  I discussed with the patient the risks of cyclophosphamide including but not limited to hair loss, hormonal abnormalities, decreased fertility, abdominal pain, diarrhea, nausea and vomiting, bone marrow suppression and infection. The patient understands that monitoring is required while taking this medication.
Ketoconazole Counseling:   Patient counseled regarding improving absorption with orange juice.  Adverse effects include but are not limited to breast enlargement, headache, diarrhea, nausea, upset stomach, liver function test abnormalities, taste disturbance, and stomach pain.  There is a rare possibility of liver failure that can occur when taking ketoconazole. The patient understands that monitoring of LFTs may be required, especially at baseline. The patient verbalized understanding of the proper use and possible adverse effects of ketoconazole.  All of the patient's questions and concerns were addressed.
Sski Pregnancy And Lactation Text: This medication is Pregnancy Category D and isn't considered safe during pregnancy. It is excreted in breast milk.
Solaraze Counseling:  I discussed with the patient the risks of Solaraze including but not limited to erythema, scaling, itching, weeping, crusting, and pain.
Xeljanz Counseling: I discussed with the patient the risks of Xeljanz therapy including increased risk of infection, liver issues, headache, diarrhea, or cold symptoms. Live vaccines should be avoided. They were instructed to call if they have any problems.
Cibinqo Counseling: I discussed with the patient the risks of Cibinqo therapy including but not limited to common cold, nausea, headache, cold sores, increased blood CPK levels, dizziness, UTIs, fatigue, acne, and vomitting. Live vaccines should be avoided.  This medication has been linked to serious infections; higher rate of mortality; malignancy and lymphoproliferative disorders; major adverse cardiovascular events; thrombosis; thrombocytopenia and lymphopenia; lipid elevations; and retinal detachment.
Enbrel Pregnancy And Lactation Text: This medication is Pregnancy Category B and is considered safe during pregnancy. It is unknown if this medication is excreted in breast milk.
Opioid Counseling: I discussed with the patient the potential side effects of opioids including but not limited to addiction, altered mental status, and depression. I stressed avoiding alcohol, benzodiazepines, muscle relaxants and sleep aids unless specifically okayed by a physician. The patient verbalized understanding of the proper use and possible adverse effects of opioids. All of the patient's questions and concerns were addressed. They were instructed to flush the remaining pills down the toilet if they did not need them for pain.
Hydroxyzine Pregnancy And Lactation Text: This medication is not safe during pregnancy and should not be taken. It is also excreted in breast milk and breast feeding isn't recommended.
Acitretin Pregnancy And Lactation Text: This medication is Pregnancy Category X and should not be given to women who are pregnant or may become pregnant in the future. This medication is excreted in breast milk.
Nsaids Pregnancy And Lactation Text: These medications are considered safe up to 30 weeks gestation. It is excreted in breast milk.
Doxycycline Counseling:  Patient counseled regarding possible photosensitivity and increased risk for sunburn.  Patient instructed to avoid sunlight, if possible.  When exposed to sunlight, patients should wear protective clothing, sunglasses, and sunscreen.  The patient was instructed to call the office immediately if the following severe adverse effects occur:  hearing changes, easy bruising/bleeding, severe headache, or vision changes.  The patient verbalized understanding of the proper use and possible adverse effects of doxycycline.  All of the patient's questions and concerns were addressed.
Tetracycline Counseling: Patient counseled regarding possible photosensitivity and increased risk for sunburn.  Patient instructed to avoid sunlight, if possible.  When exposed to sunlight, patients should wear protective clothing, sunglasses, and sunscreen.  The patient was instructed to call the office immediately if the following severe adverse effects occur:  hearing changes, easy bruising/bleeding, severe headache, or vision changes.  The patient verbalized understanding of the proper use and possible adverse effects of tetracycline.  All of the patient's questions and concerns were addressed. Patient understands to avoid pregnancy while on therapy due to potential birth defects.
Oxybutynin Counseling:  I discussed with the patient the risks of oxybutynin including but not limited to skin rash, drowsiness, dry mouth, difficulty urinating, and blurred vision.
Hydroquinone Counseling:  Patient advised that medication may result in skin irritation, lightening (hypopigmentation), dryness, and burning.  In the event of skin irritation, the patient was advised to reduce the amount of the drug applied or use it less frequently.  Rarely, spots that are treated with hydroquinone can become darker (pseudoochronosis).  Should this occur, patient instructed to stop medication and call the office. The patient verbalized understanding of the proper use and possible adverse effects of hydroquinone.  All of the patient's questions and concerns were addressed.
Glycopyrrolate Counseling:  I discussed with the patient the risks of glycopyrrolate including but not limited to skin rash, drowsiness, dry mouth, difficulty urinating, and blurred vision.
Protopic Pregnancy And Lactation Text: This medication is Pregnancy Category C. It is unknown if this medication is excreted in breast milk when applied topically.
Dapsone Pregnancy And Lactation Text: This medication is Pregnancy Category C and is not considered safe during pregnancy or breast feeding.
Topical Sulfur Applications Counseling: Topical Sulfur Counseling: Patient counseled that this medication may cause skin irritation or allergic reactions.  In the event of skin irritation, the patient was advised to reduce the amount of the drug applied or use it less frequently.   The patient verbalized understanding of the proper use and possible adverse effects of topical sulfur application.  All of the patient's questions and concerns were addressed.
Cantharidin Pregnancy And Lactation Text: The use of this medication during pregnancy or lactation is not recommended as there is insufficient data.
Ketoconazole Pregnancy And Lactation Text: This medication is Pregnancy Category C and it isn't know if it is safe during pregnancy. It is also excreted in breast milk and breast feeding isn't recommended.
Thalidomide Counseling: I discussed with the patient the risks of thalidomide including but not limited to birth defects, anxiety, weakness, chest pain, dizziness, cough and severe allergy.
Rinvoq Pregnancy And Lactation Text: Based on animal studies, Rinvoq may cause embryo-fetal harm when administered to pregnant women.  The medication should not be used in pregnancy.  Breastfeeding is not recommended during treatment and for 6 days after the last dose.
Xelnaimaz Pregnancy And Lactation Text: This medication is Pregnancy Category D and is not considered safe during pregnancy.  The risk during breast feeding is also uncertain.
Solaraze Pregnancy And Lactation Text: This medication is Pregnancy Category B and is considered safe. There is some data to suggest avoiding during the third trimester. It is unknown if this medication is excreted in breast milk.
Azelaic Acid Counseling: Patient counseled that medicine may cause skin irritation and to avoid applying near the eyes.  In the event of skin irritation, the patient was advised to reduce the amount of the drug applied or use it less frequently.   The patient verbalized understanding of the proper use and possible adverse effects of azelaic acid.  All of the patient's questions and concerns were addressed.
Bactrim Counseling:  I discussed with the patient the risks of sulfa antibiotics including but not limited to GI upset, allergic reaction, drug rash, diarrhea, dizziness, photosensitivity, and yeast infections.  Rarely, more serious reactions can occur including but not limited to aplastic anemia, agranulocytosis, methemoglobinemia, blood dyscrasias, liver or kidney failure, lung infiltrates or desquamative/blistering drug rashes.
Cibinqo Pregnancy And Lactation Text: It is unknown if this medication will adversely affect pregnancy or breast feeding.  You should not take this medication if you are currently pregnant or planning a pregnancy or while breastfeeding.
Cyclophosphamide Pregnancy And Lactation Text: This medication is Pregnancy Category D and it isn't considered safe during pregnancy. This medication is excreted in breast milk.
Humira Counseling:  I discussed with the patient the risks of adalimumab including but not limited to myelosuppression, immunosuppression, autoimmune hepatitis, demyelinating diseases, lymphoma, and serious infections.  The patient understands that monitoring is required including a PPD at baseline and must alert us or the primary physician if symptoms of infection or other concerning signs are noted.
Skyrizi Counseling: I discussed with the patient the risks of risankizumab-rzaa including but not limited to immunosuppression, and serious infections.  The patient understands that monitoring is required including a PPD at baseline and must alert us or the primary physician if symptoms of infection or other concerning signs are noted.
Odomzo Counseling- I discussed with the patient the risks of Odomzo including but not limited to nausea, vomiting, diarrhea, constipation, weight loss, changes in the sense of taste, decreased appetite, muscle spasms, and hair loss.  The patient verbalized understanding of the proper use and possible adverse effects of Odomzo.  All of the patient's questions and concerns were addressed.
Opioid Pregnancy And Lactation Text: These medications can lead to premature delivery and should be avoided during pregnancy. These medications are also present in breast milk in small amounts.
Glycopyrrolate Pregnancy And Lactation Text: This medication is Pregnancy Category B and is considered safe during pregnancy. It is unknown if it is excreted breast milk.
Bexarotene Counseling:  I discussed with the patient the risks of bexarotene including but not limited to hair loss, dry lips/skin/eyes, liver abnormalities, hyperlipidemia, pancreatitis, depression/suicidal ideation, photosensitivity, drug rash/allergic reactions, hypothyroidism, anemia, leukopenia, infection, cataracts, and teratogenicity.  Patient understands that they will need regular blood tests to check lipid profile, liver function tests, white blood cell count, thyroid function tests and pregnancy test if applicable.
Quinolones Counseling:  I discussed with the patient the risks of fluoroquinolones including but not limited to GI upset, allergic reaction, drug rash, diarrhea, dizziness, photosensitivity, yeast infections, liver function test abnormalities, tendonitis/tendon rupture.
Dutasteride Male Counseling: Dustasteride Counseling:  I discussed with the patient the risks of use of dutasteride including but not limited to decreased libido, decreased ejaculate volume, and gynecomastia. Women who can become pregnant should not handle medication.  All of the patient's questions and concerns were addressed.
5-Fu Counseling: 5-Fluorouracil Counseling:  I discussed with the patient the risks of 5-fluorouracil including but not limited to erythema, scaling, itching, weeping, crusting, and pain.
Albendazole Counseling:  I discussed with the patient the risks of albendazole including but not limited to cytopenia, kidney damage, nausea/vomiting and severe allergy.  The patient understands that this medication is being used in an off-label manner.
Bactrim Pregnancy And Lactation Text: This medication is Pregnancy Category D and is known to cause fetal risk.  It is also excreted in breast milk.
Azelaic Acid Pregnancy And Lactation Text: This medication is considered safe during pregnancy and breast feeding.
Thalidomide Pregnancy And Lactation Text: This medication is Pregnancy Category X and is absolutely contraindicated during pregnancy. It is unknown if it is excreted in breast milk.
Terbinafine Counseling: Patient counseling regarding adverse effects of terbinafine including but not limited to headache, diarrhea, rash, upset stomach, liver function test abnormalities, itching, taste/smell disturbance, nausea, abdominal pain, and flatulence.  There is a rare possibility of liver failure that can occur when taking terbinafine.  The patient understands that a baseline LFT and kidney function test may be required. The patient verbalized understanding of the proper use and possible adverse effects of terbinafine.  All of the patient's questions and concerns were addressed.
Protopic Counseling: Patient may experience a mild burning sensation during topical application. Protopic is not approved in children less than 2 years of age. There have been case reports of hematologic and skin malignancies in patients using topical calcineurin inhibitors although causality is questionable.
Rituxan Counseling:  I discussed with the patient the risks of Rituxan infusions. Side effects can include infusion reactions, severe drug rashes including mucocutaneous reactions, reactivation of latent hepatitis and other infections and rarely progressive multifocal leukoencephalopathy.  All of the patient's questions and concerns were addressed.
Topical Retinoid counseling:  Patient advised to apply a pea-sized amount only at bedtime and wait 30 minutes after washing their face before applying.  If too drying, patient may add a non-comedogenic moisturizer. The patient verbalized understanding of the proper use and possible adverse effects of retinoids.  All of the patient's questions and concerns were addressed.
Cyclosporine Counseling:  I discussed with the patient the risks of cyclosporine including but not limited to hypertension, gingival hyperplasia,myelosuppression, immunosuppression, liver damage, kidney damage, neurotoxicity, lymphoma, and serious infections. The patient understands that monitoring is required including baseline blood pressure, CBC, CMP, lipid panel and uric acid, and then 1-2 times monthly CMP and blood pressure.
Topical Sulfur Applications Pregnancy And Lactation Text: This medication is Pregnancy Category C and has an unknown safety profile during pregnancy. It is unknown if this topical medication is excreted in breast milk.
Xolair Counseling:  Patient informed of potential adverse effects including but not limited to fever, muscle aches, rash and allergic reactions.  The patient verbalized understanding of the proper use and possible adverse effects of Xolair.  All of the patient's questions and concerns were addressed.
Cimzia Counseling:  I discussed with the patient the risks of Cimzia including but not limited to immunosuppression, allergic reactions and infections.  The patient understands that monitoring is required including a PPD at baseline and must alert us or the primary physician if symptoms of infection or other concerning signs are noted.
Propranolol Counseling:  I discussed with the patient the risks of propranolol including but not limited to low heart rate, low blood pressure, low blood sugar, restlessness and increased cold sensitivity. They should call the office if they experience any of these side effects.
Hydroxychloroquine Counseling:  I discussed with the patient that a baseline ophthalmologic exam is needed at the start of therapy and every year thereafter while on therapy. A CBC may also be warranted for monitoring.  The side effects of this medication were discussed with the patient, including but not limited to agranulocytosis, aplastic anemia, seizures, rashes, retinopathy, and liver toxicity. Patient instructed to call the office should any adverse effect occur.  The patient verbalized understanding of the proper use and possible adverse effects of Plaquenil.  All the patient's questions and concerns were addressed.
Bexarotene Pregnancy And Lactation Text: This medication is Pregnancy Category X and should not be given to women who are pregnant or may become pregnant. This medication should not be used if you are breast feeding.
Dutasteride Pregnancy And Lactation Text: This medication is absolutely contraindicated in women, especially during pregnancy and breast feeding. Feminization of male fetuses is possible if taking while pregnant.
Albendazole Pregnancy And Lactation Text: This medication is Pregnancy Category C and it isn't known if it is safe during pregnancy. It is also excreted in breast milk.
Imiquimod Counseling:  I discussed with the patient the risks of imiquimod including but not limited to erythema, scaling, itching, weeping, crusting, and pain.  Patient understands that the inflammatory response to imiquimod is variable from person to person and was educated regarded proper titration schedule.  If flu-like symptoms develop, patient knows to discontinue the medication and contact us.
5-Fu Pregnancy And Lactation Text: This medication is Pregnancy Category X and contraindicated in pregnancy and in women who may become pregnant. It is unknown if this medication is excreted in breast milk.
Benzoyl Peroxide Counseling: Patient counseled that medicine may cause skin irritation and bleach clothing.  In the event of skin irritation, the patient was advised to reduce the amount of the drug applied or use it less frequently.   The patient verbalized understanding of the proper use and possible adverse effects of benzoyl peroxide.  All of the patient's questions and concerns were addressed.
Arava Counseling:  Patient counseled regarding adverse effects of Arava including but not limited to nausea, vomiting, abnormalities in liver function tests. Patients may develop mouth sores, rash, diarrhea, and abnormalities in blood counts. The patient understands that monitoring is required including LFTs and blood counts.  There is a rare possibility of scarring of the liver and lung problems that can occur when taking methotrexate. Persistent nausea, loss of appetite, pale stools, dark urine, cough, and shortness of breath should be reported immediately. Patient advised to discontinue Arava treatment and consult with a physician prior to attempting conception. The patient will have to undergo a treatment to eliminate Arava from the body prior to conception.
Terbinafine Pregnancy And Lactation Text: This medication is Pregnancy Category B and is considered safe during pregnancy. It is also excreted in breast milk and breast feeding isn't recommended.
Tranexamic Acid Counseling:  Patient advised of the small risk of bleeding problems with tranexamic acid. They were also instructed to call if they developed any nausea, vomiting or diarrhea. All of the patient's questions and concerns were addressed.
Cyclosporine Pregnancy And Lactation Text: This medication is Pregnancy Category C and it isn't know if it is safe during pregnancy. This medication is excreted in breast milk.
Wartpeel Counseling:  I discussed with the patient the risks of Wartpeel including but not limited to erythema, scaling, itching, weeping, crusting, and pain.
Ilumya Counseling: I discussed with the patient the risks of tildrakizumab including but not limited to immunosuppression, malignancy, posterior leukoencephalopathy syndrome, and serious infections.  The patient understands that monitoring is required including a PPD at baseline and must alert us or the primary physician if symptoms of infection or other concerning signs are noted.
Rituxan Pregnancy And Lactation Text: This medication is Pregnancy Category C and it isn't know if it is safe during pregnancy. It is unknown if this medication is excreted in breast milk but similar antibodies are known to be excreted.
Fluconazole Counseling:  Patient counseled regarding adverse effects of fluconazole including but not limited to headache, diarrhea, nausea, upset stomach, liver function test abnormalities, taste disturbance, and stomach pain.  There is a rare possibility of liver failure that can occur when taking fluconazole.  The patient understands that monitoring of LFTs and kidney function test may be required, especially at baseline. The patient verbalized understanding of the proper use and possible adverse effects of fluconazole.  All of the patient's questions and concerns were addressed.
Stelara Counseling:  I discussed with the patient the risks of ustekinumab including but not limited to immunosuppression, malignancy, posterior leukoencephalopathy syndrome, and serious infections.  The patient understands that monitoring is required including a PPD at baseline and must alert us or the primary physician if symptoms of infection or other concerning signs are noted.
Cephalexin Counseling: I counseled the patient regarding use of cephalexin as an antibiotic for prophylactic and/or therapeutic purposes. Cephalexin (commonly prescribed under brand name Keflex) is a cephalosporin antibiotic which is active against numerous classes of bacteria, including most skin bacteria. Side effects may include nausea, diarrhea, gastrointestinal upset, rash, hives, yeast infections, and in rare cases, hepatitis, kidney disease, seizures, fever, confusion, neurologic symptoms, and others. Patients with severe allergies to penicillin medications are cautioned that there is about a 10% incidence of cross-reactivity with cephalosporins. When possible, patients with penicillin allergies should use alternatives to cephalosporins for antibiotic therapy.
Oral Minoxidil Counseling- I discussed with the patient the risks of oral minoxidil including but not limited to shortness of breath, swelling of the feet or ankles, dizziness, lightheadedness, unwanted hair growth and allergic reaction.  The patient verbalized understanding of the proper use and possible adverse effects of oral minoxidil.  All of the patient's questions and concerns were addressed.
Xolair Pregnancy And Lactation Text: This medication is Pregnancy Category B and is considered safe during pregnancy. This medication is excreted in breast milk.
Cimzia Pregnancy And Lactation Text: This medication crosses the placenta but can be considered safe in certain situations. Cimzia may be excreted in breast milk.
Propranolol Pregnancy And Lactation Text: This medication is Pregnancy Category C and it isn't known if it is safe during pregnancy. It is excreted in breast milk.
Hydroxychloroquine Pregnancy And Lactation Text: This medication has been shown to cause fetal harm but it isn't assigned a Pregnancy Risk Category. There are small amounts excreted in breast milk.
Minocycline Counseling: Patient advised regarding possible photosensitivity and discoloration of the teeth, skin, lips, tongue and gums.  Patient instructed to avoid sunlight, if possible.  When exposed to sunlight, patients should wear protective clothing, sunglasses, and sunscreen.  The patient was instructed to call the office immediately if the following severe adverse effects occur:  hearing changes, easy bruising/bleeding, severe headache, or vision changes.  The patient verbalized understanding of the proper use and possible adverse effects of minocycline.  All of the patient's questions and concerns were addressed.
Isotretinoin Counseling: Patient should get monthly blood tests, not donate blood, not drive at night if vision affected, not share medication, and not undergo elective surgery for 6 months after tx completed. Side effects reviewed, pt to contact office should one occur.
Erivedge Counseling- I discussed with the patient the risks of Erivedge including but not limited to nausea, vomiting, diarrhea, constipation, weight loss, changes in the sense of taste, decreased appetite, muscle spasms, and hair loss.  The patient verbalized understanding of the proper use and possible adverse effects of Erivedge.  All of the patient's questions and concerns were addressed.
Picato Counseling:  I discussed with the patient the risks of Picato including but not limited to erythema, scaling, itching, weeping, crusting, and pain.
Ivermectin Counseling:  Patient instructed to take medication on an empty stomach with a full glass of water.  Patient informed of potential adverse effects including but not limited to nausea, diarrhea, dizziness, itching, and swelling of the extremities or lymph nodes.  The patient verbalized understanding of the proper use and possible adverse effects of ivermectin.  All of the patient's questions and concerns were addressed.
Tranexamic Acid Pregnancy And Lactation Text: It is unknown if this medication is safe during pregnancy or breast feeding.
Benzoyl Peroxide Pregnancy And Lactation Text: This medication is Pregnancy Category C. It is unknown if benzoyl peroxide is excreted in breast milk.
Drysol Counseling:  I discussed with the patient the risks of drysol/aluminum chloride including but not limited to skin rash, itching, irritation, burning.
Cephalexin Pregnancy And Lactation Text: This medication is Pregnancy Category B and considered safe during pregnancy.  It is also excreted in breast milk but can be used safely for shorter doses.
Methotrexate Counseling:  Patient counseled regarding adverse effects of methotrexate including but not limited to nausea, vomiting, abnormalities in liver function tests. Patients may develop mouth sores, rash, diarrhea, and abnormalities in blood counts. The patient understands that monitoring is required including LFT's and blood counts.  There is a rare possibility of scarring of the liver and lung problems that can occur when taking methotrexate. Persistent nausea, loss of appetite, pale stools, dark urine, cough, and shortness of breath should be reported immediately. Patient advised to discontinue methotrexate treatment at least three months before attempting to become pregnant.  I discussed the need for folate supplements while taking methotrexate.  These supplements can decrease side effects during methotrexate treatment. The patient verbalized understanding of the proper use and possible adverse effects of methotrexate.  All of the patient's questions and concerns were addressed.
Siliq Counseling:  I discussed with the patient the risks of Siliq including but not limited to new or worsening depression, suicidal thoughts and behavior, immunosuppression, malignancy, posterior leukoencephalopathy syndrome, and serious infections.  The patient understands that monitoring is required including a PPD at baseline and must alert us or the primary physician if symptoms of infection or other concerning signs are noted. There is also a special program designed to monitor depression which is required with Siliq.
Detail Level: Simple
Tazorac Counseling:  Patient advised that medication is irritating and drying.  Patient may need to apply sparingly and wash off after an hour before eventually leaving it on overnight.  The patient verbalized understanding of the proper use and possible adverse effects of tazorac.  All of the patient's questions and concerns were addressed.
Cosentyx Counseling:  I discussed with the patient the risks of Cosentyx including but not limited to worsening of Crohn's disease, immunosuppression, allergic reactions and infections.  The patient understands that monitoring is required including a PPD at baseline and must alert us or the primary physician if symptoms of infection or other concerning signs are noted.
Oral Minoxidil Pregnancy And Lactation Text: This medication should only be used when clearly needed if you are pregnant, attempting to become pregnant or breast feeding.
Birth Control Pills Counseling: Birth Control Pill Counseling: I discussed with the patient the potential side effects of OCPs including but not limited to increased risk of stroke, heart attack, thrombophlebitis, deep venous thrombosis, hepatic adenomas, breast changes, GI upset, headaches, and depression.  The patient verbalized understanding of the proper use and possible adverse effects of OCPs. All of the patient's questions and concerns were addressed.
Metronidazole Pregnancy And Lactation Text: This medication is Pregnancy Category B and considered safe during pregnancy.  It is also excreted in breast milk.
Isotretinoin Pregnancy And Lactation Text: This medication is Pregnancy Category X and is considered extremely dangerous during pregnancy. It is unknown if it is excreted in breast milk.
Cimetidine Counseling:  I discussed with the patient the risks of Cimetidine including but not limited to gynecomastia, headache, diarrhea, nausea, drowsiness, arrhythmias, pancreatitis, skin rashes, psychosis, bone marrow suppression and kidney toxicity.
Libtayo Counseling- I discussed with the patient the risks of Libtayo including but not limited to nausea, vomiting, diarrhea, and bone or muscle pain.  The patient verbalized understanding of the proper use and possible adverse effects of Libtayo.  All of the patient's questions and concerns were addressed.
Klisyri Counseling:  I discussed with the patient the risks of Klisyri including but not limited to erythema, scaling, itching, weeping, crusting, and pain.
Opzelura Pregnancy And Lactation Text: There is insufficient data to evaluate drug-associated risk for major birth defects, miscarriage, or other adverse maternal or fetal outcomes.  There is a pregnancy registry that monitors pregnancy outcomes in pregnant persons exposed to the medication during pregnancy.  It is unknown if this medication is excreted in breast milk.  Do not breastfeed during treatment and for about 4 weeks after the last dose.
Winlevi Counseling:  I discussed with the patient the risks of topical clascoterone including but not limited to erythema, scaling, itching, and stinging. Patient voiced their understanding.
Griseofulvin Counseling:  I discussed with the patient the risks of griseofulvin including but not limited to photosensitivity, cytopenia, liver damage, nausea/vomiting and severe allergy.  The patient understands that this medication is best absorbed when taken with a fatty meal (e.g., ice cream or french fries).
Clofazimine Counseling:  I discussed with the patient the risks of clofazimine including but not limited to skin and eye pigmentation, liver damage, nausea/vomiting, gastrointestinal bleeding and allergy.
Valtrex Counseling: I discussed with the patient the risks of valacyclovir including but not limited to kidney damage, nausea, vomiting and severe allergy.  The patient understands that if the infection seems to be worsening or is not improving, they are to call.
Azathioprine Counseling:  I discussed with the patient the risks of azathioprine including but not limited to myelosuppression, immunosuppression, hepatotoxicity, lymphoma, and infections.  The patient understands that monitoring is required including baseline LFTs, Creatinine, possible TPMP genotyping and weekly CBCs for the first month and then every 2 weeks thereafter.  The patient verbalized understanding of the proper use and possible adverse effects of azathioprine.  All of the patient's questions and concerns were addressed.
Carac Counseling:  I discussed with the patient the risks of Carac including but not limited to erythema, scaling, itching, weeping, crusting, and pain.
Tazorac Pregnancy And Lactation Text: This medication is not safe during pregnancy. It is unknown if this medication is excreted in breast milk.
Clindamycin Counseling: I counseled the patient regarding use of clindamycin as an antibiotic for prophylactic and/or therapeutic purposes. Clindamycin is active against numerous classes of bacteria, including skin bacteria. Side effects may include nausea, diarrhea, gastrointestinal upset, rash, hives, yeast infections, and in rare cases, colitis.
Infliximab Counseling:  I discussed with the patient the risks of infliximab including but not limited to myelosuppression, immunosuppression, autoimmune hepatitis, demyelinating diseases, lymphoma, and serious infections.  The patient understands that monitoring is required including a PPD at baseline and must alert us or the primary physician if symptoms of infection or other concerning signs are noted.
Include Pregnancy/Lactation Warning?: No
Methotrexate Pregnancy And Lactation Text: This medication is Pregnancy Category X and is known to cause fetal harm. This medication is excreted in breast milk.
Birth Control Pills Pregnancy And Lactation Text: This medication should be avoided if pregnant and for the first 30 days post-partum.
Qbrexza Counseling:  I discussed with the patient the risks of Qbrexza including but not limited to headache, mydriasis, blurred vision, dry eyes, nasal dryness, dry mouth, dry throat, dry skin, urinary hesitation, and constipation.  Local skin reactions including erythema, burning, stinging, and itching can also occur.
Taltz Counseling: I discussed with the patient the risks of ixekizumab including but not limited to immunosuppression, serious infections, worsening of inflammatory bowel disease and drug reactions.  The patient understands that monitoring is required including a PPD at baseline and must alert us or the primary physician if symptoms of infection or other concerning signs are noted.
Libtayo Pregnancy And Lactation Text: This medication is contraindicated in pregnancy and when breast feeding.
High Dose Vitamin A Counseling: Side effects reviewed, pt to contact office should one occur.
Metronidazole Counseling:  I discussed with the patient the risks of metronidazole including but not limited to seizures, nausea/vomiting, a metallic taste in the mouth, nausea/vomiting and severe allergy.
Klisyri Pregnancy And Lactation Text: It is unknown if this medication can harm a developing fetus or if it is excreted in breast milk.
Finasteride Male Counseling: Finasteride Counseling:  I discussed with the patient the risks of use of finasteride including but not limited to decreased libido, decreased ejaculate volume, gynecomastia, and depression. Women should not handle medication.  All of the patient's questions and concerns were addressed.
Elidel Counseling: Patient may experience a mild burning sensation during topical application. Elidel is not approved in children less than 2 years of age. There have been case reports of hematologic and skin malignancies in patients using topical calcineurin inhibitors although causality is questionable.
Clindamycin Pregnancy And Lactation Text: This medication can be used in pregnancy if certain situations. Clindamycin is also present in breast milk.
Opzelura Counseling:  I discussed with the patient the risks of Opzelura including but not limited to nasopharngitis, bronchitis, ear infection, eosinophila, hives, diarrhea, folliculitis, tonsillitis, and rhinorrhea.  Taken orally, this medication has been linked to serious infections; higher rate of mortality; malignancy and lymphoproliferative disorders; major adverse cardiovascular events; thrombosis; thrombocytopenia, anemia, and neutropenia; and lipid elevations.
Valtrex Pregnancy And Lactation Text: this medication is Pregnancy Category B and is considered safe during pregnancy. This medication is not directly found in breast milk but it's metabolite acyclovir is present.
Prednisone Counseling:  I discussed with the patient the risks of prolonged use of prednisone including but not limited to weight gain, insomnia, osteoporosis, mood changes, diabetes, susceptibility to infection, glaucoma and high blood pressure.  In cases where prednisone use is prolonged, patients should be monitored with blood pressure checks, serum glucose levels and an eye exam.  Additionally, the patient may need to be placed on GI prophylaxis, PCP prophylaxis, and calcium and vitamin D supplementation and/or a bisphosphonate.  The patient verbalized understanding of the proper use and the possible adverse effects of prednisone.  All of the patient's questions and concerns were addressed.
Winlevi Pregnancy And Lactation Text: This medication is considered safe during pregnancy and breastfeeding.
Griseofulvin Pregnancy And Lactation Text: This medication is Pregnancy Category X and is known to cause serious birth defects. It is unknown if this medication is excreted in breast milk but breast feeding should be avoided.
Spironolactone Counseling: Patient advised regarding risks of diarrhea, abdominal pain, hyperkalemia, birth defects (for female patients), liver toxicity and renal toxicity. The patient may need blood work to monitor liver and kidney function and potassium levels while on therapy. The patient verbalized understanding of the proper use and possible adverse effects of spironolactone.  All of the patient's questions and concerns were addressed.
Qbrexza Pregnancy And Lactation Text: There is no available data on Qbrexza use in pregnant women.  There is no available data on Qbrexza use in lactation.
Topical Clindamycin Counseling: Patient counseled that this medication may cause skin irritation or allergic reactions.  In the event of skin irritation, the patient was advised to reduce the amount of the drug applied or use it less frequently.   The patient verbalized understanding of the proper use and possible adverse effects of clindamycin.  All of the patient's questions and concerns were addressed.
Dupixent Counseling: I discussed with the patient the risks of dupilumab including but not limited to eye infection and irritation, cold sores, injection site reactions, worsening of asthma, allergic reactions and increased risk of parasitic infection.  Live vaccines should be avoided while taking dupilumab. Dupilumab will also interact with certain medications such as warfarin and cyclosporine. The patient understands that monitoring is required and they must alert us or the primary physician if symptoms of infection or other concerning signs are noted.
Doxepin Counseling:  Patient advised that the medication is sedating and not to drive a car after taking this medication. Patient informed of potential adverse effects including but not limited to dry mouth, urinary retention, and blurry vision.  The patient verbalized understanding of the proper use and possible adverse effects of doxepin.  All of the patient's questions and concerns were addressed.
Niacinamide Counseling: I recommended taking niacin or niacinamide, also know as vitamin B3, twice daily. Recent evidence suggests that taking vitamin B3 (500 mg twice daily) can reduce the risk of actinic keratoses and non-melanoma skin cancers. Side effects of vitamin B3 include flushing and headache.
Erythromycin Pregnancy And Lactation Text: This medication is Pregnancy Category B and is considered safe during pregnancy. It is also excreted in breast milk.
High Dose Vitamin A Pregnancy And Lactation Text: High dose vitamin A therapy is contraindicated during pregnancy and breast feeding.
Finasteride Pregnancy And Lactation Text: This medication is absolutely contraindicated during pregnancy. It is unknown if it is excreted in breast milk.
Minoxidil Counseling: Minoxidil is a topical medication which can increase blood flow where it is applied. It is uncertain how this medication increases hair growth. Side effects are uncommon and include stinging and allergic reactions.
Colchicine Counseling:  Patient counseled regarding adverse effects including but not limited to stomach upset (nausea, vomiting, stomach pain, or diarrhea).  Patient instructed to limit alcohol consumption while taking this medication.  Colchicine may reduce blood counts especially with prolonged use.  The patient understands that monitoring of kidney function and blood counts may be required, especially at baseline. The patient verbalized understanding of the proper use and possible adverse effects of colchicine.  All of the patient's questions and concerns were addressed.
Zyclara Counseling:  I discussed with the patient the risks of imiquimod including but not limited to erythema, scaling, itching, weeping, crusting, and pain.  Patient understands that the inflammatory response to imiquimod is variable from person to person and was educated regarded proper titration schedule.  If flu-like symptoms develop, patient knows to discontinue the medication and contact us.
Itraconazole Counseling:  I discussed with the patient the risks of itraconazole including but not limited to liver damage, nausea/vomiting, neuropathy, and severe allergy.  The patient understands that this medication is best absorbed when taken with acidic beverages such as non-diet cola or ginger ale.  The patient understands that monitoring is required including baseline LFTs and repeat LFTs at intervals.  The patient understands that they are to contact us or the primary physician if concerning signs are noted.
Olumiant Counseling: I discussed with the patient the risks of Olumiant therapy including but not limited to upper respiratory tract infections, shingles, cold sores, and nausea. Live vaccines should be avoided.  This medication has been linked to serious infections; higher rate of mortality; malignancy and lymphoproliferative disorders; major adverse cardiovascular events; thrombosis; gastrointestinal perforations; neutropenia; lymphopenia; anemia; liver enzyme elevations; and lipid elevations.

## 2022-08-30 ENCOUNTER — TELEPHONE (OUTPATIENT)
Dept: INTERNAL MEDICINE | Facility: OTHER | Age: 72
End: 2022-08-30
Payer: MEDICARE

## 2022-09-01 ENCOUNTER — TELEPHONE (OUTPATIENT)
Dept: INTERNAL MEDICINE | Facility: OTHER | Age: 72
End: 2022-09-01
Payer: MEDICARE

## 2022-09-01 NOTE — TELEPHONE ENCOUNTER
Pt is requesting lab order for blood work before appointment on 9/28 with Dr. Espinal.  Pt contact, Mayelin would like a call when it is ready to

## 2022-09-12 ENCOUNTER — TELEPHONE (OUTPATIENT)
Dept: INTERNAL MEDICINE | Facility: OTHER | Age: 72
End: 2022-09-12
Payer: MEDICARE

## 2022-09-12 DIAGNOSIS — R74.8 ELEVATED ALKALINE PHOSPHATASE LEVEL: ICD-10-CM

## 2022-09-12 DIAGNOSIS — E87.1 CHRONIC HYPONATREMIA: ICD-10-CM

## 2022-09-12 DIAGNOSIS — E55.9 VITAMIN D DEFICIENCY: ICD-10-CM

## 2022-09-12 NOTE — TELEPHONE ENCOUNTER
Pt's caregiver came in to request labs for pt based on last appointment. Was not sure which labs were needed.

## 2022-09-13 NOTE — TELEPHONE ENCOUNTER
Called tracy and let her know labs were placed. She would like for me to mail out the orders to her at   [address] [place] [address]

## 2022-09-20 ENCOUNTER — APPOINTMENT (RX ONLY)
Dept: URBAN - METROPOLITAN AREA CLINIC 6 | Facility: CLINIC | Age: 72
Setting detail: DERMATOLOGY
End: 2022-09-20

## 2022-09-20 DIAGNOSIS — L30.8 OTHER SPECIFIED DERMATITIS: ICD-10-CM | Status: IMPROVED

## 2022-09-20 PROBLEM — L30.9 DERMATITIS, UNSPECIFIED: Status: ACTIVE | Noted: 2022-09-20

## 2022-09-20 PROCEDURE — 99213 OFFICE O/P EST LOW 20 MIN: CPT

## 2022-09-20 PROCEDURE — ? COUNSELING

## 2022-09-20 PROCEDURE — ? MEDICATION COUNSELING

## 2022-09-20 PROCEDURE — ? TREATMENT REGIMEN

## 2022-09-20 ASSESSMENT — LOCATION ZONE DERM: LOCATION ZONE: HAND

## 2022-09-20 ASSESSMENT — LOCATION DETAILED DESCRIPTION DERM
LOCATION DETAILED: 3RD WEB SPACE LEFT HAND
LOCATION DETAILED: 3RD WEB SPACE RIGHT HAND

## 2022-09-20 ASSESSMENT — LOCATION SIMPLE DESCRIPTION DERM
LOCATION SIMPLE: RIGHT HAND
LOCATION SIMPLE: LEFT HAND

## 2022-09-20 NOTE — PROCEDURE: TREATMENT REGIMEN
Otc Regimen: Recommended the use of Aquaphor or Vaseline while severely flared.\\nRegular use of moisturizer after hand washing
Detail Level: Zone
Plan: Apply CeraVe or Eucerin multiple times a day \\nRTC if flaring/uncontrolled\\nConsider patch testing in the future
Continue Regimen: Tacrolimus bid

## 2022-09-21 DIAGNOSIS — E55.9 VITAMIN D DEFICIENCY: ICD-10-CM

## 2022-09-21 DIAGNOSIS — R74.8 ELEVATED ALKALINE PHOSPHATASE LEVEL: ICD-10-CM

## 2022-09-21 DIAGNOSIS — E87.1 CHRONIC HYPONATREMIA: ICD-10-CM

## 2022-09-23 ENCOUNTER — TELEPHONE (OUTPATIENT)
Dept: INTERNAL MEDICINE | Facility: OTHER | Age: 72
End: 2022-09-23
Payer: MEDICARE

## 2022-09-23 NOTE — TELEPHONE ENCOUNTER
Alendronate Refill    Last seen: 7/5/22 by Dr. Espinal  Next appt: 9/28/22 with Dr. Espinal    Was the patient seen in the last year in this department? Yes   Does patient have an active prescription for medications requested? No   Received Request Via: Pharmacy

## 2022-09-28 ENCOUNTER — OFFICE VISIT (OUTPATIENT)
Dept: INTERNAL MEDICINE | Facility: OTHER | Age: 72
End: 2022-09-28
Payer: MEDICARE

## 2022-09-28 VITALS
OXYGEN SATURATION: 95 % | SYSTOLIC BLOOD PRESSURE: 144 MMHG | DIASTOLIC BLOOD PRESSURE: 84 MMHG | HEIGHT: 66 IN | TEMPERATURE: 97.2 F | BODY MASS INDEX: 30.05 KG/M2 | WEIGHT: 187 LBS | HEART RATE: 92 BPM

## 2022-09-28 DIAGNOSIS — E55.9 VITAMIN D DEFICIENCY: ICD-10-CM

## 2022-09-28 DIAGNOSIS — E87.1 CHRONIC HYPONATREMIA: ICD-10-CM

## 2022-09-28 DIAGNOSIS — G47.9 SLEEP DIFFICULTIES: ICD-10-CM

## 2022-09-28 DIAGNOSIS — M81.0 AGE-RELATED OSTEOPOROSIS WITHOUT CURRENT PATHOLOGICAL FRACTURE: ICD-10-CM

## 2022-09-28 PROCEDURE — 99214 OFFICE O/P EST MOD 30 MIN: CPT | Mod: GC

## 2022-09-28 RX ORDER — ALENDRONATE SODIUM 70 MG/1
TABLET ORAL
Qty: 4 TABLET | Refills: 10 | Status: SHIPPED | OUTPATIENT
Start: 2022-09-28 | End: 2023-05-01 | Stop reason: SDUPTHER

## 2022-09-28 RX ORDER — ERGOCALCIFEROL 1.25 MG/1
50000 CAPSULE ORAL
Qty: 8 CAPSULE | Refills: 0 | Status: SHIPPED | OUTPATIENT
Start: 2022-09-28 | End: 2022-12-08

## 2022-09-28 RX ORDER — LANOLIN ALCOHOL/MO/W.PET/CERES
3 CREAM (GRAM) TOPICAL
Qty: 90 TABLET | Refills: 1 | Status: SHIPPED | OUTPATIENT
Start: 2022-09-28 | End: 2023-03-29

## 2022-09-28 ASSESSMENT — ENCOUNTER SYMPTOMS
ABDOMINAL PAIN: 0
DIZZINESS: 0
SENSORY CHANGE: 0
CHILLS: 0
NAUSEA: 0
FEVER: 0
COUGH: 0
VOMITING: 0
FOCAL WEAKNESS: 0
WEAKNESS: 0
HEADACHES: 0
TINGLING: 0
SHORTNESS OF BREATH: 0

## 2022-09-28 NOTE — TELEPHONE ENCOUNTER
Last seen: 09/28/22 by Dr. Espinal  Next appt: 11/16/22 with Dr. Espinal    Was the patient seen in the last year in this department? Yes   Does patient have an active prescription for medications requested? No   Received Request Via: Pharmacy

## 2022-09-28 NOTE — PROGRESS NOTES
Date of Service:  9/28/22    CC: followup on bloodwork    HPI:  Tracie Rinaldi (goes by Brook) is a 72 y.o. female here with  Vilma from group home for bloodwork followup.     Hyponatremia- Na 131 on recent bloodwork in 09/202 (vs 132 in 06/2022), patient states she has been staying well hydrated but Vilma states she is unsure if that is true. Not on a low salt diet at group home. Denies any nausea, vomiting, headache, dizziness, seizures, fatigue.     Vit D deficiency- currently on vit d-calcium supplement. Recently vit D level low at 11 on 09/2022. Currently on alendronate weekly for osteoporosis without acute fractures.    Sleep disturbance- per , it is reported that patient has been having trouble sleeping at night likely due to her roommate and group home inquiring on any possible medication to help patient better go to sleep at night.     Review of systems:  Review of Systems   Constitutional:  Negative for chills and fever.   HENT: Negative.     Respiratory:  Negative for cough and shortness of breath.    Cardiovascular:  Negative for chest pain.   Gastrointestinal:  Negative for abdominal pain, nausea and vomiting.   Skin: Negative.    Neurological:  Negative for dizziness, tingling, sensory change, focal weakness, weakness and headaches.      Past Medical History:  Patient Active Problem List    Diagnosis Date Noted    Obesity (BMI 30-39.9) 07/05/2022    Bladder spasm 01/18/2022    Urinary incontinence 09/20/2021    Hyponatremia 05/01/2019    Impaired fasting glucose 05/01/2019    COPD mixed type ([facility]) 07/03/2017    Dementia without behavioral disturbance ([facility]) 02/10/2017    Seizure ([facility]) 10/18/2015    Schizophrenia ([facility]) 10/18/2015    Spells of decreased attentiveness 08/28/2015    Hypothyroid 08/28/2015       Past Surgical History:    has a past surgical history that includes closed reduction (Left, 7/9/2015); pin insertion (7/9/2015); and irrigation & debridement ortho (Left,  10/17/2015).    Medications:  Current Outpatient Medications   Medication Sig Dispense Refill    vitamin D2, Ergocalciferol, (DRISDOL) 1.25 MG (42987 UT) Cap capsule Take 1 Capsule by mouth every 7 days. 8 Capsule 0    melatonin 3 MG Tab Take 1 Tablet by mouth at bedtime. 90 Tablet 1    Umeclidinium Bromide (INCRUSE ELLIPTA) 62.5 MCG/INH AEROSOL POWDER, BREATH ACTIVATED One inhalation once daily 3 Each 1    Mirabegron ER 50 MG TABLET SR 24 HR Take  by mouth.      levETIRAcetam (KEPPRA) 250 MG tablet TAKE 1 TABLET BY MOUTH 3 TIMES DAILY 90 Tablet 11    Nutritional Supplements (ENSURE ACTIVE HIGH PROTEIN PO) Take  by mouth.      ASPIRIN LOW DOSE 81 MG EC tablet TAKE 1 TABLET BY MOUTH ONCE DAILY 30 Tablet 11    Sennosides (SENNA) 8.6 MG Tab TAKE 1 TABLET BY MOUTH ONCE DAILY AS NEEDED FOR CONSTIPATION 90 Tablet 0    levothyroxine (SYNTHROID) 112 MCG Tab TAKE 1 TABLET BY MOUTH EVERY MORNING ON AN EMPTY STOMACH. 30 Tablet 10    betamethasone dipropionate (DIPROLENE) 0.05 % Ointment       clobetasol (TEMOVATE) 0.05 % Cream       simvastatin (ZOCOR) 40 MG Tab TAKE 1 TABLET BY MOUTH AT BEDTIME FOR HLD 30 Tablet 11    divalproex ER (DEPAKOTE ER) 250 MG TABLET SR 24 HR Take 1 Tablet by mouth every day. 90 Tablet 3    ibuprofen (MOTRIN) 600 MG Tab Take 1 Tablet by mouth every 8 hours as needed for Moderate Pain. (Patient taking differently: Take 800 mg by mouth every 8 hours as needed for Moderate Pain.) 30 Tablet 0    Calcium Carbonate-Vitamin D 600-400 MG-UNIT Tab Take  by mouth.      cloZAPine (CLOZARIL) 100 MG Tab Take 150 mg by mouth 2 times a day.      VENTOLIN  (90 Base) MCG/ACT Aero Soln inhalation aerosol       escitalopram (LEXAPRO) 10 MG Tab Take 2 Tabs by mouth every day.      alendronate (FOSAMAX) 70 MG Tab TAKE 1 TAB BY MOUTH EVERY 7 DAYS IN THE MORNING, ON AN EMPTY STOMACH,WITH A GLASS OF WATER & REMAIN UPRIGHT FOR 30MIN AFTER. 4 Tablet 10    Incontinence Supply Disposable (FQ PROTECTIVE UNDERWEAR) Misc  CHANGE 3 TIMES DAILY AS DIRECTED 90 Each 11    Diapers & Supplies Misc 90 Bags in the morning, at noon, and at bedtime. Patient needs large pullups Prevail adult  90 Each 3     No current facility-administered medications for this visit.       Allergies:  No Known Allergies    Family History:   family history includes Alzheimer's Disease in her mother; Heart Disease in her father.     Social History:    Social History     Tobacco Use    Smoking status: Former     Packs/day: 0.50     Years: 45.00     Pack years: 22.50     Types: Cigarettes    Smokeless tobacco: Never    Tobacco comments:     unk   Vaping Use    Vaping Use: Never used   Substance Use Topics    Alcohol use: No     Comment: unk    Drug use: No     Comment: unk     Physical Exam:  Vitals:    09/28/22 0912   BP: (!) 144/84   Pulse: 92   Temp: 36.2 °C (97.2 °F)   SpO2: 95%     Body mass index is 30.18 kg/m².  Physical Exam  Constitutional:       General: She is not in acute distress.     Appearance: Normal appearance.   HENT:      Head: Normocephalic and atraumatic.      Right Ear: External ear normal.      Left Ear: External ear normal.   Eyes:      General: No scleral icterus.     Extraocular Movements: Extraocular movements intact.      Conjunctiva/sclera: Conjunctivae normal.   Cardiovascular:      Rate and Rhythm: Normal rate and regular rhythm.      Heart sounds: Normal heart sounds.   Pulmonary:      Effort: No respiratory distress.      Breath sounds: Normal breath sounds.   Abdominal:      General: There is no distension.      Palpations: Abdomen is soft.      Tenderness: There is no abdominal tenderness.   Musculoskeletal:         General: No swelling or tenderness.      Right lower leg: No edema.      Left lower leg: No edema.   Skin:     General: Skin is warm and dry.   Neurological:      General: No focal deficit present.      Mental Status: She is alert.      Motor: No weakness.      Gait: Gait normal.   Psychiatric:         Mood and  Affect: Mood normal.         Behavior: Behavior normal.      Assessment/Plan:  1. Chronic hyponatremia  hospitalized 5/19-5/20 for hyponatremia which improved with fluids. 6/2/22 bloodwork: Na 132, serum osmo 275 (low), random urine sodium 11 (low), urine osmolality 137. Sodium improving with oral hydration. Hypotonic hyponatremia likely secondary to dehydration (no clinical signs of edematous disorders like HF/cirrhosis/nephrotic syndrome)   - encourage PO fluid intake, pt could have low solute intake, can be lenient on salt intake at this time.   - Basic Metabolic Panel; Future    2. Vitamin D deficiency  - VITAMIN D 25-HYDROXY 11 in 09/2022, will supplement with vit D 21745B weekly x 8 weeks.   - goal vit D 30-60 in setting of osteoporosis/alendronate use  - recheck level in 2 months  - vitamin D2, Ergocalciferol, (DRISDOL) 1.25 MG (93924 UT) Cap capsule; Take 1 Capsule by mouth every 7 days.  Dispense: 8 Capsule; Refill: 0    3. Sleep difficulties  - trial melatonin 3 MG Tab; Take 1 Tablet by mouth at bedtime.  Dispense: 90 Tablet; Refill: 1    All imaging results and lab results and consult notes are reviewed at this visit.  Followup: Return in about 2 months (around 11/28/2022).    Amrita Espinal,   Internal Medicine PGY-2

## 2022-11-03 ENCOUNTER — PATIENT MESSAGE (OUTPATIENT)
Dept: HEALTH INFORMATION MANAGEMENT | Facility: OTHER | Age: 72
End: 2022-11-03

## 2022-11-11 ENCOUNTER — PATIENT MESSAGE (OUTPATIENT)
Dept: HEALTH INFORMATION MANAGEMENT | Facility: OTHER | Age: 72
End: 2022-11-11

## 2022-11-16 ENCOUNTER — OFFICE VISIT (OUTPATIENT)
Dept: INTERNAL MEDICINE | Facility: OTHER | Age: 72
End: 2022-11-16
Payer: MEDICARE

## 2022-11-16 VITALS
OXYGEN SATURATION: 95 % | HEART RATE: 93 BPM | DIASTOLIC BLOOD PRESSURE: 77 MMHG | WEIGHT: 195.8 LBS | TEMPERATURE: 98.1 F | HEIGHT: 66 IN | SYSTOLIC BLOOD PRESSURE: 116 MMHG | BODY MASS INDEX: 31.47 KG/M2

## 2022-11-16 DIAGNOSIS — R60.0 BILATERAL LOWER EXTREMITY EDEMA: ICD-10-CM

## 2022-11-16 DIAGNOSIS — R01.1 HEART MURMUR: ICD-10-CM

## 2022-11-16 DIAGNOSIS — E55.9 VITAMIN D DEFICIENCY: ICD-10-CM

## 2022-11-16 DIAGNOSIS — E87.1 CHRONIC HYPONATREMIA: ICD-10-CM

## 2022-11-16 DIAGNOSIS — Z23 NEED FOR VACCINATION: ICD-10-CM

## 2022-11-16 PROCEDURE — G0008 ADMIN INFLUENZA VIRUS VAC: HCPCS

## 2022-11-16 PROCEDURE — 99214 OFFICE O/P EST MOD 30 MIN: CPT | Mod: 25,GC

## 2022-11-16 PROCEDURE — 90662 IIV NO PRSV INCREASED AG IM: CPT

## 2022-11-16 RX ORDER — TACROLIMUS 1 MG/G
1 OINTMENT TOPICAL 2 TIMES DAILY
COMMUNITY
Start: 2022-11-14 | End: 2023-05-30

## 2022-11-16 NOTE — PROGRESS NOTES
Date of Service:  11/16/22    CC: followup    HPI:  Tracie Rinaldi is a 72 y.o. female with nurse  Vilma for follow-up.    Per Vilma, patient recently moved to a new group home (from [facility] to [facility]) Per patient has her own room and more space.  Patient states that she likes her new living space.  Patient was previously up getting exercise/involved in activities at the [facility] in [place], [place] however the facility has closed, so patient and Hilda are looking into other facility where patient can get exercise..  Hilda reports that patient was taken to [facility] ED in early October 2022 for concerns of her cognition/episode of confusion.  She reports the blood work were negative and CT scan was negative as well and patient was sent home and symptoms improved, not at baseline.   Per Vilma, they found several oxygen tanks during the moving process, however Brook has not been using any supplemental oxygen.  Upon further investigation, per [facility] oxygen order was placed back in 2018 when she was discharged from [facility].  Patient denies any shortness of breath, chest pain, difficulty with exertion.  Vilma will look into oxygen order and how to discontinue this.     However, Vilma did notice some increased bilateral lower extremity swelling that has been new in the past week.  Patient denies any pain associated with the swelling.  No fever, chills, cough.  Patient sleeping with 2 pillows at baseline. Denies drinking excessive water/fluids lately. No trauma to legs    Review of systems:  Review of Systems   Constitutional:  Negative for chills, fever and malaise/fatigue.   HENT: Negative.     Eyes: Negative.    Respiratory:  Negative for cough, sputum production, shortness of breath and wheezing.    Cardiovascular:  Positive for leg swelling. Negative for chest pain, palpitations, orthopnea and claudication.   Gastrointestinal:  Negative for abdominal pain, nausea and  vomiting.   Skin:  Negative for itching and rash.   Neurological:  Negative for dizziness, tingling, sensory change, speech change, focal weakness, weakness and headaches.      Past Medical History:  Patient Active Problem List    Diagnosis Date Noted    Obesity (BMI 30-39.9) 07/05/2022    Bladder spasm 01/18/2022    Urinary incontinence 09/20/2021    Hyponatremia 05/01/2019    Impaired fasting glucose 05/01/2019    COPD mixed type ([facility]) 07/03/2017    Dementia without behavioral disturbance ([facility]) 02/10/2017    Seizure ([facility]) 10/18/2015    Schizophrenia ([facility]) 10/18/2015    Spells of decreased attentiveness 08/28/2015    Hypothyroid 08/28/2015     Past Surgical History:    has a past surgical history that includes closed reduction (Left, 7/9/2015); pin insertion (7/9/2015); and irrigation & debridement ortho (Left, 10/17/2015).    Medications:  Current Outpatient Medications   Medication Sig Dispense Refill    tacrolimus (PROTOPIC) 0.1 % Ointment       vitamin D2, Ergocalciferol, (DRISDOL) 1.25 MG (56482 UT) Cap capsule Take 1 Capsule by mouth every 7 days. 8 Capsule 0    melatonin 3 MG Tab Take 1 Tablet by mouth at bedtime. 90 Tablet 1    alendronate (FOSAMAX) 70 MG Tab TAKE 1 TAB BY MOUTH EVERY 7 DAYS IN THE MORNING, ON AN EMPTY STOMACH,WITH A GLASS OF WATER & REMAIN UPRIGHT FOR 30MIN AFTER. 4 Tablet 10    Umeclidinium Bromide (INCRUSE ELLIPTA) 62.5 MCG/INH AEROSOL POWDER, BREATH ACTIVATED One inhalation once daily 3 Each 1    Mirabegron ER 50 MG TABLET SR 24 HR Take  by mouth.      Incontinence Supply Disposable (FQ PROTECTIVE UNDERWEAR) Misc CHANGE 3 TIMES DAILY AS DIRECTED 90 Each 11    levETIRAcetam (KEPPRA) 250 MG tablet TAKE 1 TABLET BY MOUTH 3 TIMES DAILY 90 Tablet 11    Nutritional Supplements (ENSURE ACTIVE HIGH PROTEIN PO) Take  by mouth.      ASPIRIN LOW DOSE 81 MG EC tablet TAKE 1 TABLET BY MOUTH ONCE DAILY 30 Tablet 11    Sennosides (SENNA) 8.6 MG Tab TAKE 1 TABLET BY MOUTH ONCE DAILY AS NEEDED FOR  CONSTIPATION 90 Tablet 0    levothyroxine (SYNTHROID) 112 MCG Tab TAKE 1 TABLET BY MOUTH EVERY MORNING ON AN EMPTY STOMACH. 30 Tablet 10    simvastatin (ZOCOR) 40 MG Tab TAKE 1 TABLET BY MOUTH AT BEDTIME FOR HLD 30 Tablet 11    Diapers & Supplies Misc 90 Bags in the morning, at noon, and at bedtime. Patient needs large pullups Prevail adult  90 Each 3    divalproex ER (DEPAKOTE ER) 250 MG TABLET SR 24 HR Take 1 Tablet by mouth every day. 90 Tablet 3    ibuprofen (MOTRIN) 600 MG Tab Take 1 Tablet by mouth every 8 hours as needed for Moderate Pain. (Patient taking differently: Take 800 mg by mouth every 8 hours as needed for Moderate Pain.) 30 Tablet 0    Calcium Carbonate-Vitamin D 600-400 MG-UNIT Tab Take  by mouth.      cloZAPine (CLOZARIL) 100 MG Tab Take 1.5 Tablets by mouth 2 times a day.      VENTOLIN  (90 Base) MCG/ACT Aero Soln inhalation aerosol       escitalopram (LEXAPRO) 10 MG Tab Take 2 Tablets by mouth every day.      betamethasone dipropionate (DIPROLENE) 0.05 % Ointment  (Patient not taking: Reported on 11/16/2022)      clobetasol (TEMOVATE) 0.05 % Cream  (Patient not taking: Reported on 11/16/2022)       No current facility-administered medications for this visit.       Allergies:  No Known Allergies    Family History:   family history includes Alzheimer's Disease in her mother; Heart Disease in her father.     Social History:    Social History     Tobacco Use    Smoking status: Former     Packs/day: 0.50     Years: 45.00     Pack years: 22.50     Types: Cigarettes    Smokeless tobacco: Never    Tobacco comments:     unk   Vaping Use    Vaping Use: Never used   Substance Use Topics    Alcohol use: No     Comment: unk    Drug use: No     Comment: unk     Physical Exam:  Vitals:    11/16/22 1032   BP: 116/77   Pulse: 93   Temp: 36.7 °C (98.1 °F)   SpO2: 95%     Body mass index is 31.6 kg/m².  Physical Exam  Constitutional:       General: She is not in acute distress.     Appearance: Normal  appearance.   HENT:      Head: Normocephalic and atraumatic.      Right Ear: External ear normal.      Left Ear: External ear normal.   Eyes:      General: No scleral icterus.     Extraocular Movements: Extraocular movements intact.      Conjunctiva/sclera: Conjunctivae normal.   Cardiovascular:      Rate and Rhythm: Normal rate and regular rhythm.      Heart sounds: Murmur (systolilc) heard.     No friction rub. No gallop.   Pulmonary:      Effort: No respiratory distress.      Breath sounds: Normal breath sounds. No wheezing, rhonchi or rales.      Comments: Oxygen sat remain >90% at rest and with ambulation down hallway  Abdominal:      General: There is no distension.      Palpations: Abdomen is soft.      Tenderness: There is no abdominal tenderness.   Musculoskeletal:         General: No swelling or tenderness.      Right lower leg: Edema (2+ up to level of knee) present.      Left lower leg: Edema (2+ up to level of knee) present.      Comments: No swelling to upper extremities   Skin:     General: Skin is warm and dry.   Neurological:      General: No focal deficit present.      Mental Status: She is alert.      Motor: No weakness.      Gait: Gait normal.   Psychiatric:         Mood and Affect: Mood normal.         Behavior: Behavior normal.      Assessment/Plan:  1. Bilateral lower extremity edema  2. Heart murmur  New murmur and BLE 2+ edema up to level of knee with no acute pain . Lungs clear to ascultation bilaterally, no crackles noted on exam. No SOB, hypoxia on exam today.  Oxygen saturation remain above 90% at rest and with ambulation down hallway. Not on supplemental oxygen.  - recent BMP on 11/8/22- BUN/Cr wnl, Na 134, K 4.7.   - will obtain echo for evaluation of EF/valvular abnormality.   - trial support hose/compression stockings.  - EC-ECHOCARDIOGRAM COMPLETE W/O CONT; Future    3. Vitamin D deficiency  Patient currently finishing up 8 weeks of 50k IU vit D weekly dose.   - will check Vit D  level after tx and determine maintenance dose  - VITAMIN D,25 HYDROXY (DEFICIENCY); Future    4. Need for vaccination  - INFLUENZA VACCINE, HIGH DOSE (65+ ONLY)    Other orders  - tacrolimus (PROTOPIC) 0.1 % Ointment     All imaging results and lab results and consult notes are reviewed at this visit.  Followup: Return in about 5 weeks (around 12/21/2022).    Amrita Espinal, DO  Internal Medicine PGY-2

## 2022-11-16 NOTE — PATIENT INSTRUCTIONS
Please obtain PATSY/support hose, place first thing in the morning. Leg elevation     Echocardiogram ordered    Vit D level in 2 weeks, then we will determine appropriate vit D dose

## 2022-11-17 ASSESSMENT — ENCOUNTER SYMPTOMS
HEADACHES: 0
PALPITATIONS: 0
SHORTNESS OF BREATH: 0
NAUSEA: 0
EYES NEGATIVE: 1
TINGLING: 0
ABDOMINAL PAIN: 0
CLAUDICATION: 0
FOCAL WEAKNESS: 0
SPUTUM PRODUCTION: 0
SENSORY CHANGE: 0
DIZZINESS: 0
VOMITING: 0
FEVER: 0
WHEEZING: 0
ORTHOPNEA: 0
COUGH: 0
WEAKNESS: 0
CHILLS: 0
SPEECH CHANGE: 0

## 2022-12-02 DIAGNOSIS — E55.9 VITAMIN D DEFICIENCY: ICD-10-CM

## 2022-12-17 NOTE — TELEPHONE ENCOUNTER
Last seen: 09/20/21 by Dr. Talamantes  Next appt: 12/07/21 with Dr. Talamantes    Was the patient seen in the last year in this department? Yes   Does patient have an active prescription for medications requested? No   Received Request Via: Pharmacy   Quad cane was ordered.    Please send to [facility].

## 2022-12-29 ENCOUNTER — OFFICE VISIT (OUTPATIENT)
Dept: INTERNAL MEDICINE | Facility: OTHER | Age: 72
End: 2022-12-29
Payer: MEDICARE

## 2022-12-29 VITALS
HEIGHT: 66 IN | SYSTOLIC BLOOD PRESSURE: 131 MMHG | DIASTOLIC BLOOD PRESSURE: 75 MMHG | BODY MASS INDEX: 30.05 KG/M2 | WEIGHT: 187 LBS | OXYGEN SATURATION: 94 % | HEART RATE: 71 BPM | TEMPERATURE: 98.6 F

## 2022-12-29 DIAGNOSIS — F03.90 DEMENTIA WITHOUT BEHAVIORAL DISTURBANCE (HCC): ICD-10-CM

## 2022-12-29 DIAGNOSIS — J44.9 COPD MIXED TYPE (HCC): ICD-10-CM

## 2022-12-29 DIAGNOSIS — R56.9 SEIZURE (HCC): ICD-10-CM

## 2022-12-29 DIAGNOSIS — F20.9 SCHIZOPHRENIA, UNSPECIFIED TYPE (HCC): ICD-10-CM

## 2022-12-29 DIAGNOSIS — E55.9 VITAMIN D DEFICIENCY: ICD-10-CM

## 2022-12-29 DIAGNOSIS — R01.1 HEART MURMUR: ICD-10-CM

## 2022-12-29 PROCEDURE — 99213 OFFICE O/P EST LOW 20 MIN: CPT | Mod: GE | Performed by: STUDENT IN AN ORGANIZED HEALTH CARE EDUCATION/TRAINING PROGRAM

## 2022-12-29 RX ORDER — CLOZAPINE 100 MG/1
150 TABLET ORAL 2 TIMES DAILY
Qty: 30 TABLET | Refills: 0 | Status: SHIPPED | OUTPATIENT
Start: 2022-12-29

## 2022-12-29 RX ORDER — MULTIVIT-MIN/IRON/FOLIC ACID/K 18-600-40
1000 CAPSULE ORAL DAILY
Qty: 60 TABLET | Refills: 1 | Status: SHIPPED | OUTPATIENT
Start: 2022-12-29 | End: 2023-04-24 | Stop reason: SDUPTHER

## 2022-12-29 RX ORDER — ALBUTEROL SULFATE 90 UG/1
2 AEROSOL, METERED RESPIRATORY (INHALATION) EVERY 4 HOURS PRN
Qty: 8.5 G | Refills: 2 | Status: SHIPPED | OUTPATIENT
Start: 2022-12-29

## 2022-12-29 NOTE — PATIENT INSTRUCTIONS
-I refilled your clozapine for 30 days. Follow-up with your psychiatrist for increased agitation and follow-up with neurologist for the yearly visit.  -I stopped the high dose vitamin D and started you on 1000IU daily because your vitamin D was 50 up from 17 at last visit.  -Use the ventolin as needed for worsening shortness of breath.  -Your echocardiogram did not show anything acutely concerning. Your left ventricular ejection fraction was 65-70%. You had trace pulmonic and mitral regurgitation.  -We will discuss your cologuard at your next visit.  -Follow-up 15 weeks.

## 2022-12-30 NOTE — PROGRESS NOTES
"Subjective:     CC: Follow-up visit    HPI:   Patient is a 72 year old female with a PMH of hypothyroidism, seizure disorder, COPD, schizophrenia, dementia and obesity who presents to clinic today with her , Vilma who helped provide history.    Patient's  says that patient was doing well until recently when she ran out of her clozapine and was unable to get it refilled through her psychiatry team. Patient reports that she has been having insomnia and labile mood since being off this medication. She reports being very tearful and having difficulty controlling her emotions. She denies hearing voices that other people don't hear or seeing things that other people don't see.     Patient was unable to get her cologuard done last time due apprehension regarding the test. Patient was able to get labs rechecked and had her echocardiogram done.    No other complaints at this time.    No problems updated.    Health Maintenance: Completed    ROS:  Review of Systems   Unable to perform ROS: Dementia     Objective:     Exam:  /75 (BP Location: Left arm, Patient Position: Sitting, BP Cuff Size: Adult)   Pulse 71   Temp 37 °C (98.6 °F) (Temporal)   Ht 1.676 m (5' 6\")   Wt 84.8 kg (187 lb)   SpO2 94%   BMI 30.18 kg/m²  Body mass index is 30.18 kg/m².    Physical Exam  Constitutional:       General: She is not in acute distress.     Appearance: Normal appearance. She is normal weight. She is not ill-appearing, toxic-appearing or diaphoretic.   HENT:      Head: Normocephalic and atraumatic.      Mouth/Throat:      Mouth: Mucous membranes are moist.      Pharynx: Oropharynx is clear. No oropharyngeal exudate or posterior oropharyngeal erythema.   Eyes:      General: No scleral icterus.        Right eye: No discharge.         Left eye: No discharge.      Conjunctiva/sclera: Conjunctivae normal.   Cardiovascular:      Rate and Rhythm: Normal rate and regular rhythm.      Pulses: Normal pulses.      " Heart sounds: No murmur heard.    No friction rub. No gallop.      Comments: Reduced heart sounds with no notable murmur on exam today.  Pulmonary:      Effort: Pulmonary effort is normal. No respiratory distress.      Breath sounds: Normal breath sounds. No stridor. No wheezing or rhonchi.   Abdominal:      General: Abdomen is flat. Bowel sounds are normal. There is no distension.      Palpations: Abdomen is soft. There is no mass.      Tenderness: There is no abdominal tenderness.      Hernia: No hernia is present.   Musculoskeletal:         General: No swelling or tenderness.      Right lower leg: No edema.      Left lower leg: No edema.   Skin:     General: Skin is warm and dry.      Coloration: Skin is not jaundiced or pale.      Findings: No bruising or erythema.   Neurological:      Mental Status: She is alert and oriented to person, place, and time. Mental status is at baseline.   Psychiatric:         Behavior: Behavior normal.      Comments: Stable mood with easy distractibility and poor insight.     Labs: Please see results section for further information.    Assessment & Plan:   Patient is a 72 year old female with a PMH of hypothyroidism, seizure disorder, COPD, schizophrenia, dementia and obesity who presents to clinic today with her Vilma who helped provide history.    1. Schizophrenia, unspecified type (HCC)  - Refilled patient's clozapine for 30 day supply.  - Advised patient's  to continue to try to follow-up with her psychiatry team for continued management.  - Continue current regimen.    2. Seizure (HCC)  - Stable at this time. Patient or  deny any recent episodes of seizures.  - Patient on keppra regularly and reports compliance with her medication.  - Advised  to follow-up with neurology team for continued medication management.    3. COPD mixed type (HCC)  - Stable.  - Patient reports compliance with incruse ellipta.  - Was previously on  ventolin for rescue breathing, however it seems to have not been communicated to group home staff. Added ventolin back on her medication list.    4. Dementia without behavioral disturbance (HCC)  - Patient's  reports that patient has had emotional outbursts recently.  - Advised patient's  to follow-up with psychiatry team for appropriate medication titration.    5. Vitamin D deficiency  - Patient found to have vitamin D level of 50 on recheck.   - Advised patient to stop taking high dose vitamin D and prescribed patient a maintenance dose. Patient and  understanding.    6. Heart murmur  - Patient previously found to have a murmur and evaluated with an echocardiogram which showed normal left ventricular ejection fraction without significant anatomical defect or cause of murmur.      I spent a total of 45 minutes with record review, exam, communication with the patient, communication with other providers, and documentation of this encounter.      Return in about 15 weeks (around 4/13/2023).

## 2023-01-09 ENCOUNTER — TELEPHONE (OUTPATIENT)
Dept: INTERNAL MEDICINE | Facility: OTHER | Age: 73
End: 2023-01-09
Payer: MEDICARE

## 2023-01-10 ENCOUNTER — TELEPHONE (OUTPATIENT)
Dept: INTERNAL MEDICINE | Facility: OTHER | Age: 73
End: 2023-01-10
Payer: MEDICARE

## 2023-01-10 DIAGNOSIS — Z11.1 SCREENING FOR TUBERCULOSIS: ICD-10-CM

## 2023-01-10 DIAGNOSIS — T42.4X5A: ICD-10-CM

## 2023-01-10 DIAGNOSIS — Z13.228 SCREENING FOR METABOLIC DISORDER: ICD-10-CM

## 2023-01-11 NOTE — TELEPHONE ENCOUNTER
Patients caregiver, Hilda, called again to get these orders. Patient states that its been a few days since inquiring and they need these tests ordered. They did not go into specifics as of why

## 2023-01-12 ENCOUNTER — TELEPHONE (OUTPATIENT)
Dept: INTERNAL MEDICINE | Facility: OTHER | Age: 73
End: 2023-01-12
Payer: MEDICARE

## 2023-01-12 NOTE — TELEPHONE ENCOUNTER
Left detailed message for Hilda informing of orders being in chart to go to any [facility] lab to get them done or to call back if they go to any other lab.

## 2023-01-12 NOTE — TELEPHONE ENCOUNTER
[phone] Hilda's number      Patient called for update I let them know labs are ordered and added the phone number requested

## 2023-01-17 ENCOUNTER — HOSPITAL ENCOUNTER (OUTPATIENT)
Dept: LAB | Facility: MEDICAL CENTER | Age: 73
End: 2023-01-17
Payer: MEDICARE

## 2023-01-17 DIAGNOSIS — T42.4X5A: ICD-10-CM

## 2023-01-17 DIAGNOSIS — Z13.228 SCREENING FOR METABOLIC DISORDER: ICD-10-CM

## 2023-01-17 DIAGNOSIS — Z11.1 SCREENING FOR TUBERCULOSIS: ICD-10-CM

## 2023-01-17 LAB
25(OH)D3 SERPL-MCNC: 27 NG/ML (ref 30–100)
ERYTHROCYTE [DISTWIDTH] IN BLOOD BY AUTOMATED COUNT: 45.1 FL (ref 35.9–50)
HCT VFR BLD AUTO: 40.6 % (ref 37–47)
HGB BLD-MCNC: 13.4 G/DL (ref 12–16)
MCH RBC QN AUTO: 26.3 PG (ref 27–33)
MCHC RBC AUTO-ENTMCNC: 33 G/DL (ref 33.6–35)
MCV RBC AUTO: 79.8 FL (ref 81.4–97.8)
PLATELET # BLD AUTO: 278 K/UL (ref 164–446)
PMV BLD AUTO: 10.9 FL (ref 9–12.9)
RBC # BLD AUTO: 5.09 M/UL (ref 4.2–5.4)
WBC # BLD AUTO: 7.9 K/UL (ref 4.8–10.8)

## 2023-01-17 PROCEDURE — 80053 COMPREHEN METABOLIC PANEL: CPT

## 2023-01-17 PROCEDURE — 85027 COMPLETE CBC AUTOMATED: CPT

## 2023-01-17 PROCEDURE — 82306 VITAMIN D 25 HYDROXY: CPT

## 2023-01-17 PROCEDURE — 36415 COLL VENOUS BLD VENIPUNCTURE: CPT

## 2023-01-18 LAB
ALBUMIN SERPL BCP-MCNC: 4.2 G/DL (ref 3.2–4.9)
ALBUMIN/GLOB SERPL: 1.9 G/DL
ALP SERPL-CCNC: 105 U/L (ref 30–99)
ALT SERPL-CCNC: 11 U/L (ref 2–50)
ANION GAP SERPL CALC-SCNC: 11 MMOL/L (ref 7–16)
AST SERPL-CCNC: 13 U/L (ref 12–45)
BILIRUB SERPL-MCNC: 0.2 MG/DL (ref 0.1–1.5)
BUN SERPL-MCNC: 9 MG/DL (ref 8–22)
CALCIUM ALBUM COR SERPL-MCNC: 9.3 MG/DL (ref 8.5–10.5)
CALCIUM SERPL-MCNC: 9.5 MG/DL (ref 8.5–10.5)
CHLORIDE SERPL-SCNC: 96 MMOL/L (ref 96–112)
CO2 SERPL-SCNC: 23 MMOL/L (ref 20–33)
CREAT SERPL-MCNC: 0.62 MG/DL (ref 0.5–1.4)
GFR SERPLBLD CREATININE-BSD FMLA CKD-EPI: 95 ML/MIN/1.73 M 2
GLOBULIN SER CALC-MCNC: 2.2 G/DL (ref 1.9–3.5)
GLUCOSE SERPL-MCNC: 87 MG/DL (ref 65–99)
POTASSIUM SERPL-SCNC: 4.8 MMOL/L (ref 3.6–5.5)
PROT SERPL-MCNC: 6.4 G/DL (ref 6–8.2)
SODIUM SERPL-SCNC: 130 MMOL/L (ref 135–145)

## 2023-01-19 ENCOUNTER — HOSPITAL ENCOUNTER (OUTPATIENT)
Dept: LAB | Facility: MEDICAL CENTER | Age: 73
End: 2023-01-19
Payer: MEDICARE

## 2023-01-23 ENCOUNTER — APPOINTMENT (RX ONLY)
Dept: URBAN - METROPOLITAN AREA CLINIC 6 | Facility: CLINIC | Age: 73
Setting detail: DERMATOLOGY
End: 2023-01-23

## 2023-01-23 DIAGNOSIS — L57.0 ACTINIC KERATOSIS: ICD-10-CM

## 2023-01-23 DIAGNOSIS — L30.8 OTHER SPECIFIED DERMATITIS: ICD-10-CM | Status: IMPROVED

## 2023-01-23 PROBLEM — L30.9 DERMATITIS, UNSPECIFIED: Status: ACTIVE | Noted: 2023-01-23

## 2023-01-23 PROCEDURE — 99214 OFFICE O/P EST MOD 30 MIN: CPT

## 2023-01-23 PROCEDURE — ? DEFER

## 2023-01-23 PROCEDURE — ? COUNSELING

## 2023-01-23 PROCEDURE — ? PRESCRIPTION MEDICATION MANAGEMENT

## 2023-01-23 ASSESSMENT — LOCATION SIMPLE DESCRIPTION DERM
LOCATION SIMPLE: RIGHT TEMPLE
LOCATION SIMPLE: RIGHT HAND
LOCATION SIMPLE: RIGHT FOREHEAD
LOCATION SIMPLE: LEFT HAND

## 2023-01-23 ASSESSMENT — LOCATION ZONE DERM
LOCATION ZONE: HAND
LOCATION ZONE: FACE

## 2023-01-23 ASSESSMENT — LOCATION DETAILED DESCRIPTION DERM
LOCATION DETAILED: RIGHT LATERAL FOREHEAD
LOCATION DETAILED: 3RD WEB SPACE LEFT HAND
LOCATION DETAILED: 3RD WEB SPACE RIGHT HAND
LOCATION DETAILED: RIGHT SUPERIOR TEMPLE

## 2023-01-23 NOTE — PROCEDURE: DEFER
Introduction Text (Please End With A Colon): does not wish to have treated
Detail Level: Detailed
Size Of Lesion In Cm (Optional): 0

## 2023-01-23 NOTE — PROCEDURE: PRESCRIPTION MEDICATION MANAGEMENT
Continue Regimen: Tacrolimus bid.\\nRecommended the use of Aquaphor or Vaseline while severely flared.\\nRegular use of moisturizer after hand washing.\\nPlan: Apply CeraVe or Eucerin multiple times a day
Plan: RTC if flaring/uncontrolled\\nConsider patch testing in the future.
Detail Level: Zone
Render In Strict Bullet Format?: No

## 2023-02-02 ENCOUNTER — OFFICE VISIT (OUTPATIENT)
Dept: INTERNAL MEDICINE | Facility: OTHER | Age: 73
End: 2023-02-02
Payer: MEDICARE

## 2023-02-02 DIAGNOSIS — Z00.00 HEALTHCARE MAINTENANCE: ICD-10-CM

## 2023-02-02 PROCEDURE — 7101 PR PHYSICAL: Mod: GE

## 2023-02-02 ASSESSMENT — FIBROSIS 4 INDEX: FIB4 SCORE: 0.99

## 2023-02-02 NOTE — PROGRESS NOTES
Teaching Physician Attestation      Level of Participation    I discussed with the resident physician the patient's history, exam, assessment and plan in detail.  Topics listed in my addendum were the focus of the visit.  Healthcare maintenance was not addressed this visit unless listed as a topic in my addendum.  I agree with plan as written along with the following additions/modifications:    Group home paperwork  -Tb test checked per request  -appreciate case mgmt support  -ADL capabilities filled out by PCP in conjunction with  present  -Medications reviewed and reconciled by PCP, however please note we cannot confirm the doses for medications prescribed by other providers such as psychiatry, neurology, please reach out to respective providers to confirm their current medications and dosing.  -Appreciate group home support    Chronic hyponatremia  -Patient has been chronically at around 130 since 2018, in 6/2022 the patient had low urine sodium, low serum osm, low urine osm, which could represent dehydration as an etiology, however unlikely patient has been dehydrated chronically for 5 years.  Suspect is related to some of her psychiatric or neurologic medications, since stable could continue to monitor, however might benefit from dedicated reevaluation in future.    Return to clinic within 6 months for chronic conditions/annual exam    Addendum:  On review after precepting, appears patient was also noted to have lower extremity edema, on review of chart, this issue was initially addressed in November of last year, echo was reported as normal although I do not have access to this record.  AST/ALT normal along with creatinine in January of this year.  Stability is reassuring, but would benefit from further evaluation/work-up given persistence.  Appears patient also will be transferring care to a new PCP, happy to provide documentation to assist with transfer of care if requested.

## 2023-02-02 NOTE — PROGRESS NOTES
Date of Service:  2/2/23    CC: need paperwork filled    HPI:  Tracie Rinaldi is a 72 y.o. female with a  PMH of hypothyroidism, seizure disorder, COPD, schizophrenia, dementia and obesity here for paperwork.     Patient here today with her  Tracy, who brought paperwork to be signs (paperwork regarding current medication receiving at group home and paperwork indicating continued need for group home placement).    Patient is doing well denies any acute complaints.   Follows with psychiatry team for schizophrenia, recently added mirtazapine for sleep. Patient sleeping well.     TB test ordered at request of group home, quantiferon not covered so patient had 2 step tb test done, per tracy, neg results.     Review of systems:  Review of Systems   Constitutional: Negative.    HENT: Negative.     Eyes: Negative.    Respiratory: Negative.     Cardiovascular: Negative.    Gastrointestinal: Negative.    Skin: Negative.    Neurological: Negative.       Past Medical History:  Patient Active Problem List    Diagnosis Date Noted    Vitamin D deficiency 12/29/2022    Heart murmur 12/29/2022    Obesity (BMI 30-39.9) 07/05/2022    Bladder spasm 01/18/2022    Urinary incontinence 09/20/2021    Hyponatremia 05/01/2019    Impaired fasting glucose 05/01/2019    COPD mixed type (HCC) 07/03/2017    Dementia without behavioral disturbance (HCC) 02/10/2017    Seizure (HCC) 10/18/2015    Schizophrenia ([facility]) 10/18/2015    Spells of decreased attentiveness 08/28/2015    Hypothyroid 08/28/2015       Past Surgical History:    has a past surgical history that includes closed reduction (Left, 7/9/2015); pin insertion (7/9/2015); and irrigation & debridement ortho (Left, 10/17/2015).    Medications:  Current Outpatient Medications   Medication Sig Dispense Refill    mirtazapine (REMERON) 15 MG Tab       ibuprofen (MOTRIN) 800 MG Tab Take 800 mg by mouth every 6 hours as needed.      NON SPECIFIED Take 3-6 fluid ounces by mouth as  needed (HS as needed for Constipation).      zinc oxide (DESITIN) 40 % Paste paste Apply  topically as needed. Apply thin layer to topically to affected sites in groin and buttocks twice daily as needed for rash      NON SPECIFIED as needed (Thin Layer to affected site HS). Apply a thin layer topically to the affected site at bedtime as needed      Vitamin D, Cholecalciferol, (CHOLECALCIFEROL) 25 MCG (1000 UT) Tab Take 1 Tablet by mouth every day. 60 Tablet 1    cloZAPine (CLOZARIL) 100 MG Tab Take 1.5 Tablets by mouth 2 times a day. 30 Tablet 0    VENTOLIN  (90 Base) MCG/ACT Aero Soln inhalation aerosol Inhale 2 Puffs every four hours as needed for Shortness of Breath. 8.5 g 2    tacrolimus (PROTOPIC) 0.1 % Ointment       melatonin 3 MG Tab Take 1 Tablet by mouth at bedtime. 90 Tablet 1    alendronate (FOSAMAX) 70 MG Tab TAKE 1 TAB BY MOUTH EVERY 7 DAYS IN THE MORNING, ON AN EMPTY STOMACH,WITH A GLASS OF WATER & REMAIN UPRIGHT FOR 30MIN AFTER. 4 Tablet 10    Umeclidinium Bromide (INCRUSE ELLIPTA) 62.5 MCG/INH AEROSOL POWDER, BREATH ACTIVATED One inhalation once daily 3 Each 1    Mirabegron ER 50 MG TABLET SR 24 HR Take  by mouth.      Incontinence Supply Disposable (FQ PROTECTIVE UNDERWEAR) Misc CHANGE 3 TIMES DAILY AS DIRECTED 90 Each 11    levETIRAcetam (KEPPRA) 250 MG tablet TAKE 1 TABLET BY MOUTH 3 TIMES DAILY 90 Tablet 11    Nutritional Supplements (ENSURE ACTIVE HIGH PROTEIN PO) Take  by mouth.      ASPIRIN LOW DOSE 81 MG EC tablet TAKE 1 TABLET BY MOUTH ONCE DAILY 30 Tablet 11    Sennosides (SENNA) 8.6 MG Tab TAKE 1 TABLET BY MOUTH ONCE DAILY AS NEEDED FOR CONSTIPATION 90 Tablet 0    levothyroxine (SYNTHROID) 112 MCG Tab TAKE 1 TABLET BY MOUTH EVERY MORNING ON AN EMPTY STOMACH. 30 Tablet 10    betamethasone dipropionate (DIPROLENE) 0.05 % Ointment       clobetasol (TEMOVATE) 0.05 % Cream       simvastatin (ZOCOR) 40 MG Tab TAKE 1 TABLET BY MOUTH AT BEDTIME FOR HLD 30 Tablet 11    Diapers & Supplies  Misc 90 Bags in the morning, at noon, and at bedtime. Patient needs large pullups Prevail adult  90 Each 3    divalproex ER (DEPAKOTE ER) 250 MG TABLET SR 24 HR Take 1 Tablet by mouth every day. 90 Tablet 3    Calcium Carbonate-Vitamin D 600-400 MG-UNIT Tab Take  by mouth.      escitalopram (LEXAPRO) 10 MG Tab Take 2 Tablets by mouth every day.       No current facility-administered medications for this visit.       Allergies:  No Known Allergies    Family History:   family history includes Alzheimer's Disease in her mother; Heart Disease in her father.     Social History:    Social History     Tobacco Use    Smoking status: Former     Packs/day: 0.50     Years: 45.00     Pack years: 22.50     Types: Cigarettes    Smokeless tobacco: Never    Tobacco comments:     unk   Vaping Use    Vaping Use: Never used   Substance Use Topics    Alcohol use: No     Comment: unk    Drug use: No     Comment: unk     Physical Exam:  Vitals:    02/02/23 1115   BP: (!) 152/80   Pulse: 85   Temp: 36.8 °C (98.3 °F)   SpO2: 96%     Body mass index is 32.6 kg/m². Repeat /76  Physical Exam  Constitutional:       General: She is not in acute distress.     Appearance: Normal appearance.   HENT:      Head: Normocephalic and atraumatic.      Right Ear: External ear normal.      Left Ear: External ear normal.   Eyes:      General: No scleral icterus.     Extraocular Movements: Extraocular movements intact.      Conjunctiva/sclera: Conjunctivae normal.   Cardiovascular:      Rate and Rhythm: Normal rate and regular rhythm.      Heart sounds: Murmur (systolilc) heard.     No friction rub. No gallop.   Pulmonary:      Effort: No respiratory distress.      Breath sounds: Normal breath sounds. No wheezing, rhonchi or rales.   Abdominal:      General: There is no distension.      Palpations: Abdomen is soft.      Tenderness: There is no abdominal tenderness.   Musculoskeletal:         General: No swelling or tenderness.      Right lower  leg: Edema (1+) present.      Left lower leg: Edema (1+) present.      Comments: No swelling to upper extremities   Skin:     General: Skin is warm and dry.   Neurological:      General: No focal deficit present.      Mental Status: She is alert.      Motor: No weakness.      Gait: Gait normal.   Psychiatric:         Mood and Affect: Mood normal.         Behavior: Behavior normal.        Assessment/Plan:  1. Lives in group home  Paperwork signed to indicate continued disability for need for group home status and medications given at facility checked with what we have on our system.     2. Healthcare maintenance  15 pound weight gain since end of Dec 2022, has not been doing as much as exercise as before to the closure of the activity/gym center.   - encourage healthy diet and exercise.   - leg elevation for BLE edema (about same as last time)   - Vilma () informed me that Dayne Lackey geriatria PA, who goes to [facility] to provide care for many other residents, likely will take over patients care as it will be more convenient for them.     All imaging results and lab results and consult notes are reviewed at this visit.  Followup: Return if symptoms worsen or fail to improve.    Amrita Espinal, DO  Internal Medicine PGY-2

## 2023-02-03 VITALS
HEART RATE: 85 BPM | DIASTOLIC BLOOD PRESSURE: 76 MMHG | OXYGEN SATURATION: 96 % | HEIGHT: 66 IN | WEIGHT: 202 LBS | BODY MASS INDEX: 32.47 KG/M2 | TEMPERATURE: 98.3 F | SYSTOLIC BLOOD PRESSURE: 128 MMHG

## 2023-02-03 ASSESSMENT — ENCOUNTER SYMPTOMS
GASTROINTESTINAL NEGATIVE: 1
RESPIRATORY NEGATIVE: 1
NEUROLOGICAL NEGATIVE: 1
CONSTITUTIONAL NEGATIVE: 1
EYES NEGATIVE: 1
CARDIOVASCULAR NEGATIVE: 1

## 2023-02-27 PROBLEM — R74.8 ELEVATED ALKALINE PHOSPHATASE LEVEL: Status: ACTIVE | Noted: 2023-02-27

## 2023-02-27 PROBLEM — M81.0 AGE-RELATED OSTEOPOROSIS WITHOUT CURRENT PATHOLOGICAL FRACTURE: Status: ACTIVE | Noted: 2023-02-27

## 2023-02-27 PROBLEM — R60.0 LOWER EXTREMITY EDEMA: Status: ACTIVE | Noted: 2023-02-27

## 2023-03-21 RX ORDER — SENNOSIDES 8.6 MG/1
TABLET, COATED ORAL
Qty: 30 TABLET | Refills: 11 | OUTPATIENT
Start: 2023-03-21

## 2023-04-17 PROBLEM — Z87.891 HISTORY OF SMOKING: Status: ACTIVE | Noted: 2023-04-17

## 2023-04-17 PROBLEM — R03.0 ELEVATED BLOOD PRESSURE READING: Status: ACTIVE | Noted: 2023-04-17

## 2023-05-16 ENCOUNTER — TELEPHONE (OUTPATIENT)
Dept: NEUROLOGY | Facility: MEDICAL CENTER | Age: 73
End: 2023-05-16
Payer: MEDICARE

## 2023-05-16 ENCOUNTER — OFFICE VISIT (OUTPATIENT)
Dept: NEUROLOGY | Facility: MEDICAL CENTER | Age: 73
End: 2023-05-16
Attending: PSYCHIATRY & NEUROLOGY
Payer: MEDICARE

## 2023-05-16 VITALS
SYSTOLIC BLOOD PRESSURE: 130 MMHG | WEIGHT: 202.16 LBS | OXYGEN SATURATION: 93 % | HEART RATE: 100 BPM | BODY MASS INDEX: 32.49 KG/M2 | HEIGHT: 66 IN | TEMPERATURE: 96.8 F | DIASTOLIC BLOOD PRESSURE: 58 MMHG

## 2023-05-16 DIAGNOSIS — R56.9 SEIZURE (HCC): ICD-10-CM

## 2023-05-16 DIAGNOSIS — F03.90 DEMENTIA WITHOUT BEHAVIORAL DISTURBANCE (HCC): ICD-10-CM

## 2023-05-16 PROCEDURE — 3075F SYST BP GE 130 - 139MM HG: CPT | Performed by: PSYCHIATRY & NEUROLOGY

## 2023-05-16 PROCEDURE — 99215 OFFICE O/P EST HI 40 MIN: CPT | Performed by: PSYCHIATRY & NEUROLOGY

## 2023-05-16 PROCEDURE — 3078F DIAST BP <80 MM HG: CPT | Performed by: PSYCHIATRY & NEUROLOGY

## 2023-05-16 PROCEDURE — 99212 OFFICE O/P EST SF 10 MIN: CPT | Performed by: PSYCHIATRY & NEUROLOGY

## 2023-05-16 RX ORDER — LEVETIRACETAM 250 MG/1
250 TABLET ORAL 3 TIMES DAILY
Qty: 270 TABLET | Refills: 3 | Status: SHIPPED | OUTPATIENT
Start: 2023-05-16 | End: 2023-11-01

## 2023-05-16 ASSESSMENT — ENCOUNTER SYMPTOMS
LOSS OF CONSCIOUSNESS: 0
DEPRESSION: 0
FOCAL WEAKNESS: 0
FALLS: 0
NERVOUS/ANXIOUS: 0
INSOMNIA: 0
SEIZURES: 0
MEMORY LOSS: 1
TREMORS: 0
HALLUCINATIONS: 0

## 2023-05-16 ASSESSMENT — FIBROSIS 4 INDEX: FIB4 SCORE: 0.99

## 2023-05-16 NOTE — TELEPHONE ENCOUNTER
NEUROLOGY PATIENT PRE-VISIT PLANNING     Patient was NOT contacted to complete PVP.    Patient Appointment is scheduled as: Established Patient     Is visit type and length scheduled correctly? Yes    [facility] Patient Demographics? Yes    3.   Is referral attached to visit? Yes    4. Were records received from referring provider? Yes    4. Patient was NOT contacted to have someone accompany them to visit.     5. Is this appointment scheduled as a Hospital Follow-Up?  No    6. Does the patient require any pre procedure or post procedure follow up? No    7. If any orders were placed at last visit or intended to be done for this visit do we have Results/Consult Notes? Yes  Labs - Labs were not ordered at last office visit.  Imaging - Imaging was not ordered at last office visit.  Referrals - No referrals were ordered at last office visit.    8. If patient appointment is for Botox - is order pended for provider? N/A

## 2023-05-16 NOTE — PROGRESS NOTES
Subjective     Tracie Rinaldi is a 72 y.o. female who presents with her caregiver Hilda, after 2-year hiatus, with a history of dementia and seizures.  History is gotten from review of the records as well as discussion with Hilda and Tracie herself.     HPI    Seizures: According to both of them, she has not had a seizure since last seen in 2 years.  She is on Keppra 250 mg, 3 times daily, she is compliant with medications are given to her.  She does not typically require monitoring in this regard.  She is tolerating the drug without issue.    Dementia: The forgetfulness is still there, though it fluctuates to some degree.  There does not seem to be a deterioration in her overall cognitive deficits.  She still requires reminders, but on other occasions he is quite astute and recollections of recent events are quite good.  She has a calendar that she maintains her daily routines on, and she is quite good in doing so.  Other than balance difficulties making it difficult, she is independent with her ADLs.  She eats regularly.  Sleep hygiene is not distorted.    Behaviors are not distorted.  She does not wander, has not been more argumentative.  There have been no problems with hallucinations or significant depression.  The chronic schizophrenia is well controlled on Clozaril and Depakote, for a brief few weeks the Clozaril was not renewed appropriately and then everything went south, though briefly, until the medication was reinstituted.    She has her own room at the group home.  She maintains this for the most part on her own, occasionally she get some help from staff to do things such as make her bed, sweep, etc. she takes medications when given to her.    She still enjoys activities where she lives.  She remembers what she has been doing, if distracted, she seems to be able to  easily.  Mental processing speed is slowed.  Multitasking is still a little difficult.    Balance is curtailed though she  "does not fall with regularity.  She tends to fall backwards when trying to get out of chairs, stride length is shortened but she does not shuffle.  There is no tremor.  Urinary incontinence is still there, though she is on Myrbetriq.  She still uses a diaper.  Bowel control is maintained.    Medical, surgical and family histories are reviewed, there are no new drug allergies.  She is on Depakote 250 mg, daily, Fosamax 70 mg once a week, Synthroid, Keppra 250 mg 3 times daily, Zocor, melatonin, vitamin D, baby aspirin, Clozaril 150 mg, twice daily, Lexapro, Myrbetriq, Remeron, Senokot, and Ventolin inhaler as needed.    Review of Systems   Musculoskeletal:  Negative for falls.   Neurological:  Negative for tremors, focal weakness, seizures and loss of consciousness.   Psychiatric/Behavioral:  Positive for memory loss. Negative for depression and hallucinations. The patient is not nervous/anxious and does not have insomnia.    All other systems reviewed and are negative.    Objective     /58 (BP Location: Right arm, Patient Position: Sitting, BP Cuff Size: Adult)   Pulse 100   Temp 36 °C (96.8 °F) (Temporal)   Ht 1.676 m (5' 6\")   Wt 91.7 kg (202 lb 2.6 oz)   SpO2 93%   BMI 32.63 kg/m²      Physical Exam    She appears in no acute distress.  Her vital signs are stable.  There is no malar rash or sialorrhea.  The neck is supple, range of motion is full.  Cardiac evaluation is unremarkable.     Neurological Exam    Mental processing speed is still slowed, but she is fully oriented.  She names and repeats easily, there are no paraphasic errors used.  There is no agnosia or apraxia.  She spells the word \"world\" forwards and backwards.  Immediate 3 word recall is intact, she has difficulty recalling 1 word after a 1 minute interval.  Abstract thinking is actually fairly good. Her affect is a little blunted, but her mood is euthymic.    PERRLA/EOMI, there is slight hypophonia, there is no dysarthria.  Visual " fields are full, facial movements are symmetric.  Sensory exam is intact to temperature bilaterally.  The tongue and uvula are midline, there is no obvious bulbar dysfunction.  Shoulder shrug and head rotation are normal.    There is very mild generalized bradykinesia.  Even with distraction, tone is normal.  There is no tremor at rest or with sustained posture against gravity.  There is no asterixis or drift.  Strength is intact throughout.  There are no obvious reflex asymmetries.  The toes are downgoing bilaterally.    As she walks she is slow to do so, she requires some help to get her off the exam table.  She does not shuffle.  Stride length is clearly shortened.  There is no appendicular dystaxia.  Repetitive movements are symmetric, slightly diminished in amplitude throughout.    Sensory exam is intact to vibration and temperature.  Romberg is absent.    Assessment & Plan     1. Seizure (HCC)  Stable, we will continue the Keppra 250 mg, 3 times daily.  Depakote is being used not for seizures, the drug level itself is far too low, more likely related to her psychiatric condition for the schizophrenia.  I have asked that the treating physician for her psychiatric disease take over its writing.  Hilda is comfortable making this arrangement.    - levETIRAcetam (KEPPRA) 250 MG tablet; Take 1 Tablet by mouth 3 times a day.  Dispense: 270 Tablet; Refill: 3    2. Dementia without behavioral disturbance (HCC)  Stable, I do not expect major deterioration at this time.  Though this could happen moving forwards, we can simply observe.  She does not need medication symptomatically.  She is doing very well where she lives, has significant support.  She keeps herself busy and out of trouble, maintaining her own organization, scheduling and writing things down, etc.  We will follow-up in 1 year.    Time: 40 minutes in total spent on patient care including pre-charting, record review, discussion with healthcare staff and  documentation.  This includes face-to-face time for exam, review, discussion, as well as counseling and coordinating care.

## 2023-05-30 PROBLEM — L30.9 DERMATITIS: Status: ACTIVE | Noted: 2023-05-30

## 2023-05-30 PROBLEM — G47.00 INSOMNIA: Status: ACTIVE | Noted: 2023-05-30

## 2023-05-30 PROBLEM — E78.5 HYPERLIPIDEMIA: Status: ACTIVE | Noted: 2023-05-30

## 2023-05-30 PROBLEM — N32.89 BLADDER SPASM: Status: RESOLVED | Noted: 2022-01-18 | Resolved: 2023-05-30

## 2023-07-18 PROBLEM — Z12.11 COLON CANCER SCREENING: Status: ACTIVE | Noted: 2023-07-18

## 2023-08-15 ENCOUNTER — HOSPITAL ENCOUNTER (INPATIENT)
Facility: MEDICAL CENTER | Age: 73
LOS: 11 days | DRG: 177 | End: 2023-08-26
Attending: EMERGENCY MEDICINE | Admitting: INTERNAL MEDICINE
Payer: MEDICARE

## 2023-08-15 ENCOUNTER — APPOINTMENT (OUTPATIENT)
Dept: RADIOLOGY | Facility: MEDICAL CENTER | Age: 73
DRG: 177 | End: 2023-08-15
Attending: EMERGENCY MEDICINE
Payer: MEDICARE

## 2023-08-15 DIAGNOSIS — U07.1 PNEUMONIA DUE TO COVID-19 VIRUS: ICD-10-CM

## 2023-08-15 DIAGNOSIS — R09.02 HYPOXIA: ICD-10-CM

## 2023-08-15 DIAGNOSIS — J12.82 PNEUMONIA DUE TO COVID-19 VIRUS: ICD-10-CM

## 2023-08-15 DIAGNOSIS — M81.0 AGE-RELATED OSTEOPOROSIS WITHOUT CURRENT PATHOLOGICAL FRACTURE: ICD-10-CM

## 2023-08-15 DIAGNOSIS — N39.0 URINARY TRACT INFECTION WITHOUT HEMATURIA, SITE UNSPECIFIED: ICD-10-CM

## 2023-08-15 DIAGNOSIS — R53.1 WEAKNESS: ICD-10-CM

## 2023-08-15 DIAGNOSIS — J18.9 PNEUMONIA OF BOTH LOWER LOBES DUE TO INFECTIOUS ORGANISM: ICD-10-CM

## 2023-08-15 DIAGNOSIS — J18.9 ACUTE PNEUMONIA: ICD-10-CM

## 2023-08-15 PROBLEM — E87.1 HYPONATREMIA: Status: RESOLVED | Noted: 2019-05-01 | Resolved: 2023-08-15

## 2023-08-15 LAB
ALBUMIN SERPL BCP-MCNC: 3.7 G/DL (ref 3.2–4.9)
ALBUMIN/GLOB SERPL: 1.6 G/DL
ALP SERPL-CCNC: 87 U/L (ref 30–99)
ALT SERPL-CCNC: 18 U/L (ref 2–50)
ANION GAP SERPL CALC-SCNC: 9 MMOL/L (ref 7–16)
APPEARANCE UR: CLEAR
AST SERPL-CCNC: 35 U/L (ref 12–45)
BACTERIA #/AREA URNS HPF: ABNORMAL /HPF
BASOPHILS # BLD AUTO: 0.5 % (ref 0–1.8)
BASOPHILS # BLD: 0.03 K/UL (ref 0–0.12)
BILIRUB SERPL-MCNC: 0.3 MG/DL (ref 0.1–1.5)
BILIRUB UR QL STRIP.AUTO: NEGATIVE
BUN SERPL-MCNC: 12 MG/DL (ref 8–22)
CALCIUM ALBUM COR SERPL-MCNC: 8.3 MG/DL (ref 8.5–10.5)
CALCIUM SERPL-MCNC: 8.1 MG/DL (ref 8.5–10.5)
CHLORIDE SERPL-SCNC: 93 MMOL/L (ref 96–112)
CO2 SERPL-SCNC: 27 MMOL/L (ref 20–33)
COLOR UR: YELLOW
CREAT SERPL-MCNC: 0.78 MG/DL (ref 0.5–1.4)
EKG IMPRESSION: NORMAL
EOSINOPHIL # BLD AUTO: 0.01 K/UL (ref 0–0.51)
EOSINOPHIL NFR BLD: 0.2 % (ref 0–6.9)
EPI CELLS #/AREA URNS HPF: NEGATIVE /HPF
ERYTHROCYTE [DISTWIDTH] IN BLOOD BY AUTOMATED COUNT: 49.1 FL (ref 35.9–50)
GFR SERPLBLD CREATININE-BSD FMLA CKD-EPI: 81 ML/MIN/1.73 M 2
GLOBULIN SER CALC-MCNC: 2.3 G/DL (ref 1.9–3.5)
GLUCOSE SERPL-MCNC: 109 MG/DL (ref 65–99)
GLUCOSE UR STRIP.AUTO-MCNC: NEGATIVE MG/DL
HCT VFR BLD AUTO: 36.3 % (ref 37–47)
HGB BLD-MCNC: 12 G/DL (ref 12–16)
HYALINE CASTS #/AREA URNS LPF: ABNORMAL /LPF
IMM GRANULOCYTES # BLD AUTO: 0.04 K/UL (ref 0–0.11)
IMM GRANULOCYTES NFR BLD AUTO: 0.6 % (ref 0–0.9)
KETONES UR STRIP.AUTO-MCNC: NEGATIVE MG/DL
LACTATE SERPL-SCNC: 0.6 MMOL/L (ref 0.5–2)
LEUKOCYTE ESTERASE UR QL STRIP.AUTO: ABNORMAL
LYMPHOCYTES # BLD AUTO: 1.26 K/UL (ref 1–4.8)
LYMPHOCYTES NFR BLD: 19.6 % (ref 22–41)
MCH RBC QN AUTO: 26 PG (ref 27–33)
MCHC RBC AUTO-ENTMCNC: 33.1 G/DL (ref 32.2–35.5)
MCV RBC AUTO: 78.6 FL (ref 81.4–97.8)
MICRO URNS: ABNORMAL
MONOCYTES # BLD AUTO: 0.99 K/UL (ref 0–0.85)
MONOCYTES NFR BLD AUTO: 15.4 % (ref 0–13.4)
NEUTROPHILS # BLD AUTO: 4.1 K/UL (ref 1.82–7.42)
NEUTROPHILS NFR BLD: 63.7 % (ref 44–72)
NITRITE UR QL STRIP.AUTO: NEGATIVE
NRBC # BLD AUTO: 0 K/UL
NRBC BLD-RTO: 0 /100 WBC (ref 0–0.2)
NT-PROBNP SERPL IA-MCNC: 189 PG/ML (ref 0–125)
PH UR STRIP.AUTO: 6.5 [PH] (ref 5–8)
PLATELET # BLD AUTO: 209 K/UL (ref 164–446)
PMV BLD AUTO: 10.5 FL (ref 9–12.9)
POTASSIUM SERPL-SCNC: 4.1 MMOL/L (ref 3.6–5.5)
PROCALCITONIN SERPL-MCNC: 0.07 NG/ML
PROT SERPL-MCNC: 6 G/DL (ref 6–8.2)
PROT UR QL STRIP: NEGATIVE MG/DL
RBC # BLD AUTO: 4.62 M/UL (ref 4.2–5.4)
RBC # URNS HPF: ABNORMAL /HPF
RBC UR QL AUTO: NEGATIVE
SARS-COV-2 RNA RESP QL NAA+PROBE: DETECTED
SODIUM SERPL-SCNC: 129 MMOL/L (ref 135–145)
SP GR UR STRIP.AUTO: 1
SPECIMEN SOURCE: ABNORMAL
UROBILINOGEN UR STRIP.AUTO-MCNC: 0.2 MG/DL
WBC # BLD AUTO: 6.4 K/UL (ref 4.8–10.8)
WBC #/AREA URNS HPF: ABNORMAL /HPF

## 2023-08-15 PROCEDURE — 770020 HCHG ROOM/CARE - TELE (206)

## 2023-08-15 PROCEDURE — 87040 BLOOD CULTURE FOR BACTERIA: CPT | Mod: 91

## 2023-08-15 PROCEDURE — 71275 CT ANGIOGRAPHY CHEST: CPT

## 2023-08-15 PROCEDURE — 93005 ELECTROCARDIOGRAM TRACING: CPT

## 2023-08-15 PROCEDURE — 96365 THER/PROPH/DIAG IV INF INIT: CPT

## 2023-08-15 PROCEDURE — 85025 COMPLETE CBC W/AUTO DIFF WBC: CPT

## 2023-08-15 PROCEDURE — 87077 CULTURE AEROBIC IDENTIFY: CPT

## 2023-08-15 PROCEDURE — 700117 HCHG RX CONTRAST REV CODE 255: Mod: UD | Performed by: EMERGENCY MEDICINE

## 2023-08-15 PROCEDURE — 71045 X-RAY EXAM CHEST 1 VIEW: CPT

## 2023-08-15 PROCEDURE — 99285 EMERGENCY DEPT VISIT HI MDM: CPT

## 2023-08-15 PROCEDURE — 99223 1ST HOSP IP/OBS HIGH 75: CPT | Mod: AI,GC | Performed by: INTERNAL MEDICINE

## 2023-08-15 PROCEDURE — 700111 HCHG RX REV CODE 636 W/ 250 OVERRIDE (IP): Mod: JZ,UD | Performed by: EMERGENCY MEDICINE

## 2023-08-15 PROCEDURE — 93005 ELECTROCARDIOGRAM TRACING: CPT | Performed by: EMERGENCY MEDICINE

## 2023-08-15 PROCEDURE — 84145 PROCALCITONIN (PCT): CPT

## 2023-08-15 PROCEDURE — 81001 URINALYSIS AUTO W/SCOPE: CPT

## 2023-08-15 PROCEDURE — 87186 SC STD MICRODIL/AGAR DIL: CPT

## 2023-08-15 PROCEDURE — 83880 ASSAY OF NATRIURETIC PEPTIDE: CPT

## 2023-08-15 PROCEDURE — 87086 URINE CULTURE/COLONY COUNT: CPT

## 2023-08-15 PROCEDURE — 700105 HCHG RX REV CODE 258: Mod: UD | Performed by: EMERGENCY MEDICINE

## 2023-08-15 PROCEDURE — A9270 NON-COVERED ITEM OR SERVICE: HCPCS

## 2023-08-15 PROCEDURE — 87635 SARS-COV-2 COVID-19 AMP PRB: CPT

## 2023-08-15 PROCEDURE — 83605 ASSAY OF LACTIC ACID: CPT

## 2023-08-15 PROCEDURE — 96375 TX/PRO/DX INJ NEW DRUG ADDON: CPT

## 2023-08-15 PROCEDURE — 700101 HCHG RX REV CODE 250: Mod: UD | Performed by: EMERGENCY MEDICINE

## 2023-08-15 PROCEDURE — 36415 COLL VENOUS BLD VENIPUNCTURE: CPT

## 2023-08-15 PROCEDURE — 80053 COMPREHEN METABOLIC PANEL: CPT

## 2023-08-15 PROCEDURE — 700102 HCHG RX REV CODE 250 W/ 637 OVERRIDE(OP)

## 2023-08-15 RX ORDER — ESCITALOPRAM OXALATE 10 MG/1
10 TABLET ORAL DAILY
Status: DISCONTINUED | OUTPATIENT
Start: 2023-08-15 | End: 2023-08-26 | Stop reason: HOSPADM

## 2023-08-15 RX ORDER — TRAZODONE HYDROCHLORIDE 50 MG/1
50 TABLET ORAL ONCE
Status: COMPLETED | OUTPATIENT
Start: 2023-08-16 | End: 2023-08-16

## 2023-08-15 RX ORDER — CEFTRIAXONE 2 G/1
2000 INJECTION, POWDER, FOR SOLUTION INTRAMUSCULAR; INTRAVENOUS ONCE
Status: COMPLETED | OUTPATIENT
Start: 2023-08-15 | End: 2023-08-15

## 2023-08-15 RX ORDER — LEVETIRACETAM 250 MG/1
250 TABLET ORAL 3 TIMES DAILY
Status: DISCONTINUED | OUTPATIENT
Start: 2023-08-15 | End: 2023-08-26 | Stop reason: HOSPADM

## 2023-08-15 RX ORDER — SODIUM CHLORIDE 9 MG/ML
1000 INJECTION, SOLUTION INTRAVENOUS ONCE
Status: COMPLETED | OUTPATIENT
Start: 2023-08-15 | End: 2023-08-15

## 2023-08-15 RX ORDER — SIMVASTATIN 40 MG
40 TABLET ORAL NIGHTLY
Status: DISCONTINUED | OUTPATIENT
Start: 2023-08-15 | End: 2023-08-26 | Stop reason: HOSPADM

## 2023-08-15 RX ORDER — MIRTAZAPINE 15 MG/1
7.5 TABLET, FILM COATED ORAL NIGHTLY
Status: DISCONTINUED | OUTPATIENT
Start: 2023-08-15 | End: 2023-08-26 | Stop reason: HOSPADM

## 2023-08-15 RX ORDER — DIVALPROEX SODIUM 250 MG/1
250 TABLET, EXTENDED RELEASE ORAL DAILY
Status: DISCONTINUED | OUTPATIENT
Start: 2023-08-15 | End: 2023-08-26 | Stop reason: HOSPADM

## 2023-08-15 RX ORDER — DEXAMETHASONE 4 MG/1
6 TABLET ORAL DAILY
Status: COMPLETED | OUTPATIENT
Start: 2023-08-15 | End: 2023-08-24

## 2023-08-15 RX ORDER — ALBUTEROL SULFATE 90 UG/1
2 AEROSOL, METERED RESPIRATORY (INHALATION) EVERY 4 HOURS PRN
Status: DISCONTINUED | OUTPATIENT
Start: 2023-08-15 | End: 2023-08-26 | Stop reason: HOSPADM

## 2023-08-15 RX ORDER — ASPIRIN 81 MG/1
81 TABLET ORAL DAILY
Status: DISCONTINUED | OUTPATIENT
Start: 2023-08-15 | End: 2023-08-26 | Stop reason: HOSPADM

## 2023-08-15 RX ORDER — LEVOTHYROXINE SODIUM 0.12 MG/1
125 TABLET ORAL
Status: DISCONTINUED | OUTPATIENT
Start: 2023-08-16 | End: 2023-08-26 | Stop reason: HOSPADM

## 2023-08-15 RX ORDER — AZITHROMYCIN 500 MG/5ML
500 INJECTION, POWDER, LYOPHILIZED, FOR SOLUTION INTRAVENOUS ONCE
Status: COMPLETED | OUTPATIENT
Start: 2023-08-15 | End: 2023-08-15

## 2023-08-15 RX ORDER — NITROGLYCERIN 0.4 MG/1
0.4 TABLET SUBLINGUAL
Status: DISCONTINUED | OUTPATIENT
Start: 2023-08-15 | End: 2023-08-26 | Stop reason: HOSPADM

## 2023-08-15 RX ORDER — ALENDRONATE SODIUM 70 MG/1
70 TABLET ORAL
Status: DISCONTINUED | OUTPATIENT
Start: 2023-08-15 | End: 2023-08-15

## 2023-08-15 RX ADMIN — SIMVASTATIN 40 MG: 40 TABLET, FILM COATED ORAL at 21:02

## 2023-08-15 RX ADMIN — ESCITALOPRAM OXALATE 10 MG: 10 TABLET ORAL at 18:25

## 2023-08-15 RX ADMIN — AZITHROMYCIN 500 MG: 500 INJECTION, POWDER, LYOPHILIZED, FOR SOLUTION INTRAVENOUS at 14:35

## 2023-08-15 RX ADMIN — LEVETIRACETAM 250 MG: 500 TABLET, FILM COATED ORAL at 18:25

## 2023-08-15 RX ADMIN — DIVALPROEX SODIUM 250 MG: 250 TABLET, EXTENDED RELEASE ORAL at 18:25

## 2023-08-15 RX ADMIN — CLOZAPINE 150 MG: 25 TABLET ORAL at 23:24

## 2023-08-15 RX ADMIN — IOHEXOL 100 ML: 350 INJECTION, SOLUTION INTRAVENOUS at 13:32

## 2023-08-15 RX ADMIN — CEFTRIAXONE SODIUM 2000 MG: 2 INJECTION, POWDER, FOR SOLUTION INTRAMUSCULAR; INTRAVENOUS at 12:36

## 2023-08-15 RX ADMIN — DEXAMETHASONE 6 MG: 4 TABLET ORAL at 18:25

## 2023-08-15 RX ADMIN — ASPIRIN 81 MG: 81 TABLET, COATED ORAL at 18:25

## 2023-08-15 RX ADMIN — MIRTAZAPINE 7.5 MG: 15 TABLET, FILM COATED ORAL at 21:01

## 2023-08-15 RX ADMIN — TIOTROPIUM BROMIDE INHALATION SPRAY 5 MCG: 3.12 SPRAY, METERED RESPIRATORY (INHALATION) at 20:13

## 2023-08-15 RX ADMIN — SODIUM CHLORIDE 1000 ML: 9 INJECTION, SOLUTION INTRAVENOUS at 12:03

## 2023-08-15 ASSESSMENT — LIFESTYLE VARIABLES
TOTAL SCORE: 0
EVER HAD A DRINK FIRST THING IN THE MORNING TO STEADY YOUR NERVES TO GET RID OF A HANGOVER: NO
AVERAGE NUMBER OF DAYS PER WEEK YOU HAVE A DRINK CONTAINING ALCOHOL: 0
DOES PATIENT WANT TO STOP DRINKING: NO
HAVE YOU EVER FELT YOU SHOULD CUT DOWN ON YOUR DRINKING: NO
TOTAL SCORE: 0
ON A TYPICAL DAY WHEN YOU DRINK ALCOHOL HOW MANY DRINKS DO YOU HAVE: 0
CONSUMPTION TOTAL: NEGATIVE
HAVE PEOPLE ANNOYED YOU BY CRITICIZING YOUR DRINKING: NO
HOW MANY TIMES IN THE PAST YEAR HAVE YOU HAD 5 OR MORE DRINKS IN A DAY: 0
TOTAL SCORE: 0
EVER FELT BAD OR GUILTY ABOUT YOUR DRINKING: NO

## 2023-08-15 ASSESSMENT — ENCOUNTER SYMPTOMS
HEADACHES: 0
DIARRHEA: 0
BLURRED VISION: 0
COUGH: 1
SHORTNESS OF BREATH: 0
ABDOMINAL PAIN: 0
FEVER: 0
PALPITATIONS: 0
WEAKNESS: 1
VOMITING: 0
DOUBLE VISION: 0
SORE THROAT: 0
NAUSEA: 0
CHILLS: 0
DIZZINESS: 0
HEARTBURN: 0

## 2023-08-15 ASSESSMENT — COGNITIVE AND FUNCTIONAL STATUS - GENERAL
PERSONAL GROOMING: A LITTLE
STANDING UP FROM CHAIR USING ARMS: A LOT
TOILETING: A LOT
DAILY ACTIVITIY SCORE: 14
MOBILITY SCORE: 12
WALKING IN HOSPITAL ROOM: A LOT
SUGGESTED CMS G CODE MODIFIER DAILY ACTIVITY: CK
HELP NEEDED FOR BATHING: A LOT
SUGGESTED CMS G CODE MODIFIER MOBILITY: CL
DRESSING REGULAR LOWER BODY CLOTHING: A LOT
DRESSING REGULAR UPPER BODY CLOTHING: A LOT
MOVING FROM LYING ON BACK TO SITTING ON SIDE OF FLAT BED: A LOT
MOVING TO AND FROM BED TO CHAIR: A LOT
CLIMB 3 TO 5 STEPS WITH RAILING: A LOT
EATING MEALS: A LITTLE
TURNING FROM BACK TO SIDE WHILE IN FLAT BAD: A LOT

## 2023-08-15 ASSESSMENT — FIBROSIS 4 INDEX
FIB4 SCORE: 1
FIB4 SCORE: 2.8

## 2023-08-15 ASSESSMENT — PATIENT HEALTH QUESTIONNAIRE - PHQ9
2. FEELING DOWN, DEPRESSED, IRRITABLE, OR HOPELESS: NOT AT ALL
SUM OF ALL RESPONSES TO PHQ9 QUESTIONS 1 AND 2: 0
1. LITTLE INTEREST OR PLEASURE IN DOING THINGS: NOT AT ALL

## 2023-08-15 NOTE — ED PROVIDER NOTES
ED Provider Note    Scribed for Gio Coleman M.D. by Carolina Meyers. 8/15/2023  11:37 AM    Primary care provider: VANESSA Torres  Means of arrival: EMS    CHIEF COMPLAINT  Chief Complaint   Patient presents with    Weakness     X1 day weakness, EMS states O2 81% on arrival       LIMITATION TO HISTORY   Poor historian    HPI    Tracie Rinaldi is a 73 y.o. female who presents to the Emergency Department for constant generalized weakness onset 2-3 months ago. The patient states when she ambulates she looses her balance. Per patient, her caregiver at home prompted her to present to the ED today. The patient reports she is able to ambulate into a restaurant without difficulty. She is not on oxygen at home. She reports associated cough, mild leg swelling, and abdominal pain but denies any fever, vomiting, bloody or black stools, diarrhea or dysuria. She denies any major medical problems, but states she takes daily medications and is unsure for what. She denies any history of stroke. HPI limited secondary to poor historian.     OUTSIDE HISTORIAN(S):  EMS: Relayed that she had weakness, fatigue, nausea, and possible UTI. She was hypoxic to 81 % placed on 6 liters and went to 98%.    EXTERNAL RECORDS REVIEWED  Progress note July 17th. She lives in a group home. She has schizophrenia and hypothyroidism. Elevated blood pressure on July 17th visit.     PAST MEDICAL HISTORY  Past Medical History:   Diagnosis Date    Anxiety     Bladder spasm     Dental disorder     upper and lower dentures    Depression     Dermatitis     GERD (gastroesophageal reflux disease)     Hearing loss of right ear due to cerumen impaction     Hyperlipidemia     Hypothyroid     Other emphysema (HCC)     Pneumonia     2012    Psychiatric disorder     schizophrenia    Schizophrenia (HCC)     Seizure (HCC)     Stroke (HCC)     tia june 2015 no residual     Unspecified urinary incontinence     diapers     Vitamin D deficiency        FAMILY  HISTORY  Family History   Problem Relation Age of Onset    Alzheimer's Disease Mother     Heart Disease Father        SOCIAL HISTORY  Social History     Tobacco Use    Smoking status: Former     Packs/day: 0.50     Years: 45.00     Pack years: 22.50     Types: Cigarettes    Smokeless tobacco: Never    Tobacco comments:     unk   Vaping Use    Vaping Use: Never used   Substance Use Topics    Alcohol use: No     Comment: unk    Drug use: No     Comment: unk     Social History     Substance and Sexual Activity   Drug Use No    Comment: unk       SURGICAL HISTORY  Past Surgical History:   Procedure Laterality Date    IRRIGATION & DEBRIDEMENT ORTHO Left 10/17/2015    Procedure: IRRIGATION & DEBRIDEMENT ORTHO foot;  Surgeon: Jluis Becker M.D.;  Location: SURGERY [facility];  Service:     CLOSED REDUCTION Left 7/9/2015    Procedure: CLOSED REDUCTION -ATTEMPTED;  Surgeon: Jluis Becker M.D.;  Location: SURGERY [facility];  Service:     PIN INSERTION  7/9/2015    Procedure: PIN INSERTION FOOT;  Surgeon: Jluis Becker M.D.;  Location: SURGERY [facility];  Service:        CURRENT MEDICATIONS    Current Facility-Administered Medications:     Respiratory Therapy Consult, , Nebulization, Continuous RT, Norbert Hensley D.O.    [START ON 8/16/2023] cefTRIAXone (Rocephin) syringe 2,000 mg, 2,000 mg, Intravenous, Q24HRS, Norbert Hensley D.O.    Current Outpatient Medications:     levothyroxine (SYNTHROID) 125 MCG Tab, Take 1 Tablet by mouth every morning on an empty stomach., Disp: 30 Tablet, Rfl: 6    alendronate (FOSAMAX) 70 MG Tab, Take 1 Tablet by mouth every 7 days., Disp: 16 Tablet, Rfl: 3    divalproex ER (DEPAKOTE ER) 250 MG TABLET SR 24 HR, Take 1 Tablet by mouth every day., Disp: 90 Tablet, Rfl: 3    aspirin (ASPIRIN LOW DOSE) 81 MG EC tablet, Take 1 Tablet by mouth every day., Disp: 30 Tablet, Rfl: 11    simvastatin (ZOCOR) 40 MG Tab, TAKE 1 TABLET BY MOUTH AT BEDTIME FOR HLD (Patient taking  "differently: Take 40 mg by mouth every evening. TAKE 1 TABLET BY MOUTH AT BEDTIME FOR HLD), Disp: 90 Tablet, Rfl: 3    levETIRAcetam (KEPPRA) 250 MG tablet, Take 1 Tablet by mouth 3 times a day., Disp: 270 Tablet, Rfl: 3    mirtazapine (REMERON) 15 MG Tab, Take 0.5 Tablets by mouth every evening. Q hs and 1 dose 7.5 mg prn, Disp: 30 Tablet, Rfl: 5    Vitamin D, Cholecalciferol, (CHOLECALCIFEROL) 25 MCG (1000 UT) Tab, Take 1 Tablet by mouth every day., Disp: 60 Tablet, Rfl: 6    Umeclidinium Bromide (INCRUSE ELLIPTA) 62.5 MCG/ACT AEROSOL POWDER, BREATH ACTIVATED, INHALE 1 PUFF BY MOUTH ONCE DAILY (Patient taking differently: Inhale 1 Puff every day. INHALE 1 PUFF BY MOUTH ONCE DAILY), Disp: 30 Each, Rfl: 4    escitalopram (LEXAPRO) 10 MG Tab, Take 10 mg by mouth every day., Disp: , Rfl:     hydrophilic ointment (AQUAPHOR) Ointment, Apply 1 Each topically at bedtime. Apply to hands, Disp: , Rfl:     cloZAPine (CLOZARIL) 100 MG Tab, Take 1.5 Tablets by mouth 2 times a day., Disp: 30 Tablet, Rfl: 0    VENTOLIN  (90 Base) MCG/ACT Aero Soln inhalation aerosol, Inhale 2 Puffs every four hours as needed for Shortness of Breath., Disp: 8.5 g, Rfl: 2    Mirabegron ER 50 MG TABLET SR 24 HR, Take 50 mg by mouth every day., Disp: , Rfl:     ALLERGIES  No Known Allergies    PHYSICAL EXAM  VITAL SIGNS: /68   Pulse 75   Temp 36.4 °C (97.5 °F) (Temporal)   Resp (!) 22   Ht 1.676 m (5' 6\")   Wt 93 kg (205 lb)   SpO2 94%   BMI 33.09 kg/m²   Reviewed and 87 % on room air  Constitutional: Well developed, Well nourished, Appears fatigued. Elevated BMI  HENT: Normocephalic, atraumatic, bilateral external ears normal, No intraoral erythema, edema, exudate. Parched and dry mucous membranes.   Eyes: PERRLA, conjunctiva pink, no scleral icterus.   Cardiovascular: Regular rate and rhythm. No murmurs, rubs or gallops.  No calf tenderness. 1+ pitting edema in both legs.  Respiratory: Lungs clear to auscultation bilaterally. " No wheezes, rales, or rhonchi.  Abdominal:  Abdomen soft, non-tender, non distended. No rebound, or guarding.    Skin: No erythema, no rash. No wounds or bruising.  Genitourinary: No costovertebral angle tenderness.   Musculoskeletal: no deformities.   Neurologic: Alert & oriented x 3, Poor historian, cranial nerves 2-12 intact by passive exam.  No focal deficit noted.  Psychiatric: Affect normal, Judgment normal, Mood normal.     LABS Ordered and Reviewed by Me:  Results for orders placed or performed during the hospital encounter of 08/15/23   Lactic acid (lactate)   Result Value Ref Range    Lactic Acid 0.6 0.5 - 2.0 mmol/L   CBC With Differential   Result Value Ref Range    WBC 6.4 4.8 - 10.8 K/uL    RBC 4.62 4.20 - 5.40 M/uL    Hemoglobin 12.0 12.0 - 16.0 g/dL    Hematocrit 36.3 (L) 37.0 - 47.0 %    MCV 78.6 (L) 81.4 - 97.8 fL    MCH 26.0 (L) 27.0 - 33.0 pg    MCHC 33.1 32.2 - 35.5 g/dL    RDW 49.1 35.9 - 50.0 fL    Platelet Count 209 164 - 446 K/uL    MPV 10.5 9.0 - 12.9 fL    Neutrophils-Polys 63.70 44.00 - 72.00 %    Lymphocytes 19.60 (L) 22.00 - 41.00 %    Monocytes 15.40 (H) 0.00 - 13.40 %    Eosinophils 0.20 0.00 - 6.90 %    Basophils 0.50 0.00 - 1.80 %    Immature Granulocytes 0.60 0.00 - 0.90 %    Nucleated RBC 0.00 0.00 - 0.20 /100 WBC    Neutrophils (Absolute) 4.10 1.82 - 7.42 K/uL    Lymphs (Absolute) 1.26 1.00 - 4.80 K/uL    Monos (Absolute) 0.99 (H) 0.00 - 0.85 K/uL    Eos (Absolute) 0.01 0.00 - 0.51 K/uL    Baso (Absolute) 0.03 0.00 - 0.12 K/uL    Immature Granulocytes (abs) 0.04 0.00 - 0.11 K/uL    NRBC (Absolute) 0.00 K/uL   Comp Metabolic Panel   Result Value Ref Range    Sodium 129 (L) 135 - 145 mmol/L    Potassium 4.1 3.6 - 5.5 mmol/L    Chloride 93 (L) 96 - 112 mmol/L    Co2 27 20 - 33 mmol/L    Anion Gap 9.0 7.0 - 16.0    Glucose 109 (H) 65 - 99 mg/dL    Bun 12 8 - 22 mg/dL    Creatinine 0.78 0.50 - 1.40 mg/dL    Calcium 8.1 (L) 8.5 - 10.5 mg/dL    Correct Calcium 8.3 (L) 8.5 - 10.5 mg/dL     AST(SGOT) 35 12 - 45 U/L    ALT(SGPT) 18 2 - 50 U/L    Alkaline Phosphatase 87 30 - 99 U/L    Total Bilirubin 0.3 0.1 - 1.5 mg/dL    Albumin 3.7 3.2 - 4.9 g/dL    Total Protein 6.0 6.0 - 8.2 g/dL    Globulin 2.3 1.9 - 3.5 g/dL    A-G Ratio 1.6 g/dL   Urinalysis    Specimen: Urine   Result Value Ref Range    Color Yellow     Character Clear     Specific Gravity 1.005 <1.035    Ph 6.5 5.0 - 8.0    Glucose Negative Negative mg/dL    Ketones Negative Negative mg/dL    Protein Negative Negative mg/dL    Bilirubin Negative Negative    Urobilinogen, Urine 0.2 Negative    Nitrite Negative Negative    Leukocyte Esterase Moderate (A) Negative    Occult Blood Negative Negative    Micro Urine Req Microscopic    proBrain Natriuretic Peptide, NT   Result Value Ref Range    NT-proBNP 189 (H) 0 - 125 pg/mL   SARS-CoV-2, PCR (24 Hour In-House) Collect NP swab in [facility]    Specimen: Respirate   Result Value Ref Range    SARS-CoV-2 Source NP Swab     SARS-CoV-2 by PCR DETECTED (AA)    URINE MICROSCOPIC (W/UA)   Result Value Ref Range    WBC 10-20 (A) /hpf    RBC 0-2 /hpf    Bacteria Moderate (A) None /hpf    Epithelial Cells Negative /hpf    Hyaline Cast 0-2 /lpf   ESTIMATED GFR   Result Value Ref Range    GFR (CKD-EPI) 81 >60 mL/min/1.73 m 2   Procalcitonin   Result Value Ref Range    Procalcitonin 0.07 <0.25 ng/mL   EKG   Result Value Ref Range    Report       [facility] Emergency Dept.    Test Date:  2023-08-15  Pt Name:    NGA OCAMPO               Department: ER  MRN:        [id number]                      Room:        15  Gender:     Female                       Technician: [id number]  :        1950                   Requested By:ER TRIAGE PROTOCOL  Order #:    [id number]                    Louisa MD:    Measurements  Intervals                                Axis  Rate:       77                           P:          68  MO:         158                          QRS:        9  QRSD:       94                            T:          67  QT:         385  QTc:        436    Interpretive Statements  Sinus rhythm  Abnormal R-wave progression, late transition  Borderline T abnormalities, lateral leads  Compared to ECG 04/17/2016 11:17:17  T-wave abnormality now present       EKG Interpretation by me    Indication: hypoxia    Rhythm: normal sinus   Rate: 77 at 10:53 PM  Axis: normal  Ectopy: none  Conduction: normal  ST/T Waves: no acute change  Q Waves: none  R Wave progression: normal  Hypertrophy changes: Absent    Comparison: No change from prior    Clinical Impression: Normal Sinus     RADIOLOGY  I have independently interpreted the chest X ray associated with this visit demonstrating left base opacity.  I am awaiting the final reading from the radiologist.     Final Radiology Report  CT-CTA CHEST PULMONARY ARTERY W/ RECONS   Final Result         1.  No evidence pulmonary embolism.   2.  Trace bilateral pleural effusions with bibasilar atelectasis.   3.  Trace pericardial effusion.   4.  Emphysematous changes.      Fleischner Society pulmonary nodule recommendations:   Nonapplicable      DX-CHEST-PORTABLE (1 VIEW)   Final Result      Nonspecific mild left basilar opacity could be scarring however cannot exclude atelectasis, small infiltrate or a small pleural effusion.        Radiologist interpretation have been reviewed by me.       ED COURSE:  11:37 AM - Patient seen and examined at bedside for generalized weakness. She is oriented x3.     INTERVENTIONS BY ME:  Medications   Respiratory Therapy Consult (has no administration in time range)   cefTRIAXone (Rocephin) syringe 2,000 mg (has no administration in time range)   NS infusion 1,000 mL (0 mL Intravenous Stopped 8/15/23 1602)   cefTRIAXone (Rocephin) injection 2,000 mg (2,000 mg Intravenous Given 8/15/23 1236)   iohexol (OMNIPAQUE) 350 mg/mL (IV) (100 mL Intravenous Given 8/15/23 1332)   azithromycin (ZITHROMAX) 500 mg in D5W 250 mL IVPB premix (0 mg Intravenous  Stopped 8/15/23 1535)       1:38 PM - CT shows pneumonia left greater than right base.    2:53 PM I discussed the patient's case and the above findings with [employer] medicine resident from [facility] who will consult.     I have discussed management of the patient with the following physicians and sources:    [employer] medicine resident from [facility]     MEDICAL DECISION MAKING:  PROBLEMS EVALUATED THIS VISIT:    Patient presents with weakness and hypoxia and appears to have both pneumonia and a urinary tract infection.  She will require admission for oxygen and IV antibiotics.  There is no evidence of anemia, GI bleed or pulmonary embolism.  Hypoxia places her at considerable risk of decompensation.  She was immediately placed on oxygen on arrival here.    RISK:  High given need for escalation of care to include admission     PLAN:  Admission for IV antibiotics, pneumonia, UTI and oxygen.    CONDITION: guarded.  Critical care given hypoxia and risk of decompensation is warranted.  Critical care time 33 minutes    FINAL IMPRESSION  1. Pneumonia of both lower lobes due to infectious organism    2. Hypoxia    3. Urinary tract infection without hematuria, site unspecified    4. Weakness      Critical care     Carolina PAZ ([place]key), am scribing for, and in the presence of, Gio Coleman M.D..    Electronically signed by: Carolina Meyers (Reina), 8/15/2023    Gio PAZ M.D. personally performed the services described in this documentation, as scribed by Carolina Meyers in my presence, and it is both accurate and complete.    The note accurately reflects work and decisions made by me.  Gio Coleman M.D.  8/15/2023  4:46 PM

## 2023-08-15 NOTE — ED TRIAGE NOTES
Chief Complaint   Patient presents with    Weakness     X1 day weakness, EMS states O2 81% on arrival      BIB EMS from [facility]. EMS was called due to pt having weakness and fatigue. EMS states nausea and possible UTI.   Pt's O2 was at 81% EMS put pt on 6 L, O2 went up to 98%.    A&O x3     Vitals:    08/15/23 1058   BP: 139/68   Pulse: 75   Resp: (!) 22   Temp: 36.4 °C (97.5 °F)   SpO2: 94%

## 2023-08-16 PROBLEM — F32.A DEPRESSION: Status: ACTIVE | Noted: 2023-08-16

## 2023-08-16 LAB
ALBUMIN SERPL BCP-MCNC: 4 G/DL (ref 3.2–4.9)
ALBUMIN/GLOB SERPL: 1.6 G/DL
ALP SERPL-CCNC: 87 U/L (ref 30–99)
ALT SERPL-CCNC: 20 U/L (ref 2–50)
ANION GAP SERPL CALC-SCNC: 10 MMOL/L (ref 7–16)
APTT PPP: 28.3 SEC (ref 24.7–36)
AST SERPL-CCNC: 34 U/L (ref 12–45)
BASOPHILS # BLD AUTO: 0.3 % (ref 0–1.8)
BASOPHILS # BLD: 0.02 K/UL (ref 0–0.12)
BILIRUB SERPL-MCNC: 0.2 MG/DL (ref 0.1–1.5)
BUN SERPL-MCNC: 11 MG/DL (ref 8–22)
CALCIUM ALBUM COR SERPL-MCNC: 8.5 MG/DL (ref 8.5–10.5)
CALCIUM SERPL-MCNC: 8.5 MG/DL (ref 8.5–10.5)
CHLORIDE SERPL-SCNC: 100 MMOL/L (ref 96–112)
CO2 SERPL-SCNC: 25 MMOL/L (ref 20–33)
CREAT SERPL-MCNC: 0.74 MG/DL (ref 0.5–1.4)
EKG IMPRESSION: NORMAL
EOSINOPHIL # BLD AUTO: 0.19 K/UL (ref 0–0.51)
EOSINOPHIL NFR BLD: 3 % (ref 0–6.9)
ERYTHROCYTE [DISTWIDTH] IN BLOOD BY AUTOMATED COUNT: 50.6 FL (ref 35.9–50)
GFR SERPLBLD CREATININE-BSD FMLA CKD-EPI: 86 ML/MIN/1.73 M 2
GLOBULIN SER CALC-MCNC: 2.5 G/DL (ref 1.9–3.5)
GLUCOSE SERPL-MCNC: 190 MG/DL (ref 65–99)
HCT VFR BLD AUTO: 40.6 % (ref 37–47)
HGB BLD-MCNC: 12.6 G/DL (ref 12–16)
IMM GRANULOCYTES # BLD AUTO: 0.06 K/UL (ref 0–0.11)
IMM GRANULOCYTES NFR BLD AUTO: 1 % (ref 0–0.9)
INR PPP: 0.93 (ref 0.87–1.13)
LYMPHOCYTES # BLD AUTO: 0.64 K/UL (ref 1–4.8)
LYMPHOCYTES NFR BLD: 10.2 % (ref 22–41)
MCH RBC QN AUTO: 25.1 PG (ref 27–33)
MCHC RBC AUTO-ENTMCNC: 31 G/DL (ref 32.2–35.5)
MCV RBC AUTO: 81 FL (ref 81.4–97.8)
MONOCYTES # BLD AUTO: 0.21 K/UL (ref 0–0.85)
MONOCYTES NFR BLD AUTO: 3.4 % (ref 0–13.4)
NEUTROPHILS # BLD AUTO: 5.13 K/UL (ref 1.82–7.42)
NEUTROPHILS NFR BLD: 82.1 % (ref 44–72)
NRBC # BLD AUTO: 0 K/UL
NRBC BLD-RTO: 0 /100 WBC (ref 0–0.2)
PLATELET # BLD AUTO: 212 K/UL (ref 164–446)
PMV BLD AUTO: 10.5 FL (ref 9–12.9)
POTASSIUM SERPL-SCNC: 4.2 MMOL/L (ref 3.6–5.5)
PROT SERPL-MCNC: 6.5 G/DL (ref 6–8.2)
PROTHROMBIN TIME: 12.4 SEC (ref 12–14.6)
RBC # BLD AUTO: 5.01 M/UL (ref 4.2–5.4)
SODIUM SERPL-SCNC: 135 MMOL/L (ref 135–145)
WBC # BLD AUTO: 6.3 K/UL (ref 4.8–10.8)

## 2023-08-16 PROCEDURE — A9270 NON-COVERED ITEM OR SERVICE: HCPCS

## 2023-08-16 PROCEDURE — 85610 PROTHROMBIN TIME: CPT

## 2023-08-16 PROCEDURE — 85730 THROMBOPLASTIN TIME PARTIAL: CPT

## 2023-08-16 PROCEDURE — 700111 HCHG RX REV CODE 636 W/ 250 OVERRIDE (IP)

## 2023-08-16 PROCEDURE — 770020 HCHG ROOM/CARE - TELE (206)

## 2023-08-16 PROCEDURE — 97166 OT EVAL MOD COMPLEX 45 MIN: CPT

## 2023-08-16 PROCEDURE — 80053 COMPREHEN METABOLIC PANEL: CPT

## 2023-08-16 PROCEDURE — 97162 PT EVAL MOD COMPLEX 30 MIN: CPT

## 2023-08-16 PROCEDURE — 700102 HCHG RX REV CODE 250 W/ 637 OVERRIDE(OP)

## 2023-08-16 PROCEDURE — 99232 SBSQ HOSP IP/OBS MODERATE 35: CPT | Mod: GC | Performed by: INTERNAL MEDICINE

## 2023-08-16 PROCEDURE — 85025 COMPLETE CBC W/AUTO DIFF WBC: CPT

## 2023-08-16 PROCEDURE — 93010 ELECTROCARDIOGRAM REPORT: CPT | Performed by: INTERNAL MEDICINE

## 2023-08-16 PROCEDURE — 36415 COLL VENOUS BLD VENIPUNCTURE: CPT

## 2023-08-16 RX ORDER — LORAZEPAM 2 MG/ML
2 INJECTION INTRAMUSCULAR ONCE
Status: COMPLETED | OUTPATIENT
Start: 2023-08-16 | End: 2023-08-16

## 2023-08-16 RX ADMIN — LEVETIRACETAM 250 MG: 500 TABLET, FILM COATED ORAL at 06:10

## 2023-08-16 RX ADMIN — CLOZAPINE 150 MG: 25 TABLET ORAL at 17:49

## 2023-08-16 RX ADMIN — CLOZAPINE 150 MG: 25 TABLET ORAL at 06:11

## 2023-08-16 RX ADMIN — DIVALPROEX SODIUM 250 MG: 250 TABLET, EXTENDED RELEASE ORAL at 06:10

## 2023-08-16 RX ADMIN — MIRTAZAPINE 7.5 MG: 15 TABLET, FILM COATED ORAL at 20:48

## 2023-08-16 RX ADMIN — LEVETIRACETAM 250 MG: 500 TABLET, FILM COATED ORAL at 12:42

## 2023-08-16 RX ADMIN — LORAZEPAM 2 MG: 2 INJECTION INTRAMUSCULAR; INTRAVENOUS at 22:59

## 2023-08-16 RX ADMIN — LORAZEPAM 2 MG: 2 INJECTION INTRAMUSCULAR; INTRAVENOUS at 03:14

## 2023-08-16 RX ADMIN — LEVETIRACETAM 250 MG: 500 TABLET, FILM COATED ORAL at 17:48

## 2023-08-16 RX ADMIN — ESCITALOPRAM OXALATE 10 MG: 10 TABLET ORAL at 06:10

## 2023-08-16 RX ADMIN — ASPIRIN 81 MG: 81 TABLET, COATED ORAL at 06:09

## 2023-08-16 RX ADMIN — DEXAMETHASONE 6 MG: 4 TABLET ORAL at 06:10

## 2023-08-16 RX ADMIN — TRAZODONE HYDROCHLORIDE 50 MG: 50 TABLET ORAL at 00:10

## 2023-08-16 RX ADMIN — TIOTROPIUM BROMIDE INHALATION SPRAY 5 MCG: 3.12 SPRAY, METERED RESPIRATORY (INHALATION) at 06:00

## 2023-08-16 RX ADMIN — LEVOTHYROXINE SODIUM 125 MCG: 0.12 TABLET ORAL at 06:10

## 2023-08-16 RX ADMIN — SIMVASTATIN 40 MG: 40 TABLET, FILM COATED ORAL at 20:48

## 2023-08-16 ASSESSMENT — COGNITIVE AND FUNCTIONAL STATUS - GENERAL
MOVING TO AND FROM BED TO CHAIR: A LOT
STANDING UP FROM CHAIR USING ARMS: A LITTLE
SUGGESTED CMS G CODE MODIFIER DAILY ACTIVITY: CK
TURNING FROM BACK TO SIDE WHILE IN FLAT BAD: A LITTLE
CLIMB 3 TO 5 STEPS WITH RAILING: TOTAL
PERSONAL GROOMING: A LITTLE
DRESSING REGULAR UPPER BODY CLOTHING: A LITTLE
SUGGESTED CMS G CODE MODIFIER MOBILITY: CL
DAILY ACTIVITIY SCORE: 15
MOVING FROM LYING ON BACK TO SITTING ON SIDE OF FLAT BED: UNABLE
TOILETING: A LOT
WALKING IN HOSPITAL ROOM: A LOT
DRESSING REGULAR LOWER BODY CLOTHING: A LOT
HELP NEEDED FOR BATHING: A LOT
EATING MEALS: A LITTLE
MOBILITY SCORE: 12

## 2023-08-16 ASSESSMENT — ENCOUNTER SYMPTOMS
CHILLS: 1
DOUBLE VISION: 0
VOMITING: 0
NAUSEA: 0
FOCAL WEAKNESS: 0
PHOTOPHOBIA: 0
DIARRHEA: 0
WEAKNESS: 1
FEVER: 0
HEARTBURN: 0
PALPITATIONS: 0
COUGH: 1

## 2023-08-16 ASSESSMENT — PATIENT HEALTH QUESTIONNAIRE - PHQ9
1. LITTLE INTEREST OR PLEASURE IN DOING THINGS: NOT AT ALL
2. FEELING DOWN, DEPRESSED, IRRITABLE, OR HOPELESS: NOT AT ALL
SUM OF ALL RESPONSES TO PHQ9 QUESTIONS 1 AND 2: 0

## 2023-08-16 ASSESSMENT — FIBROSIS 4 INDEX: FIB4 SCORE: 2.55

## 2023-08-16 ASSESSMENT — ACTIVITIES OF DAILY LIVING (ADL): TOILETING: REQUIRES ASSIST

## 2023-08-16 NOTE — DISCHARGE PLANNING
Received Choice form at 1038  Agency/Facility Name: [facility]  Referral sent per Choice form @ 1603 0063-  Agency/Facility Name: [facility]  Spoke To: Lucy  Outcome: SNF informed DPA they can accept. SNF won't be able to take Pt until 3 midnights have passed, and if they have an isolation bed available upon medical clearance.     ANALI CM notified.     [id number]-  Received Choice form at [phone]  Agency/Facility Name: [facility]  Referral sent per Choice form @ 1638

## 2023-08-16 NOTE — ASSESSMENT & PLAN NOTE
Acute hypoxia 2/2 to emphysematous changes and covid pneumonia  Still requiring oxygen at baseline.

## 2023-08-16 NOTE — PROGRESS NOTES
"[facility] Internal Medicine Daily Progress Note    Date of Service  8/16/2023    R Team: R KEVYN Sanchez Team   Attending: Alvin Gonzales M.d.  Senior Resident: Dr. Naresh Hensley  Intern:  Dr. Josh rooney MD  Contact Number: [phone]    Chief Complaint  Tracie Rinaldi is a 73 y.o. female admitted 8/15/2023 with weakness, cough.    Hospital Course  Tracie Rinaldi is a 73 y.o. female who presented 8/15/2023 with Lower extremities, weakness and hypoxia. When inquired when did your weakness start she states that \"its been years\" and wheel chair bound. She states that she was feeling lonely at group home and was not well taken care of. Patient seems poor historian to me and was not sure why is she in hospital and what is her medical problem although she was oriented to time, place and person. She denies chest pain, SOB but does cough with inspiration. She was asking food and most of the time incoherent answering questions.   ED course: pulse 75, RR 22, /68, O2 87%.   Labs were significant for Hyponatremia 129, mild hypocalcemia, lactic acid 0.6,  Chest X-ray remarkable for mild left basilar opacity could be scarring however cannot exclude atelectasis, small infiltrate or a small pleural effusion.  Chest CT: No evidence of PE, trace bilateral pleural effusions, trace pericardial effusion and  Emphysematous changes.  EKG showed T wave abnormalities.  8/16/2023: Hyponatremia resolved.    Interval Problem Update  Patient seen and evaluated at bedside this AM.  No overnight acute event reported by the night resident.  Does complain of cough, cold.  Vitals are stable except elevated blood pressure.  Denies chest pain dyspnea.  WBC trending down, CMP normal.    I have discussed this patient's plan of care and discharge plan at IDT rounds today with Case Management, Nursing, Nursing leadership, and other members of the IDT team.    Consultants/Specialty       Code Status  Full    Disposition  The patient is not " medically cleared for discharge to home or a post-acute facility.      I have placed the appropriate orders for post-discharge needs.    Review of Systems  Review of Systems   Constitutional:  Positive for chills. Negative for fever.   Eyes:  Negative for double vision and photophobia.   Respiratory:  Positive for cough.    Cardiovascular:  Negative for chest pain, palpitations and leg swelling.   Gastrointestinal:  Negative for diarrhea, heartburn, nausea and vomiting.   Neurological:  Positive for weakness. Negative for focal weakness.        Physical Exam  Temp:  [36 °C (96.8 °F)-36.6 °C (97.9 °F)] 36 °C (96.8 °F)  Pulse:  [66-81] 81  Resp:  [14-17] 16  BP: (129-154)/(60-84) 151/60  SpO2:  [92 %-99 %] 94 %    Physical Exam  Constitutional:       Appearance: Normal appearance.      Comments: Patient is incoherent and unclear about her condition   HENT:      Head: Normocephalic and atraumatic.      Mouth/Throat:      Mouth: Mucous membranes are moist.      Pharynx: Oropharynx is clear.   Eyes:      Extraocular Movements: Extraocular movements intact.   Cardiovascular:      Rate and Rhythm: Normal rate.      Heart sounds: Murmur heard.   Pulmonary:      Effort: Pulmonary effort is normal.      Breath sounds: Normal breath sounds.   Abdominal:      Palpations: Abdomen is soft.   Skin:     General: Skin is warm.   Neurological:      Mental Status: She is oriented to person, place, and time.         Fluids    Intake/Output Summary (Last 24 hours) at 8/16/2023 1640  Last data filed at 8/16/2023 1512  Gross per 24 hour   Intake 1500 ml   Output 3300 ml   Net -1800 ml       Laboratory  Recent Labs     08/15/23  1100 08/16/23  0058   WBC 6.4 6.3   RBC 4.62 5.01   HEMOGLOBIN 12.0 12.6   HEMATOCRIT 36.3* 40.6   MCV 78.6* 81.0*   MCH 26.0* 25.1*   MCHC 33.1 31.0*   RDW 49.1 50.6*   PLATELETCT 209 212   MPV 10.5 10.5     Recent Labs     08/15/23  1100 08/16/23  0058   SODIUM 129* 135   POTASSIUM 4.1 4.2   CHLORIDE 93* 100    CO2 27 25   GLUCOSE 109* 190*   BUN 12 11   CREATININE 0.78 0.74   CALCIUM 8.1* 8.5     Recent Labs     08/16/23  0058   APTT 28.3   INR 0.93               Imaging  CT-CTA CHEST PULMONARY ARTERY W/ RECONS   Final Result         1.  No evidence pulmonary embolism.   2.  Trace bilateral pleural effusions with bibasilar atelectasis.   3.  Trace pericardial effusion.   4.  Emphysematous changes.      Fleischner Society pulmonary nodule recommendations:   Nonapplicable      DX-CHEST-PORTABLE (1 VIEW)   Final Result      Nonspecific mild left basilar opacity could be scarring however cannot exclude atelectasis, small infiltrate or a small pleural effusion.           Assessment/Plan  Problem Representation:    * Acute pneumonia- (present on admission)  Assessment & Plan  Patient is poor historian but covid test detected likey Covid pneumonia.    Plan:  Continue O2 nasal canula  Dexamethasone 6mg  Rrespiratory therapy  F/u with repeat Chest X-ray      Depression  Assessment & Plan  Review of the chronic depression    Plan:  Continue home antidepressant medications      Hypoxia  Assessment & Plan  Acute hypoxia 2/2 to emphysematous changes and covid pneumonia    Plan:  Continue O2 on nasal canula  Repeat Lactic acid      Hyponatremia  Assessment & Plan  On admission to ED 8/15/23 sodium 129    Plan:  8/16/2023 sodium 135  F/u with CMP  Resolved    Hypothyroid  Assessment & Plan  Conitnue home levothyroxine  Repeat TSH, T4           VTE prophylaxis:     I have performed a physical exam and reviewed and updated ROS and Plan today (8/16/2023). In review of yesterday's note (8/15/2023), there are no changes except as documented above.

## 2023-08-16 NOTE — CARE PLAN
The patient is Stable - Low risk of patient condition declining or worsening    Shift Goals  Clinical Goals: titrate supplimental O2 and saturation will remain <90% this shift.  Patient Goals: get out of bed  Family Goals: Not present    Progress made toward(s) clinical / shift goals:  patient titrated down to 2L this shift. Pt has been performing ADL's independently such as feeding. Patient sat up in chair for a few hours and got back to bed with staff assistance. Isolation precautions remain.       Problem: Respiratory  Goal: Patient will achieve/maintain optimum respiratory ventilation and gas exchange  Outcome: Progressing     Problem: Self Care  Goal: Patient will have the ability to perform ADLs independently or with assistance (bathe, groom, dress, toilet and feed)  Outcome: Progressing     Problem: Fall Risk  Goal: Patient will remain free from falls  Outcome: Progressing       Patient is not progressing towards the following goals:

## 2023-08-16 NOTE — CARE PLAN
The patient is Watcher - Medium risk of patient condition declining or worsening    Shift Goals  Clinical Goals: monitor resp efforts, mentation, SOB, CP, pain management, decadron, rest  Patient Goals: no SOB, rest    Progress made toward(s) clinical / shift goals:    Problem: Care Map:  Optimal Outcome for the Pneumonia Patient  Goal: Collection and monitoring of appropriate tests and labs  Description: Target End Date:  end of day 1  Outcome: Progressing     Problem: Respiratory  Goal: Patient will achieve/maintain optimum respiratory ventilation and gas exchange  Description: Target End Date:  Prior to discharge or change in level of care    Document on Assessment flowsheet    1.  Assess and monitor rate, rhythm, depth and effort of respiration  2.  Breath sounds assessed qshift and/or as needed  3.  Assess O2 saturation, administer/titrate oxygen as ordered  4.  Position patient for maximum ventilatory efficiency  5.  Turn, cough, and deep breath with splinting to improve effectiveness  6.  Collaborate with RT to administer medication/treatments per order  7.  Encourage use of incentive spirometer and encourage patient to cough after use and utilize splinting techniques if applicable  8.  Airway suctioning  9.  Monitor sputum production for changes in color, consistency and frequency  10. Perform frequent oral hygiene  11. Alternate physical activity with rest periods  Outcome: Progressing     Problem: Risk for Aspiration  Goal: Patient's risk for aspiration will be absent or decrease  Description: Target End Date:  Prior to discharge or change in level of care    1.   Complete dysphagia screening on admission  2.   NPO until dysphagia screening complete or medically cleared  3.   Collaborate with Speech Therapy, Clinical Dietitian and interdisciplinary team  4.   Implement aspiration precautions  5.   Assist patient up to chair for meals  6.   Elevate head of bed 90 degrees if patient is unable to get out of  bed  7.   Encourage small bites  8.   Ensure foods/liquids are of appropriate consistency  9.   Assess for any signs/symptoms of aspiration  10. Assess breath sounds and vital signs after oral intake  Outcome: Progressing     Problem: Hemodynamics - Pneumonia  Goal: Patient's hemodynamics, fluid balance and neurologic status will be stable or improve  Description: Target End Date:  Prior to discharge or change in level of care    Document on Assessment and I/O flowsheet templates    1.  Monitor vital signs, pulse oximetry and cardiac monitor per provider order and/or policy  2.  Manage IV fluids and IV infusions  3.  Monitor intake and output  4.  Daily weights per unit policy or provider order  5.  Assess peripheral pulses and capillary refill  6.  Monitor body temperature and assist with comfort measures to reduce fever and chills  7.  Position patient for maximum circulation/cardiac output  8.  Assess for peripheral, sacral, periorbital and abdominal edema  9. Assess for signs of deterioration - tachycardia, tachypnea, dyspnea, hypertension, hypoxemia, pallor, changes in consciousness or restlessness  Outcome: Progressing     Problem: Self Care  Goal: Patient will have the ability to perform ADLs independently or with assistance (bathe, groom, dress, toilet and feed)  Description: Target End Date:  Prior to discharge or change in level of care    Document on ADL flowsheet    1.  Assess the capability and level of deficiency to perform ADLs  2.  Encourage family/care giver involvement  3.  Provide assistive devices  4.  Consider PT/OT evaluations  5.  Maintain support, give positive feedback, encourage self-care allowing extra time and verbal cuing as needed  6.  Avoid doing something for patients they can do themselves, but provide assistance as needed  7.  Assist in anticipating/planning individual needs  8.  Collaborate with Case Management and  to meet discharge needs  Outcome: Progressing      Problem: Discharge Planning - Pneumonia  Goal: Patient will verbalize understanding of lifestyle changes and therapeutic needs post discharge  Description: Target End Date:  Prior to discharge or change in level of care    1.  Assess need for home oxygen throughout admission.  2.  Discuss debilitating aspects of disease, length of convalescence, and recovery expectations. Identify self-care and homemaker needs.  3.  Reinforce importance of continuing effective coughing and deep-breathing exercises.  4.  Emphasize necessity for continuing antibiotic therapy for prescribed period.  5.  Review the importance of cessation of smoking.  6.  Outline steps to enhance general health and well-being: balanced rest and activity, well-rounded diet, avoidance of crowds during cold/flu season and persons with URIs.  7.  Stress importance of continuing medical follow-up and obtaining vaccinations as appropriate.  8.  Identify signs and symptoms requiring notification of health care provider: increasing dyspnea, chest pain, prolonged fatigue, weight loss, fever, chills, persistence of productive cough, changes in mentation.  9.  Instruct patient to avoid using antibiotics indiscriminately during minor viral infections.  10. Encourage Pneumovax and annual flu shots for high-risk patients.  11. Educate on importance of oral care in prevention of pneumonia  Outcome: Progressing     Problem: Knowledge Deficit - Standard  Goal: Patient and family/care givers will demonstrate understanding of plan of care, disease process/condition, diagnostic tests and medications  Description: Target End Date:  1-3 days or as soon as patient condition allows    Document in Patient Education    1.  Patient and family/caregiver oriented to unit, equipment, visitation policy and means for communicating concern  2.  Complete/review Learning Assessment  3.  Assess knowledge level of disease process/condition, treatment plan, diagnostic tests and  medications  4.  Explain disease process/condition, treatment plan, diagnostic tests and medications  Outcome: Progressing     Problem: Fall Risk  Goal: Patient will remain free from falls  Description: Target End Date:  Prior to discharge or change in level of care    Document interventions on the [place] Fall Risk Assessment    1.  Assess for fall risk factors  2.  Implement fall precautions  Outcome: Progressing     Problem: Skin Integrity  Goal: Skin integrity is maintained or improved  Description: Target End Date:  Prior to discharge or change in level of care    Document interventions on Skin Risk/Sebastian flowsheet groups and corresponding LDA    1.  Assess and monitor skin integrity, appearance and/or temperature  2.  Assess risk factors for impaired skin integrity and/or pressures ulcers  3.  Implement precautions to protect skin integrity in collaboration with interdisciplinary team  4.  Implement pressure ulcer prevention protocol if at risk for skin breakdown  5.  Confirm wound care consult if at risk for skin breakdown  6.  Ensure patient use of pressure relieving devices  (Low air loss bed, waffle overlay, heel protectors, ROHO cushion, etc)  Outcome: Progressing       Patient is not progressing towards the following goals:

## 2023-08-16 NOTE — HOSPITAL COURSE
"Tracie Rinaldi is a 73 y.o. female who presented 8/15/2023 with Lower extremities, weakness and hypoxia. When inquired when did your weakness start she states that \"its been years\" and wheel chair bound. She states that she was feeling lonely at group home and was not well taken care of. Patient seems poor historian to me and was not sure why is she in hospital and what is her medical problem although she was oriented to time, place and person. She denies chest pain, SOB but does cough with inspiration. She was asking food and most of the time incoherent answering questions.   ED course: pulse 75, RR 22, /68, O2 87%.   Labs were significant for Hyponatremia 129, mild hypocalcemia, lactic acid 0.6,  Chest X-ray remarkable for mild left basilar opacity could be scarring however cannot exclude atelectasis, small infiltrate or a small pleural effusion.  Chest CT: No evidence of PE, trace bilateral pleural effusions, trace pericardial effusion and  Emphysematous changes.  EKG showed T wave abnormalities.  Gradual resolution of COVID-19 symptoms ongoing fatigue, shortness of breath, cough.  Patient symptoms gradually improving on dexamethasone but still complains of shortness of breath desaturating on 4 L oxygen. Repeat CXR remarkable for right middle lobe consolidation concerning superimposed bacterial pneumonia. pt was experiencing delirium episode and discharge was cancelled.  Over the days, patient was saturating good amount of oxygen on minimal oxygen support.   "

## 2023-08-16 NOTE — RESPIRATORY CARE
" COPD EDUCATION by COPD CLINICAL EDUCATOR  8/16/2023 at 3:24 PM by Sophie Owen, RRT     Patient reviewed by COPD education team. Patient denies history/diagnosis of COPD. Per chart, patient has a cognitive impairment. Patient couldn't remember doing outpatient PFT on 8/8/2016 or why she is doing inhalers, therefore could not complete COPD education. Patient is a distant smoker, unable to define when she quit.   COPD Screen  COPD Risk Screening  Do you have a history of COPD?: Yes    COPD Assessment  COPD Clinical Specialists ONLY  COPD Education Initiated: Yes--Short Intervention (patient denies history/diagnosis of COPD. has inhalers and doesnt know why she is taking them. talked to her about her PFT performed on 8/8/2019 and doesnt remember the procedure. quit smoking?)  Is this a COPD exacerbation patient?: No  Referrals Initiated: Yes  Pulmonary Rehab: Declined  Smoking Cessation: N/A (quit smoking, doesnt know when she quit)  Hospice: Declined  Home Health Care: Declined  Mobile Urgent Care Services: Declined  [facility]: Declined  Private In-Home Care Agency: Declined  Interdisciplinary Rounds: Attendance at Rounds (30 Min)      Meds to Beds  Would the patient like to opt in for Bedside Medication Delivery at Discharge?: No     MY COPD ACTION PLAN     It is recommended that patients and physicians /healthcare providers complete this action plan together. This plan should be discussed at each physician visit and updated as needed.    The green, yellow and red zones show groups of symptoms of COPD. This list of symptoms is not comprehensive, and you may experience other symptoms. In the \"Actions\" column, your healthcare provider has recommended actions for you to take based on your symptoms.    Patient Name: Tracie Rinaldi   YOB: 1950   Last Updated on:     Green Zone:  I am doing well today Actions     Usual activitiy and exercise level   Take daily medications     " "Usual amounts of cough and phlegm/mucus   Use oxygen as prescribed     Sleep well at night   Continue regular exercise/diet plan     Appetite is good   At all times avoid cigarette smoke, inhaled irritants     Daily Medications (these medications are taken every day):                Yellow Zone:  I am having a bad day or a COPD flare Actions     More breathless than usual   Continue daily medications     I have less energy for my daily activities   Use quick relief inhaler as ordered     Increased or thicker phlegm/mucus   Use oxygen as prescribed     Using quick relief inhaler/nebulizer more often   Get plenty of rest     Swelling of ankles more than usual   Use pursed lip breathing     More coughing than usual   At all times avoid cigarette smoke, inhaled irritants     I feel like I have a \"chest cold\"     Poor sleep and my symptoms woke me up     My appetite is not good     My medicine is not helping      Call provider immediately if symptoms don’t improve     Continue daily medications, add rescue medications:               Medications to be used during a flare up, (as Discussed with Provider):              Red Zone:  I need urgent medical care Actions     Severe shortness of breath even at rest   Call 911 or seek medical care immediately     Not able to do any activity because of breathing      Fever or shaking chills      Feeling confused or very drowsy       Chest pains      Coughing up blood                  " Yes

## 2023-08-16 NOTE — THERAPY
"Physical Therapy   Initial Evaluation     Patient Name: Tracie Rinaldi  Age:  73 y.o., Sex:  female  Medical Record #: [id number]  Today's Date: 8/16/2023     Precautions  Precautions: Fall Risk    Assessment  Patient is 73 y.o. female that presented to acute with complaints of weakness and fatigue. Medical dx of COVID. PMHx significant for seizures, dementia, schizophrenia, COPD, osteoporosis. Patient presented to PT with gross motor and speech delay and decreased activity tolerance however anticipate she is likely near baseline functional mobility. Need to verify PLOF and social support as patient was limited historian. She mobilized as detailed below. Patient will not be actively followed for physical therapy services at this time, however may be seen if requested by physician for 1 more visit within 30 days to address any discharge or equipment needs.    Plan    Physical Therapy Initial Treatment Plan   Duration: Discharge Needs Only    DC Equipment Recommendations: None  Discharge Recommendations:  (anticipate patient as baseline and return to group home following medical DC if social supports able to provide required assist)       Subjective    \"I started using a wheelchair when I went to visit my sister. Now it is safer.\"     Objective       08/16/23 1015   Charge Group   PT Evaluation PT Evaluation Mod   Total Time Spent   PT Total Time Yes   PT Evaluation Time Spent (Mins) 21   PT Total Time Spent (Calculated) 21   Initial Contact Note    Initial Contact Note Order Received and Verified, Evaluation Only - Patient Does Not Require Further Acute Physical Therapy at this Time.  However, May Benefit from Post Acute Therapy for Higher Level Functional Deficits.   Precautions   Precautions Fall Risk   Vitals   O2 (LPM) 1   O2 Delivery Device Silicone Nasal Cannula   Pain 0 - 10 Group   Therapist Pain Assessment   (no pain complaint during session)   Prior Living Situation   Prior Services Continuous (24 Hour) " Care Giving Per Service   Housing / Facility Group Home   Equipment Owned Wheelchair   Lives with - Patient's Self Care Capacity Attendant / Paid Care Giver   Comments Per EMR and patient report patient lives at [facility]. Need to verify level of assist available   Prior Level of Functional Mobility   Bed Mobility Independent   Transfer Status Required Assist   Ambulation Unable To Determine At This Time   Assistive Devices Used Wheelchair   Wheelchair Required Assist   Comments Patient unable to provide complete subjective but reported assistance for WC transfers and prefers to be pushed in WC. Patient reported she was ambulatory but unable to provide timeline, patient reported WC use began when visiting sister?   Cognition    Cognition / Consciousness X   Level of Consciousness   (lethargic but alert when cued)   New Learning Impaired   Comments gross delay but followed commands with repetitive verbal cues. agreeable. question baseline cognition   Active ROM Upper Body   Comments patient moved BUE functionally during assessment   Active ROM Lower Body    Comments WFL for bed mobility and transfer   Strength Lower Body   Comments as above; gross BLE weakness/decreased endurance   Balance Assessment   Sitting Balance (Static) Fair   Sitting Balance (Dynamic) Fair -   Standing Balance (Static) Poor +   Standing Balance (Dynamic) Poor -   Weight Shift Sitting Fair   Weight Shift Standing Poor   Comments no overt LOB sitting EOB. HHA x2 for standing and transfer, unsteady on feet   Bed Mobility    Supine to Sit Supervised  (HOB elevated, appears capable of performing without)   Sit to Supine   (NT, left in WC)   Scooting Supervised  (seated)   Gait Analysis   Gait Level Of Assist   (deferred given unclear if patient ambulates at baseline and concern for safety)   Weight Bearing Status no restrictions   Vision Deficits Impacting Mobility NT   Functional Mobility   Sit to Stand Minimal Assist   Bed, Chair,  Wheelchair Transfer Moderate Assist   Transfer Method Stand Step  (small steps to weight shift)   How much difficulty does the patient currently have...   Turning over in bed (including adjusting bedclothes, sheets and blankets)? 3   Sitting down on and standing up from a chair with arms (e.g., wheelchair, bedside commode, etc.) 1   Moving from lying on back to sitting on the side of the bed? 2   How much help from another person does the patient currently need...   Moving to and from a bed to a chair (including a wheelchair)? 3   Need to walk in a hospital room? 2   Climbing 3-5 steps with a railing? 1   6 clicks Mobility Score 12   Education Group   Education Provided Role of Physical Therapist   Role of Physical Therapist Patient Response Patient;Acceptance;Explanation;Verbal Demonstration   Physical Therapy Initial Treatment Plan    Duration Discharge Needs Only   Anticipated Discharge Equipment and Recommendations   DC Equipment Recommendations None   Discharge Recommendations   (anticipate patient as baseline and return to group home following medical DC if social supports able to provide required assist)   Interdisciplinary Plan of Care Collaboration   IDT Collaboration with  Nursing;Occupational Therapist   Patient Position at End of Therapy Seated;Chair Alarm On;Call Light within Reach;Tray Table within Reach   Collaboration Comments RN aware of visit, response   Session Information   Date / Session Number  8/16 - DC needs only

## 2023-08-16 NOTE — H&P
"[facility] Internal Medicine History & Physical Note    Date of Service  8/15/2023    [facility] Team: R KEVYN Sanchez Team   Attending: Dr. Alvin Gonzales MD  Senior Resident: Dr. Naresh Hensley MD  Intern:  Dr. Josh Ward MD  Contact Number: [phone]    Primary Care Physician  VANESSA Torres    Consultants       Specialist Names:     Code Status  FULL    Chief Complaint  Chief Complaint   Patient presents with    Weakness     X1 day weakness, EMS states O2 81% on arrival       History of Presenting Illness (HPI):  Tracie Rinaldi is a 73 y.o. female who presented 8/15/2023 with Lower extremities, weakness and hypoxia. When inquired when did your weakness start she states that \"its been years\" and wheel chair bound. She states that she was feeling lonely at group home and was not well taken care of. Patient seems poor historian to me and was not sure why is she in hospital and what is her medical problem although she was oriented to time, place and person. She denies chest pain, SOB but does cough with inspiration. She was asking food and most of the time incoherent answering questions.   In the ED, pulse 75, RR 22, /68, O2 87%.   Labs were significant for Hyponatremia 129, mild hypocalcemia, lactic acid 0.6,  Chest X-ray remarkable for mild left basilar opacity could be scarring however cannot exclude atelectasis, small infiltrate or a small pleural effusion.  Chest CT: No evidence of PE, trace bilateral pleural effusions, trace pericardial effusion and  Emphysematous changes.  EKG showed T wave abnormalities.  Covid Positive    I discussed the plan of care with patient.    Review of Systems  Review of Systems   Constitutional:  Negative for chills and fever.   HENT:  Negative for hearing loss and sore throat.    Eyes:  Negative for blurred vision and double vision.   Respiratory:  Positive for cough. Negative for shortness of breath.    Cardiovascular:  Negative for chest pain, palpitations and leg swelling. "   Gastrointestinal:  Negative for abdominal pain, diarrhea, heartburn, nausea and vomiting.   Neurological:  Positive for weakness. Negative for dizziness and headaches.       Past Medical History   has a past medical history of Anxiety, Bladder spasm, Dental disorder, Depression, Dermatitis, GERD (gastroesophageal reflux disease), Hearing loss of right ear due to cerumen impaction, Hyperlipidemia, Hypothyroid, Other emphysema (formerly [facility]), Pneumonia, Psychiatric disorder, Schizophrenia (formerly [facility]), Seizure (formerly [facility]), Stroke (formerly [facility]), Unspecified urinary incontinence, and Vitamin D deficiency.    Surgical History   has a past surgical history that includes closed reduction (Left, 7/9/2015); pin insertion (7/9/2015); and irrigation & debridement ortho (Left, 10/17/2015).     Family History  family history includes Alzheimer's Disease in her mother; Heart Disease in her father.   Family history reviewed with patient.     Social History  Tobacco: none  Alcohol: none  Recreational drugs (illegal or prescription): none  Employment: none  Living Situation: group home  Recent Travel: none  Primary Care Provider: Reviewed    Other (stressors, spirituality, exposures): no specified    Allergies  No Known Allergies    Medications  Prior to Admission Medications   Prescriptions Last Dose Informant Patient Reported? Taking?   Mirabegron ER 50 MG TABLET SR 24 HR 8/14/2023 at 2000 MAR from Other Facility Yes No   Sig: Take 50 mg by mouth every day.   Umeclidinium Bromide (INCRUSE ELLIPTA) 62.5 MCG/ACT AEROSOL POWDER, BREATH ACTIVATED 8/14/2023 at 0800 MAR from Other Facility No No   Sig: INHALE 1 PUFF BY MOUTH ONCE DAILY   Patient taking differently: Inhale 1 Puff every day. INHALE 1 PUFF BY MOUTH ONCE DAILY   VENTOLIN  (90 Base) MCG/ACT Aero Soln inhalation aerosol PRN at PRN MAR from Other Facility No No   Sig: Inhale 2 Puffs every four hours as needed for Shortness of Breath.   Vitamin D, Cholecalciferol, (CHOLECALCIFEROL) 25 MCG (1000 UT)  Tab 8/14/2023 at 0800 MAR from Other Facility No No   Sig: Take 1 Tablet by mouth every day.   alendronate (FOSAMAX) 70 MG Tab 8/9/2023 at 0700 MAR from Other Facility No No   Sig: Take 1 Tablet by mouth every 7 days.   aspirin (ASPIRIN LOW DOSE) 81 MG EC tablet 8/14/2023 at 0800 MAR from Other Facility No No   Sig: Take 1 Tablet by mouth every day.   cloZAPine (CLOZARIL) 100 MG Tab 8/14/2023 at 1700 MAR from Other Facility No No   Sig: Take 1.5 Tablets by mouth 2 times a day.   divalproex ER (DEPAKOTE ER) 250 MG TABLET SR 24 HR 8/14/2023 at 0800 MAR from Other Facility No No   Sig: Take 1 Tablet by mouth every day.   escitalopram (LEXAPRO) 10 MG Tab 8/14/2023 at 800 MAR from Other Facility Yes No   Sig: Take 10 mg by mouth every day.   hydrophilic ointment (AQUAPHOR) Ointment 8/14/2023 at 1900 MAR from Other Facility Yes No   Sig: Apply 1 Each topically at bedtime. Apply to hands   levETIRAcetam (KEPPRA) 250 MG tablet 8/14/2023 at 1700 MAR from Other Facility No No   Sig: Take 1 Tablet by mouth 3 times a day.   levothyroxine (SYNTHROID) 125 MCG Tab 8/15/2023 at 0700 MAR from Other Facility No No   Sig: Take 1 Tablet by mouth every morning on an empty stomach.   mirtazapine (REMERON) 15 MG Tab 8/14/2023 at 2000 MAR from Other Facility No No   Sig: Take 0.5 Tablets by mouth every evening. Q hs and 1 dose 7.5 mg prn   simvastatin (ZOCOR) 40 MG Tab 8/14/2023 at 2000 MAR from Other Facility No No   Sig: TAKE 1 TABLET BY MOUTH AT BEDTIME FOR HLD   Patient taking differently: Take 40 mg by mouth every evening. TAKE 1 TABLET BY MOUTH AT BEDTIME FOR HLD      Facility-Administered Medications: None       Physical Exam  Temp:  [36.4 °C (97.5 °F)] 36.4 °C (97.5 °F)  Pulse:  [75-79] 75  Resp:  [13-22] 14  BP: (121-156)/(58-75) 133/67  SpO2:  [87 %-98 %] 95 %  Blood Pressure : 133/67   Temperature: 36.4 °C (97.5 °F)   Pulse: 75   Respiration: 14   Pulse Oximetry: 95 %       Physical Exam  Constitutional:       Appearance: She is  obese.      Comments: Oriented to time place person but not well responsice   HENT:      Head: Normocephalic and atraumatic.      Mouth/Throat:      Mouth: Mucous membranes are moist.      Pharynx: Oropharynx is clear.   Eyes:      Extraocular Movements: Extraocular movements intact.   Cardiovascular:      Rate and Rhythm: Normal rate and regular rhythm.      Pulses: Normal pulses.      Heart sounds: Normal heart sounds.   Pulmonary:      Breath sounds: Rhonchi and rales present.   Abdominal:      Palpations: Abdomen is soft.   Musculoskeletal:      Cervical back: No rigidity or tenderness.      Comments: Decreased or absent b/l knee reflexes   Psychiatric:         Mood and Affect: Mood normal.         Laboratory:  Recent Labs     08/15/23  1100   WBC 6.4   RBC 4.62   HEMOGLOBIN 12.0   HEMATOCRIT 36.3*   MCV 78.6*   MCH 26.0*   MCHC 33.1   RDW 49.1   PLATELETCT 209   MPV 10.5     Recent Labs     08/15/23  1100   SODIUM 129*   POTASSIUM 4.1   CHLORIDE 93*   CO2 27   GLUCOSE 109*   BUN 12   CREATININE 0.78   CALCIUM 8.1*     Recent Labs     08/15/23  1100   ALTSGPT 18   ASTSGOT 35   ALKPHOSPHAT 87   TBILIRUBIN 0.3   GLUCOSE 109*         Recent Labs     08/15/23  1229   NTPROBNP 189*         No results for input(s): TROPONINT in the last 72 hours.    Imaging:  CT-CTA CHEST PULMONARY ARTERY W/ RECONS   Final Result         1.  No evidence pulmonary embolism.   2.  Trace bilateral pleural effusions with bibasilar atelectasis.   3.  Trace pericardial effusion.   4.  Emphysematous changes.      Fleischner Society pulmonary nodule recommendations:   Nonapplicable      DX-CHEST-PORTABLE (1 VIEW)   Final Result      Nonspecific mild left basilar opacity could be scarring however cannot exclude atelectasis, small infiltrate or a small pleural effusion.          X-Ray:  I have personally reviewed the images and compared with prior images.  EKG:  I have personally reviewed the images and compared with prior  images.    Assessment/Plan:  Problem Representation:   I anticipate this patient will require at least two midnights for appropriate medical management, necessitating inpatient admission because of pneumonia    Patient will need a Telemetry bed on MEDICAL service .  The need is secondary to pneumonia and EKG abnormalities.    * Acute pneumonia- (present on admission)  Assessment & Plan  Patient is poor historian but covid test detected likey Covid pneumonia.    Plan:  Continue O2 nasal canula  Dexamethasone 6mg  Rrespiratory therapy  F/u with repeat Chest X-ray      Hypoxia  Assessment & Plan  Acute hypoxia 2/2 to emphysematous changes and covid pneumonia    Plan:  Continue O2 on nasal canula  Repeat Lactic acid      Hyponatremia  Assessment & Plan  On admission to ED 8/15/23 sodium 129    Plan:  Conitnue NS infusion  F/u with CMP  Monitor for signs of hyponatremia, confusion dizziness fatigue, low BP    Hypothyroid  Assessment & Plan  Conitnue home levothyroxine  Repeat TSH, T4        VTE prophylaxis: SCDs/TEDs

## 2023-08-16 NOTE — ASSESSMENT & PLAN NOTE
Pt lived In group home. She is poor historian but covid test positive    -Continue O2 nasal canula.  We will continue to titrate off oxygen therapy.  -Dexamethasone 6mg daily for total of 10 days ending on August 25.  -Respiratory therapy consult appreciated for PEEP evaluation  -Repeat Chest X-ray 8/22/23 remarkable for right middle lobe consolidation- superimposed bacterial infection?  Possible possible component of aspiration pneumonitis.  -Home oxygen evaluation.  Still requiring  mild oxygen therapy

## 2023-08-16 NOTE — THERAPY
"Occupational Therapy   Initial Evaluation     Patient Name: Tracie Rinaldi  Age:  73 y.o., Sex:  female  Medical Record #: [id number]  Today's Date: 8/16/2023     Precautions  Precautions: (P) Fall Risk    Assessment  Patient is 73 y.o. female who presents to acute w/ CPIVD pneumonia and hypoxic respiratory failure. PMH includes seizures, dementia, schizophrenia, and COPD. Pt reports she lives at a group home, they assist her to w/c, w/ dressing, and bathing. Pt likely close to functional baseline, however need to clarify with group home pts prior level of function. Will continue to follow for DC planning.     Plan    Occupational Therapy Initial Treatment Plan   Treatment Interventions: (P) Self Care / Activities of Daily Living, Neuro Re-Education / Balance, Therapeutic Exercises, Therapeutic Activity, Adaptive Equipment  Treatment Frequency: (P) 2 Times per Week  Duration: (P) Until Therapy Goals Met    DC Equipment Recommendations: (P) None  Discharge Recommendations: (P) Recommend home health for continued occupational therapy services (return to group home w/ HH)     Subjective    \"I started using a w/c when I went to visit my sister\"     Objective       08/16/23 1012   Charge Group   OT Evaluation OT Evaluation Mod   Total Time Spent   OT Evaluation (Minutes) 20   Initial Contact Note    Initial Contact Note Order Received and Verified, Occupational Therapy Evaluation in Progress with Full Report to Follow.   Prior Living Situation   Prior Services Intermittent Physical Support for ADL Per Service   Housing / Facility Group Home   Bathroom Set up Walk In Shower;Grab Bars;Shower Chair   Equipment Owned Wheelchair   Lives with - Patient's Self Care Capacity Attendant / Paid Care Giver   Comments Pt resides at group home, reports that they assist w/ dressing and bathing at baseline   Prior Level of ADL Function   Self Feeding Independent   Grooming / Hygiene Independent   Bathing Requires Assist   Dressing " Requires Assist   Toileting Requires Assist   Precautions   Precautions Fall Risk   Vitals   O2 (LPM) 1   O2 Delivery Device Silicone Nasal Cannula   Cognition    Cognition / Consciousness X   Level of Consciousness   (lethargic, aroused to voice)   New Learning Impaired   Active ROM Upper Body   Comments WFL   Strength Upper Body   Comments WFL   Balance Assessment   Sitting Balance (Static) Fair   Sitting Balance (Dynamic) Fair -   Standing Balance (Static) Poor +   Standing Balance (Dynamic) Poor   Weight Shift Sitting Fair   Weight Shift Standing Poor   Comments w/ B UE support   Bed Mobility    Supine to Sit Supervised   Sit to Supine   (NT in chair post)   Scooting Supervised   ADL Assessment   Eating Supervision   Grooming Supervision;Seated   Upper Body Dressing Supervision   Lower Body Dressing Maximal Assist  (reports she gets help with dressing at baseline)   How much help from another person does the patient currently need...   Putting on and taking off regular lower body clothing? 2   Bathing (including washing, rinsing, and drying)? 2   Toileting, which includes using a toilet, bedpan, or urinal? 2   Putting on and taking off regular upper body clothing? 3   Taking care of personal grooming such as brushing teeth? 3   Eating meals? 3   6 Clicks Daily Activity Score 15   Functional Mobility   Sit to Stand Minimal Assist   Bed, Chair, Wheelchair Transfer Moderate Assist   Mobility stand pivot from EOB > W/C   Activity Tolerance   Sitting in Chair 10+ min in w/c   Sitting Edge of Bed 5 min   Standing 2 min total   Patient / Family Goals   Patient / Family Goal #1 To go see her sister   Short Term Goals   Short Term Goal # 1 Pt will demo ADL txf w/ CGA   Short Term Goal # 2 Pt will demo toilet hygiene w/ CGA   Education Group   Role of Occupational Therapist Patient Response Patient;Acceptance;Explanation;Demonstration;Verbal Demonstration;Action Demonstration   Occupational Therapy Initial Treatment Plan     Treatment Interventions Self Care / Activities of Daily Living;Neuro Re-Education / Balance;Therapeutic Exercises;Therapeutic Activity;Adaptive Equipment   Treatment Frequency 2 Times per Week   Duration Until Therapy Goals Met   Problem List   Problem List Decreased Active Daily Living Skills;Decreased Homemaking Skills;Decreased Functional Mobility;Decreased Activity Tolerance;Safety Awareness Deficits / Cognition;Impaired Postural Control / Balance   Anticipated Discharge Equipment and Recommendations   DC Equipment Recommendations None   Discharge Recommendations Recommend home health for continued occupational therapy services  (return to group home w/ HH)   Interdisciplinary Plan of Care Collaboration   IDT Collaboration with  Nursing   Patient Position at End of Therapy Seated;Chair Alarm On;Call Light within Reach;Tray Table within Reach;Phone within Reach   Collaboration Comments report given   Session Information   Date / Session Number  8/16, 1 (1/2, 8/22)

## 2023-08-16 NOTE — DISCHARGE PLANNING
Case Management Discharge Planning    Admission Date: 8/15/2023  GMLOS: 4  ALOS: 1    6-Clicks ADL Score: 14  6-Clicks Mobility Score: 12    Anticipated Discharge Dispo: Discharge Disposition: D/T to SNF with Medicare cert in anticipation of skilled care (03)    DME Needed: No    Action(s) Taken: DC Assessment Complete (See below)    Escalations Completed: None    Medically Clear: No    Barriers to Discharge: Medical clearance    Is the patient up for discharge tomorrow: No    Care Transition Team Assessment    LMSW met with pt at bedside, pt oriented x4. Pt states she lives in a group home and uses a wheelchair for DME. Wheelchair is at bedside. Pt states all of her family has passed away and does not have much support.     Information Source  Orientation Level: Oriented to place, Oriented to time, Oriented to person, Oriented to situation  Information Given By: Patient  Who is responsible for making decisions for patient? : Patient    Elopement Risk  Legal Hold: No  Ambulatory or Self Mobile in Wheelchair: No-Not an Elopement Risk    Interdisciplinary Discharge Planning  Primary Care Physician: Dayne DIMAS  Lives with - Patient's Self Care Capacity: Alone and Unable to Care For Self  Patient or legal guardian wants to designate a caregiver: Yes  Caregiver name: Anamaria- supervisor at [facility]  Caregiver contact info: [phone]  ([facility] [place]) Authorization for Release of Health Information has been completed: Yes  Support Systems: Friends / Neighbors, Home Care Staff  Housing / Facility: long term  Name of Care Facility: [facility]  Able to Return to Previous ADL's: Yes  Mobility Issues: Yes  Patient Prefers to be Discharged to:: Home  Assistance Needed: Yes  Durable Medical Equipment: Walker (wheelchair)    Discharge Preparedness  What is your plan after discharge?: Skilled nursing facility  What are your discharge supports?:  (Hilda Delacruz)  Difficulity with ADLs: Walking  Difficulity with IADLs:  Cooking, Driving, Laundry, Shopping    Vision / Hearing Impairment  Vision Impairment : No  Hearing Impairment : No    Domestic Abuse  Have you ever been the victim of abuse or violence?: No  Physical Abuse or Sexual Abuse: No  Verbal Abuse or Emotional Abuse: No  Possible Abuse/Neglect Reported to:: Not Applicable    Discharge Risks or Barriers  Discharge risks or barriers?: Lives alone, no community support  Patient risk factors: Vulnerable adult, Cognitive / sensory / physical deficit, Complex medical needs    Anticipated Discharge Information  Discharge Disposition: D/T to SNF with Medicare cert in anticipation of skilled care (03)

## 2023-08-16 NOTE — PROGRESS NOTES
[facility] Medical Student PROGRESS NOTE     Attending: Dr. Gonzales    Resident: Dr. Ward    PATIENT: Tracie Rinaldi; [id number]; 1950    ID: 73 y.o. female with PMH of seizures, cognitive impairment, schizophrenia, COPD, and osteoarthritis admitted on 8/15 for COVID-pneumonia.     SUBJECTIVE: Patient is a poor historian. No acute events overnight. Patient reports having bad sleep, coughing and feeling cold all night. Denies chest pain or weakness in extremities.     OBJECTIVE:     Vitals:    08/16/23 0319 08/16/23 0330 08/16/23 0819 08/16/23 1201   BP: (!) 154/78  (!) 153/84 (!) 153/73   Pulse: 75  80 79   Resp: 16 17 16   Temp:   36.2 °C (97.2 °F) 36.5 °C (97.7 °F)   TempSrc: Temporal  Temporal Temporal   SpO2: 92% 93% 95% 99%   Weight:       Height:           Intake/Output Summary (Last 24 hours) at 8/16/2023 1341  Last data filed at 8/16/2023 0819  Gross per 24 hour   Intake --   Output 2350 ml   Net -2350 ml       PE:  General: Pleasant, in mild distress and mildly somnolent. Cognitively impaired but responsive.  HEENT: Decreased constriction bilateral. Visual fields intact.  Cardiovascular: II/VI systolic ejection murmur on right second intercostal space. Regular rhythm.   Respiratory: In mild respiratory distress. Low and high pitched expiratory wheezing in middle and lower lobes. On NC.    LABS:  Recent Labs     08/15/23  1100 08/16/23  0058   WBC 6.4 6.3   RBC 4.62 5.01   HEMOGLOBIN 12.0 12.6   HEMATOCRIT 36.3* 40.6   MCV 78.6* 81.0*   MCH 26.0* 25.1*   RDW 49.1 50.6*   PLATELETCT 209 212   MPV 10.5 10.5   NEUTSPOLYS 63.70 82.10*   LYMPHOCYTES 19.60* 10.20*   MONOCYTES 15.40* 3.40   EOSINOPHILS 0.20 3.00   BASOPHILS 0.50 0.30     Recent Labs     08/15/23  1100 08/16/23  0058   SODIUM 129* 135   POTASSIUM 4.1 4.2   CHLORIDE 93* 100   CO2 27 25   BUN 12 11   CREATININE 0.78 0.74   CALCIUM 8.1* 8.5   ALBUMIN 3.7 4.0     Estimated GFR/CRCL = Estimated Creatinine Clearance: 81.8 mL/min (by C-G formula based on  SCr of 0.74 mg/dL).  Recent Labs     08/15/23  1100 08/16/23  0058   GLUCOSE 109* 190*     Recent Labs     08/15/23  1100 08/16/23  0058   ASTSGOT 35 34   ALTSGPT 18 20   TBILIRUBIN 0.3 0.2   ALKPHOSPHAT 87 87   GLOBULIN 2.3 2.5   INR  --  0.93             Recent Labs     08/16/23  0058   INR 0.93   APTT 28.3           MEDS:  Current Facility-Administered Medications   Medication Last Admin    Respiratory Therapy Consult      dexamethasone (Decadron) tablet 6 mg 6 mg at 08/16/23 0610    aspirin EC tablet 81 mg 81 mg at 08/16/23 0609    tiotropium (Spiriva Respimat) 2.5 mcg/Act inhalation spray 5 mcg 5 mcg at 08/16/23 0600    albuterol inhaler 2 Puff      simvastatin (Zocor) tablet 40 mg 40 mg at 08/15/23 2102    mirtazapine (Remeron) tablet 7.5 mg 7.5 mg at 08/15/23 2101    escitalopram (Lexapro) tablet 10 mg 10 mg at 08/16/23 0610    divalproex ER (Depakote ER) tablet 250 mg 250 mg at 08/16/23 0610    cloZAPine (Clozaril) tablet 150 mg 150 mg at 08/16/23 0611    levETIRAcetam (Keppra) tablet 250 mg 250 mg at 08/16/23 1242    levothyroxine (Synthroid) tablet 125 mcg 125 mcg at 08/16/23 0610    nitroglycerin (Nitrostat) tablet 0.4 mg           ASSESSMENT/PLAN: Tracie Rinaldi is a 74 yo female with PMH of seizures, cognitive impairment, schizophrenia, COPD, osteoporosis admitted on 8/15 for COVID-pneumonia. Patient has started pharmacotherapy for COVID symptom relief and doing otherwise well.     COVID-pneumonia  COVID positive test and imaging showed emphysema and mild consolidation with COVID  Plan: Continue decadron 6mg and oxygen therapy. Monitor for improvement until patient is cleared to go back to group home.  Alternative: Can consider Remdesivir or Paxlovid if patient was hospitalized for any other reason besides COVID or at risk for high-disease progression per [facility] guidelines.   Hypoxia  Mild respiratory distress due to above  Plan: Continue oxygen therapy NC  Hyponatremia  Recent labwork showed  129 sodium  Plan: Continue normal saline. Monitor for signs of dangerous hyponatremia.  Hypothyroidism  Plan: Continue levothyroxine. Can repeat thyroid panel at next lab draw if necessary

## 2023-08-16 NOTE — DISCHARGE PLANNING
Case Management Discharge Planning    Admission Date: 8/15/2023  GMLOS: 4  ALOS: 1    6-Clicks ADL Score: 15  6-Clicks Mobility Score: 12    Anticipated Discharge Dispo: Discharge Disposition: D/T to SNF with Medicare cert in anticipation of skilled care (03)    LMSW obtained choice for HH to go visit pt at Group Home.  Pt positive for COVID and obtained verbal signature.  Form faxed to [facility].

## 2023-08-16 NOTE — PROGRESS NOTES
4 Eyes Skin Assessment Completed by MINAL May and MINAL Jett.    Head WDL  Ears WDL  Nose WDL  Mouth WDL  Neck WDL  Breast/Chest WDL  Shoulder Blades WDL  Spine WDL  (R) Arm/Elbow/Hand WDL  (L) Arm/Elbow/Hand WDL  Abdomen WDL  Groin WDL  Scrotum/Coccyx/Buttocks Redness and Blanching  (R) Leg Edema  (L) Leg Edema  (R) Heel/Foot/Toe Redness and Blanching  (L) Heel/Foot/Toe Redness and Blanching          Devices In Places Tele Box and Pulse Ox      Interventions In Place Gray Ear Foams and Pillows    Possible Skin Injury No    Pictures Uploaded Into Epic N/A  Wound Consult Placed N/A  RN Wound Prevention Protocol Ordered No

## 2023-08-16 NOTE — ASSESSMENT & PLAN NOTE
Review of the chronic depression.  Does not appear to be in exacerbation or crisis at this time    Plan:  Continue home antidepressant medications  Recommend CBT

## 2023-08-16 NOTE — PROGRESS NOTES
Bedside report received. Assumed care of patient this morning. Assessment completed      Patient is A&O x 3 (disoriented to event), pt educated to call for assistance appropriately  Reports 0 /10 pain  Pt is on 3L oxygen, baseline is RA.   Mobility 2x stand pivot to  Bed alarm in use.  Voiding +  Patient is on COVID isolation    Plan of care reviewed with the patient. Bed is locked and in the lowest position. Call light is within reach. Patient encouraged to voice needs and concerns, all needs met at this time. Hourly rounding in place.

## 2023-08-16 NOTE — SENIOR ADMIT NOTE
[facility]  Senior Admission Note      HPI  Tracie Rinaldi is a 73 y.o. female with a past medical history that includes history of seizures, dementia without behavioral disturbance, schizophrenia, COPD mixed type, age-related osteoporosis who presented on August 15, 2023 for COVID-pneumonia and hypoxic respiratory failure.    Physical Exam  Physical Exam  Vitals and nursing note reviewed.   Constitutional:       General: She is not in acute distress.     Appearance: Normal appearance. She is normal weight.   HENT:      Head: Normocephalic and atraumatic.      Right Ear: External ear normal.      Left Ear: External ear normal.   Cardiovascular:      Rate and Rhythm: Normal rate and regular rhythm.      Pulses: Normal pulses.      Heart sounds: Murmur heard.      No friction rub.   Pulmonary:      Effort: Pulmonary effort is normal. No respiratory distress.      Breath sounds: No stridor. Rhonchi and rales present. No wheezing.   Chest:      Chest wall: No tenderness.   Abdominal:      General: Abdomen is flat. There is no distension.      Tenderness: There is no abdominal tenderness. There is no guarding or rebound.      Hernia: No hernia is present.   Musculoskeletal:         General: No deformity or signs of injury.      Right lower le+ Edema present.      Left lower le+ Edema present.   Neurological:      Mental Status: She is alert.      Comments: Mildly confused.  Component of cognitive deficits present         Assessment  #COVID-pneumonia  #Acute hypoxic respiratory failure    Plan  -Decadron 6 mg daily for period of 10 days.  Holding antibiotics after dose of azithromycin given in emergency room due to clinical picture and imaging studies.  -Oxygen therapy as necessary.  -Continuing home inhalers    Discussed with my attending physician, Alvin Gonzales M.D.

## 2023-08-17 LAB
ALBUMIN SERPL BCP-MCNC: 3.7 G/DL (ref 3.2–4.9)
ALBUMIN/GLOB SERPL: 1.5 G/DL
ALP SERPL-CCNC: 81 U/L (ref 30–99)
ALT SERPL-CCNC: 15 U/L (ref 2–50)
ANION GAP SERPL CALC-SCNC: 9 MMOL/L (ref 7–16)
AST SERPL-CCNC: 24 U/L (ref 12–45)
BACTERIA UR CULT: ABNORMAL
BACTERIA UR CULT: ABNORMAL
BASOPHILS # BLD AUTO: 0.2 % (ref 0–1.8)
BASOPHILS # BLD: 0.01 K/UL (ref 0–0.12)
BILIRUB SERPL-MCNC: 0.2 MG/DL (ref 0.1–1.5)
BUN SERPL-MCNC: 11 MG/DL (ref 8–22)
CALCIUM ALBUM COR SERPL-MCNC: 8.5 MG/DL (ref 8.5–10.5)
CALCIUM SERPL-MCNC: 8.3 MG/DL (ref 8.5–10.5)
CHLORIDE SERPL-SCNC: 99 MMOL/L (ref 96–112)
CO2 SERPL-SCNC: 28 MMOL/L (ref 20–33)
CREAT SERPL-MCNC: 0.63 MG/DL (ref 0.5–1.4)
EOSINOPHIL # BLD AUTO: 0 K/UL (ref 0–0.51)
EOSINOPHIL NFR BLD: 0 % (ref 0–6.9)
ERYTHROCYTE [DISTWIDTH] IN BLOOD BY AUTOMATED COUNT: 50.6 FL (ref 35.9–50)
GFR SERPLBLD CREATININE-BSD FMLA CKD-EPI: 95 ML/MIN/1.73 M 2
GLOBULIN SER CALC-MCNC: 2.4 G/DL (ref 1.9–3.5)
GLUCOSE SERPL-MCNC: 158 MG/DL (ref 65–99)
HCT VFR BLD AUTO: 40.9 % (ref 37–47)
HGB BLD-MCNC: 12.6 G/DL (ref 12–16)
IMM GRANULOCYTES # BLD AUTO: 0.06 K/UL (ref 0–0.11)
IMM GRANULOCYTES NFR BLD AUTO: 0.9 % (ref 0–0.9)
LYMPHOCYTES # BLD AUTO: 1.11 K/UL (ref 1–4.8)
LYMPHOCYTES NFR BLD: 17.2 % (ref 22–41)
MAGNESIUM SERPL-MCNC: 2.3 MG/DL (ref 1.5–2.5)
MCH RBC QN AUTO: 25.1 PG (ref 27–33)
MCHC RBC AUTO-ENTMCNC: 30.8 G/DL (ref 32.2–35.5)
MCV RBC AUTO: 81.5 FL (ref 81.4–97.8)
MONOCYTES # BLD AUTO: 0.82 K/UL (ref 0–0.85)
MONOCYTES NFR BLD AUTO: 12.7 % (ref 0–13.4)
NEUTROPHILS # BLD AUTO: 4.47 K/UL (ref 1.82–7.42)
NEUTROPHILS NFR BLD: 69 % (ref 44–72)
NRBC # BLD AUTO: 0 K/UL
NRBC BLD-RTO: 0 /100 WBC (ref 0–0.2)
PLATELET # BLD AUTO: 226 K/UL (ref 164–446)
PMV BLD AUTO: 10 FL (ref 9–12.9)
POTASSIUM SERPL-SCNC: 4.2 MMOL/L (ref 3.6–5.5)
PROCALCITONIN SERPL-MCNC: <0.05 NG/ML
PROT SERPL-MCNC: 6.1 G/DL (ref 6–8.2)
RBC # BLD AUTO: 5.02 M/UL (ref 4.2–5.4)
SIGNIFICANT IND 70042: ABNORMAL
SITE SITE: ABNORMAL
SODIUM SERPL-SCNC: 136 MMOL/L (ref 135–145)
SOURCE SOURCE: ABNORMAL
WBC # BLD AUTO: 6.5 K/UL (ref 4.8–10.8)

## 2023-08-17 PROCEDURE — 85025 COMPLETE CBC W/AUTO DIFF WBC: CPT

## 2023-08-17 PROCEDURE — 36415 COLL VENOUS BLD VENIPUNCTURE: CPT

## 2023-08-17 PROCEDURE — A9270 NON-COVERED ITEM OR SERVICE: HCPCS

## 2023-08-17 PROCEDURE — 700102 HCHG RX REV CODE 250 W/ 637 OVERRIDE(OP)

## 2023-08-17 PROCEDURE — 770020 HCHG ROOM/CARE - TELE (206)

## 2023-08-17 PROCEDURE — 99232 SBSQ HOSP IP/OBS MODERATE 35: CPT | Mod: GC | Performed by: INTERNAL MEDICINE

## 2023-08-17 PROCEDURE — 84145 PROCALCITONIN (PCT): CPT

## 2023-08-17 PROCEDURE — 80053 COMPREHEN METABOLIC PANEL: CPT

## 2023-08-17 PROCEDURE — 83735 ASSAY OF MAGNESIUM: CPT

## 2023-08-17 RX ADMIN — LEVETIRACETAM 250 MG: 500 TABLET, FILM COATED ORAL at 17:09

## 2023-08-17 RX ADMIN — TIOTROPIUM BROMIDE INHALATION SPRAY 5 MCG: 3.12 SPRAY, METERED RESPIRATORY (INHALATION) at 06:00

## 2023-08-17 RX ADMIN — ESCITALOPRAM OXALATE 10 MG: 10 TABLET ORAL at 06:13

## 2023-08-17 RX ADMIN — LEVETIRACETAM 250 MG: 500 TABLET, FILM COATED ORAL at 06:13

## 2023-08-17 RX ADMIN — SIMVASTATIN 40 MG: 40 TABLET, FILM COATED ORAL at 21:13

## 2023-08-17 RX ADMIN — DEXAMETHASONE 6 MG: 4 TABLET ORAL at 06:13

## 2023-08-17 RX ADMIN — LEVETIRACETAM 250 MG: 500 TABLET, FILM COATED ORAL at 12:00

## 2023-08-17 RX ADMIN — CLOZAPINE 150 MG: 25 TABLET ORAL at 17:09

## 2023-08-17 RX ADMIN — DIVALPROEX SODIUM 250 MG: 250 TABLET, EXTENDED RELEASE ORAL at 06:13

## 2023-08-17 RX ADMIN — CLOZAPINE 150 MG: 25 TABLET ORAL at 06:14

## 2023-08-17 RX ADMIN — ASPIRIN 81 MG: 81 TABLET, COATED ORAL at 06:13

## 2023-08-17 RX ADMIN — LEVOTHYROXINE SODIUM 125 MCG: 0.12 TABLET ORAL at 06:13

## 2023-08-17 RX ADMIN — MIRTAZAPINE 7.5 MG: 15 TABLET, FILM COATED ORAL at 21:13

## 2023-08-17 ASSESSMENT — PAIN DESCRIPTION - PAIN TYPE: TYPE: ACUTE PAIN

## 2023-08-17 ASSESSMENT — ENCOUNTER SYMPTOMS
DIARRHEA: 0
FOCAL WEAKNESS: 0
WEAKNESS: 1
FEVER: 0
HEARTBURN: 0
PALPITATIONS: 0
DOUBLE VISION: 0
CHILLS: 1
VOMITING: 0
PHOTOPHOBIA: 0
NAUSEA: 0
COUGH: 1

## 2023-08-17 ASSESSMENT — PATIENT HEALTH QUESTIONNAIRE - PHQ9
1. LITTLE INTEREST OR PLEASURE IN DOING THINGS: NOT AT ALL
SUM OF ALL RESPONSES TO PHQ9 QUESTIONS 1 AND 2: 0
2. FEELING DOWN, DEPRESSED, IRRITABLE, OR HOPELESS: NOT AT ALL

## 2023-08-17 ASSESSMENT — FIBROSIS 4 INDEX: FIB4 SCORE: 1.95

## 2023-08-17 NOTE — CARE PLAN
"  Problem: Care Map:  Optimal Outcome for the Pneumonia Patient  Goal: Collection and monitoring of appropriate tests and labs  Outcome: Progressing     Problem: Respiratory  Goal: Patient will achieve/maintain optimum respiratory ventilation and gas exchange  Outcome: Progressing     Problem: Risk for Aspiration  Goal: Patient's risk for aspiration will be absent or decrease  Outcome: Progressing     Problem: Hemodynamics - Pneumonia  Goal: Patient's hemodynamics, fluid balance and neurologic status will be stable or improve  Outcome: Progressing     Problem: Self Care  Goal: Patient will have the ability to perform ADLs independently or with assistance (bathe, groom, dress, toilet and feed)  Outcome: Progressing     Problem: Discharge Planning - Pneumonia  Goal: Patient will verbalize understanding of lifestyle changes and therapeutic needs post discharge  Outcome: Progressing     Problem: Knowledge Deficit - Standard  Goal: Patient and family/care givers will demonstrate understanding of plan of care, disease process/condition, diagnostic tests and medications  Outcome: Progressing     Problem: Fall Risk  Goal: Patient will remain free from falls  Outcome: Progressing     Problem: Skin Integrity  Goal: Skin integrity is maintained or improved  Outcome: Progressing   The patient is Watcher - Medium risk of patient condition declining or worsening    Shift Goals  Clinical Goals: Monitor VS; safety from fall  Patient Goals: feel better  Family Goals: Not present    Progress made toward(s) clinical / shift goals:   patient still confused at times and was at the edge of the bed at one time verbalized that she wants to go back to her room, need to be re oriented about her location and situation, assisted on feeding, performed jana care and complete bed changed to promote comfort,BP (!) 150/90   Pulse 63   Temp 36.7 °C (98 °F) (Temporal)   Resp 15   Ht 1.676 m (5' 6\")   Wt 96.4 kg (212 lb 8.4 oz)   SpO2 94%  bed " alarm was kept on and call light within reach, will continue to monitor

## 2023-08-17 NOTE — DISCHARGE PLANNING
Case Management Discharge Planning    Admission Date: 8/15/2023  GMLOS: 5.2  ALOS: 2    6-Clicks ADL Score: 15    Anticipated Discharge Dispo: Discharge Disposition: D/T to Home [facility]    LMSW spoke with [facility], they will be accepting pt on service with them.,  LMSW checked with pts Group Home and made them aware of Nurse coming in to see this pt.  Group Home confirmed understanding.    Pt to discharge home with HH upon medical clearance.

## 2023-08-17 NOTE — PROGRESS NOTES
[facility] Medical Student PROGRESS NOTE     Attending: Dr. Gonzales    Resident: Dr. Hensley, senior resident, Dr. Ward, juliocesar resident     PATIENT: Tracie Rinaldi; [id number]; 1950    ID: 73 y.o. female with PMH of seizures, cognitive impairment, schizophrenia, COPD, and osteoarthritis admitted on 8/15 for COVID-pneumonia.     INTERVAL UPDATE 8/17: Patient is a poor historian and has profound dementia. No acute events overnight. Patient reports being cold. Patient looks almost claustrophobic as she kept wanting to take off her gown and blanket during the H&P. Patient looking delirious, but mainstay hospital course. Otherwise COVID symptoms in moderate improvement.     OBJECTIVE:     Vitals:    08/16/23 2000 08/16/23 2300 08/17/23 0600 08/17/23 0825   BP: 139/64 126/72 (!) 127/96 (!) 135/91   Pulse: 76 72 77 68   Resp: 16 16 18 16   Temp: 36.6 °C (97.8 °F) 36.6 °C (97.9 °F) 36.1 °C (97 °F) 37.1 °C (98.8 °F)   TempSrc: Temporal Temporal Temporal Temporal   SpO2: 93% 95% 95% 91%   Weight: 96.4 kg (212 lb 8.4 oz)      Height:           Intake/Output Summary (Last 24 hours) at 8/16/2023 1341  Last data filed at 8/16/2023 0819  Gross per 24 hour   Intake --   Output 2350 ml   Net -2350 ml       PE:  General: In mild distress, normal for profound dementia. Difficult to understand words as she was mumbling in and out of sleep, somnolent and moderately difficult to arouse.   Cardiovascular: II/VI systolic ejection murmur on right second intercostal space. Regular rhythm.   Respiratory: In mild respiratory distress with stridor breathing. Low and high pitched expiratory wheezing in middle and lower lobes.     LABS:  Recent Labs     08/15/23  1100 08/16/23  0058 08/17/23  0245   WBC 6.4 6.3 6.5   RBC 4.62 5.01 5.02   HEMOGLOBIN 12.0 12.6 12.6   HEMATOCRIT 36.3* 40.6 40.9   MCV 78.6* 81.0* 81.5   MCH 26.0* 25.1* 25.1*   RDW 49.1 50.6* 50.6*   PLATELETCT 209 212 226   MPV 10.5 10.5 10.0   NEUTSPOLYS 63.70 82.10* 69.00    LYMPHOCYTES 19.60* 10.20* 17.20*   MONOCYTES 15.40* 3.40 12.70   EOSINOPHILS 0.20 3.00 0.00   BASOPHILS 0.50 0.30 0.20       Recent Labs     08/15/23  1100 08/16/23  0058 08/17/23  0245   SODIUM 129* 135 136   POTASSIUM 4.1 4.2 4.2   CHLORIDE 93* 100 99   CO2 27 25 28   BUN 12 11 11   CREATININE 0.78 0.74 0.63   CALCIUM 8.1* 8.5 8.3*   MAGNESIUM  --   --  2.3   ALBUMIN 3.7 4.0 3.7       Estimated GFR/CRCL = Estimated Creatinine Clearance: 95.8 mL/min (by C-G formula based on SCr of 0.63 mg/dL).  Recent Labs     08/15/23  1100 08/16/23  0058 08/17/23  0245   GLUCOSE 109* 190* 158*       Recent Labs     08/15/23  1100 08/16/23  0058 08/17/23  0245   ASTSGOT 35 34 24   ALTSGPT 18 20 15   TBILIRUBIN 0.3 0.2 0.2   ALKPHOSPHAT 87 87 81   GLOBULIN 2.3 2.5 2.4   INR  --  0.93  --                Recent Labs     08/16/23 0058   INR 0.93   APTT 28.3             MEDS:  Current Facility-Administered Medications   Medication Last Admin    Respiratory Therapy Consult      dexamethasone (Decadron) tablet 6 mg 6 mg at 08/17/23 0613    aspirin EC tablet 81 mg 81 mg at 08/17/23 0613    tiotropium (Spiriva Respimat) 2.5 mcg/Act inhalation spray 5 mcg 5 mcg at 08/17/23 0600    albuterol inhaler 2 Puff      simvastatin (Zocor) tablet 40 mg 40 mg at 08/16/23 2048    mirtazapine (Remeron) tablet 7.5 mg 7.5 mg at 08/16/23 2048    escitalopram (Lexapro) tablet 10 mg 10 mg at 08/17/23 0613    divalproex ER (Depakote ER) tablet 250 mg 250 mg at 08/17/23 0613    cloZAPine (Clozaril) tablet 150 mg 150 mg at 08/17/23 0614    levETIRAcetam (Keppra) tablet 250 mg 250 mg at 08/17/23 0613    levothyroxine (Synthroid) tablet 125 mcg 125 mcg at 08/17/23 0613    nitroglycerin (Nitrostat) tablet 0.4 mg           ASSESSMENT/PLAN: Tracie Rinaldi is a 72 yo female with PMH of seizures, cognitive impairment, schizophrenia, COPD, osteoporosis admitted on 8/15 for COVID-pneumonia. Patient has started pharmacotherapy for COVID symptom relief and doing  otherwise well.     COVID-pneumonia  COVID positive test and imaging showed emphysema and mild consolidation with COVID  Plan: Continue decadron 6 mg (3/10) for 10 days and oxygen therapy. Monitor for improvement until patient is cleared to go back to group home.  Alternative: Can consider Remdesivir or Paxlovid if patient was hospitalized for any other reason besides COVID or at risk for high-disease progression per [facility] guidelines.   Hypoxia  Mild respiratory distress due to above  Plan: Continue oxygen therapy NC  Hyponatremia  8/11 labwork showed 129 sodium, 8/17 labwork showed normalization to 136  Plan: Monitor for signs of dangerous hyponatremia.  Hypothyroidism  Plan: Continue levothyroxine. Can repeat thyroid panel at next lab draw if necessary   Face to face with home health  Dr. Ward meeting today with [facility] that will meet patient at [facility]

## 2023-08-17 NOTE — FACE TO FACE
Face to Face Supporting Documentation - Home Health    The encounter with this patient was in whole or in part the primary reason for home health admission.    Date of encounter:   Patient:                    MRN:                       YOB: 2021  Tracie Rinaldi  [id number]  1950     Home health to see patient for:  Occupational therapy evaluation and treatment    Skilled need for:  Comment:      Skilled nursing interventions to include:  Comment:      Homebound status evidenced by:  Need the aid of supportive devices such as crutches, canes, wheelchairs or walkers, Needs the assistance of another person in order to leave the home, or Have a condition such that leaving his or her home is medically contraindicated. Leaving home requires a considerable and taxing effort. There is a normal inability to leave the home.    Community Physician to provide follow up care: DI Torres.     Optional Interventions? No      I certify the face to face encounter for this home health care referral meets the CMS requirements and the encounter/clinical assessment with the patient was, in whole, or in part, for the medical condition(s) listed above, which is the primary reason for home health care. Based on my clinical findings: the service(s) are medically necessary, support the need for home health care, and the homebound criteria are met.  I certify that this patient has had a face to face encounter by myself.  Josh Ward M.D. - [place]: [phone]

## 2023-08-17 NOTE — CARE PLAN
The patient is Watcher - Medium risk of patient condition declining or worsening    Shift Goals  Clinical Goals: monitor resp efforts, mentation, SOB, CP, pain management, decadron, rest  Patient Goals: no SOB, decrease anxiety, rest  Family Goals: Not present    Progress made toward(s) clinical / shift goals:    Problem: Care Map:  Optimal Outcome for the Pneumonia Patient  Goal: Collection and monitoring of appropriate tests and labs  Description: Target End Date:  end of day 1  Outcome: Progressing     Problem: Respiratory  Goal: Patient will achieve/maintain optimum respiratory ventilation and gas exchange  Description: Target End Date:  Prior to discharge or change in level of care    Document on Assessment flowsheet    1.  Assess and monitor rate, rhythm, depth and effort of respiration  2.  Breath sounds assessed qshift and/or as needed  3.  Assess O2 saturation, administer/titrate oxygen as ordered  4.  Position patient for maximum ventilatory efficiency  5.  Turn, cough, and deep breath with splinting to improve effectiveness  6.  Collaborate with RT to administer medication/treatments per order  7.  Encourage use of incentive spirometer and encourage patient to cough after use and utilize splinting techniques if applicable  8.  Airway suctioning  9.  Monitor sputum production for changes in color, consistency and frequency  10. Perform frequent oral hygiene  11. Alternate physical activity with rest periods  Outcome: Progressing     Problem: Risk for Aspiration  Goal: Patient's risk for aspiration will be absent or decrease  Description: Target End Date:  Prior to discharge or change in level of care    1.   Complete dysphagia screening on admission  2.   NPO until dysphagia screening complete or medically cleared  3.   Collaborate with Speech Therapy, Clinical Dietitian and interdisciplinary team  4.   Implement aspiration precautions  5.   Assist patient up to chair for meals  6.   Elevate head of bed 90  degrees if patient is unable to get out of bed  7.   Encourage small bites  8.   Ensure foods/liquids are of appropriate consistency  9.   Assess for any signs/symptoms of aspiration  10. Assess breath sounds and vital signs after oral intake  Outcome: Progressing     Problem: Hemodynamics - Pneumonia  Goal: Patient's hemodynamics, fluid balance and neurologic status will be stable or improve  Description: Target End Date:  Prior to discharge or change in level of care    Document on Assessment and I/O flowsheet templates    1.  Monitor vital signs, pulse oximetry and cardiac monitor per provider order and/or policy  2.  Manage IV fluids and IV infusions  3.  Monitor intake and output  4.  Daily weights per unit policy or provider order  5.  Assess peripheral pulses and capillary refill  6.  Monitor body temperature and assist with comfort measures to reduce fever and chills  7.  Position patient for maximum circulation/cardiac output  8.  Assess for peripheral, sacral, periorbital and abdominal edema  9. Assess for signs of deterioration - tachycardia, tachypnea, dyspnea, hypertension, hypoxemia, pallor, changes in consciousness or restlessness  Outcome: Progressing     Problem: Self Care  Goal: Patient will have the ability to perform ADLs independently or with assistance (bathe, groom, dress, toilet and feed)  Description: Target End Date:  Prior to discharge or change in level of care    Document on ADL flowsheet    1.  Assess the capability and level of deficiency to perform ADLs  2.  Encourage family/care giver involvement  3.  Provide assistive devices  4.  Consider PT/OT evaluations  5.  Maintain support, give positive feedback, encourage self-care allowing extra time and verbal cuing as needed  6.  Avoid doing something for patients they can do themselves, but provide assistance as needed  7.  Assist in anticipating/planning individual needs  8.  Collaborate with Case Management and  to meet  discharge needs  Outcome: Progressing     Problem: Discharge Planning - Pneumonia  Goal: Patient will verbalize understanding of lifestyle changes and therapeutic needs post discharge  Description: Target End Date:  Prior to discharge or change in level of care    1.  Assess need for home oxygen throughout admission.  2.  Discuss debilitating aspects of disease, length of convalescence, and recovery expectations. Identify self-care and homemaker needs.  3.  Reinforce importance of continuing effective coughing and deep-breathing exercises.  4.  Emphasize necessity for continuing antibiotic therapy for prescribed period.  5.  Review the importance of cessation of smoking.  6.  Outline steps to enhance general health and well-being: balanced rest and activity, well-rounded diet, avoidance of crowds during cold/flu season and persons with URIs.  7.  Stress importance of continuing medical follow-up and obtaining vaccinations as appropriate.  8.  Identify signs and symptoms requiring notification of health care provider: increasing dyspnea, chest pain, prolonged fatigue, weight loss, fever, chills, persistence of productive cough, changes in mentation.  9.  Instruct patient to avoid using antibiotics indiscriminately during minor viral infections.  10. Encourage Pneumovax and annual flu shots for high-risk patients.  11. Educate on importance of oral care in prevention of pneumonia  Outcome: Progressing     Problem: Knowledge Deficit - Standard  Goal: Patient and family/care givers will demonstrate understanding of plan of care, disease process/condition, diagnostic tests and medications  Description: Target End Date:  1-3 days or as soon as patient condition allows    Document in Patient Education    1.  Patient and family/caregiver oriented to unit, equipment, visitation policy and means for communicating concern  2.  Complete/review Learning Assessment  3.  Assess knowledge level of disease process/condition,  treatment plan, diagnostic tests and medications  4.  Explain disease process/condition, treatment plan, diagnostic tests and medications  Outcome: Progressing     Problem: Fall Risk  Goal: Patient will remain free from falls  Description: Target End Date:  Prior to discharge or change in level of care    Document interventions on the [place] Fall Risk Assessment    1.  Assess for fall risk factors  2.  Implement fall precautions  Outcome: Progressing     Problem: Skin Integrity  Goal: Skin integrity is maintained or improved  Description: Target End Date:  Prior to discharge or change in level of care    Document interventions on Skin Risk/Sebastian flowsheet groups and corresponding LDA    1.  Assess and monitor skin integrity, appearance and/or temperature  2.  Assess risk factors for impaired skin integrity and/or pressures ulcers  3.  Implement precautions to protect skin integrity in collaboration with interdisciplinary team  4.  Implement pressure ulcer prevention protocol if at risk for skin breakdown  5.  Confirm wound care consult if at risk for skin breakdown  6.  Ensure patient use of pressure relieving devices  (Low air loss bed, waffle overlay, heel protectors, ROHO cushion, etc)  Outcome: Progressing       Patient is not progressing towards the following goals:

## 2023-08-17 NOTE — PROGRESS NOTES
"[facility] Internal Medicine Daily Progress Note    Date of Service  8/17/2023    R Team: R KEVYN Sanchez Team   Attending: Alvin Gonzales M.d.  Senior Resident: Dr. Naresh Hensley  Intern:  Dr. Josh rooney MD  Contact Number: [phone]    Chief Complaint  Tracie Rinaldi is a 73 y.o. female admitted 8/15/2023 with weakness, cough.    Hospital Course  Tracie Rinaldi is a 73 y.o. female who presented 8/15/2023 with Lower extremities, weakness and hypoxia. When inquired when did your weakness start she states that \"its been years\" and wheel chair bound. She states that she was feeling lonely at group home and was not well taken care of. Patient seems poor historian to me and was not sure why is she in hospital and what is her medical problem although she was oriented to time, place and person. She denies chest pain, SOB but does cough with inspiration. She was asking food and most of the time incoherent answering questions.   ED course: pulse 75, RR 22, /68, O2 87%.   Labs were significant for Hyponatremia 129, mild hypocalcemia, lactic acid 0.6,  Chest X-ray remarkable for mild left basilar opacity could be scarring however cannot exclude atelectasis, small infiltrate or a small pleural effusion.  Chest CT: No evidence of PE, trace bilateral pleural effusions, trace pericardial effusion and  Emphysematous changes.  EKG showed T wave abnormalities.  8/16/2023: Hyponatremia resolved.  8/17/13 vitals and labs stable.    Interval Problem Update  Patient seen and evaluated at bedside this AM.  No overnight acute event reported by the night resident.  Patient is somnolent, does not seems interested in verbal conversation  Vitals and labs are stable.  Denies chest pain dyspnea.  SPO2 91% on 2 L oxygen    I have discussed this patient's plan of care and discharge plan at IDT rounds today with Case Management, Nursing, Nursing leadership, and other members of the IDT team.    Consultants/Specialty       Code " Status  Full    Disposition  The patient is not medically cleared for discharge to home or a post-acute facility.      I have placed the appropriate orders for post-discharge needs.    Review of Systems  Review of Systems   Constitutional:  Positive for chills. Negative for fever.   Eyes:  Negative for double vision and photophobia.   Respiratory:  Positive for cough.    Cardiovascular:  Negative for chest pain, palpitations and leg swelling.   Gastrointestinal:  Negative for diarrhea, heartburn, nausea and vomiting.   Neurological:  Positive for weakness. Negative for focal weakness.        Physical Exam  Temp:  [36 °C (96.8 °F)-37.1 °C (98.8 °F)] 37.1 °C (98.8 °F)  Pulse:  [68-81] 68  Resp:  [16-18] 16  BP: (126-153)/(60-96) 135/91  SpO2:  [91 %-99 %] 91 %    Physical Exam  Constitutional:       Appearance: Normal appearance.      Comments: Patient is incoherent and unclear about her condition   HENT:      Head: Normocephalic and atraumatic.      Mouth/Throat:      Mouth: Mucous membranes are moist.      Pharynx: Oropharynx is clear.   Eyes:      Extraocular Movements: Extraocular movements intact.   Cardiovascular:      Rate and Rhythm: Normal rate.      Heart sounds: Murmur heard.   Pulmonary:      Effort: Pulmonary effort is normal.      Breath sounds: Normal breath sounds.   Abdominal:      Palpations: Abdomen is soft.   Skin:     General: Skin is warm.   Neurological:      Mental Status: She is oriented to person, place, and time.         Fluids    Intake/Output Summary (Last 24 hours) at 8/17/2023 1037  Last data filed at 8/17/2023 0900  Gross per 24 hour   Intake 1600 ml   Output 1550 ml   Net 50 ml         Laboratory  Recent Labs     08/15/23  1100 08/16/23  0058 08/17/23  0245   WBC 6.4 6.3 6.5   RBC 4.62 5.01 5.02   HEMOGLOBIN 12.0 12.6 12.6   HEMATOCRIT 36.3* 40.6 40.9   MCV 78.6* 81.0* 81.5   MCH 26.0* 25.1* 25.1*   MCHC 33.1 31.0* 30.8*   RDW 49.1 50.6* 50.6*   PLATELETCT 209 212 226   MPV 10.5 10.5  10.0       Recent Labs     08/15/23  1100 08/16/23  0058 08/17/23  0245   SODIUM 129* 135 136   POTASSIUM 4.1 4.2 4.2   CHLORIDE 93* 100 99   CO2 27 25 28   GLUCOSE 109* 190* 158*   BUN 12 11 11   CREATININE 0.78 0.74 0.63   CALCIUM 8.1* 8.5 8.3*       Recent Labs     08/16/23  0058   APTT 28.3   INR 0.93                 Imaging  CT-CTA CHEST PULMONARY ARTERY W/ RECONS   Final Result         1.  No evidence pulmonary embolism.   2.  Trace bilateral pleural effusions with bibasilar atelectasis.   3.  Trace pericardial effusion.   4.  Emphysematous changes.      Fleischner Society pulmonary nodule recommendations:   Nonapplicable      DX-CHEST-PORTABLE (1 VIEW)   Final Result      Nonspecific mild left basilar opacity could be scarring however cannot exclude atelectasis, small infiltrate or a small pleural effusion.             Assessment/Plan  Problem Representation:    * Acute pneumonia- (present on admission)  Assessment & Plan  Patient is poor historian but covid test detected likey Covid pneumonia.    Plan:  Continue O2 nasal canula  Dexamethasone 6mg  Rrespiratory therapy  F/u with repeat Chest X-ray      Depression  Assessment & Plan  Review of the chronic depression    Plan:  Continue home antidepressant medications      Hypoxia  Assessment & Plan  Acute hypoxia 2/2 to emphysematous changes and covid pneumonia    Plan:  Continue O2 on nasal canula  Repeat Lactic acid      Hyponatremia  Assessment & Plan  On admission to ED 8/15/23 sodium 129    Plan:  8/17/23 resolved    Hypothyroid  Assessment & Plan  Conitnue home levothyroxine  Repeat TSH, T4           VTE prophylaxis:     I have performed a physical exam and reviewed and updated ROS and Plan today (8/17/2023). In review of yesterday's note (8/16/2023), there are no changes except as documented above.

## 2023-08-17 NOTE — PROGRESS NOTES
"[facility] Internal Medicine Daily Progress Note    Date of Service  8/17/2023    [facility] Team: R KEVYN Sanchez Team   Attending: Alvin Gonzales M.d.  Senior Resident: Dr. Naresh Hensley  Intern:  Dr. Josh rooney MD  Contact Number: [phone]    Chief Complaint  Tracie Rinaldi is a 73 y.o. female admitted 8/15/2023 with weakness, cough.    Hospital Course  Tracie Rinaldi is a 73 y.o. female who presented 8/15/2023 with Lower extremities, weakness and hypoxia. When inquired when did your weakness start she states that \"its been years\" and wheel chair bound. She states that she was feeling lonely at group home and was not well taken care of. Patient seems poor historian to me and was not sure why is she in hospital and what is her medical problem although she was oriented to time, place and person. She denies chest pain, SOB but does cough with inspiration. She was asking food and most of the time incoherent answering questions.   ED course: pulse 75, RR 22, /68, O2 87%.   Labs were significant for Hyponatremia 129, mild hypocalcemia, lactic acid 0.6,  Chest X-ray remarkable for mild left basilar opacity could be scarring however cannot exclude atelectasis, small infiltrate or a small pleural effusion.  Chest CT: No evidence of PE, trace bilateral pleural effusions, trace pericardial effusion and  Emphysematous changes.  EKG showed T wave abnormalities.  8/16/2023: Hyponatremia resolved.  8/17/13 vitals and labs stable.    Interval Problem Update  Patient seen and evaluated at bedside this AM.  No overnight acute event reported by the night resident.  Moderate improvement in the cough.  Seems agitated, claustrophobic.  Denies chest pain dyspnea.  WBC trending down, CMP normal.    I have discussed this patient's plan of care and discharge plan at IDT rounds today with Case Management, Nursing, Nursing leadership, and other members of the IDT team.    Consultants/Specialty       Code " Status  Full    Disposition  The patient is not medically cleared for discharge to home or a post-acute facility.      I have placed the appropriate orders for post-discharge needs.    Review of Systems  Review of Systems   Constitutional:  Positive for chills. Negative for fever.   Eyes:  Negative for double vision and photophobia.   Respiratory:  Positive for cough.    Cardiovascular:  Negative for chest pain, palpitations and leg swelling.   Gastrointestinal:  Negative for diarrhea, heartburn, nausea and vomiting.   Neurological:  Positive for weakness. Negative for focal weakness.        Physical Exam  Temp:  [36 °C (96.8 °F)-37.1 °C (98.8 °F)] 37.1 °C (98.8 °F)  Pulse:  [68-81] 68  Resp:  [16-18] 16  BP: (126-153)/(60-96) 135/91  SpO2:  [91 %-99 %] 91 %    Physical Exam  Constitutional:       Appearance: Normal appearance.      Comments: Patient is incoherent and unclear about her condition   HENT:      Head: Normocephalic and atraumatic.      Mouth/Throat:      Mouth: Mucous membranes are moist.      Pharynx: Oropharynx is clear.   Eyes:      Extraocular Movements: Extraocular movements intact.   Cardiovascular:      Rate and Rhythm: Normal rate.      Heart sounds: Murmur heard.   Pulmonary:      Effort: Pulmonary effort is normal.      Breath sounds: Normal breath sounds.   Abdominal:      Palpations: Abdomen is soft.   Skin:     General: Skin is warm.   Neurological:      Mental Status: She is oriented to person, place, and time.         Fluids    Intake/Output Summary (Last 24 hours) at 8/17/2023 1057  Last data filed at 8/17/2023 0900  Gross per 24 hour   Intake 1600 ml   Output 1550 ml   Net 50 ml         Laboratory  Recent Labs     08/15/23  1100 08/16/23  0058 08/17/23  0245   WBC 6.4 6.3 6.5   RBC 4.62 5.01 5.02   HEMOGLOBIN 12.0 12.6 12.6   HEMATOCRIT 36.3* 40.6 40.9   MCV 78.6* 81.0* 81.5   MCH 26.0* 25.1* 25.1*   MCHC 33.1 31.0* 30.8*   RDW 49.1 50.6* 50.6*   PLATELETCT 209 212 226   MPV 10.5 10.5  10.0       Recent Labs     08/15/23  1100 08/16/23  0058 08/17/23  0245   SODIUM 129* 135 136   POTASSIUM 4.1 4.2 4.2   CHLORIDE 93* 100 99   CO2 27 25 28   GLUCOSE 109* 190* 158*   BUN 12 11 11   CREATININE 0.78 0.74 0.63   CALCIUM 8.1* 8.5 8.3*       Recent Labs     08/16/23  0058   APTT 28.3   INR 0.93                 Imaging  CT-CTA CHEST PULMONARY ARTERY W/ RECONS   Final Result         1.  No evidence pulmonary embolism.   2.  Trace bilateral pleural effusions with bibasilar atelectasis.   3.  Trace pericardial effusion.   4.  Emphysematous changes.      Fleischner Society pulmonary nodule recommendations:   Nonapplicable      DX-CHEST-PORTABLE (1 VIEW)   Final Result      Nonspecific mild left basilar opacity could be scarring however cannot exclude atelectasis, small infiltrate or a small pleural effusion.             Assessment/Plan  Problem Representation:    * Acute pneumonia- (present on admission)  Assessment & Plan  Patient is poor historian but covid test detected likey Covid pneumonia.    Plan:  Continue O2 nasal canula  Dexamethasone 6mg  Rrespiratory therapy  F/u with repeat Chest X-ray      Depression  Assessment & Plan  Review of the chronic depression    Plan:  Continue home antidepressant medications      Hypoxia  Assessment & Plan  Acute hypoxia 2/2 to emphysematous changes and covid pneumonia    Plan:  Continue O2 on nasal canula  Repeat Lactic acid      Hyponatremia  Assessment & Plan  On admission to ED 8/15/23 sodium 129 otherwise asymptomatic.    Plan:  8/17/23 resolved    Hypothyroid  Assessment & Plan  Conitnue home levothyroxine  Repeat TSH, T4           VTE prophylaxis:     I have performed a physical exam and reviewed and updated ROS and Plan today (8/17/2023). In review of yesterday's note (8/16/2023), there are no changes except as documented above.
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
## 2023-08-18 PROCEDURE — 770001 HCHG ROOM/CARE - MED/SURG/GYN PRIV*

## 2023-08-18 PROCEDURE — 700102 HCHG RX REV CODE 250 W/ 637 OVERRIDE(OP)

## 2023-08-18 PROCEDURE — A9270 NON-COVERED ITEM OR SERVICE: HCPCS

## 2023-08-18 PROCEDURE — 99232 SBSQ HOSP IP/OBS MODERATE 35: CPT | Mod: GC | Performed by: INTERNAL MEDICINE

## 2023-08-18 RX ADMIN — LEVETIRACETAM 250 MG: 500 TABLET, FILM COATED ORAL at 11:58

## 2023-08-18 RX ADMIN — TIOTROPIUM BROMIDE INHALATION SPRAY 5 MCG: 3.12 SPRAY, METERED RESPIRATORY (INHALATION) at 05:19

## 2023-08-18 RX ADMIN — MIRTAZAPINE 7.5 MG: 15 TABLET, FILM COATED ORAL at 21:07

## 2023-08-18 RX ADMIN — LEVETIRACETAM 250 MG: 500 TABLET, FILM COATED ORAL at 05:13

## 2023-08-18 RX ADMIN — LEVOTHYROXINE SODIUM 125 MCG: 0.12 TABLET ORAL at 05:12

## 2023-08-18 RX ADMIN — DIVALPROEX SODIUM 250 MG: 250 TABLET, EXTENDED RELEASE ORAL at 05:13

## 2023-08-18 RX ADMIN — ESCITALOPRAM OXALATE 10 MG: 10 TABLET ORAL at 05:13

## 2023-08-18 RX ADMIN — SIMVASTATIN 40 MG: 40 TABLET, FILM COATED ORAL at 21:07

## 2023-08-18 RX ADMIN — CLOZAPINE 150 MG: 25 TABLET ORAL at 05:13

## 2023-08-18 RX ADMIN — ASPIRIN 81 MG: 81 TABLET, COATED ORAL at 05:12

## 2023-08-18 RX ADMIN — DEXAMETHASONE 6 MG: 4 TABLET ORAL at 05:13

## 2023-08-18 RX ADMIN — CLOZAPINE 150 MG: 25 TABLET ORAL at 17:13

## 2023-08-18 RX ADMIN — LEVETIRACETAM 250 MG: 500 TABLET, FILM COATED ORAL at 17:13

## 2023-08-18 ASSESSMENT — FIBROSIS 4 INDEX: FIB4 SCORE: 1.95

## 2023-08-18 ASSESSMENT — ENCOUNTER SYMPTOMS
HEARTBURN: 0
NAUSEA: 0
FEVER: 0
WEAKNESS: 1
FOCAL WEAKNESS: 0
PHOTOPHOBIA: 0
DOUBLE VISION: 0
VOMITING: 0
CHILLS: 0
DIARRHEA: 0
COUGH: 0
PALPITATIONS: 0

## 2023-08-18 ASSESSMENT — PAIN DESCRIPTION - PAIN TYPE: TYPE: ACUTE PAIN

## 2023-08-18 NOTE — PROGRESS NOTES
"[facility] Medical Student PROGRESS NOTE     Attending: Dr. Gonzales    Resident: Dr. Hensley, senior resident, Dr. Ward, juliocesar resident     PATIENT: Tracie Rinaldi; [id number]; 1950    ID: 73 y.o. female with PMH of seizures, cognitive impairment, schizophrenia, COPD, and osteoarthritis admitted on 8/15 for COVID-pneumonia.     INTERVAL UPDATE 8/17: Patient is a poor historian and has profound dementia. No acute events overnight. Patient reports being cold. Patient looks almost claustrophobic as she kept wanting to take off her gown and blanket during the H&P. Patient looking delirious, but mainstay hospital course. Otherwise COVID symptoms in moderate improvement.     INTERVAL UPDATE 8/18: Patient looks visibly improved. She denies chest pain or SOB. Reports productive cough is better. Reports she is just \"trying to rest.\" Labs show WBC and lymphocyte count improvement. Exam shows improvement in lung crackles and warm extremities. Will remove from tele monitor as it is not indicated at this time. Home health was contacted and will follow patient in group home upon discharge. Contacting case management to see when and how she can be discharged to group home with proper isolation, etc.     OBJECTIVE:     Vitals:    08/18/23 0703 08/18/23 0806 08/18/23 0921 08/18/23 1200   BP: (!) 152/82   (!) 145/79   Pulse: 76   79   Resp: 15   20   Temp: 36.1 °C (97 °F)   36.3 °C (97.3 °F)   TempSrc: Temporal   Temporal   SpO2: 91% 96% 95% 93%   Weight:       Height:           Intake/Output Summary (Last 24 hours) at 8/16/2023 1341  Last data filed at 8/16/2023 0819  Gross per 24 hour   Intake --   Output 2350 ml   Net -2350 ml       PE:  General: In mild distress, normal for profound dementia. Clearer voice sounds today and easier to understand patient.   Chest: II/VI systolic murmur on right second intercostal space. Regular rhythm.   Respiratory: Mild productive cough and in mild respiratory distress with stridor breathing. Low " pitched wheezing and decrease in crackles from yesterday.     LABS:  Recent Labs     08/16/23 0058 08/17/23  0245   WBC 6.3 6.5   RBC 5.01 5.02   HEMOGLOBIN 12.6 12.6   HEMATOCRIT 40.6 40.9   MCV 81.0* 81.5   MCH 25.1* 25.1*   RDW 50.6* 50.6*   PLATELETCT 212 226   MPV 10.5 10.0   NEUTSPOLYS 82.10* 69.00   LYMPHOCYTES 10.20* 17.20*   MONOCYTES 3.40 12.70   EOSINOPHILS 3.00 0.00   BASOPHILS 0.30 0.20       Recent Labs     08/16/23 0058 08/17/23  0245   SODIUM 135 136   POTASSIUM 4.2 4.2   CHLORIDE 100 99   CO2 25 28   BUN 11 11   CREATININE 0.74 0.63   CALCIUM 8.5 8.3*   MAGNESIUM  --  2.3   ALBUMIN 4.0 3.7       Estimated GFR/CRCL = Estimated Creatinine Clearance: 96.1 mL/min (by C-G formula based on SCr of 0.63 mg/dL).  Recent Labs     08/16/23 0058 08/17/23  0245   GLUCOSE 190* 158*       Recent Labs     08/16/23 0058 08/17/23  0245   ASTSGOT 34 24   ALTSGPT 20 15   TBILIRUBIN 0.2 0.2   ALKPHOSPHAT 87 81   GLOBULIN 2.5 2.4   INR 0.93  --                Recent Labs     08/16/23 0058   INR 0.93   APTT 28.3             MEDS:  Current Facility-Administered Medications   Medication Last Admin    Respiratory Therapy Consult      dexamethasone (Decadron) tablet 6 mg 6 mg at 08/18/23 0513    aspirin EC tablet 81 mg 81 mg at 08/18/23 0512    tiotropium (Spiriva Respimat) 2.5 mcg/Act inhalation spray 5 mcg 5 mcg at 08/18/23 0519    albuterol inhaler 2 Puff      simvastatin (Zocor) tablet 40 mg 40 mg at 08/17/23 2113    mirtazapine (Remeron) tablet 7.5 mg 7.5 mg at 08/17/23 2113    escitalopram (Lexapro) tablet 10 mg 10 mg at 08/18/23 0513    divalproex ER (Depakote ER) tablet 250 mg 250 mg at 08/18/23 0513    cloZAPine (Clozaril) tablet 150 mg 150 mg at 08/18/23 0513    levETIRAcetam (Keppra) tablet 250 mg 250 mg at 08/18/23 1158    levothyroxine (Synthroid) tablet 125 mcg 125 mcg at 08/18/23 0512    nitroglycerin (Nitrostat) tablet 0.4 mg           ASSESSMENT/PLAN: Tracie Rinaldi is a 72 yo female with PMH of  seizures, cognitive impairment, schizophrenia, COPD, osteoporosis admitted on 8/15 for COVID-pneumonia. Patient on day 4 of steroids for COVID-pneumonia therapy and is showing improvement of respiratory distress and productive cough    COVID-pneumonia  COVID positive test and imaging showed emphysema and mild consolidation with COVID. Patient showing respiratory and productive cough improvement from initial admission.   Plan: Continue decadron 6 mg (4/10) for 10 days and oxygen therapy if needed. Monitor for improvement until patient is cleared to go back to group home.  Hypoxia  Mild respiratory distress due to above  Plan: Watch O2 sat without NC and notified nursing staff to monitor  Hyponatremia  Sodium normalized from 8/11  Plan: Monitor for signs of dangerous hyponatremia.  Hypothyroidism  Plan: Continue levothyroxine. Can repeat thyroid panel at next lab draw if necessary   Discharge planning  Plan: Home health contacted and will follow patient in group home. Case management will be contacted for when they are available to take her back

## 2023-08-18 NOTE — CARE PLAN
Problem: Respiratory  Goal: Patient will achieve/maintain optimum respiratory ventilation and gas exchange  Outcome: Progressing     Problem: Risk for Aspiration  Goal: Patient's risk for aspiration will be absent or decrease  Outcome: Progressing     Problem: Self Care  Goal: Patient will have the ability to perform ADLs independently or with assistance (bathe, groom, dress, toilet and feed)  Outcome: Not Progressing  Note: Patient unable to perform ADLs by themself needs lots of coaching from staff members.      Problem: Fall Risk  Goal: Patient will remain free from falls  Outcome: Progressing   The patient is Stable - Low risk of patient condition declining or worsening    Shift Goals  Clinical Goals: reorient patient, monitor vitals, q 2 turn  Patient Goals: sleep  Family Goals: n/a    Progress made toward(s) clinical / shift goals:      Patient is not progressing towards the following goals:      Problem: Self Care  Goal: Patient will have the ability to perform ADLs independently or with assistance (bathe, groom, dress, toilet and feed)  Outcome: Not Progressing  Note: Patient unable to perform ADLs by themself needs lots of coaching from staff members.       yes

## 2023-08-18 NOTE — DISCHARGE PLANNING
RNCM has left voice mail message for Valeria # [phone]  for patient's group home- [facility] #3. Regarding protocol for (+) Covid test. Waiting for call back.  15:00- Received phone call back from Valeria. Valeria asked about antibiotics, mobility and expressed concern for falling (patient is back to baseline per P/T). RNCM gathered requested information and left voicemail again for Valeria. Valeria would want to see patient prior to return home. RNCM also explained that d/c date will be 8/19/23. Waiting for call back again.    15:58- RNCM spoke with Valeria of group home # [phone]. Per- Valeria, she will be out today or tomorrow to see patient prior to going back home ([facility] group home). Possible d/c 8/19/23

## 2023-08-18 NOTE — PROGRESS NOTES
Assumed care of patient, bedside report received from Yadira day shift RN. Updated on plan of care, call light within reach and fall precautions are in place and bed lock and in lowest position. Patient instructed to call for assistance before getting out of bed. All questions answered at this time. No other needs.

## 2023-08-18 NOTE — PROGRESS NOTES
"[facility] Internal Medicine Daily Progress Note    Date of Service  8/18/2023    R Team: R KEVYN Sanchez Team   Attending: Alvin Gonzales M.d.  Senior Resident: Dr. Naresh Hensley  Intern:  Dr. Josh rooney MD  Contact Number: [phone]    Chief Complaint  Tracie Rinaldi is a 73 y.o. female admitted 8/15/2023 with weakness, cough.    Hospital Course  Tracie Rinaldi is a 73 y.o. female who presented 8/15/2023 with Lower extremities, weakness and hypoxia. When inquired when did your weakness start she states that \"its been years\" and wheel chair bound. She states that she was feeling lonely at group home and was not well taken care of. Patient seems poor historian to me and was not sure why is she in hospital and what is her medical problem although she was oriented to time, place and person. She denies chest pain, SOB but does cough with inspiration. She was asking food and most of the time incoherent answering questions.   ED course: pulse 75, RR 22, /68, O2 87%.   Labs were significant for Hyponatremia 129, mild hypocalcemia, lactic acid 0.6,  Chest X-ray remarkable for mild left basilar opacity could be scarring however cannot exclude atelectasis, small infiltrate or a small pleural effusion.  Chest CT: No evidence of PE, trace bilateral pleural effusions, trace pericardial effusion and  Emphysematous changes.  EKG showed T wave abnormalities.  8/16/2023: Hyponatremia resolved.  8/17/13 vitals and labs stable.  8/18/23 Improvement in lung crackles and breathing.  8/18/23 significant improvement in breath sounds    Interval Problem Update  Patient seen and evaluated at bedside this AM.  No overnight acute event reported by the night team.  Breath sounds and crackles improved.  Vitals and labs are stable except variation in blood pressure.  Denies chest pain dyspnea.  SPO2 91% on 2 L oxygen    I have discussed this patient's plan of care and discharge plan at IDT rounds today with Case Management, " Nursing, Nursing leadership, and other members of the IDT team.    Consultants/Specialty       Code Status  Full    Disposition  The patient is medically cleared for discharge to home or a post-acute facility.      I have placed the appropriate orders for post-discharge needs.    Review of Systems  Review of Systems   Constitutional:  Negative for chills and fever.   Eyes:  Negative for double vision and photophobia.   Respiratory:  Negative for cough.    Cardiovascular:  Negative for chest pain, palpitations and leg swelling.   Gastrointestinal:  Negative for diarrhea, heartburn, nausea and vomiting.   Musculoskeletal:         Fatigued   Neurological:  Positive for weakness. Negative for focal weakness.        Physical Exam  Temp:  [36.1 °C (97 °F)-37.1 °C (98.8 °F)] 36.1 °C (97 °F)  Pulse:  [62-76] 76  Resp:  [15-17] 15  BP: (130-152)/(81-91) 152/82  SpO2:  [91 %-94 %] 91 %    Physical Exam  Constitutional:       Appearance: Normal appearance.      Comments: Patient is incoherent and unclear about her condition   HENT:      Head: Normocephalic and atraumatic.      Mouth/Throat:      Mouth: Mucous membranes are moist.      Pharynx: Oropharynx is clear.   Eyes:      Extraocular Movements: Extraocular movements intact.   Cardiovascular:      Rate and Rhythm: Normal rate.      Pulses: Normal pulses.      Heart sounds: Normal heart sounds. No murmur heard.  Pulmonary:      Effort: Pulmonary effort is normal.      Breath sounds: Normal breath sounds.      Comments: Marked improvement in crackles and rales  Abdominal:      Palpations: Abdomen is soft.   Skin:     General: Skin is warm.   Neurological:      Mental Status: She is oriented to person, place, and time.       Fluids    Intake/Output Summary (Last 24 hours) at 8/18/2023 0749  Last data filed at 8/17/2023 2000  Gross per 24 hour   Intake 340 ml   Output 950 ml   Net -610 ml         Laboratory  Recent Labs     08/15/23  1100 08/16/23  0058 08/17/23  0245   WBC  6.4 6.3 6.5   RBC 4.62 5.01 5.02   HEMOGLOBIN 12.0 12.6 12.6   HEMATOCRIT 36.3* 40.6 40.9   MCV 78.6* 81.0* 81.5   MCH 26.0* 25.1* 25.1*   MCHC 33.1 31.0* 30.8*   RDW 49.1 50.6* 50.6*   PLATELETCT 209 212 226   MPV 10.5 10.5 10.0       Recent Labs     08/15/23  1100 08/16/23  0058 08/17/23  0245   SODIUM 129* 135 136   POTASSIUM 4.1 4.2 4.2   CHLORIDE 93* 100 99   CO2 27 25 28   GLUCOSE 109* 190* 158*   BUN 12 11 11   CREATININE 0.78 0.74 0.63   CALCIUM 8.1* 8.5 8.3*       Recent Labs     08/16/23  0058   APTT 28.3   INR 0.93                 Imaging  CT-CTA CHEST PULMONARY ARTERY W/ RECONS   Final Result         1.  No evidence pulmonary embolism.   2.  Trace bilateral pleural effusions with bibasilar atelectasis.   3.  Trace pericardial effusion.   4.  Emphysematous changes.      Fleischner Society pulmonary nodule recommendations:   Nonapplicable      DX-CHEST-PORTABLE (1 VIEW)   Final Result      Nonspecific mild left basilar opacity could be scarring however cannot exclude atelectasis, small infiltrate or a small pleural effusion.             Assessment/Plan  Problem Representation:    * Acute pneumonia- (present on admission)  Assessment & Plan  Patient is poor historian but covid test detected likey Covid pneumonia.    Plan:  Continue O2 nasal canula  Dexamethasone 6mg  Rrespiratory therapy  F/u with repeat Chest X-ray      Depression  Assessment & Plan  Review of the chronic depression    Plan:  Continue home antidepressant medications      Hypoxia  Assessment & Plan  Acute hypoxia 2/2 to emphysematous changes and covid pneumonia    Plan:  Continue O2 on nasal canula  Repeat Lactic acid      Hyponatremia  Assessment & Plan  On admission to ED 8/15/23 sodium 129 otherwise asymptomatic.    Plan:  8/17/23 resolved    Hypothyroid  Assessment & Plan  Conitnue home levothyroxine  Repeat TSH, T4           VTE prophylaxis:     I have performed a physical exam and reviewed and updated ROS and Plan today (8/18/2023).  In review of yesterday's note (8/17/2023), there are no changes except as documented above.

## 2023-08-19 PROBLEM — U07.1 PNEUMONIA DUE TO COVID-19 VIRUS: Status: ACTIVE | Noted: 2023-08-15

## 2023-08-19 PROBLEM — J12.82 PNEUMONIA DUE TO COVID-19 VIRUS: Status: ACTIVE | Noted: 2023-08-15

## 2023-08-19 LAB
BASOPHILS # BLD AUTO: 0.3 % (ref 0–1.8)
BASOPHILS # BLD: 0.02 K/UL (ref 0–0.12)
EOSINOPHIL # BLD AUTO: 0 K/UL (ref 0–0.51)
EOSINOPHIL NFR BLD: 0 % (ref 0–6.9)
ERYTHROCYTE [DISTWIDTH] IN BLOOD BY AUTOMATED COUNT: 51.5 FL (ref 35.9–50)
HCT VFR BLD AUTO: 43.5 % (ref 37–47)
HGB BLD-MCNC: 13.8 G/DL (ref 12–16)
IMM GRANULOCYTES # BLD AUTO: 0.1 K/UL (ref 0–0.11)
IMM GRANULOCYTES NFR BLD AUTO: 1.3 % (ref 0–0.9)
LYMPHOCYTES # BLD AUTO: 0.62 K/UL (ref 1–4.8)
LYMPHOCYTES NFR BLD: 7.8 % (ref 22–41)
MCH RBC QN AUTO: 26.1 PG (ref 27–33)
MCHC RBC AUTO-ENTMCNC: 31.7 G/DL (ref 32.2–35.5)
MCV RBC AUTO: 82.4 FL (ref 81.4–97.8)
MONOCYTES # BLD AUTO: 0.48 K/UL (ref 0–0.85)
MONOCYTES NFR BLD AUTO: 6 % (ref 0–13.4)
NEUTROPHILS # BLD AUTO: 6.75 K/UL (ref 1.82–7.42)
NEUTROPHILS NFR BLD: 84.6 % (ref 44–72)
NRBC # BLD AUTO: 0 K/UL
NRBC BLD-RTO: 0 /100 WBC (ref 0–0.2)
PLATELET # BLD AUTO: 223 K/UL (ref 164–446)
PMV BLD AUTO: 10.1 FL (ref 9–12.9)
PROCALCITONIN SERPL-MCNC: <0.05 NG/ML
RBC # BLD AUTO: 5.28 M/UL (ref 4.2–5.4)
WBC # BLD AUTO: 8 K/UL (ref 4.8–10.8)

## 2023-08-19 PROCEDURE — 99232 SBSQ HOSP IP/OBS MODERATE 35: CPT | Mod: GC | Performed by: INTERNAL MEDICINE

## 2023-08-19 PROCEDURE — 770001 HCHG ROOM/CARE - MED/SURG/GYN PRIV*

## 2023-08-19 PROCEDURE — 36415 COLL VENOUS BLD VENIPUNCTURE: CPT

## 2023-08-19 PROCEDURE — A9270 NON-COVERED ITEM OR SERVICE: HCPCS

## 2023-08-19 PROCEDURE — 85025 COMPLETE CBC W/AUTO DIFF WBC: CPT

## 2023-08-19 PROCEDURE — 84145 PROCALCITONIN (PCT): CPT

## 2023-08-19 PROCEDURE — 700102 HCHG RX REV CODE 250 W/ 637 OVERRIDE(OP)

## 2023-08-19 RX ADMIN — CLOZAPINE 150 MG: 25 TABLET ORAL at 06:15

## 2023-08-19 RX ADMIN — TIOTROPIUM BROMIDE INHALATION SPRAY 5 MCG: 3.12 SPRAY, METERED RESPIRATORY (INHALATION) at 06:17

## 2023-08-19 RX ADMIN — SIMVASTATIN 40 MG: 40 TABLET, FILM COATED ORAL at 20:55

## 2023-08-19 RX ADMIN — LEVOTHYROXINE SODIUM 125 MCG: 0.12 TABLET ORAL at 06:16

## 2023-08-19 RX ADMIN — LEVETIRACETAM 250 MG: 500 TABLET, FILM COATED ORAL at 17:49

## 2023-08-19 RX ADMIN — ASPIRIN 81 MG: 81 TABLET, COATED ORAL at 06:16

## 2023-08-19 RX ADMIN — LEVETIRACETAM 250 MG: 500 TABLET, FILM COATED ORAL at 06:16

## 2023-08-19 RX ADMIN — MIRTAZAPINE 7.5 MG: 15 TABLET, FILM COATED ORAL at 20:55

## 2023-08-19 RX ADMIN — CLOZAPINE 150 MG: 25 TABLET ORAL at 17:49

## 2023-08-19 RX ADMIN — ESCITALOPRAM OXALATE 10 MG: 10 TABLET ORAL at 06:16

## 2023-08-19 RX ADMIN — LEVETIRACETAM 250 MG: 500 TABLET, FILM COATED ORAL at 13:35

## 2023-08-19 RX ADMIN — DEXAMETHASONE 6 MG: 4 TABLET ORAL at 06:16

## 2023-08-19 RX ADMIN — DIVALPROEX SODIUM 250 MG: 250 TABLET, EXTENDED RELEASE ORAL at 06:16

## 2023-08-19 ASSESSMENT — ENCOUNTER SYMPTOMS
COUGH: 0
DIARRHEA: 0
CHILLS: 0
SENSORY CHANGE: 0
PALPITATIONS: 0
FEVER: 0
FOCAL WEAKNESS: 0
NAUSEA: 0
SPEECH CHANGE: 0
DOUBLE VISION: 0
HEARTBURN: 0
VOMITING: 0
PHOTOPHOBIA: 0
WEAKNESS: 1

## 2023-08-19 ASSESSMENT — PAIN DESCRIPTION - PAIN TYPE: TYPE: ACUTE PAIN

## 2023-08-19 ASSESSMENT — FIBROSIS 4 INDEX: FIB4 SCORE: 1.97

## 2023-08-19 NOTE — DISCHARGE PLANNING
RNCM spoke with group home home administrator- Valeria # [phone]. Per Valeria patient can return back to group home. Bedside RN advised.    15:17- RNCM asked bedside RN to do 02 sat test as patient is still on 1L 02. RNCM has been trying to get ahold of [facility] resident. Per answering service Dr. Ward is not on call today. She has paged the on call resident x2 still no answer from resident- Dr. Jimenez. Patient needs order and face to face for 02 for home use. Still waiting for call back.    15:33- RNCM has reached out a 3rd time trying to get in touch with resident/ attending for home 02 order and DME face to face. Waiting for call back.    Bedside RN gave RNCM patients RNCM for grantorship?- [facility]- [phone].

## 2023-08-19 NOTE — CARE PLAN
The patient is Stable - Low risk of patient condition declining or worsening    Shift Goals  Clinical Goals: monitor respiratory status/needs; safety  Patient Goals: sleep good  Family Goals: ash[place]; n/a at this time    Progress made toward(s) clinical / shift goals:  progressing     Problem: Care Map:  Optimal Outcome for the Pneumonia Patient  Goal: Collection and monitoring of appropriate tests and labs  Outcome: Progressing     Problem: Respiratory  Goal: Patient will achieve/maintain optimum respiratory ventilation and gas exchange  Outcome: Progressing     Problem: Risk for Aspiration  Goal: Patient's risk for aspiration will be absent or decrease  Outcome: Progressing     Problem: Hemodynamics - Pneumonia  Goal: Patient's hemodynamics, fluid balance and neurologic status will be stable or improve  Outcome: Progressing    Problem: Fall Risk  Goal: Patient will remain free from falls  Outcome: Progressing     Problem: Skin Integrity  Goal: Skin integrity is maintained or improved  Outcome: Progressing          Patient is not progressing towards the following goals:      Problem: Self Care  Goal: Patient will have the ability to perform ADLs independently or with assistance (bathe, groom, dress, toilet and feed)  Outcome: Not Met

## 2023-08-19 NOTE — PROGRESS NOTES
"[facility] Internal Medicine Daily Progress Note    Date of Service  8/19/2023    [facility] Team: R KEVYN Sanchez Team   Attending: Alvin Gonzales M.d.  Senior Resident: Dr. Norbert Hensley  Intern:  Dr. Josh rooney MD  Contact Number: [phone]    Chief Complaint  Tracie Rinaldi is a 73 y.o. female admitted 8/15/2023 with weakness, cough.    Hospital Course  Tracie Rinaldi is a 73 y.o. female who presented 8/15/2023 with Lower extremities, weakness and hypoxia. When inquired when did your weakness start she states that \"its been years\" and wheel chair bound. She states that she was feeling lonely at group home and was not well taken care of. Patient seems poor historian to me and was not sure why is she in hospital and what is her medical problem although she was oriented to time, place and person. She denies chest pain, SOB but does cough with inspiration. She was asking food and most of the time incoherent answering questions.   ED course: pulse 75, RR 22, /68, O2 87%.   Labs were significant for Hyponatremia 129, mild hypocalcemia, lactic acid 0.6,  Chest X-ray remarkable for mild left basilar opacity could be scarring however cannot exclude atelectasis, small infiltrate or a small pleural effusion.  Chest CT: No evidence of PE, trace bilateral pleural effusions, trace pericardial effusion and  Emphysematous changes.  EKG showed T wave abnormalities.  Gradual resolution of COVID-19 symptoms ongoing fatigue, shortness of breath, cough.    Interval Problem Update  -Patient seen and evaluated at bedside this AM.  No overnight acute event reported by the night team.  -Patient's breathing overall improved.  Following up with case management regarding group home and policies regarding COVID.    I have discussed this patient's plan of care and discharge plan at IDT rounds today with Case Management, Nursing, Nursing leadership, and other members of the IDT team.    Consultants/Specialty  N/a    Code " Status  Full    Disposition  The patient is medically cleared for discharge to home or a post-acute facility.      I have placed the appropriate orders for post-discharge needs.    Review of Systems  Review of Systems   Constitutional:  Negative for chills and fever.   Eyes:  Negative for double vision and photophobia.   Respiratory:  Negative for cough.    Cardiovascular:  Negative for chest pain, palpitations and leg swelling.   Gastrointestinal:  Negative for diarrhea, heartburn, nausea and vomiting.   Musculoskeletal:         Fatigued   Neurological:  Positive for weakness. Negative for sensory change, speech change and focal weakness.        Physical Exam  Temp:  [36.6 °C (97.9 °F)-36.9 °C (98.5 °F)] 36.8 °C (98.3 °F)  Pulse:  [80-87] 82  Resp:  [14-18] 14  BP: (139-175)/(62-88) 139/79  SpO2:  [87 %-95 %] 93 %    Physical Exam  Vitals and nursing note reviewed.   Constitutional:       General: She is not in acute distress.     Appearance: Normal appearance.      Comments: Patient is incoherent and unclear about her condition   HENT:      Head: Normocephalic and atraumatic.      Right Ear: External ear normal.      Left Ear: External ear normal.      Mouth/Throat:      Mouth: Mucous membranes are moist.      Pharynx: Oropharynx is clear.   Eyes:      Extraocular Movements: Extraocular movements intact.   Cardiovascular:      Rate and Rhythm: Normal rate.      Pulses: Normal pulses.      Heart sounds: Normal heart sounds. No murmur heard.  Pulmonary:      Effort: Pulmonary effort is normal. No respiratory distress.      Breath sounds: Normal breath sounds.      Comments: Marked improvement in crackles and rales  Chest:      Chest wall: No tenderness.   Abdominal:      General: Abdomen is flat. There is no distension.   Skin:     General: Skin is warm.      Findings: No bruising, lesion or rash.   Neurological:      General: No focal deficit present.      Mental Status: She is alert. Mental status is at baseline.          Fluids    Intake/Output Summary (Last 24 hours) at 8/19/2023 1400  Last data filed at 8/18/2023 2000  Gross per 24 hour   Intake 1600 ml   Output 450 ml   Net 1150 ml       Laboratory  Recent Labs     08/17/23  0245   WBC 6.5   RBC 5.02   HEMOGLOBIN 12.6   HEMATOCRIT 40.9   MCV 81.5   MCH 25.1*   MCHC 30.8*   RDW 50.6*   PLATELETCT 226   MPV 10.0     Recent Labs     08/17/23  0245   SODIUM 136   POTASSIUM 4.2   CHLORIDE 99   CO2 28   GLUCOSE 158*   BUN 11   CREATININE 0.63   CALCIUM 8.3*                     Imaging  CT-CTA CHEST PULMONARY ARTERY W/ RECONS   Final Result         1.  No evidence pulmonary embolism.   2.  Trace bilateral pleural effusions with bibasilar atelectasis.   3.  Trace pericardial effusion.   4.  Emphysematous changes.      Fleischner Society pulmonary nodule recommendations:   Nonapplicable      DX-CHEST-PORTABLE (1 VIEW)   Final Result      Nonspecific mild left basilar opacity could be scarring however cannot exclude atelectasis, small infiltrate or a small pleural effusion.             Assessment/Plan  Problem Representation:    * Pneumonia due to COVID-19 virus- (present on admission)  Assessment & Plan  Patient is poor historian but covid test positive     -Continue O2 nasal canula.  We will continue to titrate off oxygen therapy.   -Dexamethasone 6mg daily for total of 10 days ending on August 25.   Respiratory therapy   F/u with repeat Chest X-ray necessary for worsening clinical picture      Depression  Assessment & Plan  Review of the chronic depression.  Does not appear to be in exacerbation or crisis at this time    Plan:  Continue home antidepressant medications      Hypoxia  Assessment & Plan  Acute hypoxia 2/2 to emphysematous changes and covid pneumonia     Continue O2 on nasal canula   See pneumonia plan.      Hyponatremia  Assessment & Plan  On admission to ED 8/15/23 sodium 129 otherwise asymptomatic.    Plan:  8/17/23 resolved    Hypothyroid  Assessment &  Plan  Conitnue home levothyroxine  Repeat TSH, T4           VTE prophylaxis:     I have performed a physical exam and reviewed and updated ROS and Plan today (8/19/2023). In review of yesterday's note (8/18/2023), there are no changes except as documented above.

## 2023-08-19 NOTE — CARE PLAN
Problem: Knowledge Deficit - Standard  Goal: Patient and family/care givers will demonstrate understanding of plan of care, disease process/condition, diagnostic tests and medications  Outcome: Progressing     Problem: Fall Risk  Goal: Patient will remain free from falls  Outcome: Progressing     Problem: Skin Integrity  Goal: Skin integrity is maintained or improved  Outcome: Progressing   The patient is Stable - Low risk of patient condition declining or worsening    Shift Goals  Clinical Goals: Stable O2 demand, discharge  Patient Goals: Rest and discharge  Family Goals: JORDI    Progress made toward(s) clinical / shift goals:      Pt educated on plan of care, pt verbalizes understanding, reinforcement needed. Pt educated on pain/anxiety scales, alleviating factors, pain/anxiety medications and side effects, and need for pain/anxiety reassessment, pt pain/anxiety controlled at this time, reinforcement needed. Pt educated on the need to reposition frequently and remain dry to avoid skin breakdown, reinforcement needed, no skin breakdown during shift. Fall risk education provided to pt, pt educated about the need to call for assistance while attempting to ambulate, reinforcement needed, pt remains fall free this shift.

## 2023-08-19 NOTE — PROGRESS NOTES
Bedside report received from night nurse at 0700. Pt seen at bedside and pt care assumed. Pt is A&Ox2, on 1L NC, and denies pain. Pt is medical and not on telemetry monitor. Pt is up x2 assist w/FWW.     Plan of care reviewed with pt, call light, phone, and personal belongings within reach. Bed alarm on, and bed in low locked position. All pt's needs met at this time.

## 2023-08-20 LAB
ANION GAP SERPL CALC-SCNC: 12 MMOL/L (ref 7–16)
BACTERIA BLD CULT: NORMAL
BACTERIA BLD CULT: NORMAL
BUN SERPL-MCNC: 15 MG/DL (ref 8–22)
CALCIUM SERPL-MCNC: 9 MG/DL (ref 8.5–10.5)
CHLORIDE SERPL-SCNC: 99 MMOL/L (ref 96–112)
CO2 SERPL-SCNC: 24 MMOL/L (ref 20–33)
CREAT SERPL-MCNC: 0.68 MG/DL (ref 0.5–1.4)
ERYTHROCYTE [DISTWIDTH] IN BLOOD BY AUTOMATED COUNT: 49 FL (ref 35.9–50)
GFR SERPLBLD CREATININE-BSD FMLA CKD-EPI: 93 ML/MIN/1.73 M 2
GLUCOSE SERPL-MCNC: 110 MG/DL (ref 65–99)
HCT VFR BLD AUTO: 42.8 % (ref 37–47)
HGB BLD-MCNC: 13.8 G/DL (ref 12–16)
MCH RBC QN AUTO: 25.7 PG (ref 27–33)
MCHC RBC AUTO-ENTMCNC: 32.2 G/DL (ref 32.2–35.5)
MCV RBC AUTO: 79.7 FL (ref 81.4–97.8)
PLATELET # BLD AUTO: 233 K/UL (ref 164–446)
PMV BLD AUTO: 10.5 FL (ref 9–12.9)
POTASSIUM SERPL-SCNC: 3.7 MMOL/L (ref 3.6–5.5)
RBC # BLD AUTO: 5.37 M/UL (ref 4.2–5.4)
SIGNIFICANT IND 70042: NORMAL
SIGNIFICANT IND 70042: NORMAL
SITE SITE: NORMAL
SITE SITE: NORMAL
SODIUM SERPL-SCNC: 135 MMOL/L (ref 135–145)
SOURCE SOURCE: NORMAL
SOURCE SOURCE: NORMAL
TSH SERPL DL<=0.005 MIU/L-ACNC: 0.77 UIU/ML (ref 0.38–5.33)
WBC # BLD AUTO: 12.6 K/UL (ref 4.8–10.8)

## 2023-08-20 PROCEDURE — 85027 COMPLETE CBC AUTOMATED: CPT

## 2023-08-20 PROCEDURE — 36415 COLL VENOUS BLD VENIPUNCTURE: CPT

## 2023-08-20 PROCEDURE — 99232 SBSQ HOSP IP/OBS MODERATE 35: CPT | Mod: GC | Performed by: INTERNAL MEDICINE

## 2023-08-20 PROCEDURE — 80048 BASIC METABOLIC PNL TOTAL CA: CPT

## 2023-08-20 PROCEDURE — 84443 ASSAY THYROID STIM HORMONE: CPT

## 2023-08-20 PROCEDURE — 700102 HCHG RX REV CODE 250 W/ 637 OVERRIDE(OP)

## 2023-08-20 PROCEDURE — A9270 NON-COVERED ITEM OR SERVICE: HCPCS

## 2023-08-20 PROCEDURE — 770001 HCHG ROOM/CARE - MED/SURG/GYN PRIV*

## 2023-08-20 RX ADMIN — TIOTROPIUM BROMIDE INHALATION SPRAY 5 MCG: 3.12 SPRAY, METERED RESPIRATORY (INHALATION) at 06:23

## 2023-08-20 RX ADMIN — DEXAMETHASONE 6 MG: 4 TABLET ORAL at 06:23

## 2023-08-20 RX ADMIN — CLOZAPINE 150 MG: 25 TABLET ORAL at 06:23

## 2023-08-20 RX ADMIN — LEVETIRACETAM 250 MG: 500 TABLET, FILM COATED ORAL at 06:23

## 2023-08-20 RX ADMIN — DIVALPROEX SODIUM 250 MG: 250 TABLET, EXTENDED RELEASE ORAL at 06:23

## 2023-08-20 RX ADMIN — MIRTAZAPINE 7.5 MG: 15 TABLET, FILM COATED ORAL at 20:51

## 2023-08-20 RX ADMIN — SIMVASTATIN 40 MG: 40 TABLET, FILM COATED ORAL at 20:51

## 2023-08-20 RX ADMIN — ASPIRIN 81 MG: 81 TABLET, COATED ORAL at 06:23

## 2023-08-20 RX ADMIN — LEVETIRACETAM 250 MG: 500 TABLET, FILM COATED ORAL at 13:44

## 2023-08-20 RX ADMIN — ESCITALOPRAM OXALATE 10 MG: 10 TABLET ORAL at 06:23

## 2023-08-20 RX ADMIN — LEVETIRACETAM 250 MG: 500 TABLET, FILM COATED ORAL at 18:14

## 2023-08-20 RX ADMIN — LEVOTHYROXINE SODIUM 125 MCG: 0.12 TABLET ORAL at 06:23

## 2023-08-20 RX ADMIN — CLOZAPINE 150 MG: 25 TABLET ORAL at 18:14

## 2023-08-20 ASSESSMENT — ENCOUNTER SYMPTOMS
HEARTBURN: 0
FOCAL WEAKNESS: 0
DIARRHEA: 0
FEVER: 0
DOUBLE VISION: 0
WEAKNESS: 1
NAUSEA: 0
CHILLS: 0
VOMITING: 0
PHOTOPHOBIA: 0
COUGH: 0
PALPITATIONS: 0

## 2023-08-20 ASSESSMENT — FIBROSIS 4 INDEX: FIB4 SCORE: 1.89

## 2023-08-20 ASSESSMENT — PAIN DESCRIPTION - PAIN TYPE: TYPE: ACUTE PAIN

## 2023-08-20 NOTE — PROGRESS NOTES
"[facility] Internal Medicine Daily Progress Note    Date of Service  8/20/2023    [facility] Team: R KEVYN Sanchez Team   Attending: Alvin Gonzales M.d.  Senior Resident: Dr. Naresh Hensley  Intern:  Dr. Josh rooney MD  Contact Number: [phone]    Chief Complaint  Tracie Rinaldi is a 73 y.o. female admitted 8/15/2023 with weakness, cough.    Hospital Course  Tracie Rinaldi is a 73 y.o. female who presented 8/15/2023 with Lower extremities, weakness and hypoxia. When inquired when did your weakness start she states that \"its been years\" and wheel chair bound. She states that she was feeling lonely at group home and was not well taken care of. Patient seems poor historian to me and was not sure why is she in hospital and what is her medical problem although she was oriented to time, place and person. She denies chest pain, SOB but does cough with inspiration. She was asking food and most of the time incoherent answering questions.   ED course: pulse 75, RR 22, /68, O2 87%.   Labs were significant for Hyponatremia 129, mild hypocalcemia, lactic acid 0.6,  Chest X-ray remarkable for mild left basilar opacity could be scarring however cannot exclude atelectasis, small infiltrate or a small pleural effusion.  Chest CT: No evidence of PE, trace bilateral pleural effusions, trace pericardial effusion and  Emphysematous changes.  EKG showed T wave abnormalities.  Gradual resolution of COVID-19 symptoms ongoing fatigue, shortness of breath, cough.    Interval Problem Update  Patient seen and evaluated at bedside this AM.  No overnight acute event reported by the night team.  Breath sounds and crackles significantly improved.  Vitals and labs are stable except variation in blood pressure.  Denies chest pain dyspnea.  SPO2 93% on 4 L oxygen    I have discussed this patient's plan of care and discharge plan at IDT rounds today with Case Management, Nursing, Nursing leadership, and other members of the IDT " team.    Consultants/Specialty       Code Status  Full    Disposition  The patient is medically cleared for discharge to home or a post-acute facility.      I have placed the appropriate orders for post-discharge needs.    Review of Systems  Review of Systems   Constitutional:  Negative for chills and fever.   Eyes:  Negative for double vision and photophobia.   Respiratory:  Negative for cough.    Cardiovascular:  Negative for chest pain, palpitations and leg swelling.   Gastrointestinal:  Negative for diarrhea, heartburn, nausea and vomiting.   Musculoskeletal:         Fatigued   Neurological:  Positive for weakness. Negative for focal weakness.        Physical Exam  Temp:  [36.3 °C (97.3 °F)-37.1 °C (98.8 °F)] 36.3 °C (97.3 °F)  Pulse:  [85-95] 95  Resp:  [16-18] 18  BP: (123-164)/(59-89) 123/59  SpO2:  [90 %-96 %] 93 %    Physical Exam  Constitutional:       Appearance: Normal appearance.      Comments: Patient is not clear about her condition, situation   HENT:      Head: Normocephalic and atraumatic.      Mouth/Throat:      Mouth: Mucous membranes are moist.      Pharynx: Oropharynx is clear.   Eyes:      Extraocular Movements: Extraocular movements intact.   Cardiovascular:      Rate and Rhythm: Normal rate.      Pulses: Normal pulses.      Heart sounds: Normal heart sounds. No murmur heard.  Pulmonary:      Effort: Pulmonary effort is normal.      Breath sounds: Normal breath sounds.      Comments: Marked improvement in crackles and rales  Abdominal:      Palpations: Abdomen is soft.   Skin:     General: Skin is warm.   Neurological:      Mental Status: She is oriented to person, place, and time.      Comments: Generalized weakness of lower legs         Fluids    Intake/Output Summary (Last 24 hours) at 8/20/2023 1407  Last data filed at 8/19/2023 1933  Gross per 24 hour   Intake 50 ml   Output 2100 ml   Net -2050 ml         Laboratory  Recent Labs     08/19/23  1501 08/20/23  0318   WBC 8.0 12.6*   RBC 5.28  5.37   HEMOGLOBIN 13.8 13.8   HEMATOCRIT 43.5 42.8   MCV 82.4 79.7*   MCH 26.1* 25.7*   MCHC 31.7* 32.2   RDW 51.5* 49.0   PLATELETCT 223 233   MPV 10.1 10.5       Recent Labs     08/20/23  0318   SODIUM 135   POTASSIUM 3.7   CHLORIDE 99   CO2 24   GLUCOSE 110*   BUN 15   CREATININE 0.68   CALCIUM 9.0                       Imaging  CT-CTA CHEST PULMONARY ARTERY W/ RECONS   Final Result         1.  No evidence pulmonary embolism.   2.  Trace bilateral pleural effusions with bibasilar atelectasis.   3.  Trace pericardial effusion.   4.  Emphysematous changes.      Fleischner Society pulmonary nodule recommendations:   Nonapplicable      DX-CHEST-PORTABLE (1 VIEW)   Final Result      Nonspecific mild left basilar opacity could be scarring however cannot exclude atelectasis, small infiltrate or a small pleural effusion.             Assessment/Plan  Problem Representation:    * Pneumonia due to COVID-19 virus- (present on admission)  Assessment & Plan  Patient is poor historian but covid test positive     -Continue O2 nasal canula.  We will continue to titrate off oxygen therapy.   -Dexamethasone 6mg daily for total of 10 days ending on August 25.   Respiratory therapy consult appreciated for PEEP evaluation   F/u with repeat Chest X-ray necessary for worsening clinical picture                    Depression  Assessment & Plan  Review of the chronic depression.  Does not appear to be in exacerbation or crisis at this time    Plan:  Continue home antidepressant medications  Recommend CBT    Hypoxia  Assessment & Plan  Acute hypoxia 2/2 to emphysematous changes and covid pneumonia     Continue O2 on nasal canula   See pneumonia plan.      Hyponatremia  Assessment & Plan  On admission to ED 8/15/23 sodium 129 otherwise asymptomatic.    Plan:  8/17/23 resolved    Hypothyroid  Assessment & Plan  Conitnue home levothyroxine  Repeat TSH is in normal range.           VTE prophylaxis: none    I have performed a physical exam and  reviewed and updated ROS and Plan today (8/20/2023). In review of yesterday's note (8/19/2023), there are no changes except as documented above.

## 2023-08-20 NOTE — PROGRESS NOTES
Bedside report received from MINAL Neri. Pt on 2 L O2 NC. Patient A&O x 1 to self only. Pt does not complain of pain at this time. POC discussed with patient. Patient unable to verbalize understanding. Call light and belongings within reach. Bed locked in lowest position, alarm and fall precautions in place.

## 2023-08-20 NOTE — PROGRESS NOTES
Bedside report received from night nurse at 0700. Pt seen at bedside and pt care assumed. Pt is A&Ox2, on 4L NC, and denies pain. Pt is medical and not on telemetry monitor. Pt is up x2 assist w/FWW.     Plan of care reviewed with pt, call light, phone, and personal belongings within reach. Bed alarm on, and bed in low locked position. All pt's needs met at this time.

## 2023-08-20 NOTE — CARE PLAN
The patient is Stable - Low risk of patient condition declining or worsening    Shift Goals  Clinical Goals: Pt will not remove their O2 this shift  Patient Goals: comfort  Family Goals: JORDI    Progress made toward(s) clinical / shift goals: Pt has not removed their O2 NC this shift, VS WNL, pt turning self from side to side, pt's O2 demands have not increased    Patient is not progressing towards the following goals:Pt remains oriented to self only      Problem: Respiratory  Goal: Patient will achieve/maintain optimum respiratory ventilation and gas exchange  Outcome: Progressing     Problem: Hemodynamics - Pneumonia  Goal: Patient's hemodynamics, fluid balance and neurologic status will be stable or improve  Outcome: Progressing

## 2023-08-21 ENCOUNTER — APPOINTMENT (OUTPATIENT)
Dept: RADIOLOGY | Facility: MEDICAL CENTER | Age: 73
DRG: 177 | End: 2023-08-21
Payer: MEDICARE

## 2023-08-21 LAB — NT-PROBNP SERPL IA-MCNC: 247 PG/ML (ref 0–125)

## 2023-08-21 PROCEDURE — 700111 HCHG RX REV CODE 636 W/ 250 OVERRIDE (IP): Mod: JZ

## 2023-08-21 PROCEDURE — 97535 SELF CARE MNGMENT TRAINING: CPT | Mod: CO

## 2023-08-21 PROCEDURE — 700102 HCHG RX REV CODE 250 W/ 637 OVERRIDE(OP): Performed by: STUDENT IN AN ORGANIZED HEALTH CARE EDUCATION/TRAINING PROGRAM

## 2023-08-21 PROCEDURE — 700102 HCHG RX REV CODE 250 W/ 637 OVERRIDE(OP)

## 2023-08-21 PROCEDURE — 770001 HCHG ROOM/CARE - MED/SURG/GYN PRIV*

## 2023-08-21 PROCEDURE — A9270 NON-COVERED ITEM OR SERVICE: HCPCS | Performed by: STUDENT IN AN ORGANIZED HEALTH CARE EDUCATION/TRAINING PROGRAM

## 2023-08-21 PROCEDURE — 36415 COLL VENOUS BLD VENIPUNCTURE: CPT

## 2023-08-21 PROCEDURE — 83880 ASSAY OF NATRIURETIC PEPTIDE: CPT

## 2023-08-21 PROCEDURE — 99232 SBSQ HOSP IP/OBS MODERATE 35: CPT | Mod: GC | Performed by: INTERNAL MEDICINE

## 2023-08-21 PROCEDURE — 71045 X-RAY EXAM CHEST 1 VIEW: CPT

## 2023-08-21 PROCEDURE — A9270 NON-COVERED ITEM OR SERVICE: HCPCS

## 2023-08-21 RX ORDER — FUROSEMIDE 10 MG/ML
20 INJECTION INTRAMUSCULAR; INTRAVENOUS
Status: DISCONTINUED | OUTPATIENT
Start: 2023-08-21 | End: 2023-08-26 | Stop reason: HOSPADM

## 2023-08-21 RX ORDER — CHOLECALCIFEROL (VITAMIN D3) 125 MCG
5 CAPSULE ORAL NIGHTLY
Status: DISCONTINUED | OUTPATIENT
Start: 2023-08-21 | End: 2023-08-26 | Stop reason: HOSPADM

## 2023-08-21 RX ORDER — DIAPER,BRIEF,ADULT, DISPOSABLE
EACH MISCELLANEOUS
Qty: 90 EACH | Refills: 10 | Status: SHIPPED | OUTPATIENT
Start: 2023-08-21

## 2023-08-21 RX ORDER — POTASSIUM CHLORIDE 20 MEQ/1
40 TABLET, EXTENDED RELEASE ORAL ONCE
Status: COMPLETED | OUTPATIENT
Start: 2023-08-21 | End: 2023-08-21

## 2023-08-21 RX ADMIN — LEVETIRACETAM 250 MG: 500 TABLET, FILM COATED ORAL at 06:32

## 2023-08-21 RX ADMIN — CLOZAPINE 150 MG: 25 TABLET ORAL at 17:57

## 2023-08-21 RX ADMIN — SIMVASTATIN 40 MG: 40 TABLET, FILM COATED ORAL at 20:15

## 2023-08-21 RX ADMIN — POTASSIUM CHLORIDE 40 MEQ: 1500 TABLET, EXTENDED RELEASE ORAL at 17:56

## 2023-08-21 RX ADMIN — DEXAMETHASONE 6 MG: 4 TABLET ORAL at 06:36

## 2023-08-21 RX ADMIN — CLOZAPINE 150 MG: 25 TABLET ORAL at 06:37

## 2023-08-21 RX ADMIN — LEVETIRACETAM 250 MG: 500 TABLET, FILM COATED ORAL at 12:07

## 2023-08-21 RX ADMIN — DIVALPROEX SODIUM 250 MG: 250 TABLET, EXTENDED RELEASE ORAL at 06:38

## 2023-08-21 RX ADMIN — MIRTAZAPINE 7.5 MG: 15 TABLET, FILM COATED ORAL at 20:15

## 2023-08-21 RX ADMIN — TIOTROPIUM BROMIDE INHALATION SPRAY 5 MCG: 3.12 SPRAY, METERED RESPIRATORY (INHALATION) at 06:40

## 2023-08-21 RX ADMIN — FUROSEMIDE 20 MG: 10 INJECTION, SOLUTION INTRAVENOUS at 17:56

## 2023-08-21 RX ADMIN — ASPIRIN 81 MG: 81 TABLET, COATED ORAL at 06:31

## 2023-08-21 RX ADMIN — LEVOTHYROXINE SODIUM 125 MCG: 0.12 TABLET ORAL at 06:31

## 2023-08-21 RX ADMIN — Medication 5 MG: at 20:15

## 2023-08-21 RX ADMIN — LEVETIRACETAM 250 MG: 500 TABLET, FILM COATED ORAL at 17:57

## 2023-08-21 RX ADMIN — ESCITALOPRAM OXALATE 10 MG: 10 TABLET ORAL at 06:32

## 2023-08-21 ASSESSMENT — COGNITIVE AND FUNCTIONAL STATUS - GENERAL
DAILY ACTIVITIY SCORE: 15
EATING MEALS: A LITTLE
HELP NEEDED FOR BATHING: A LOT
SUGGESTED CMS G CODE MODIFIER DAILY ACTIVITY: CK
DRESSING REGULAR UPPER BODY CLOTHING: A LITTLE
TOILETING: A LOT
PERSONAL GROOMING: A LITTLE
DRESSING REGULAR LOWER BODY CLOTHING: A LOT

## 2023-08-21 ASSESSMENT — ENCOUNTER SYMPTOMS
COUGH: 0
HEARTBURN: 0
PHOTOPHOBIA: 0
PALPITATIONS: 0
DOUBLE VISION: 0
WEAKNESS: 1
DIARRHEA: 0
NAUSEA: 0
VOMITING: 0
FOCAL WEAKNESS: 0
FEVER: 0
CHILLS: 0

## 2023-08-21 ASSESSMENT — FIBROSIS 4 INDEX: FIB4 SCORE: 1.89

## 2023-08-21 ASSESSMENT — PAIN DESCRIPTION - PAIN TYPE: TYPE: ACUTE PAIN

## 2023-08-21 NOTE — CARE PLAN
Problem: Knowledge Deficit - Standard  Goal: Patient and family/care givers will demonstrate understanding of plan of care, disease process/condition, diagnostic tests and medications  Outcome: Progressing     Problem: Fall Risk  Goal: Patient will remain free from falls  Outcome: Progressing     Problem: Skin Integrity  Goal: Skin integrity is maintained or improved  Outcome: Progressing   The patient is Stable - Low risk of patient condition declining or worsening    Shift Goals  Clinical Goals: Stable O2 demand  Patient Goals: Rest  Family Goals: JORDI    Progress made toward(s) clinical / shift goals:      Pt educated on plan of care, pt verbalizes understanding, reinforcement needed. Pt educated on pain/anxiety scales, alleviating factors, pain/anxiety medications and side effects, and need for pain/anxiety reassessment, pt pain/anxiety controlled at this time, reinforcement needed. Pt educated on the need to reposition frequently and remain dry to avoid skin breakdown, reinforcement needed, no skin breakdown during shift. Fall risk education provided to pt, pt educated about the need to call for assistance while attempting to ambulate, reinforcement needed, pt remains fall free this shift.

## 2023-08-21 NOTE — THERAPY
Occupational Therapy  Daily Treatment     Patient Name: Trcaie Rinaldi  Age:  73 y.o., Sex:  female  Medical Record #: [id number]  Today's Date: 8/21/2023       Precautions: Fall Risk    Assessment    Pt seen for OT tx. Continues to be limited by decreased activity tolerance and inattention impacting ability to complete ADLs and ADL transfers independently. Pt poor historian w/ inconsistent PLOF, during previous session pt stated she received help w/ dressing and today she was independent PTA. Supv supine > sit, pt declined further OOB activity d/t wanting to rest. Encouraged pt to mobilize w/ [facility] staff as tolerated. Will continue to follow while in house.     Plan    Treatment Plan Status: Continue Current Treatment Plan    DC Equipment Recommendations: None  Discharge Recommendations: Other - (return to group home w/ home health)       08/21/23 1129   Balance   Sitting Balance (Static) Fair   Sitting Balance (Dynamic) Fair -   Weight Shift Sitting Fair   Bed Mobility    Supine to Sit Supervised   Sit to Supine Supervised   Activities of Daily Living   Grooming Supervision;Seated   Upper Body Dressing Supervision   Lower Body Dressing Maximal Assist   Toileting Moderate Assist   Functional Mobility   Comments declined txf d/t wanting to sleep   Short Term Goals   Short Term Goal # 1 Pt will demo ADL txf w/ CGA   Goal Outcome # 1 Goal not met   Short Term Goal # 2 Pt will demo toilet hygiene w/ CGA   Goal Outcome # 2 Goal not met   Anticipated Discharge Equipment and Recommendations   DC Equipment Recommendations None   Discharge Recommendations Other -  (return to group home w/ home health)

## 2023-08-21 NOTE — PROGRESS NOTES
Bedside report received from night RN, pt care assumed, assessment completed. Pt is A&O to self only, pain 0/10. Updated on POC, questions answered. Bed in lowest, locked position, treaded socks on, call light and belongings within reach.

## 2023-08-21 NOTE — CARE PLAN
The patient is Stable - Low risk of patient condition declining or worsening    Shift Goals: wean O2, comfort, safety  Clinical Goals: Stable O2 demand  Patient Goals: Rest  Family Goals: JORDI    Progress made toward(s) clinical / shift goals:      Problem: Care Map:  Optimal Outcome for the Pneumonia Patient  Goal: Collection and monitoring of appropriate tests and labs  Outcome: Progressing     Problem: Self Care  Goal: Patient will have the ability to perform ADLs independently or with assistance (bathe, groom, dress, toilet and feed)  Outcome: Progressing     Problem: Knowledge Deficit - Standard  Goal: Patient and family/care givers will demonstrate understanding of plan of care, disease process/condition, diagnostic tests and medications  Outcome: Progressing     Problem: Fall Risk  Goal: Patient will remain free from falls  Outcome: Progressing     Problem: Skin Integrity  Goal: Skin integrity is maintained or improved  Outcome: Progressing

## 2023-08-21 NOTE — CARE PLAN
The patient is Stable - Low risk of patient condition declining or worsening    Shift Goals  Clinical Goals: reorient patient, monitor resp status  Patient Goals: Rest  Family Goals: JORDI    Progress made toward(s) clinical / shift goals:    Problem: Respiratory  Goal: Patient will achieve/maintain optimum respiratory ventilation and gas exchange  Description: Target End Date:  Prior to discharge or change in level of care    Document on Assessment flowsheet    1.  Assess and monitor rate, rhythm, depth and effort of respiration  2.  Breath sounds assessed qshift and/or as needed  3.  Assess O2 saturation, administer/titrate oxygen as ordered  4.  Position patient for maximum ventilatory efficiency  5.  Turn, cough, and deep breath with splinting to improve effectiveness  6.  Collaborate with RT to administer medication/treatments per order  7.  Encourage use of incentive spirometer and encourage patient to cough after use and utilize splinting techniques if applicable  8.  Airway suctioning  9.  Monitor sputum production for changes in color, consistency and frequency  10. Perform frequent oral hygiene  11. Alternate physical activity with rest periods  Outcome: Progressing  Note: Pt has fine crackles in RML, RLL, and LLL. Pt on 4L nasal cannula. Continually assessing respiratory status.     Problem: Fall Risk  Goal: Patient will remain free from falls  Description: Target End Date:  Prior to discharge or change in level of care    Document interventions on the [place] Fall Risk Assessment    1.  Assess for fall risk factors  2.  Implement fall precautions  Outcome: Progressing  Note: Pt reminded to utilize call light for assistance. Bed in lowest, locked position. Call light within reach, treaded socks in place, upper bed rails upright.        Pt advised to return to ER or call 911 if with worsening concerns for safety.

## 2023-08-21 NOTE — PROGRESS NOTES
"[facility] [place]GRESS [place]     Attending: Dr. Gonzales    Resident: Dr. Hensley, senior resident, Dr. Ward, juliocesar resident     PATIENT: Tracie Rinaldi; [id number]; 1950    ID: 73 y.o. female with PMH of seizures, cognitive impairment, schizophrenia, COPD, and osteoarthritis admitted on 8/15 for COVID-pneumonia.     INTERVAL UPDATE 8/17: Patient is a poor historian and has profound dementia. No acute events overnight. Patient reports being cold. Patient looks almost claustrophobic as she kept wanting to take off her gown and blanket during the H&P. Patient looking delirious, but mainstay hospital course. Otherwise COVID symptoms in moderate improvement.     INTERVAL UPDATE 8/18: Patient looks visibly improved. She denies chest pain or SOB. Reports productive cough is better. Reports she is just \"trying to rest.\" Labs show WBC and lymphocyte count improvement. Exam shows improvement in lung crackles and warm extremities. Will remove from tele monitor as it is not indicated at this time. Home health was contacted and will follow patient in group home upon discharge. Contacting case management to see when and how she can be discharged to group home with proper isolation, etc.     INTERVAL UPDATE 8/21: Patient is evaluated at bedside in the morning with no acute overnight events. Patient is poor historian. Patient reports not feeling good and tries to get out of bed on her own even though she is a fall risk. Breathing is about the same with productive cough with inspiration, showing lack of improvement for 7 day steroid course. Patient AxO3 but still does not know why she is in the hospital.     OBJECTIVE:     Vitals:    08/20/23 0801 08/20/23 2000 08/21/23 0650 08/21/23 0758   BP: 123/59 124/62 111/71 103/85   Pulse: 95 86 88 94   Resp: 18 16 18 18   Temp: 36.3 °C (97.3 °F) 36.6 °C (97.9 °F)  36.4 °C (97.5 °F)   TempSrc: Temporal Temporal  Temporal   SpO2: 93% 94% 93% 96%   Weight:  92.8 kg (204 lb 9.4 oz)   " "  Height:           Intake/Output Summary (Last 24 hours) at 8/16/2023 1341  Last data filed at 8/16/2023 0819  Gross per 24 hour   Intake --   Output 2350 ml   Net -2350 ml       PE:  General: Aox3. In mild distress, normal for profound dementia. Patient slightly agitated.   Chest: II/VI systolic murmur on right second intercostal space. Regular rhythm.   Respiratory: Mild productive cough unresolved and in mild respiratory distress with stridor breathing. Low pitched wheezing and decrease in crackles.     LABS:  Recent Labs     08/19/23  1501 08/20/23  0318   WBC 8.0 12.6*   RBC 5.28 5.37   HEMOGLOBIN 13.8 13.8   HEMATOCRIT 43.5 42.8   MCV 82.4 79.7*   MCH 26.1* 25.7*   RDW 51.5* 49.0   PLATELETCT 223 233   MPV 10.1 10.5   NEUTSPOLYS 84.60*  --    LYMPHOCYTES 7.80*  --    MONOCYTES 6.00  --    EOSINOPHILS 0.00  --    BASOPHILS 0.30  --        Recent Labs     08/20/23  0318   SODIUM 135   POTASSIUM 3.7   CHLORIDE 99   CO2 24   BUN 15   CREATININE 0.68   CALCIUM 9.0       Estimated GFR/CRCL = Estimated Creatinine Clearance: 87.1 mL/min (by C-G formula based on SCr of 0.68 mg/dL).  Recent Labs     08/20/23  0318   GLUCOSE 110*                     No results for input(s): \"INR\", \"APTT\", \"FIBRINOGEN\" in the last 72 hours.    Invalid input(s): \"DIMER\"          MEDS:  Current Facility-Administered Medications   Medication Last Admin    Respiratory Therapy Consult      dexamethasone (Decadron) tablet 6 mg 6 mg at 08/21/23 0636    aspirin EC tablet 81 mg 81 mg at 08/21/23 0631    tiotropium (Spiriva Respimat) 2.5 mcg/Act inhalation spray 5 mcg 5 mcg at 08/21/23 0640    albuterol inhaler 2 Puff      simvastatin (Zocor) tablet 40 mg 40 mg at 08/20/23 2051    mirtazapine (Remeron) tablet 7.5 mg 7.5 mg at 08/20/23 2051    escitalopram (Lexapro) tablet 10 mg 10 mg at 08/21/23 0632    divalproex ER (Depakote ER) tablet 250 mg 250 mg at 08/21/23 0638    cloZAPine (Clozaril) tablet 150 mg 150 mg at 08/21/23 0637    levETIRAcetam " (Keppra) tablet 250 mg 250 mg at 08/21/23 1207    levothyroxine (Synthroid) tablet 125 mcg 125 mcg at 08/21/23 0631    nitroglycerin (Nitrostat) tablet 0.4 mg           ASSESSMENT/PLAN: Tracie Rinaldi is a 72 yo female with PMH of seizures, cognitive impairment, schizophrenia, COPD, osteoporosis admitted on 8/15 for COVID-pneumonia. Patient on day 7 of steroids for COVID-pneumonia therapy and is showing improvement of respiratory distress and productive cough    COVID-pneumonia  COVID positive test and imaging showed emphysema and mild consolidation with COVID. Patient showing respiratory and productive cough improvement from initial admission.   Plan: Patient not showing as much improvement as expected for her hospital stay, so repeat CXR. Continue decadron 6 mg (7/10) for 10 days and oxygen therapy if needed. Monitor for improvement until patient is cleared to go back to group home.  Hypoxia  Mild respiratory distress due to above  Plan: Watch O2 sat without NC and notified nursing staff to monitor  Hyponatremia  Sodium normalized from 8/11  Plan: Monitor for signs of dangerous hyponatremia.  Hypothyroidism  Plan: Continue levothyroxine. Can repeat thyroid panel at next lab draw if necessary   Discharge planning  Plan: Home health contacted and will follow patient in group home. Case management in touch with her group home

## 2023-08-21 NOTE — PROGRESS NOTES
"[facility] Internal Medicine Daily Progress Note    Date of Service  8/21/2023    [facility] Team: [facility] Team   Attending: Alvin Gonzales M.d.  Senior Resident: Dr. Naresh Hensley  Intern:  Dr. Josh rooney MD  Contact Number: [phone]    Chief Complaint  Tracie Rinaldi is a 73 y.o. female admitted 8/15/2023 with weakness, cough.    Hospital Course  Tracie Rinaldi is a 73 y.o. female who presented 8/15/2023 with Lower extremities, weakness and hypoxia. When inquired when did your weakness start she states that \"its been years\" and wheel chair bound. She states that she was feeling lonely at group home and was not well taken care of. Patient seems poor historian to me and was not sure why is she in hospital and what is her medical problem although she was oriented to time, place and person. She denies chest pain, SOB but does cough with inspiration. She was asking food and most of the time incoherent answering questions.   ED course: pulse 75, RR 22, /68, O2 87%.   Labs were significant for Hyponatremia 129, mild hypocalcemia, lactic acid 0.6,  Chest X-ray remarkable for mild left basilar opacity could be scarring however cannot exclude atelectasis, small infiltrate or a small pleural effusion.  Chest CT: No evidence of PE, trace bilateral pleural effusions, trace pericardial effusion and  Emphysematous changes.  EKG showed T wave abnormalities.  Gradual resolution of COVID-19 symptoms ongoing fatigue, shortness of breath, cough.  Patient symptoms gradually improving on dexamethasone but still complains of shortness of breath.  Chest x-ray ordered to reevaluate.    Interval Problem Update  Patient seen and evaluated at bedside this AM.  No overnight acute event reported by the night team.  Breath sounds and crackles significantly improved.  Vitals and labs are stable except variation in high blood pressure reading.  Denies fever, chills chest pain,.  still desaturating on 4 L oxygen, will reevaluate chest " x-ray.    I have discussed this patient's plan of care and discharge plan at IDT rounds today with Case Management, Nursing, Nursing leadership, and other members of the IDT team.    Consultants/Specialty       Code Status  Full    Disposition  The patient is not medically cleared for discharge to home or a post-acute facility.      I have placed the appropriate orders for post-discharge needs.    Review of Systems  Review of Systems   Constitutional:  Negative for chills and fever.        Poor historian, baseline dementia   Eyes:  Negative for double vision and photophobia.   Respiratory:  Negative for cough.    Cardiovascular:  Negative for chest pain, palpitations and leg swelling.   Gastrointestinal:  Negative for diarrhea, heartburn, nausea and vomiting.   Musculoskeletal:         Fatigued   Neurological:  Positive for weakness. Negative for focal weakness.        Physical Exam  Temp:  [36.4 °C (97.5 °F)-36.6 °C (97.9 °F)] 36.4 °C (97.5 °F)  Pulse:  [86-94] 94  Resp:  [16-18] 18  BP: (103-124)/(62-85) 103/85  SpO2:  [93 %-96 %] 96 %    Physical Exam  Constitutional:       Appearance: Normal appearance.      Comments: Patient is not clear about her condition, situation   HENT:      Head: Normocephalic and atraumatic.      Mouth/Throat:      Mouth: Mucous membranes are moist.      Pharynx: Oropharynx is clear.   Eyes:      Extraocular Movements: Extraocular movements intact.   Cardiovascular:      Rate and Rhythm: Normal rate.      Pulses: Normal pulses.      Heart sounds: Normal heart sounds. No murmur heard.  Pulmonary:      Effort: Pulmonary effort is normal.      Breath sounds: Normal breath sounds.      Comments: Marked improvement in crackles and rales  Abdominal:      Palpations: Abdomen is soft.   Skin:     General: Skin is warm.   Neurological:      Mental Status: She is oriented to person, place, and time.      Comments: Generalized weakness of lower legs         Fluids    Intake/Output Summary (Last 24  hours) at 8/21/2023 1312  Last data filed at 8/20/2023 2100  Gross per 24 hour   Intake 120 ml   Output --   Net 120 ml         Laboratory  Recent Labs     08/19/23  1501 08/20/23  0318   WBC 8.0 12.6*   RBC 5.28 5.37   HEMOGLOBIN 13.8 13.8   HEMATOCRIT 43.5 42.8   MCV 82.4 79.7*   MCH 26.1* 25.7*   MCHC 31.7* 32.2   RDW 51.5* 49.0   PLATELETCT 223 233   MPV 10.1 10.5       Recent Labs     08/20/23  0318   SODIUM 135   POTASSIUM 3.7   CHLORIDE 99   CO2 24   GLUCOSE 110*   BUN 15   CREATININE 0.68   CALCIUM 9.0                       Imaging  CT-CTA CHEST PULMONARY ARTERY W/ RECONS   Final Result         1.  No evidence pulmonary embolism.   2.  Trace bilateral pleural effusions with bibasilar atelectasis.   3.  Trace pericardial effusion.   4.  Emphysematous changes.      Fleischner Society pulmonary nodule recommendations:   Nonapplicable      DX-CHEST-PORTABLE (1 VIEW)   Final Result      Nonspecific mild left basilar opacity could be scarring however cannot exclude atelectasis, small infiltrate or a small pleural effusion.      DX-CHEST-2 VIEWS    (Results Pending)   DX-CHEST-PORTABLE (1 VIEW)    (Results Pending)          Assessment/Plan  Problem Representation:    * Pneumonia due to COVID-19 virus- (present on admission)  Assessment & Plan  Patient is poor historian but covid test positive     -Continue O2 nasal canula.  We will continue to titrate off oxygen therapy.   -Dexamethasone 6mg daily for total of 10 days ending on August 25.   Respiratory therapy consult appreciated for PEEP evaluation   Still desaturating on 4 L oxygen f/u with repeat Chest X-ray necessary for worsening clinical picture                    Hypoxia  Assessment & Plan  Acute hypoxia 2/2 to emphysematous changes and covid pneumonia    Still desaturating on 4 L oxygen   Continue O2 on nasal canula  See pneumonia plan.       Depression  Assessment & Plan  Review of the chronic depression.  Does not appear to be in exacerbation or crisis at  this time    Plan:  Continue home antidepressant medications  Recommend CBT    Hyponatremia  Assessment & Plan  On admission to ED 8/15/23 sodium 129 otherwise asymptomatic.    8/17/23 resolved    Hypothyroid  Assessment & Plan  Conitnue home levothyroxine  Repeat TSH is in normal range.           VTE prophylaxis: none    I have performed a physical exam and reviewed and updated ROS and Plan today (8/21/2023). In review of yesterday's note (8/20/2023), there are no changes except as documented above.

## 2023-08-21 NOTE — DISCHARGE PLANNING
Agency/Facility Name: [facility]   Spoke To: Yon  Outcome: HH asking for expected DC date. DPA informed  that Pt is not medically cleared today, will update HH.     LSW CM notified.

## 2023-08-22 ENCOUNTER — APPOINTMENT (OUTPATIENT)
Dept: RADIOLOGY | Facility: MEDICAL CENTER | Age: 73
DRG: 177 | End: 2023-08-22
Payer: MEDICARE

## 2023-08-22 PROBLEM — J18.1: Status: ACTIVE | Noted: 2023-08-22

## 2023-08-22 LAB
ALBUMIN SERPL BCP-MCNC: 3.7 G/DL (ref 3.2–4.9)
ALBUMIN/GLOB SERPL: 1.4 G/DL
ALP SERPL-CCNC: 75 U/L (ref 30–99)
ALT SERPL-CCNC: 22 U/L (ref 2–50)
ANION GAP SERPL CALC-SCNC: 10 MMOL/L (ref 7–16)
AST SERPL-CCNC: 25 U/L (ref 12–45)
BILIRUB SERPL-MCNC: 0.5 MG/DL (ref 0.1–1.5)
BUN SERPL-MCNC: 21 MG/DL (ref 8–22)
CALCIUM ALBUM COR SERPL-MCNC: 9 MG/DL (ref 8.5–10.5)
CALCIUM SERPL-MCNC: 8.8 MG/DL (ref 8.5–10.5)
CHLORIDE SERPL-SCNC: 96 MMOL/L (ref 96–112)
CO2 SERPL-SCNC: 28 MMOL/L (ref 20–33)
CREAT SERPL-MCNC: 0.92 MG/DL (ref 0.5–1.4)
D DIMER PPP IA.FEU-MCNC: 0.3 UG/ML (FEU) (ref 0–0.5)
ERYTHROCYTE [DISTWIDTH] IN BLOOD BY AUTOMATED COUNT: 50 FL (ref 35.9–50)
GFR SERPLBLD CREATININE-BSD FMLA CKD-EPI: 66 ML/MIN/1.73 M 2
GLOBULIN SER CALC-MCNC: 2.6 G/DL (ref 1.9–3.5)
GLUCOSE SERPL-MCNC: 150 MG/DL (ref 65–99)
HCT VFR BLD AUTO: 42.3 % (ref 37–47)
HGB BLD-MCNC: 13.7 G/DL (ref 12–16)
MCH RBC QN AUTO: 25.8 PG (ref 27–33)
MCHC RBC AUTO-ENTMCNC: 32.4 G/DL (ref 32.2–35.5)
MCV RBC AUTO: 79.7 FL (ref 81.4–97.8)
PLATELET # BLD AUTO: 237 K/UL (ref 164–446)
PMV BLD AUTO: 10.9 FL (ref 9–12.9)
POTASSIUM SERPL-SCNC: 4.3 MMOL/L (ref 3.6–5.5)
PROCALCITONIN SERPL-MCNC: 0.06 NG/ML
PROT SERPL-MCNC: 6.3 G/DL (ref 6–8.2)
RBC # BLD AUTO: 5.31 M/UL (ref 4.2–5.4)
SODIUM SERPL-SCNC: 134 MMOL/L (ref 135–145)
WBC # BLD AUTO: 8.8 K/UL (ref 4.8–10.8)

## 2023-08-22 PROCEDURE — 80053 COMPREHEN METABOLIC PANEL: CPT

## 2023-08-22 PROCEDURE — 85379 FIBRIN DEGRADATION QUANT: CPT

## 2023-08-22 PROCEDURE — 84145 PROCALCITONIN (PCT): CPT

## 2023-08-22 PROCEDURE — 36415 COLL VENOUS BLD VENIPUNCTURE: CPT

## 2023-08-22 PROCEDURE — 770006 HCHG ROOM/CARE - MED/SURG/GYN SEMI*

## 2023-08-22 PROCEDURE — 700102 HCHG RX REV CODE 250 W/ 637 OVERRIDE(OP)

## 2023-08-22 PROCEDURE — 85027 COMPLETE CBC AUTOMATED: CPT

## 2023-08-22 PROCEDURE — 99232 SBSQ HOSP IP/OBS MODERATE 35: CPT | Mod: GC | Performed by: HOSPITALIST

## 2023-08-22 PROCEDURE — A9270 NON-COVERED ITEM OR SERVICE: HCPCS | Performed by: STUDENT IN AN ORGANIZED HEALTH CARE EDUCATION/TRAINING PROGRAM

## 2023-08-22 PROCEDURE — 700111 HCHG RX REV CODE 636 W/ 250 OVERRIDE (IP): Mod: JZ

## 2023-08-22 PROCEDURE — A9270 NON-COVERED ITEM OR SERVICE: HCPCS

## 2023-08-22 PROCEDURE — 700102 HCHG RX REV CODE 250 W/ 637 OVERRIDE(OP): Performed by: STUDENT IN AN ORGANIZED HEALTH CARE EDUCATION/TRAINING PROGRAM

## 2023-08-22 RX ORDER — ENOXAPARIN SODIUM 100 MG/ML
40 INJECTION SUBCUTANEOUS DAILY
Status: DISCONTINUED | OUTPATIENT
Start: 2023-08-22 | End: 2023-08-26 | Stop reason: HOSPADM

## 2023-08-22 RX ADMIN — FUROSEMIDE 20 MG: 10 INJECTION, SOLUTION INTRAVENOUS at 04:44

## 2023-08-22 RX ADMIN — ENOXAPARIN SODIUM 40 MG: 100 INJECTION SUBCUTANEOUS at 17:13

## 2023-08-22 RX ADMIN — LEVETIRACETAM 250 MG: 500 TABLET, FILM COATED ORAL at 12:15

## 2023-08-22 RX ADMIN — ESCITALOPRAM OXALATE 10 MG: 10 TABLET ORAL at 04:44

## 2023-08-22 RX ADMIN — DEXAMETHASONE 6 MG: 4 TABLET ORAL at 04:44

## 2023-08-22 RX ADMIN — CLOZAPINE 150 MG: 25 TABLET ORAL at 04:44

## 2023-08-22 RX ADMIN — LEVETIRACETAM 250 MG: 500 TABLET, FILM COATED ORAL at 04:44

## 2023-08-22 RX ADMIN — SIMVASTATIN 40 MG: 40 TABLET, FILM COATED ORAL at 21:32

## 2023-08-22 RX ADMIN — MIRTAZAPINE 7.5 MG: 15 TABLET, FILM COATED ORAL at 21:32

## 2023-08-22 RX ADMIN — DIVALPROEX SODIUM 250 MG: 250 TABLET, EXTENDED RELEASE ORAL at 04:44

## 2023-08-22 RX ADMIN — CLOZAPINE 150 MG: 25 TABLET ORAL at 16:14

## 2023-08-22 RX ADMIN — ASPIRIN 81 MG: 81 TABLET, COATED ORAL at 04:44

## 2023-08-22 RX ADMIN — Medication 5 MG: at 21:32

## 2023-08-22 RX ADMIN — LEVOTHYROXINE SODIUM 125 MCG: 0.12 TABLET ORAL at 04:44

## 2023-08-22 RX ADMIN — LEVETIRACETAM 250 MG: 500 TABLET, FILM COATED ORAL at 16:14

## 2023-08-22 ASSESSMENT — ENCOUNTER SYMPTOMS
DEPRESSION: 1
DIARRHEA: 0
COUGH: 0
VOMITING: 0
PHOTOPHOBIA: 0
WEAKNESS: 1
NAUSEA: 0
SPUTUM PRODUCTION: 1
FEVER: 0
HEARTBURN: 0
FOCAL WEAKNESS: 0
CHILLS: 0
DOUBLE VISION: 0
PALPITATIONS: 0
WHEEZING: 1

## 2023-08-22 NOTE — CARE PLAN
Problem: Respiratory  Goal: Patient will achieve/maintain optimum respiratory ventilation and gas exchange  Outcome: Progressing     Problem: Hemodynamics - Pneumonia  Goal: Patient's hemodynamics, fluid balance and neurologic status will be stable or improve  Outcome: Progressing     Problem: Knowledge Deficit - Standard  Goal: Patient and family/care givers will demonstrate understanding of plan of care, disease process/condition, diagnostic tests and medications  Outcome: Progressing     Problem: Fall Risk  Goal: Patient will remain free from falls  Outcome: Progressing     Problem: Skin Integrity  Goal: Skin integrity is maintained or improved  Outcome: Progressing   The patient is Stable - Low risk of patient condition declining or worsening    Shift Goals  Clinical Goals: safety, wean O2  Patient Goals: Rest  Family Goals: JORDI    Progress made toward(s) clinical / shift goals:  Progressing    Patient is not progressing towards the following goals:

## 2023-08-22 NOTE — PROGRESS NOTES
"[facility] [place] [facility] PROGRESS NOTE     Attending: Dr. Gonzales    Resident: Dr. Hensley, senior resident, Dr. Ward, juliocesar resident     PATIENT: Tracie Rinaldi; [id number]; 1950    ID: 73 y.o. female admitted on 8/15 for COVID-pneumonia. Patient has history of seizures, cognitive impairment, schizophrenia, COPD, and osteoarthritis     INTERVAL UPDATE 8/17: Patient is a poor historian and has profound dementia. No acute events overnight. Patient reports being cold. Patient looks almost claustrophobic as she kept wanting to take off her gown and blanket during the H&P. Patient looking delirious, but mainstay hospital course. Otherwise COVID symptoms in moderate improvement.     INTERVAL UPDATE 8/18: Patient looks visibly improved. She denies chest pain or SOB. Reports productive cough is better. Reports she is just \"trying to rest.\" Labs show WBC and lymphocyte count improvement. Exam shows improvement in lung crackles and warm extremities. Will remove from tele monitor as it is not indicated at this time. Home health was contacted and will follow patient in group home upon discharge. Contacting case management to see when and how she can be discharged to group home with proper isolation, etc.     INTERVAL UPDATE 8/21: Patient is evaluated at bedside in the morning with no acute overnight events. Patient is poor historian. Patient reports not feeling good and tries to get out of bed on her own even though she is a fall risk. Breathing is about the same with productive cough with inspiration, showing lack of improvement for 7 day steroid course. Patient AxO3 but still does not know why she is in the hospital.     INTERVAL UPDATE 8/22: No acute overnight events. Patient reports cough and not feeling good and wants to just rest. Exam showed productive cough still present and expiratory wheezing, showing lack of expected improvement for day 8 steroids. CXR ordered yesterday showed fluid in horizontal fissure, pulmonary " edema and right middle lobe consolidation, suggestive of secondary bacterial pneumonia.     OBJECTIVE:     Vitals:    08/21/23 1633 08/21/23 1910 08/22/23 0401 08/22/23 0719   BP: 98/60 94/57 103/56 110/67   Pulse:  85 88 77   Resp:  16 16 16   Temp:    36.4 °C (97.5 °F)   TempSrc:  Temporal Temporal Temporal   SpO2:  94% 98% 95%   Weight:  91.6 kg (201 lb 15.1 oz)     Height:           Intake/Output Summary (Last 24 hours) at 8/16/2023 1341  Last data filed at 8/16/2023 0819  Gross per 24 hour   Intake --   Output 2350 ml   Net -2350 ml       PE:  General: Aox3. In mild distress, normal for profound dementia. Patient slightly agitated.   Chest: II/VI systolic murmur on right second intercostal space. Regular rhythm.   Respiratory: Mild productive cough unresolved and in mild respiratory distress with stridor breathing. Low pitched wheezing and decrease in crackles.     LABS:  Recent Labs     08/19/23  1501 08/20/23  0318   WBC 8.0 12.6*   RBC 5.28 5.37   HEMOGLOBIN 13.8 13.8   HEMATOCRIT 43.5 42.8   MCV 82.4 79.7*   MCH 26.1* 25.7*   RDW 51.5* 49.0   PLATELETCT 223 233   MPV 10.1 10.5   NEUTSPOLYS 84.60*  --    LYMPHOCYTES 7.80*  --    MONOCYTES 6.00  --    EOSINOPHILS 0.00  --    BASOPHILS 0.30  --        Recent Labs     08/20/23  0318 08/22/23  0611   SODIUM 135 134*   POTASSIUM 3.7 4.3   CHLORIDE 99 96   CO2 24 28   BUN 15 21   CREATININE 0.68 0.92   CALCIUM 9.0 8.8   ALBUMIN  --  3.7       Estimated GFR/CRCL = Estimated Creatinine Clearance: 63.9 mL/min (by C-G formula based on SCr of 0.92 mg/dL).  Recent Labs     08/20/23  0318 08/22/23  0611   GLUCOSE 110* 150*       Recent Labs     08/22/23  0611   ASTSGOT 25   ALTSGPT 22   TBILIRUBIN 0.5   ALKPHOSPHAT 75   GLOBULIN 2.6             MEDS:  Current Facility-Administered Medications   Medication Last Admin    furosemide (Lasix) injection 20 mg 20 mg at 08/22/23 0444    melatonin tablet 5 mg 5 mg at 08/21/23 2015    Respiratory Therapy Consult      dexamethasone  (Decadron) tablet 6 mg 6 mg at 08/22/23 0444    aspirin EC tablet 81 mg 81 mg at 08/22/23 0444    tiotropium (Spiriva Respimat) 2.5 mcg/Act inhalation spray 5 mcg 5 mcg at 08/21/23 0640    albuterol inhaler 2 Puff      simvastatin (Zocor) tablet 40 mg 40 mg at 08/21/23 2015    mirtazapine (Remeron) tablet 7.5 mg 7.5 mg at 08/21/23 2015    escitalopram (Lexapro) tablet 10 mg 10 mg at 08/22/23 0444    divalproex ER (Depakote ER) tablet 250 mg 250 mg at 08/22/23 0444    cloZAPine (Clozaril) tablet 150 mg 150 mg at 08/22/23 0444    levETIRAcetam (Keppra) tablet 250 mg 250 mg at 08/22/23 1215    levothyroxine (Synthroid) tablet 125 mcg 125 mcg at 08/22/23 0444    nitroglycerin (Nitrostat) tablet 0.4 mg           ASSESSMENT/PLAN: Tracie Rinaldi is a 74 yo female with PMH of seizures, cognitive impairment, schizophrenia, COPD, osteoporosis admitted on 8/15 for COVID-pneumonia. Patient on day 8 of steroids for COVID-pneumonia therapy, but CXR demonstrated secondary bacterial pneumonia. Plan is to start antibacterial therapy.     COVID-pneumonia  COVID positive test and imaging showed emphysema and mild consolidation with COVID. Patient showing respiratory and productive cough improvement from initial admission.   Plan: Repeat CXR done because patient not showing as much improvement as expected. See results below. Continue decadron 6 mg (8/10) for 10 days and oxygen therapy if needed. Monitor for improvement until patient is cleared to go back to group home.  Secondary bacterial pneumonia   Patient not showing expected improvement of productive cough from COVID-pneumonia. CXR yesterday showed right middle lower lobe consolidation and fluid in the horizontal fissure suggestive of secondary pneumonia.   Plan: Repeat CBC. Obtain sputum culture. Start IV Unasyn. Obtain swallow study for concern of aspiration pneumonia.   Hypoxia  Mild respiratory distress due to above  Plan: O2 stable at low 90s on RA. Higher O2 sats on NC.  Patient not on oxygen at home.   Hyponatremia  Sodium normalized from 8/11  Plan: Monitor for signs of dangerous hyponatremia.  Hypothyroidism  Plan: Continue levothyroxine. Can repeat thyroid panel at next lab draw if necessary   Discharge planning  Plan: Home health contacted and will follow patient in group home. Case management in touch with her group home

## 2023-08-22 NOTE — ASSESSMENT & PLAN NOTE
New finding on repeat X-ray on 8/22/23 with right middle lobe consolidation most likely superimposed bacterial infection versus atelectasis    -Sputum culture -awaiting results

## 2023-08-22 NOTE — CARE PLAN
Problem: Knowledge Deficit - Standard  Goal: Patient and family/care givers will demonstrate understanding of plan of care, disease process/condition, diagnostic tests and medications  Outcome: Progressing     Problem: Fall Risk  Goal: Patient will remain free from falls  Outcome: Progressing     Problem: Skin Integrity  Goal: Skin integrity is maintained or improved  Outcome: Progressing   The patient is Stable - Low risk of patient condition declining or worsening    Shift Goals  Clinical Goals: Wean O2  Patient Goals: Rest  Family Goals: JORDI    Progress made toward(s) clinical / shift goals:      Pt educated on plan of care, pt verbalizes understanding, reinforcement needed. Pt educated on pain/anxiety scales, alleviating factors, pain/anxiety medications and side effects, and need for pain/anxiety reassessment, pt pain/anxiety controlled at this time, reinforcement needed. Pt educated on the need to reposition frequently and remain dry to avoid skin breakdown, reinforcement needed, no skin breakdown during shift. Fall risk education provided to pt, pt educated about the need to call for assistance while attempting to ambulate, reinforcement needed, pt remains fall free this shift.

## 2023-08-22 NOTE — PROGRESS NOTES
"[facility] Internal Medicine Daily Progress Note    Date of Service  8/22/2023    [facility] Team: R KEVYN Sanchez Team   Attending: MUNDO Couch M.d.  Senior Resident: Dr. Naresh Hensley  Intern:  Dr. Josh rooney MD  Contact Number: [phone]    Chief Complaint  Tracie Rinaldi is a 73 y.o. female admitted 8/15/2023 with weakness, cough.    Hospital Course  Tracie Rinaldi is a 73 y.o. female who presented 8/15/2023 with Lower extremities, weakness and hypoxia. When inquired when did your weakness start she states that \"its been years\" and wheel chair bound. She states that she was feeling lonely at group home and was not well taken care of. Patient seems poor historian to me and was not sure why is she in hospital and what is her medical problem although she was oriented to time, place and person. She denies chest pain, SOB but does cough with inspiration. She was asking food and most of the time incoherent answering questions.   ED course: pulse 75, RR 22, /68, O2 87%.   Labs were significant for Hyponatremia 129, mild hypocalcemia, lactic acid 0.6,  Chest X-ray remarkable for mild left basilar opacity could be scarring however cannot exclude atelectasis, small infiltrate or a small pleural effusion.  Chest CT: No evidence of PE, trace bilateral pleural effusions, trace pericardial effusion and  Emphysematous changes.  EKG showed T wave abnormalities.  Gradual resolution of COVID-19 symptoms ongoing fatigue, shortness of breath, cough.  Patient symptoms gradually improving on dexamethasone but still complains of shortness of breath desaturating on 4 L oxygen. Repeat CXR remarkable for right middle lobe consolidation concerning superimposed bacterial pneumonia.     Interval Problem Update  Patient seen and evaluated at bedside this AM.  No overnight acute event reported by the night team.  -Expiratory wheezing still present given underlying COPD  -Vitals are stable  -WBC trending up, CXR remarkable for right " middle lobe consolidation, superimposed bacterial infection  Denies fever, chills,chest pain,.  still desaturating on 4 L oxygen.    I have discussed this patient's plan of care and discharge plan at IDT rounds today with Case Management, Nursing, Nursing leadership, and other members of the IDT team.    Consultants/Specialty       Code Status  Full    Disposition  The patient is not medically cleared for discharge to home or a post-acute facility.      I have placed the appropriate orders for post-discharge needs.    Review of Systems  Review of Systems   Constitutional:  Positive for malaise/fatigue. Negative for chills and fever.        Poor historian, baseline dementia   Eyes:  Negative for double vision and photophobia.   Respiratory:  Positive for sputum production and wheezing. Negative for cough.    Cardiovascular:  Negative for chest pain, palpitations and leg swelling.   Gastrointestinal:  Negative for diarrhea, heartburn, nausea and vomiting.   Genitourinary:  Negative for dysuria and frequency.   Musculoskeletal:         Fatigued   Neurological:  Positive for weakness. Negative for focal weakness.   Psychiatric/Behavioral:  Positive for depression.         Physical Exam  Temp:  [36.3 °C (97.3 °F)-36.4 °C (97.5 °F)] 36.4 °C (97.5 °F)  Pulse:  [77-88] 77  Resp:  [16] 16  BP: ()/(56-67) 110/67  SpO2:  [94 %-98 %] 95 %    Physical Exam  Constitutional:       Appearance: Normal appearance. She is obese.      Comments: Incoherent but oriented to time, place and person.   HENT:      Head: Normocephalic and atraumatic.      Mouth/Throat:      Mouth: Mucous membranes are moist.      Pharynx: Oropharynx is clear.   Eyes:      Extraocular Movements: Extraocular movements intact.   Cardiovascular:      Rate and Rhythm: Normal rate.      Pulses: Normal pulses.      Heart sounds: Normal heart sounds. No murmur heard.  Pulmonary:      Effort: Pulmonary effort is normal.      Breath sounds: Wheezing and rhonchi  present.      Comments: Cough with deep inspiration  Chest:      Chest wall: Tenderness present.   Abdominal:      General: There is no distension.      Palpations: Abdomen is soft.      Tenderness: There is no abdominal tenderness.   Skin:     General: Skin is warm.   Neurological:      Mental Status: She is oriented to person, place, and time.      Motor: Weakness present.      Comments: Generalized weakness of lower legs   Psychiatric:      Comments: Mood and judgement somewhat impaired         Fluids  No intake or output data in the 24 hours ending 08/22/23 1556      Laboratory  Recent Labs     08/20/23  0318   WBC 12.6*   RBC 5.37   HEMOGLOBIN 13.8   HEMATOCRIT 42.8   MCV 79.7*   MCH 25.7*   MCHC 32.2   RDW 49.0   PLATELETCT 233   MPV 10.5       Recent Labs     08/20/23 0318 08/22/23  0611   SODIUM 135 134*   POTASSIUM 3.7 4.3   CHLORIDE 99 96   CO2 24 28   GLUCOSE 110* 150*   BUN 15 21   CREATININE 0.68 0.92   CALCIUM 9.0 8.8                       Imaging  DX-CHEST-PORTABLE (1 VIEW)   Final Result      1.  Mild bilateral perihilar atelectasis and or pneumonitis.      CT-CTA CHEST PULMONARY ARTERY W/ RECONS   Final Result         1.  No evidence pulmonary embolism.   2.  Trace bilateral pleural effusions with bibasilar atelectasis.   3.  Trace pericardial effusion.   4.  Emphysematous changes.      Fleischner Society pulmonary nodule recommendations:   Nonapplicable      DX-CHEST-PORTABLE (1 VIEW)   Final Result      Nonspecific mild left basilar opacity could be scarring however cannot exclude atelectasis, small infiltrate or a small pleural effusion.             Assessment/Plan  Problem Representation:    * Pneumonia due to COVID-19 virus- (present on admission)  Assessment & Plan  Pt lived In group home. She is poor historian but covid test positive    -Continue O2 nasal canula.  We will continue to titrate off oxygen therapy.  -Dexamethasone 6mg daily for total of 10 days ending on August 25.  -Respiratory  therapy consult appreciated for PEEP evaluation  -Still desaturating on 4 L oxygen   -Repeat Chest X-ray remarkable for right middle lobe consolidation- superimposed bacterial infection  -Trend WBC, procal, sputum culture if necessary                    Consolidation of middle lobe of lung (HCC)  Assessment & Plan  New finding on repeat X-ray on 8/22/23 with right middle lobe consolidation most likely superimposed bacterial infection.    -Trend WBC,procal  -Sputum culture if necessary  -SLP eval for dysphagia and risk of aspiration  -Consider antibiotics if lab confirms superimposed infection    Hypoxia  Assessment & Plan  Acute hypoxia 2/2 to emphysematous changes and covid pneumonia    Still desaturating on 4 L oxygen   Continue O2 on nasal canula  See pneumonia plan.       Depression  Assessment & Plan  Review of the chronic depression.  Does not appear to be in exacerbation or crisis at this time    Plan:  Continue home antidepressant medications  Recommend CBT    Hyponatremia  Assessment & Plan  On admission to ED 8/15/23 sodium 129 otherwise asymptomatic.    8/17/23 resolved    Hypothyroid  Assessment & Plan  Conitnue home levothyroxine  Repeat TSH is in normal range.           VTE prophylaxis: none    I have performed a physical exam and reviewed and updated ROS and Plan today (8/22/2023). In review of yesterday's note (8/21/2023), there are no changes except as documented above.

## 2023-08-22 NOTE — DISCHARGE PLANNING
Case Management Discharge Planning    Admission Date: 8/15/2023  GMLOS: 5.2  ALOS: 7    6-Clicks ADL Score: 15  6-Clicks Mobility Score: 12    Anticipated Discharge Dispo: Discharge Disposition: D/T to home under A care in anticipation of covered skilled care (06)    LMSW reached out to both Sebastián and Senior Resident regarding updates on discharge.   Per chart review, pt is okay to go back to group home per Valeria ([phone]) once medically cleared.     No response at this time.  Pending information on O2 and if pt has O2 available at Group Home.    Per MD, pt has 1-2 more days.   PENDING WALK TEST AND O2 LEVELS

## 2023-08-23 PROCEDURE — 700102 HCHG RX REV CODE 250 W/ 637 OVERRIDE(OP): Performed by: STUDENT IN AN ORGANIZED HEALTH CARE EDUCATION/TRAINING PROGRAM

## 2023-08-23 PROCEDURE — 770006 HCHG ROOM/CARE - MED/SURG/GYN SEMI*

## 2023-08-23 PROCEDURE — A9270 NON-COVERED ITEM OR SERVICE: HCPCS | Performed by: STUDENT IN AN ORGANIZED HEALTH CARE EDUCATION/TRAINING PROGRAM

## 2023-08-23 PROCEDURE — 700111 HCHG RX REV CODE 636 W/ 250 OVERRIDE (IP): Mod: JZ

## 2023-08-23 PROCEDURE — 99232 SBSQ HOSP IP/OBS MODERATE 35: CPT | Mod: GC | Performed by: HOSPITALIST

## 2023-08-23 PROCEDURE — 700102 HCHG RX REV CODE 250 W/ 637 OVERRIDE(OP)

## 2023-08-23 PROCEDURE — A9270 NON-COVERED ITEM OR SERVICE: HCPCS

## 2023-08-23 PROCEDURE — 92610 EVALUATE SWALLOWING FUNCTION: CPT

## 2023-08-23 RX ADMIN — Medication 5 MG: at 20:14

## 2023-08-23 RX ADMIN — CLOZAPINE 150 MG: 25 TABLET ORAL at 05:35

## 2023-08-23 RX ADMIN — LEVETIRACETAM 250 MG: 500 TABLET, FILM COATED ORAL at 05:36

## 2023-08-23 RX ADMIN — ESCITALOPRAM OXALATE 10 MG: 10 TABLET ORAL at 05:36

## 2023-08-23 RX ADMIN — LEVETIRACETAM 250 MG: 500 TABLET, FILM COATED ORAL at 18:18

## 2023-08-23 RX ADMIN — LEVETIRACETAM 250 MG: 500 TABLET, FILM COATED ORAL at 12:14

## 2023-08-23 RX ADMIN — FUROSEMIDE 20 MG: 10 INJECTION, SOLUTION INTRAVENOUS at 05:36

## 2023-08-23 RX ADMIN — MIRTAZAPINE 7.5 MG: 15 TABLET, FILM COATED ORAL at 20:14

## 2023-08-23 RX ADMIN — DEXAMETHASONE 6 MG: 4 TABLET ORAL at 05:33

## 2023-08-23 RX ADMIN — CLOZAPINE 150 MG: 25 TABLET ORAL at 18:18

## 2023-08-23 RX ADMIN — LEVOTHYROXINE SODIUM 125 MCG: 0.12 TABLET ORAL at 05:36

## 2023-08-23 RX ADMIN — DIVALPROEX SODIUM 250 MG: 250 TABLET, EXTENDED RELEASE ORAL at 05:36

## 2023-08-23 RX ADMIN — ASPIRIN 81 MG: 81 TABLET, COATED ORAL at 05:33

## 2023-08-23 RX ADMIN — SIMVASTATIN 40 MG: 40 TABLET, FILM COATED ORAL at 20:15

## 2023-08-23 RX ADMIN — ENOXAPARIN SODIUM 40 MG: 100 INJECTION SUBCUTANEOUS at 18:18

## 2023-08-23 ASSESSMENT — ENCOUNTER SYMPTOMS
NAUSEA: 0
WEAKNESS: 1
COUGH: 0
PALPITATIONS: 0
FOCAL WEAKNESS: 0
SPUTUM PRODUCTION: 1
PHOTOPHOBIA: 0
DEPRESSION: 1
VOMITING: 0
WHEEZING: 1
FEVER: 0
DIARRHEA: 0
HEARTBURN: 0
CHILLS: 0
DOUBLE VISION: 0

## 2023-08-23 ASSESSMENT — PAIN DESCRIPTION - PAIN TYPE
TYPE: ACUTE PAIN

## 2023-08-23 NOTE — PROGRESS NOTES
Assumed care of patient 0700. Received Report from [facility] nurse. Patient A&O X 2, on 2 L NC, Reporting a pain level of 0. Call light within reach, belongings within reach, Fall precautions in place, and bed alarm is on and bed in lowest position. Patient does not have any other needs at this time.

## 2023-08-23 NOTE — CARE PLAN
The patient is Stable - Low risk of patient condition declining or worsening    Shift Goals  Clinical Goals: Safety, and manage incontinence  Patient Goals: rest  Family Goals: JORDI    Progress made toward(s) clinical / shift goals:  Pt is resting comfortably and calling when she needs to urinate.      Problem: Care Map:  Optimal Outcome for the Pneumonia Patient  Goal: Collection and monitoring of appropriate tests and labs  Outcome: Progressing     Problem: Respiratory  Goal: Patient will achieve/maintain optimum respiratory ventilation and gas exchange  Outcome: Progressing     Problem: Risk for Aspiration  Goal: Patient's risk for aspiration will be absent or decrease  Outcome: Progressing     Problem: Hemodynamics - Pneumonia  Goal: Patient's hemodynamics, fluid balance and neurologic status will be stable or improve  Outcome: Progressing     Problem: Self Care  Goal: Patient will have the ability to perform ADLs independently or with assistance (bathe, groom, dress, toilet and feed)  Outcome: Progressing     Problem: Discharge Planning - Pneumonia  Goal: Patient will verbalize understanding of lifestyle changes and therapeutic needs post discharge  Outcome: Progressing     Problem: Knowledge Deficit - Standard  Goal: Patient and family/care givers will demonstrate understanding of plan of care, disease process/condition, diagnostic tests and medications  Outcome: Progressing     Problem: Fall Risk  Goal: Patient will remain free from falls  Outcome: Progressing     Problem: Skin Integrity  Goal: Skin integrity is maintained or improved  Outcome: Progressing       Patient is not progressing towards the following goals:

## 2023-08-23 NOTE — PROGRESS NOTES
"[facility] Internal Medicine Daily Progress Note    Date of Service  8/23/2023    [facility] Team: R KEVYN Sanchez Team   Attending: MUNDO Couch M.d.  Senior Resident: Dr. Naresh Hensley  Intern:  Dr. Josh rooney MD  Contact Number: [phone]    Chief Complaint  Tracie Rinladi is a 73 y.o. female admitted 8/15/2023 with weakness, cough.    Hospital Course  Tracie Rinaldi is a 73 y.o. female who presented 8/15/2023 with Lower extremities, weakness and hypoxia. When inquired when did your weakness start she states that \"its been years\" and wheel chair bound. She states that she was feeling lonely at group home and was not well taken care of. Patient seems poor historian to me and was not sure why is she in hospital and what is her medical problem although she was oriented to time, place and person. She denies chest pain, SOB but does cough with inspiration. She was asking food and most of the time incoherent answering questions.   ED course: pulse 75, RR 22, /68, O2 87%.   Labs were significant for Hyponatremia 129, mild hypocalcemia, lactic acid 0.6,  Chest X-ray remarkable for mild left basilar opacity could be scarring however cannot exclude atelectasis, small infiltrate or a small pleural effusion.  Chest CT: No evidence of PE, trace bilateral pleural effusions, trace pericardial effusion and  Emphysematous changes.  EKG showed T wave abnormalities.  Gradual resolution of COVID-19 symptoms ongoing fatigue, shortness of breath, cough.  Patient symptoms gradually improving on dexamethasone but still complains of shortness of breath desaturating on 4 L oxygen. Repeat CXR remarkable for right middle lobe consolidation concerning superimposed bacterial pneumonia.     Interval Problem Update  Patient seen and evaluated at bedside this AM.  No overnight acute event reported by the night team.  -Patient reports feeling much better, more talkative and cooperative.  Inspiratory and expiratory wheezing still present " given underlying COPD.  -Vitals are stable  -Denies fever, chills,chest pain,.  -Currently saturating 91% on 1 LPM  -Oxygen saturation evaluation performed, desaturating to 88% after weaning off of 3-minute    I have discussed this patient's plan of care and discharge plan at IDT rounds today with Case Management, Nursing, Nursing leadership, and other members of the IDT team.    Consultants/Specialty       Code Status  Full    Disposition  The patient is not medically cleared for discharge to home or a post-acute facility.      I have placed the appropriate orders for post-discharge needs.    Review of Systems  Review of Systems   Constitutional:  Positive for malaise/fatigue. Negative for chills and fever.        Poor historian, baseline dementia   Eyes:  Negative for double vision and photophobia.   Respiratory:  Positive for sputum production and wheezing. Negative for cough.    Cardiovascular:  Negative for chest pain, palpitations and leg swelling.   Gastrointestinal:  Negative for diarrhea, heartburn, nausea and vomiting.   Genitourinary:  Negative for dysuria and frequency.   Musculoskeletal:         Fatigued   Neurological:  Positive for weakness. Negative for focal weakness.   Psychiatric/Behavioral:  Positive for depression.         Physical Exam  Temp:  [36.1 °C (96.9 °F)-36.6 °C (97.9 °F)] 36.4 °C (97.5 °F)  Pulse:  [74-81] 81  Resp:  [15-18] 18  BP: (119-130)/(55-90) 123/62  SpO2:  [91 %-95 %] 91 %    Physical Exam  Constitutional:       Appearance: Normal appearance. She is obese.      Comments: Improvement in alertness   HENT:      Head: Normocephalic and atraumatic.      Mouth/Throat:      Mouth: Mucous membranes are moist.      Pharynx: Oropharynx is clear.   Eyes:      Extraocular Movements: Extraocular movements intact.   Cardiovascular:      Rate and Rhythm: Normal rate.      Pulses: Normal pulses.      Heart sounds: Normal heart sounds. No murmur heard.  Pulmonary:      Effort: Pulmonary effort  is normal.      Breath sounds: Wheezing and rhonchi present.      Comments: Cough with deep inspiration  Chest:      Chest wall: Tenderness present.   Abdominal:      General: There is no distension.      Palpations: Abdomen is soft.      Tenderness: There is no abdominal tenderness.   Skin:     General: Skin is warm.   Neurological:      Mental Status: She is oriented to person, place, and time.      Motor: Weakness present.      Comments: Generalized weakness of lower legs   Psychiatric:      Comments: Mood and judgement somewhat impaired         Fluids    Intake/Output Summary (Last 24 hours) at 8/23/2023 1525  Last data filed at 8/23/2023 1400  Gross per 24 hour   Intake 1860 ml   Output --   Net 1860 ml         Laboratory  Recent Labs     08/22/23  1703   WBC 8.8   RBC 5.31   HEMOGLOBIN 13.7   HEMATOCRIT 42.3   MCV 79.7*   MCH 25.8*   MCHC 32.4   RDW 50.0   PLATELETCT 237   MPV 10.9       Recent Labs     08/22/23  0611   SODIUM 134*   POTASSIUM 4.3   CHLORIDE 96   CO2 28   GLUCOSE 150*   BUN 21   CREATININE 0.92   CALCIUM 8.8                       Imaging  DX-CHEST-PORTABLE (1 VIEW)   Final Result      1.  Mild bilateral perihilar atelectasis and or pneumonitis.      CT-CTA CHEST PULMONARY ARTERY W/ RECONS   Final Result         1.  No evidence pulmonary embolism.   2.  Trace bilateral pleural effusions with bibasilar atelectasis.   3.  Trace pericardial effusion.   4.  Emphysematous changes.      Fleischner Society pulmonary nodule recommendations:   Nonapplicable      DX-CHEST-PORTABLE (1 VIEW)   Final Result      Nonspecific mild left basilar opacity could be scarring however cannot exclude atelectasis, small infiltrate or a small pleural effusion.             Assessment/Plan  Problem Representation:    * Pneumonia due to COVID-19 virus- (present on admission)  Assessment & Plan  Pt lived In group home. She is poor historian but covid test positive    -Continue O2 nasal canula.  We will continue to titrate  off oxygen therapy.  -Dexamethasone 6mg daily for total of 10 days ending on August 25.  -Respiratory therapy consult appreciated for PEEP evaluation  -Saturating 91% on 1 LPM  -Repeat Chest X-ray 8/22/23 remarkable for right middle lobe consolidation- superimposed bacterial infection  -Sputum cultures sent for further evaluation-awaiting results                    Consolidation of middle lobe of lung (HCC)  Assessment & Plan  New finding on repeat X-ray on 8/22/23 with right middle lobe consolidation most likely superimposed bacterial infection versus atelectasis    -Sputum culture -awaiting results  -SLP eval for dysphagia and risk of aspiration      Hypoxia  Assessment & Plan  Acute hypoxia 2/2 to emphysematous changes and covid pneumonia    -O2 evaluation formed: Saturating 88% after 3-minute weaning of oxygen.  Cannot ambulate, saturation in sitting position 88%  -Currently saturating 91% on 1 LPM.  See pneumonia plan.       Depression  Assessment & Plan  Review of the chronic depression.  Does not appear to be in exacerbation or crisis at this time    Plan:  Continue home antidepressant medications  Recommend CBT    Hyponatremia  Assessment & Plan  On admission to ED 8/15/23 sodium 129 otherwise asymptomatic.    8/17/23 resolved    Hypothyroid  Assessment & Plan  Conitnue home levothyroxine  Repeat TSH is in normal range.           VTE prophylaxis: Enoxaparin    I have performed a physical exam and reviewed and updated ROS and Plan today (8/23/2023). In review of yesterday's note (8/22/2023), there are no changes except as documented above.

## 2023-08-23 NOTE — PROGRESS NOTES
4 Eyes Skin Assessment Completed by Maricruz RN and MINAL Sanchez.    Head WDL  Ears WDL  Nose WDL  Mouth WDL  Neck WDL  Breast/Chest WDL  Shoulder Blades WDL  Spine WDL  (R) Arm/Elbow/Hand WDL  (L) Arm/Elbow/Hand WDL  Abdomen WDL  Groin WDL  Scrotum/Coccyx/Buttocks Blanching  (R) Leg WDL  (L) Leg WDL  (R) Heel/Foot/Toe WDL  (L) Heel/Foot/Toe WDL          Devices In Places Nasal Cannula      Interventions In Place Gray Ear Foams    Possible Skin Injury No    Pictures Uploaded Into Epic N/A  Wound Consult Placed N/A  RN Wound Prevention Protocol Ordered No

## 2023-08-23 NOTE — CARE PLAN
The patient is Stable - Low risk of patient condition declining or worsening    Shift Goals  Clinical Goals:  (safety, skin integrity)  Patient Goals:  (food, comfort)  Family Goals: JORDI    Progress made toward(s) clinical / shift goals:    Problem: Respiratory  Goal: Patient will achieve/maintain optimum respiratory ventilation and gas exchange  Outcome: Progressing  Flowsheets (Taken 8/23/2023 1041)  O2 Delivery Device: Nasal Cannula  Note: Attempting to wean patient off oxygen-1 L NC at this time     Problem: Knowledge Deficit - Standard  Goal: Patient and family/care givers will demonstrate understanding of plan of care, disease process/condition, diagnostic tests and medications  Outcome: Progressing  Note: Skin intact     Problem: Fall Risk  Goal: Patient will remain free from falls  Outcome: Progressing  Note: No falls       Patient is not progressing towards the following goals:

## 2023-08-23 NOTE — PROGRESS NOTES
Pt arrived to unit. Aox2. Resting in bed in no apparent distress. Calm and cooperative. Fall alarm in place.

## 2023-08-23 NOTE — THERAPY
"Speech Language Pathology   Clinical Swallow Evaluation     Patient Name: Tracie Rinaldi  AGE:  73 y.o., SEX:  female  Medical Record #: [id number]  Date of Service: 8/23/2023      History of Present Illness  Per review of the EMR-Pt admitted for weakness and cough. Pt positive for Covid and on dexamthasone and 4 liters of oxygen. 1.  Mild bilateral perihilar atelectasis and or pneumonitis-chest xray 8/22. Per MD notes-\"new finding on repeat X-ray 8/22 with right middle lobe consolidation most likely superimposed bacterial infection. Per nurs, pt has been confused but with good PO intake and without s/s of difficulty. No prior SLP services at [facility]. Pt lives in group home per notes.       General Information:  Vitals  O2 Delivery Device: (P) Nasal Cannula     Patient Behaviors: (P) Confused         Prior Living Situation & Level of Function:        Communication: (P) not known  Swallowing: (P) not known       Oral Mechanism Evaluation:  Dentition: (P) Upper denture, Lower denture   Facial Symmetry: (P) Equal        Labial Observations: (P) WFL   Lingual Observations: (P) Midline  Motor Speech: (P) no dysarthria.            Laryngeal Function:  Secretion Management: (P) Adequate  Voice Quality: (P) WFL        Cough: (P) Perceptually WNL  Comments: (P) congested sounding non-productive cough.      Subjective  (P) \"I am eating fine.\"      Assessment  Current Method of Nutrition: (P) Oral diet (RG7/TNO)  Positioning: (P) Whitaker's (60-90 degrees)  Bolus Administration: (P) Patient    O2 Delivery Device: (P) Nasal Cannula  Factor(s) Affecting Performance: (P) None  Tracheostomy : (P) No         Swallowing Trials:  Swallowing Trials  Thin Liquid (TN0): (P) WFL  Regular (RG7): (P) WFL      Comments:  When asked to cough, pt with congested  sounding non-productive cough x1. No s/s of dysphagia with one cup TNO, small pill with TNO given by RN, and two saltine crackers. Nurs and pt not noting s/s difficulty during bfast " "meal. Pt denies odynophagia or globus when asked.       Clinical Impressions  Pt with functional swallow and recommend for RG7/TNO. One additional visit if pt experiences swallow diffiuclty-please inform SLP.     Recommendations  Diet Consistency: (P) RG7/TNO  Instrumentation: (P) None indicated at this time  Medication: (P) Whole with liquid  Supervision: (P) Distant supervision - check on patient 2-3 times per meal  Positioning: (P) Fully upright and midline during oral intake  Risk Management : (P) Small bites/sips  Oral Care: (P) Q8h         SLP Treatment Plan  Treatment Plan: (P) None Indicated (One additional visit if pt experiences swallow difficulty this admit.)  SLP Frequency: (P) N/A - Evaluation Only         Anticipated Discharge Needs  Discharge Recommendations: (P) Anticipate that the patient will have no further speech therapy needs after discharge from the hospital            Patient / Family Goals  Patient / Family Goal #1: (P) \"I am eating fine.\"  Goal #1 Outcome: (P) Progressing as expected  Short Term Goals  Short Term Goal # 1: (P) Pt will consume RG7/TNO without s/s of difficulty during oral intake.  Goal Outcome # 1: (P) Goal met      Brittani Lee, SLP   "

## 2023-08-23 NOTE — PROGRESS NOTES
"[facility] Medical Student PROGRESS NOTE     Attending: Dr. Couch    Resident: Dr. Hensley, senior resident, Dr. Ward, juliocesar resident     PATIENT: Tracie Rinaldi; [id number]; 1950    ID: 73 y.o. female admitted on 8/15 for COVID-pneumonia. Patient has history of seizures, cognitive impairment, schizophrenia, COPD, and osteoarthritis     INTERVAL UPDATE 8/17: Patient is a poor historian and has profound dementia. No acute events overnight. Patient reports being cold. Patient looks almost claustrophobic as she kept wanting to take off her gown and blanket during the H&P. Patient looking delirious, but mainstay hospital course. Otherwise COVID symptoms in moderate improvement.     INTERVAL UPDATE 8/18: Patient looks visibly improved. She denies chest pain or SOB. Reports productive cough is better. Reports she is just \"trying to rest.\" Labs show WBC and lymphocyte count improvement. Exam shows improvement in lung crackles and warm extremities. Will remove from tele monitor as it is not indicated at this time. Home health was contacted and will follow patient in group home upon discharge. Contacting case management to see when and how she can be discharged to group home with proper isolation, etc.     INTERVAL UPDATE 8/21: Patient is evaluated at bedside in the morning with no acute overnight events. Patient is poor historian. Patient reports not feeling good and tries to get out of bed on her own even though she is a fall risk. Breathing is about the same with productive cough with inspiration, showing lack of improvement for 7 day steroid course. Patient AxO3 but still does not know why she is in the hospital.     INTERVAL UPDATE 8/22: No acute overnight events. Patient reports cough and not feeling good and wants to just rest. Exam showed productive cough still present and expiratory wheezing, showing lack of expected improvement for day 8 steroids. CXR ordered yesterday showed fluid in horizontal fissure, pulmonary " edema and right middle lobe consolidation, suggestive of secondary bacterial pneumonia.     INTERVAL UPDATE 8/23: No acute overnight events. Patient reports feeling better and is worried about her leg weakness and wheelchair. Exam showed slight productive cough still present with inspiratory and expiratory wheezes appreciated. Labs show negative pro calcitonin and marked improvement of WBC count. Patient on day 9 on steroid course. Dr. Couch's interpretation of CXR done 8/21 is increasing atelectasis rather than consolidation of right middle lobe. Plan is to continue steroids, gather sputum culture, and assess for home oxygen therapy.    OBJECTIVE:     Vitals:    08/22/23 1807 08/22/23 2000 08/23/23 0345 08/23/23 0642   BP: 125/71 (!) 123/90 130/66 119/55   Pulse: 74 75 75 78   Resp: 16 15 16 18   Temp: 36.6 °C (97.9 °F) 36.3 °C (97.3 °F) 36.1 °C (96.9 °F) 36.4 °C (97.5 °F)   TempSrc: Temporal Temporal Temporal Temporal   SpO2: 92% 91% 94% 91%   Weight:       Height:           Intake/Output Summary (Last 24 hours) at 8/16/2023 1341  Last data filed at 8/16/2023 0819  Gross per 24 hour   Intake --   Output 2350 ml   Net -2350 ml       PE:  General: Aox3. In mild distress, normal for profound dementia. Patient slightly agitated.   Chest: II/VI systolic murmur on right second intercostal space. Regular rhythm.   Respiratory: Mild productive cough unresolved and in mild respiratory distress with stridor breathing. Low pitched wheezing and decrease in crackles.     LABS:  Recent Labs     08/22/23  1703   WBC 8.8   RBC 5.31   HEMOGLOBIN 13.7   HEMATOCRIT 42.3   MCV 79.7*   MCH 25.8*   RDW 50.0   PLATELETCT 237   MPV 10.9       Recent Labs     08/22/23  0611   SODIUM 134*   POTASSIUM 4.3   CHLORIDE 96   CO2 28   BUN 21   CREATININE 0.92   CALCIUM 8.8   ALBUMIN 3.7       Estimated GFR/CRCL = Estimated Creatinine Clearance: 63.9 mL/min (by C-G formula based on SCr of 0.92 mg/dL).  Recent Labs     08/22/23  0611   GLUCOSE  150*       Recent Labs     08/22/23  0611   ASTSGOT 25   ALTSGPT 22   TBILIRUBIN 0.5   ALKPHOSPHAT 75   GLOBULIN 2.6             MEDS:  Current Facility-Administered Medications   Medication Last Admin    enoxaparin (Lovenox) inj 40 mg 40 mg at 08/22/23 1713    furosemide (Lasix) injection 20 mg 20 mg at 08/23/23 0536    melatonin tablet 5 mg 5 mg at 08/22/23 2132    Respiratory Therapy Consult      dexamethasone (Decadron) tablet 6 mg 6 mg at 08/23/23 0533    aspirin EC tablet 81 mg 81 mg at 08/23/23 0533    tiotropium (Spiriva Respimat) 2.5 mcg/Act inhalation spray 5 mcg 5 mcg at 08/21/23 0640    albuterol inhaler 2 Puff      simvastatin (Zocor) tablet 40 mg 40 mg at 08/22/23 2132    mirtazapine (Remeron) tablet 7.5 mg 7.5 mg at 08/22/23 2132    escitalopram (Lexapro) tablet 10 mg 10 mg at 08/23/23 0536    divalproex ER (Depakote ER) tablet 250 mg 250 mg at 08/23/23 0536    cloZAPine (Clozaril) tablet 150 mg 150 mg at 08/23/23 0535    levETIRAcetam (Keppra) tablet 250 mg 250 mg at 08/23/23 1214    levothyroxine (Synthroid) tablet 125 mcg 125 mcg at 08/23/23 0536    nitroglycerin (Nitrostat) tablet 0.4 mg           ASSESSMENT/PLAN: Tracie Rinaldi is a 72 yo female with PMH of seizures, cognitive impairment, schizophrenia, COPD, osteoporosis admitted on 8/15 for COVID-pneumonia. Patient on day 8 of steroids for COVID-pneumonia therapy, but CXR demonstrated right middle lobe insult suggestive of increasing atelectasis. Plan is to obtain sputum culture, continue steroid course and determine oxygen home therapy status.     COVID-pneumonia  COVID positive test and imaging showed emphysema and mild consolidation with COVID. Patient showing respiratory and productive cough improvement from initial admission.   Plan: Repeat CXR 8/21, Dr. Couch interpretation is of increasing atelectasis rather than secondary bacterial pneumonia. Continue decadron 6 mg (9/10) for 10 days. Monitor for improvement until patient is cleared  to go back to group home.  Right middle lobe consolidation  CXR yesterday showed right middle lower lobe consolidation and fluid in the horizontal fissure. On 8/22, there was thought to start IV Unasyn, but WBC count evening of 8/22 showed improvement to WNL, therefore not started. Swallow study obtained and showed RG7 (regular diet). Repeat CXE on 8/21 interpretation by Dr. Couch is of increasing atelectasis, not secondary bacterial pneumonia.   Plan: Obtain sputum culture for completeness sake.   Hypoxia  Mild respiratory distress due to above. Monitored for home oxygen therapy by assessing patient off of NC. Patient saturated 88 on RA both lying down and sitting down for 3 minutes each. Did not test standing as patient non-ambulatory without wheelchair.  Plan: Keep on 2L NC. Assess O2 on RA again tomorrow again.  Hyponatremia  Sodium normalized from 8/11  Plan: Monitor for signs of dangerous hyponatremia.  Hypothyroidism  Plan: Continue levothyroxine.   Discharge planning  Plan: Contact group home to assess her d/c protocol

## 2023-08-24 PROBLEM — F05 DELIRIUM SUPERIMPOSED ON DEMENTIA: Status: ACTIVE | Noted: 2017-02-10

## 2023-08-24 PROCEDURE — 700102 HCHG RX REV CODE 250 W/ 637 OVERRIDE(OP)

## 2023-08-24 PROCEDURE — 99232 SBSQ HOSP IP/OBS MODERATE 35: CPT | Performed by: HOSPITALIST

## 2023-08-24 PROCEDURE — 770001 HCHG ROOM/CARE - MED/SURG/GYN PRIV*

## 2023-08-24 PROCEDURE — 700102 HCHG RX REV CODE 250 W/ 637 OVERRIDE(OP): Performed by: STUDENT IN AN ORGANIZED HEALTH CARE EDUCATION/TRAINING PROGRAM

## 2023-08-24 PROCEDURE — A9270 NON-COVERED ITEM OR SERVICE: HCPCS | Performed by: STUDENT IN AN ORGANIZED HEALTH CARE EDUCATION/TRAINING PROGRAM

## 2023-08-24 PROCEDURE — 700111 HCHG RX REV CODE 636 W/ 250 OVERRIDE (IP): Mod: JZ

## 2023-08-24 PROCEDURE — A9270 NON-COVERED ITEM OR SERVICE: HCPCS

## 2023-08-24 RX ADMIN — LEVETIRACETAM 250 MG: 500 TABLET, FILM COATED ORAL at 16:56

## 2023-08-24 RX ADMIN — Medication 5 MG: at 20:07

## 2023-08-24 RX ADMIN — DEXAMETHASONE 6 MG: 4 TABLET ORAL at 05:06

## 2023-08-24 RX ADMIN — ENOXAPARIN SODIUM 40 MG: 100 INJECTION SUBCUTANEOUS at 16:56

## 2023-08-24 RX ADMIN — CLOZAPINE 150 MG: 25 TABLET ORAL at 05:07

## 2023-08-24 RX ADMIN — SIMVASTATIN 40 MG: 40 TABLET, FILM COATED ORAL at 20:07

## 2023-08-24 RX ADMIN — TIOTROPIUM BROMIDE INHALATION SPRAY 5 MCG: 3.12 SPRAY, METERED RESPIRATORY (INHALATION) at 05:16

## 2023-08-24 RX ADMIN — ASPIRIN 81 MG: 81 TABLET, COATED ORAL at 05:06

## 2023-08-24 RX ADMIN — LEVETIRACETAM 250 MG: 500 TABLET, FILM COATED ORAL at 13:04

## 2023-08-24 RX ADMIN — FUROSEMIDE 20 MG: 10 INJECTION, SOLUTION INTRAVENOUS at 05:09

## 2023-08-24 RX ADMIN — DIVALPROEX SODIUM 250 MG: 250 TABLET, EXTENDED RELEASE ORAL at 05:07

## 2023-08-24 RX ADMIN — CLOZAPINE 150 MG: 25 TABLET ORAL at 17:00

## 2023-08-24 RX ADMIN — MIRTAZAPINE 7.5 MG: 15 TABLET, FILM COATED ORAL at 20:07

## 2023-08-24 RX ADMIN — ESCITALOPRAM OXALATE 10 MG: 10 TABLET ORAL at 05:08

## 2023-08-24 RX ADMIN — LEVOTHYROXINE SODIUM 125 MCG: 0.12 TABLET ORAL at 05:08

## 2023-08-24 RX ADMIN — LEVETIRACETAM 250 MG: 500 TABLET, FILM COATED ORAL at 05:06

## 2023-08-24 ASSESSMENT — ENCOUNTER SYMPTOMS
HEARTBURN: 0
DEPRESSION: 1
DIARRHEA: 0
FEVER: 0
DOUBLE VISION: 0
NAUSEA: 0
NERVOUS/ANXIOUS: 1
PHOTOPHOBIA: 0
WEAKNESS: 1
SPUTUM PRODUCTION: 1
WHEEZING: 0
FOCAL WEAKNESS: 0
PALPITATIONS: 0
VOMITING: 0
COUGH: 0
CHILLS: 0

## 2023-08-24 ASSESSMENT — PAIN DESCRIPTION - PAIN TYPE
TYPE: ACUTE PAIN

## 2023-08-24 NOTE — CARE PLAN
The patient is Stable - Low risk of patient condition declining or worsening    Shift Goals  Clinical Goals: safety and comfort  Patient Goals: rest, comfort  Family Goals: NA    Progress made toward(s) clinical / shift goals:          Problem: Care Map:  Optimal Outcome for the Pneumonia Patient  Goal: Collection and monitoring of appropriate tests and labs  Description: Target End Date:  end of day 1  Outcome: Progressing     Problem: Respiratory  Goal: Patient will achieve/maintain optimum respiratory ventilation and gas exchange  Description: Target End Date:  Prior to discharge or change in level of care    Document on Assessment flowsheet    1.  Assess and monitor rate, rhythm, depth and effort of respiration  2.  Breath sounds assessed qshift and/or as needed  3.  Assess O2 saturation, administer/titrate oxygen as ordered  4.  Position patient for maximum ventilatory efficiency  5.  Turn, cough, and deep breath with splinting to improve effectiveness  6.  Collaborate with RT to administer medication/treatments per order  7.  Encourage use of incentive spirometer and encourage patient to cough after use and utilize splinting techniques if applicable  8.  Airway suctioning  9.  Monitor sputum production for changes in color, consistency and frequency  10. Perform frequent oral hygiene  11. Alternate physical activity with rest periods  Outcome: Progressing     Problem: Risk for Aspiration  Goal: Patient's risk for aspiration will be absent or decrease  Description: Target End Date:  Prior to discharge or change in level of care    1.   Complete dysphagia screening on admission  2.   NPO until dysphagia screening complete or medically cleared  3.   Collaborate with Speech Therapy, Clinical Dietitian and interdisciplinary team  4.   Implement aspiration precautions  5.   Assist patient up to chair for meals  6.   Elevate head of bed 90 degrees if patient is unable to get out of bed  7.   Encourage small bites  8.    Ensure foods/liquids are of appropriate consistency  9.   Assess for any signs/symptoms of aspiration  10. Assess breath sounds and vital signs after oral intake  Outcome: Progressing

## 2023-08-24 NOTE — DISCHARGE PLANNING
Case Management Discharge Planning    Admission Date: 8/15/2023  GMLOS: 5.2  ALOS: 9    6-Clicks ADL Score: 15  6-Clicks Mobility Score: 12  PT and/or OT Eval ordered: Yes  Post-acute Referrals Ordered: No  Post-acute Choice Obtained: No  Has referral(s) been sent to post-acute provider:  No      Anticipated Discharge Dispo: Discharge Disposition: D/T to home under A care in anticipation of covered skilled care (06)    DME Needed: Yes    DME Ordered: No    Action(s) Taken: Updated Provider/Nurse on Discharge Plan    1000 RNCM spoke to Valeria [phone] regarding Covid protocol for pt to return back to group home.  Per owner, pt can return to group home if she is back to baseline and transferring to  per PT.  Last note from PT was on 8/16; RNCM to update owner once PT sees pt.    1300 RNCM left vm to [facility]  (Group Home's owner) to obtain information about pt's baseline. Pending call back.  Pending PT.    1430 Per PT, pt is able to transfer from bed to , but unable to ambulate. RNCM spoke to Valeria. Per Valeria pt is ambulatory and very independent.  She is AOx3. Per Valeria pt doesn't use DME.  She will need WC, FFW, and O2. Resident Ayden notified on pt's baseline and need for F2F/order for WC and O2.     Escalations Completed: None    Medically Clear: Yes    Next Steps: RNCM to continue to follow up with pt and medical team to address dc needs and barriers      Barriers to Discharge: None

## 2023-08-24 NOTE — DISCHARGE PLANNING
Agency/Facility Name: [facility]  Plan or Request: Yon called to check on pt.    Please send d/c summary when pt is going to d/c.

## 2023-08-24 NOTE — PROGRESS NOTES
Patient removed her nasal cannula, and she got more confused; she got undressed and managed to get over the right lower side rail. Reassured and put it back in bed.O2 was reading at 88%. O2 bumped up at 2 L/min fpr about 10 min Her cough sounded congested, unable to bring up any phlegm.  Her O2 sat still at 88%. O2 bumped up at 4 L/min @ 91-92 % When it reached 93-94% titreted back to 3L/min for 89-90%  O2 sat. She still has congested cough, unable to expectorate; she had ease breathing

## 2023-08-24 NOTE — PROGRESS NOTES
"[facility] Internal Medicine Daily Progress Note    Date of Service  8/24/2023    [facility] Team: R KEVYN Sanchez Team   Attending: MUNDO Couch M.d.  Senior Resident: Dr. Naresh Hensley  Intern:  Dr. Josh rooney MD  Contact Number: [phone]    Chief Complaint  Tracie Rinaldi is a 73 y.o. female admitted 8/15/2023 with weakness, cough.    Hospital Course  Tracie Rinaldi is a 73 y.o. female who presented 8/15/2023 with Lower extremities, weakness and hypoxia. When inquired when did your weakness start she states that \"its been years\" and wheel chair bound. She states that she was feeling lonely at group home and was not well taken care of. Patient seems poor historian to me and was not sure why is she in hospital and what is her medical problem although she was oriented to time, place and person. She denies chest pain, SOB but does cough with inspiration. She was asking food and most of the time incoherent answering questions.   ED course: pulse 75, RR 22, /68, O2 87%.   Labs were significant for Hyponatremia 129, mild hypocalcemia, lactic acid 0.6,  Chest X-ray remarkable for mild left basilar opacity could be scarring however cannot exclude atelectasis, small infiltrate or a small pleural effusion.  Chest CT: No evidence of PE, trace bilateral pleural effusions, trace pericardial effusion and  Emphysematous changes.  EKG showed T wave abnormalities.  Gradual resolution of COVID-19 symptoms ongoing fatigue, shortness of breath, cough.  Patient symptoms gradually improving on dexamethasone but still complains of shortness of breath desaturating on 4 L oxygen. Repeat CXR remarkable for right middle lobe consolidation concerning superimposed bacterial pneumonia.     Interval Problem Update  Patient seen and evaluated at bedside this AM.  No overnight acute event reported by the night team.  -Patient reports feeling much better, more talkative and cooperative.   -possible delirious 2/2 steroids and " hospitalization stay.  -Vitals are stable  -Denies fever, chills,chest pain,.  -Currently saturating 93% on 3LPM      I have discussed this patient's plan of care and discharge plan at IDT rounds today with Case Management, Nursing, Nursing leadership, and other members of the IDT team.    Consultants/Specialty       Code Status  Full    Disposition  The patient is not medically cleared for discharge to home or a post-acute facility.      I have placed the appropriate orders for post-discharge needs.    Review of Systems  Review of Systems   Constitutional:  Positive for malaise/fatigue. Negative for chills and fever.        Poor historian, baseline dementia   Eyes:  Negative for double vision and photophobia.   Respiratory:  Positive for sputum production. Negative for cough and wheezing.    Cardiovascular:  Negative for chest pain, palpitations and leg swelling.   Gastrointestinal:  Negative for diarrhea, heartburn, nausea and vomiting.   Genitourinary:  Negative for dysuria and frequency.   Musculoskeletal:         Fatigued   Neurological:  Positive for weakness. Negative for focal weakness.   Psychiatric/Behavioral:  Positive for depression. The patient is nervous/anxious.         Physical Exam  Temp:  [35.9 °C (96.7 °F)-37 °C (98.6 °F)] 35.9 °C (96.7 °F)  Pulse:  [73-88] 73  Resp:  [18-20] 18  BP: (104-135)/(45-65) 116/51  SpO2:  [88 %-96 %] 95 %    Physical Exam  Constitutional:       Appearance: Normal appearance. She is obese.      Comments: Improvement in alertness   HENT:      Head: Normocephalic and atraumatic.      Mouth/Throat:      Mouth: Mucous membranes are moist.      Pharynx: Oropharynx is clear.   Eyes:      Extraocular Movements: Extraocular movements intact.   Cardiovascular:      Rate and Rhythm: Normal rate.      Pulses: Normal pulses.      Heart sounds: Normal heart sounds. No murmur heard.  Pulmonary:      Effort: Pulmonary effort is normal.      Breath sounds: Wheezing and rhonchi present.       Comments: Cough with deep inspiration  Chest:      Chest wall: No tenderness.   Abdominal:      General: There is no distension.      Palpations: Abdomen is soft.      Tenderness: There is no abdominal tenderness.   Skin:     General: Skin is warm.   Neurological:      Mental Status: She is oriented to person, place, and time.      Motor: Weakness present.      Comments: Generalized weakness of lower legs   Psychiatric:      Comments: Mood and judgement somewhat impaired         Fluids  No intake or output data in the 24 hours ending 08/24/23 1616      Laboratory  Recent Labs     08/22/23  1703   WBC 8.8   RBC 5.31   HEMOGLOBIN 13.7   HEMATOCRIT 42.3   MCV 79.7*   MCH 25.8*   MCHC 32.4   RDW 50.0   PLATELETCT 237   MPV 10.9       Recent Labs     08/22/23  0611   SODIUM 134*   POTASSIUM 4.3   CHLORIDE 96   CO2 28   GLUCOSE 150*   BUN 21   CREATININE 0.92   CALCIUM 8.8                       Imaging  DX-CHEST-PORTABLE (1 VIEW)   Final Result      1.  Mild bilateral perihilar atelectasis and or pneumonitis.      CT-CTA CHEST PULMONARY ARTERY W/ RECONS   Final Result         1.  No evidence pulmonary embolism.   2.  Trace bilateral pleural effusions with bibasilar atelectasis.   3.  Trace pericardial effusion.   4.  Emphysematous changes.      Fleischner Society pulmonary nodule recommendations:   Nonapplicable      DX-CHEST-PORTABLE (1 VIEW)   Final Result      Nonspecific mild left basilar opacity could be scarring however cannot exclude atelectasis, small infiltrate or a small pleural effusion.             Assessment/Plan  Problem Representation:    * Pneumonia due to COVID-19 virus- (present on admission)  Assessment & Plan  Pt lived In group home. She is poor historian but covid test positive    -Continue O2 nasal canula.  We will continue to titrate off oxygen therapy.  -Dexamethasone 6mg daily for total of 10 days ending on August 25.  -Respiratory therapy consult appreciated for PEEP evaluation  -Repeat  Chest X-ray 8/22/23 remarkable for right middle lobe consolidation- superimposed bacterial infection  -Sputum cultures sent for further evaluation-awaiting results                    Consolidation of middle lobe of lung (HCC)  Assessment & Plan  New finding on repeat X-ray on 8/22/23 with right middle lobe consolidation most likely superimposed bacterial infection versus atelectasis    -Sputum culture -awaiting results  -SLP eval for dysphagia and risk of aspiration      Hypoxia  Assessment & Plan  Acute hypoxia 2/2 to emphysematous changes and covid pneumonia    -O2 evaluation formed: Saturating 88% after 3-minute weaning of oxygen.  Cannot ambulate, saturation in sitting position 88%  -Currently saturating 95% on 3LPM.  See pneumonia plan.       Depression  Assessment & Plan  Review of the chronic depression.  Does not appear to be in exacerbation or crisis at this time    Plan:  Continue home antidepressant medications  Recommend CBT    Hyponatremia  Assessment & Plan  On admission to ED 8/15/23 sodium 129 otherwise asymptomatic.    8/17/23 resolved    Delirium superimposed on dementia  Assessment & Plan  Pt is on day 9 hospitalization stay with dexamethasone Rx with underlying dementia. Oriented to time and place but acting unusual since yesterday    - not severe enough, will continue observation  -If not improved, psych consultation will be appreciated    Hypothyroid  Assessment & Plan  Conitnue home levothyroxine  Repeat TSH is in normal range.           VTE prophylaxis: Enoxaparin    I have performed a physical exam and reviewed and updated ROS and Plan today (8/24/2023). In review of yesterday's note (8/23/2023), there are no changes except as documented above.

## 2023-08-24 NOTE — ASSESSMENT & PLAN NOTE
Pt is on day 9 hospitalization stay with dexamethasone Rx with underlying dementia.   Appears at baseline at this time; continue observation.

## 2023-08-24 NOTE — PROGRESS NOTES
Assumed care of patient at 0700 from Kaleb FONTAINE. Patient is A&O x1, states pain level is 0/10. Bed locked in lowest position with 2 rail up. Call light  in place, belongings at bedside. Hourly rounding is in place.

## 2023-08-25 ENCOUNTER — PATIENT OUTREACH (OUTPATIENT)
Dept: SCHEDULING | Facility: IMAGING CENTER | Age: 73
End: 2023-08-25
Payer: MEDICARE

## 2023-08-25 LAB
ALBUMIN SERPL BCP-MCNC: 3.7 G/DL (ref 3.2–4.9)
ALBUMIN/GLOB SERPL: 1.3 G/DL
ALP SERPL-CCNC: 93 U/L (ref 30–99)
ALT SERPL-CCNC: 39 U/L (ref 2–50)
ANION GAP SERPL CALC-SCNC: 13 MMOL/L (ref 7–16)
AST SERPL-CCNC: 25 U/L (ref 12–45)
BILIRUB SERPL-MCNC: 0.5 MG/DL (ref 0.1–1.5)
BUN SERPL-MCNC: 20 MG/DL (ref 8–22)
CALCIUM ALBUM COR SERPL-MCNC: 10.1 MG/DL (ref 8.5–10.5)
CALCIUM SERPL-MCNC: 9.9 MG/DL (ref 8.5–10.5)
CHLORIDE SERPL-SCNC: 94 MMOL/L (ref 96–112)
CO2 SERPL-SCNC: 27 MMOL/L (ref 20–33)
CREAT SERPL-MCNC: 0.85 MG/DL (ref 0.5–1.4)
GFR SERPLBLD CREATININE-BSD FMLA CKD-EPI: 73 ML/MIN/1.73 M 2
GLOBULIN SER CALC-MCNC: 2.9 G/DL (ref 1.9–3.5)
GLUCOSE SERPL-MCNC: 130 MG/DL (ref 65–99)
MAGNESIUM SERPL-MCNC: 2.3 MG/DL (ref 1.5–2.5)
POTASSIUM SERPL-SCNC: 4.2 MMOL/L (ref 3.6–5.5)
PROT SERPL-MCNC: 6.6 G/DL (ref 6–8.2)
SODIUM SERPL-SCNC: 134 MMOL/L (ref 135–145)

## 2023-08-25 PROCEDURE — 700102 HCHG RX REV CODE 250 W/ 637 OVERRIDE(OP)

## 2023-08-25 PROCEDURE — 700102 HCHG RX REV CODE 250 W/ 637 OVERRIDE(OP): Performed by: STUDENT IN AN ORGANIZED HEALTH CARE EDUCATION/TRAINING PROGRAM

## 2023-08-25 PROCEDURE — 83735 ASSAY OF MAGNESIUM: CPT

## 2023-08-25 PROCEDURE — 36415 COLL VENOUS BLD VENIPUNCTURE: CPT

## 2023-08-25 PROCEDURE — 770001 HCHG ROOM/CARE - MED/SURG/GYN PRIV*

## 2023-08-25 PROCEDURE — A9270 NON-COVERED ITEM OR SERVICE: HCPCS

## 2023-08-25 PROCEDURE — 99231 SBSQ HOSP IP/OBS SF/LOW 25: CPT | Performed by: HOSPITALIST

## 2023-08-25 PROCEDURE — 80053 COMPREHEN METABOLIC PANEL: CPT

## 2023-08-25 PROCEDURE — A9270 NON-COVERED ITEM OR SERVICE: HCPCS | Performed by: STUDENT IN AN ORGANIZED HEALTH CARE EDUCATION/TRAINING PROGRAM

## 2023-08-25 RX ADMIN — DIVALPROEX SODIUM 250 MG: 250 TABLET, EXTENDED RELEASE ORAL at 04:50

## 2023-08-25 RX ADMIN — SIMVASTATIN 40 MG: 40 TABLET, FILM COATED ORAL at 20:06

## 2023-08-25 RX ADMIN — CLOZAPINE 150 MG: 25 TABLET ORAL at 17:59

## 2023-08-25 RX ADMIN — LEVOTHYROXINE SODIUM 125 MCG: 0.12 TABLET ORAL at 04:49

## 2023-08-25 RX ADMIN — LEVETIRACETAM 250 MG: 500 TABLET, FILM COATED ORAL at 12:39

## 2023-08-25 RX ADMIN — MIRTAZAPINE 7.5 MG: 15 TABLET, FILM COATED ORAL at 20:06

## 2023-08-25 RX ADMIN — LEVETIRACETAM 250 MG: 500 TABLET, FILM COATED ORAL at 04:51

## 2023-08-25 RX ADMIN — ASPIRIN 81 MG: 81 TABLET, COATED ORAL at 04:50

## 2023-08-25 RX ADMIN — LEVETIRACETAM 250 MG: 500 TABLET, FILM COATED ORAL at 17:59

## 2023-08-25 RX ADMIN — ESCITALOPRAM OXALATE 10 MG: 10 TABLET ORAL at 04:50

## 2023-08-25 RX ADMIN — TIOTROPIUM BROMIDE INHALATION SPRAY 5 MCG: 3.12 SPRAY, METERED RESPIRATORY (INHALATION) at 04:47

## 2023-08-25 RX ADMIN — CLOZAPINE 150 MG: 25 TABLET ORAL at 04:50

## 2023-08-25 RX ADMIN — Medication 5 MG: at 20:07

## 2023-08-25 ASSESSMENT — ENCOUNTER SYMPTOMS
NAUSEA: 0
VOMITING: 0
WHEEZING: 0
CHILLS: 0
DIARRHEA: 0
COUGH: 0
DEPRESSION: 1
DOUBLE VISION: 0
FEVER: 0
HEARTBURN: 0
FOCAL WEAKNESS: 0
NERVOUS/ANXIOUS: 1
PHOTOPHOBIA: 0
SPUTUM PRODUCTION: 1
PALPITATIONS: 0
WEAKNESS: 1

## 2023-08-25 ASSESSMENT — PAIN DESCRIPTION - PAIN TYPE
TYPE: ACUTE PAIN

## 2023-08-25 NOTE — PROGRESS NOTES
"Patient remained awake and kept ungowning herself, removed the purewick and removing her 02 tubing. After 2200 hr, she started using the call bell button calling every 10-15 min: given different short statements like these: \"cover me up, I need water, I did not call you\" etc.  "

## 2023-08-25 NOTE — DISCHARGE PLANNING
DC Transport Scheduled    Received request at: 8/25/2023 at 1540    Transport Company Scheduled:  [employer]  Spoke with Jennifer at [facility] to schedule transport.  [facility] Trip #: [id number]     Scheduled Date: 8/26/2023  Scheduled Time: 1215    Destination: [facility] at [address] [place]     Notified care team of scheduled transport via Voalte.     If there are any changes needed to the DC transportation scheduled, please contact [facility] at ext. [phone] between the hours of 2912-6935 Mon-Fri. If outside those hours, contact the ED Case Manager at ext. [phone].

## 2023-08-25 NOTE — PROGRESS NOTES
"[facility] Internal Medicine Daily Progress Note    Date of Service  8/25/2023    [facility] Team: R KEVYN Sanchez Team   Attending: MUNDO Couch M.d.  Senior Resident: Dr. Norbert Hensley  Intern:  Dr. Josh Ward MD  Contact Number: [phone]    Chief Complaint  Tracie Rinaldi is a 73 y.o. female admitted 8/15/2023 with weakness, cough.    Hospital Course  Tracie Rinaldi is a 73 y.o. female who presented 8/15/2023 with Lower extremities, weakness and hypoxia. When inquired when did your weakness start she states that \"its been years\" and wheel chair bound. She states that she was feeling lonely at group home and was not well taken care of. Patient seems poor historian to me and was not sure why is she in hospital and what is her medical problem although she was oriented to time, place and person. She denies chest pain, SOB but does cough with inspiration. She was asking food and most of the time incoherent answering questions.   ED course: pulse 75, RR 22, /68, O2 87%.   Labs were significant for Hyponatremia 129, mild hypocalcemia, lactic acid 0.6,  Chest X-ray remarkable for mild left basilar opacity could be scarring however cannot exclude atelectasis, small infiltrate or a small pleural effusion.  Chest CT: No evidence of PE, trace bilateral pleural effusions, trace pericardial effusion and  Emphysematous changes.  EKG showed T wave abnormalities.  Gradual resolution of COVID-19 symptoms ongoing fatigue, shortness of breath, cough.  Patient symptoms gradually improving on dexamethasone but still complains of shortness of breath desaturating on 4 L oxygen. Repeat CXR remarkable for right middle lobe consolidation concerning superimposed bacterial pneumonia. Pt was havinng experiencing delirium episode and discharge was cancelled.    Interval Problem Update  Patient seen and evaluated at bedside this AM.  No overnight acute event reported by the night team.  -Patient reports feeling better, improved " shortness of breath.  Coordinating with case management regarding transportation.  - home O2 evaluation, DME and face-to-face placed.      I have discussed this patient's plan of care and discharge plan at IDT rounds today with Case Management, Nursing, Nursing leadership, and other members of the IDT team.    Consultants/Specialty       Code Status  Full    Disposition  Medically Cleared  I have placed the appropriate orders for post-discharge needs.    Review of Systems  Review of Systems   Constitutional:  Positive for malaise/fatigue. Negative for chills and fever.        Poor historian, baseline dementia   Eyes:  Negative for double vision and photophobia.   Respiratory:  Positive for sputum production. Negative for cough and wheezing.    Cardiovascular:  Negative for chest pain, palpitations and leg swelling.   Gastrointestinal:  Negative for diarrhea, heartburn, nausea and vomiting.   Genitourinary:  Negative for dysuria and frequency.   Musculoskeletal:         Fatigued   Neurological:  Positive for weakness. Negative for focal weakness.   Psychiatric/Behavioral:  Positive for depression. The patient is nervous/anxious.         Physical Exam  Temp:  [36 °C (96.8 °F)-36.6 °C (97.8 °F)] 36 °C (96.8 °F)  Pulse:  [72-87] 87  Resp:  [17-19] 18  BP: (110-138)/(55-65) 110/65  SpO2:  [91 %-98 %] 98 %    Physical Exam  Vitals and nursing note reviewed.   Constitutional:       Appearance: Normal appearance. She is obese.      Comments: Improvement in alertness   HENT:      Head: Normocephalic and atraumatic.      Right Ear: External ear normal.      Left Ear: External ear normal.      Mouth/Throat:      Mouth: Mucous membranes are moist.      Pharynx: Oropharynx is clear.   Eyes:      Extraocular Movements: Extraocular movements intact.   Cardiovascular:      Rate and Rhythm: Normal rate.      Pulses: Normal pulses.      Heart sounds: Normal heart sounds. No murmur heard.  Pulmonary:      Effort: Pulmonary effort is  normal.      Breath sounds: Wheezing and rhonchi present.      Comments: Cough with deep inspiration  Chest:      Chest wall: No tenderness.   Abdominal:      General: There is no distension.      Palpations: Abdomen is soft.      Tenderness: There is no abdominal tenderness.   Musculoskeletal:         General: No signs of injury.   Skin:     General: Skin is warm.      Coloration: Skin is not jaundiced.      Findings: No bruising or erythema.   Neurological:      Mental Status: She is alert and oriented to person, place, and time.      Motor: Weakness present.      Comments: Generalized weakness of lower legs   Psychiatric:      Comments: Mood and judgement somewhat impaired         Fluids  No intake or output data in the 24 hours ending 08/25/23 1617      Laboratory  Recent Labs     08/22/23  1703   WBC 8.8   RBC 5.31   HEMOGLOBIN 13.7   HEMATOCRIT 42.3   MCV 79.7*   MCH 25.8*   MCHC 32.4   RDW 50.0   PLATELETCT 237   MPV 10.9     Recent Labs     08/25/23  1154   SODIUM 134*   POTASSIUM 4.2   CHLORIDE 94*   CO2 27   GLUCOSE 130*   BUN 20   CREATININE 0.85   CALCIUM 9.9                     Imaging  DX-CHEST-PORTABLE (1 VIEW)   Final Result      1.  Mild bilateral perihilar atelectasis and or pneumonitis.      CT-CTA CHEST PULMONARY ARTERY W/ RECONS   Final Result         1.  No evidence pulmonary embolism.   2.  Trace bilateral pleural effusions with bibasilar atelectasis.   3.  Trace pericardial effusion.   4.  Emphysematous changes.      Fleischner Society pulmonary nodule recommendations:   Nonapplicable      DX-CHEST-PORTABLE (1 VIEW)   Final Result      Nonspecific mild left basilar opacity could be scarring however cannot exclude atelectasis, small infiltrate or a small pleural effusion.             Assessment/Plan  Problem Representation:    * Pneumonia due to COVID-19 virus- (present on admission)  Assessment & Plan  Pt lived In group home. She is poor historian but covid test positive    -Continue O2 nasal  cesar.  We will continue to titrate off oxygen therapy.  -Dexamethasone 6mg daily for total of 10 days ending on August 25.  -Respiratory therapy consult appreciated for PEEP evaluation  -Repeat Chest X-ray 8/22/23 remarkable for right middle lobe consolidation- superimposed bacterial infection?  Possible possible component of aspiration pneumonitis.  -Home oxygen evaluation.  Still requiring  mild oxygen therapy                    Consolidation of middle lobe of lung (HCC)  Assessment & Plan  New finding on repeat X-ray on 8/22/23 with right middle lobe consolidation most likely superimposed bacterial infection versus atelectasis    -Sputum culture -awaiting results        Depression  Assessment & Plan  Review of the chronic depression.  Does not appear to be in exacerbation or crisis at this time    Plan:  Continue home antidepressant medications  Recommend CBT    Hypoxia  Assessment & Plan  Acute hypoxia 2/2 to emphysematous changes and covid pneumonia  Still requiring oxygen at baseline.      Hyponatremia  Assessment & Plan  On admission to ED 8/15/23 sodium 129 otherwise asymptomatic.    8/17/23 resolved    Delirium superimposed on dementia  Assessment & Plan  Pt is on day 9 hospitalization stay with dexamethasone Rx with underlying dementia.   Appears at baseline at this time; continue observation.      Hypothyroid  Assessment & Plan  Conitnue home levothyroxine  Repeat TSH is in normal range.           VTE prophylaxis: Enoxaparin    I have performed a physical exam and reviewed and updated ROS and Plan today (8/25/2023). In review of yesterday's note (8/24/2023), there are no changes except as documented above.

## 2023-08-25 NOTE — PROGRESS NOTES
Assumed pt care at 0700 . Pt is A&Ox 2, disoriented to time and situation . Pt denies pain and any SOB.  Pt's belongings are nearby, bed is in the lowest position and locked.

## 2023-08-25 NOTE — PROGRESS NOTES
"Patient by 0500 hr when passing meds. She was asked how she would take her morning meds, she articulated very well. \"Please crushed\"  She chose with Chocolate pudding. Also, she said: \"Can I have another blanket; I am freezing\"  "

## 2023-08-25 NOTE — FACE TO FACE
"Face to Face Note  -  Durable Medical Equipment    Norbert Hensley D.O. - NPI: [id number]  I certify that this patient is under my care and that they had a durable medical equipment(DME)face to face encounter by myself that meets the physician DME face-to-face encounter requirements with this patient on:    Date of encounter:   Patient:                    MRN:                       YOB: 2021  Tracie Rinaldi  [id number]  1950     The encounter with the patient was in whole, or in part, for the following medical condition, which is the primary reason for durable medical equipment:  Covid-19 Infection    I certify that, based on my findings, the following durable medical equipment is medically necessary:    Oxygen   HOME O2 Saturation Measurements:(Values must be present for Home Oxygen orders)  Room air sat at rest: 86  Room air sat with amb:  (N/A, wheelchair bound at baseline)  With liters of O2: 1, O2 sat at rest with O2: 94  With Liters of O2:  (n/a, wheelchair bound at baseline), O2 sat with amb with O2 :  (n/a)  Is the patient mobile?: No  If patient feels more short of breath, they can go up to 6 liters per minute and contact healthcare provider.    Supporting Symptoms: The patient requires supplemental oxygen, as the following interventions have been tried with limited or no improvement: \"Oral and/or IV steroids, \"Ambulation with oximetry, and \"Incentive spirometry   and Wheelchair   Patient needs manual wheelchair for use inside the home based on the above diagnosis. Per guidelines patient meets criteria in the following ways:   A.  Patient has significant impairment in the following Toileting, Dressing, Grooming, and Bathing and is is unable to complete these tasks in a reasonable timeframe.   B.  The patient's mobility limitations cannot be sufficiently resolved by use of  fitted cane or walker.   C.  The patient reports his home provides adequate access between rooms,  " maneuvering space, and surfaces for use of the manual wheelchair that is  provided.   D.  The use of the manual wheelchair will significantly improve the patient's  ability to participate in MRADLs and the patient will use it on a regular basis in  the home.   E.  The patient has not expressed an unwillingness to use the manual  wheelchair.   F. The patient has limitations of strength, endurance, range of motion, or coordination per OT notes:.    My Clinical findings support the need for the above equipment due to:  Hypoxia

## 2023-08-25 NOTE — DISCHARGE SUMMARY
Case Management Discharge Planning    Admission Date: 8/15/2023  GMLOS: 5.2  ALOS: 10    6-Clicks ADL Score: 15  6-Clicks Mobility Score: 12  PT and/or OT Eval ordered: Yes  Post-acute Referrals Ordered: Yes  Post-acute Choice Obtained: No  Has referral(s) been sent to post-acute provider:  No      Anticipated Discharge Dispo: Discharge Disposition: D/T to home under A care in anticipation of covered skilled care (06)   [facility] ([phone])  [address] [address]    DME Needed: Yes    DME Ordered: WC, O2    Action(s) Taken: Updated Provider/Nurse on Discharge Plan    1230 RNCM communicated with Senior resident Nitin about pt needing F2F for O2 and WC.  Per Ayden, pt is medically cleared and will coordinate what's needed.  RNCM received a call from Hilda Delacruz  with guardianship services.  She was updated about pt most discharging today.  RNCM also called Valeria and notified her of plan     1400  RNCM obtained verbal consent from Rima Barrett (Guardian [phone]) for DME Choice with Paddy and IMM signature. Choice form given to MICHAEL Oliver and referral sent. Will follow up with Paddy on delivery time. Transportation pending.    1515 RNCM called [facility] GUSMAN.  No order was received for O2 and WC. RNCM refaxed  corrected order with proper O2 numbers. Order was faxed to [phone] per their request.    1547 RNCM got confirmation from Lauren Beyer) that order for DME was received. O2 and WC should be delivered today.  Transportation is being set up for tomorrow 8/26 at noon. Valeria from the  was notified and agreed with accepting pt over the weekend.     Escalations Completed: None    Medically Clear: Yes    Next Steps: pending  DME and O2, RNCM to continue to follow up with pt and medical team to address dc needs and barriers      Barriers to Discharge: DME, Oxygen Delivery, and Transportation

## 2023-08-25 NOTE — CARE PLAN
"The patient is Stable - Low risk of patient condition declining or worsening    Shift Goals  Clinical Goals: monitor o2 saturation  Patient Goals: sleep  Family Goals: NA    Progress made toward(s) clinical / shift goals: Patient was asleep at shift change. Woke up at 2030, removing her nasal cannula, and saying : \"I can't  do this\" while she is removing her gown. What is it that you can't do?  Her answer was: \"Sleep\".  You just wake up from sleeping all day I answered. She rather had no gown a remain naked. Every time, I cover her with her gown. She removed it. She was restless moving from side to side in the bed.  Bed in lowest and locked position. Hourly rounding in place. HS medication given at this time. Call bell within reach.    Patient is not progressing towards the following goals:  Problem: Care Map:  Optimal Outcome for the Pneumonia Patient  Goal: Collection and monitoring of appropriate tests and labs  Description: Target End Date:  end of day 1  Outcome: Progressing     Problem: Respiratory  Goal: Patient will achieve/maintain optimum respiratory ventilation and gas exchange  Description: Target End Date:  Prior to discharge or change in level of care    Document on Assessment flowsheet    1.  Assess and monitor rate, rhythm, depth and effort of respiration  2.  Breath sounds assessed qshift and/or as needed  3.  Assess O2 saturation, administer/titrate oxygen as ordered  4.  Position patient for maximum ventilatory efficiency  5.  Turn, cough, and deep breath with splinting to improve effectiveness  6.  Collaborate with RT to administer medication/treatments per order  7.  Encourage use of incentive spirometer and encourage patient to cough after use and utilize splinting techniques if applicable  8.  Airway suctioning  9.  Monitor sputum production for changes in color, consistency and frequency  10. Perform frequent oral hygiene  11. Alternate physical activity with rest periods  Outcome: " Progressing     Problem: Hemodynamics - Pneumonia  Goal: Patient's hemodynamics, fluid balance and neurologic status will be stable or improve  Description: Target End Date:  Prior to discharge or change in level of care    Document on Assessment and I/O flowsheet templates    1.  Monitor vital signs, pulse oximetry and cardiac monitor per provider order and/or policy  2.  Manage IV fluids and IV infusions  3.  Monitor intake and output  4.  Daily weights per unit policy or provider order  5.  Assess peripheral pulses and capillary refill  6.  Monitor body temperature and assist with comfort measures to reduce fever and chills  7.  Position patient for maximum circulation/cardiac output  8.  Assess for peripheral, sacral, periorbital and abdominal edema  9. Assess for signs of deterioration - tachycardia, tachypnea, dyspnea, hypertension, hypoxemia, pallor, changes in consciousness or restlessness  Outcome: Progressing

## 2023-08-25 NOTE — CARE PLAN
The patient is Stable - Low risk of patient condition declining or worsening    Shift Goals  Clinical Goals: safety, oxygen monitoring sat.  Patient Goals: ? wanting to be gown free  Family Goals: NA    Progress made toward(s) clinical / shift goals:    Problem: Respiratory  Goal: Patient will achieve/maintain optimum respiratory ventilation and gas exchange. Will monitor pt to maintain SPO2 sat at above 88%.  Outcome: Progressing     Problem: Risk for Aspiration  Goal: Patient's risk for aspiration will be absent or decrease  Outcome: Progressing     Problem: Hemodynamics - Pneumonia  Goal: Patient's hemodynamics, fluid balance and neurologic status will be stable or improve  Outcome: Progressing       Patient is not progressing towards the following goals:

## 2023-08-25 NOTE — PROGRESS NOTES
@ 2330 hr, patient  had pulled her iv dressing. [facility] Team Dr. Edmond Su was notified about it She is okayed with patient having no iv since patient has not iv medications at all.  @ 0030 hr, patient had pulled out her bed alarm. She is still on the call bell, pressing the call button asking for cover me up, give me a soda, etc.  Since about the time to wake up  at 2030 hr up to this hour patient produces a sound like Chanting something self soothing.  Started falling asleep by  0230 hr

## 2023-08-26 VITALS
TEMPERATURE: 96.6 F | WEIGHT: 201.94 LBS | BODY MASS INDEX: 32.45 KG/M2 | DIASTOLIC BLOOD PRESSURE: 84 MMHG | HEIGHT: 66 IN | SYSTOLIC BLOOD PRESSURE: 136 MMHG | OXYGEN SATURATION: 90 % | HEART RATE: 74 BPM | RESPIRATION RATE: 17 BRPM

## 2023-08-26 PROCEDURE — 99239 HOSP IP/OBS DSCHRG MGMT >30: CPT | Mod: GC | Performed by: HOSPITALIST

## 2023-08-26 PROCEDURE — A9270 NON-COVERED ITEM OR SERVICE: HCPCS

## 2023-08-26 PROCEDURE — 700102 HCHG RX REV CODE 250 W/ 637 OVERRIDE(OP)

## 2023-08-26 RX ADMIN — DIVALPROEX SODIUM 250 MG: 250 TABLET, EXTENDED RELEASE ORAL at 05:23

## 2023-08-26 RX ADMIN — ESCITALOPRAM OXALATE 10 MG: 10 TABLET ORAL at 05:23

## 2023-08-26 RX ADMIN — ASPIRIN 81 MG: 81 TABLET, COATED ORAL at 05:23

## 2023-08-26 RX ADMIN — CLOZAPINE 150 MG: 25 TABLET ORAL at 05:25

## 2023-08-26 RX ADMIN — LEVETIRACETAM 250 MG: 500 TABLET, FILM COATED ORAL at 05:23

## 2023-08-26 RX ADMIN — TIOTROPIUM BROMIDE INHALATION SPRAY 5 MCG: 3.12 SPRAY, METERED RESPIRATORY (INHALATION) at 05:24

## 2023-08-26 RX ADMIN — LEVOTHYROXINE SODIUM 125 MCG: 0.12 TABLET ORAL at 05:23

## 2023-08-26 ASSESSMENT — PAIN DESCRIPTION - PAIN TYPE: TYPE: ACUTE PAIN

## 2023-08-26 NOTE — DISCHARGE SUMMARY
"[facility] Internal Medicine Discharge Summary    Attending: Dr. Abhi ngo  Senior Resident: Dr. Norbert Hensley  Intern:  Dr. Josh rooney   Contact Number: [phone]    CHIEF COMPLAINT ON ADMISSION  Chief Complaint   Patient presents with    Weakness     X1 day weakness, EMS states O2 81% on arrival       Reason for Admission  ems     Admission Date  8/15/2023    CODE STATUS  Prior    HPI & HOSPITAL COUR   Tracie Rinaldi is a 73 y.o. female who presented 8/15/2023 with Lower extremities, weakness and hypoxia. When inquired when did your weakness start she states that \"its been years\" and wheel chair bound. She states that she was feeling lonely at group home and was not well taken care of. Patient seems poor historian to me and was not sure why is she in hospital and what is her medical problem although she was oriented to time, place and person. She denies chest pain, SOB but does cough with inspiration. She was asking food and most of the time incoherent answering questions.   ED course: pulse 75, RR 22, /68, O2 87%.   Labs were significant for Hyponatremia 129, mild hypocalcemia, lactic acid 0.6,  Chest X-ray remarkable for mild left basilar opacity could be scarring however cannot exclude atelectasis, small infiltrate or a small pleural effusion.  Chest CT: No evidence of PE, trace bilateral pleural effusions, trace pericardial effusion and  Emphysematous changes.  EKG showed T wave abnormalities.  Gradual resolution of COVID-19 symptoms ongoing fatigue, shortness of breath, cough.  Patient symptoms gradually improving on dexamethasone but still complains of shortness of breath desaturating on 4 L oxygen. Repeat CXR remarkable for right middle lobe consolidation concerning superimposed bacterial pneumonia.  Pt was experiencing delirium episode and discharge was cancelled.  Over the days, patient was saturating good amount of oxygen on minimal oxygen support.     Therefore, she is discharged in " good and stable condition to home with organized home healthcare and close outpatient follow-up-group home    The patient met 2-midnight criteria for an inpatient stay at the time of discharge.    Discharge Date  8/26/2023    Physical Exam on Day of Discharge  Physical Exam  Constitutional:       Appearance: She is obese.   HENT:      Head: Normocephalic and atraumatic.      Mouth/Throat:      Mouth: Mucous membranes are moist.      Pharynx: Oropharynx is clear.   Eyes:      Extraocular Movements: Extraocular movements intact.      Conjunctiva/sclera: Conjunctivae normal.   Cardiovascular:      Rate and Rhythm: Normal rate and regular rhythm.      Pulses: Normal pulses.      Heart sounds: Normal heart sounds.   Pulmonary:      Effort: Pulmonary effort is normal. No respiratory distress.      Breath sounds: Rales present. No wheezing.   Chest:      Chest wall: No tenderness.   Abdominal:      General: Abdomen is flat.      Palpations: Abdomen is soft.   Musculoskeletal:         General: Normal range of motion.      Cervical back: Normal range of motion and neck supple.   Skin:     Capillary Refill: Capillary refill takes less than 2 seconds.   Neurological:      Mental Status: She is alert.         FOLLOW UP ITEMS POST DISCHARGE      DISCHARGE DIAGNOSES  Principal Problem:    Pneumonia due to COVID-19 virus (POA: Yes)  Active Problems:    Hypoxia (POA: Unknown)    Consolidation of middle lobe of lung (HCC) (POA: Unknown)    Hypothyroid (POA: Unknown)      Overview: Labs 4/20/2023 TSH 4.13, free T41.2.    Delirium superimposed on dementia (POA: Unknown)    Hyponatremia (POA: Unknown)      Overview: Labs 4/20/2023 sodium 133, serum OSM mildly decreased 273, urine OSM       normal 130.    Depression (POA: Unknown)  Resolved Problems:    * No resolved hospital problems. *      FOLLOW UP  No future appointments.  [place] [id number], [address]  [facility]  [address]  [phone]        DI Torres.  [address] [facility] [address] [phone]    Schedule an appointment as soon as possible for a visit on 9/8/2023  Please call your primary care provider to schedule a hospital follow up. Thank you.      MEDICATIONS ON DISCHARGE     Medication List        CHANGE how you take these medications        Instructions   Incruse Ellipta 62.5 MCG/ACT Aepb  What changed:   how much to take  how to take this  when to take this  Generic drug: Umeclidinium Bromide   INHALE 1 PUFF BY MOUTH ONCE DAILY     simvastatin 40 MG Tabs  What changed:   how much to take  how to take this  when to take this  Commonly known as: Zocor   Doctor's comments: [facility] asked for a 90 day refill for pt's simvastatin be sent to [facility]  TAKE 1 TABLET BY MOUTH AT BEDTIME FOR HLD            CONTINUE taking these medications        Instructions   alendronate 70 MG Tabs  Commonly known as: Fosamax   Take 1 Tablet by mouth every 7 days.  Dose: 70 mg     aspirin 81 MG EC tablet  Commonly known as: Aspirin Low Dose   Take 1 Tablet by mouth every day.  Dose: 81 mg     cloZAPine 100 MG Tabs  Commonly known as: Clozaril   Take 1.5 Tablets by mouth 2 times a day.  Dose: 150 mg     divalproex  MG Tb24  Commonly known as: Depakote ER   Take 1 Tablet by mouth every day.  Dose: 250 mg     escitalopram 10 MG Tabs  Commonly known as: Lexapro   Take 10 mg by mouth every day.  Dose: 10 mg     FQ Protective Underwear Misc   CHANGE 3 TIMES DAILY AS DIRECTED     hydrophilic ointment Oint   Apply 1 Each topically at bedtime. Apply to hands  Dose: 1 Each     levETIRAcetam 250 MG tablet  Commonly known as: Keppra   Take 1 Tablet by mouth 3 times a day.  Dose: 250 mg     levothyroxine 125 MCG Tabs  Commonly known as: Synthroid   Take 1 Tablet by mouth every morning on an empty stomach.  Dose: 125 mcg     mirtazapine 15 MG Tabs  Commonly known as: Remeron   Take 0.5 Tablets by mouth every evening. Q hs and 1 dose 7.5  mg prn  Dose: 7.5 mg     Ventolin  (90 Base) MCG/ACT Aers inhalation aerosol  Generic drug: albuterol   Inhale 2 Puffs every four hours as needed for Shortness of Breath.  Dose: 2 Puff     Vitamin D (Cholecalciferol) 25 MCG (1000 UT) Tabs  Commonly known as: Cholecalciferol   Take 1 Tablet by mouth every day.  Dose: 1,000 Units            STOP taking these medications      Mirabegron ER 50 MG Tb24              Allergies  No Known Allergies    DIET  No orders of the defined types were placed in this encounter.      ACTIVITY  As tolerated.  Weight bearing as tolerated    CONSULTATIONS      PROCEDURES      LABORATORY  Lab Results   Component Value Date    SODIUM 134 (L) 08/25/2023    POTASSIUM 4.2 08/25/2023    CHLORIDE 94 (L) 08/25/2023    CO2 27 08/25/2023    GLUCOSE 130 (H) 08/25/2023    BUN 20 08/25/2023    CREATININE 0.85 08/25/2023    CREATININE 0.9 12/31/2008        Lab Results   Component Value Date    WBC 8.8 08/22/2023    HEMOGLOBIN 13.7 08/22/2023    HEMATOCRIT 42.3 08/22/2023    PLATELETCT 237 08/22/2023        Total time of the discharge process exceeds 40 minutes.

## 2023-08-26 NOTE — DISCHARGE PLANNING
Case Management Discharge Planning    Admission Date: 8/15/2023  GMLOS: 5.2  ALOS: 11    6-Clicks ADL Score: 15  6-Clicks Mobility Score: 12  PT and/or OT Eval ordered: Yes  Post-acute Referrals Ordered: Yes  Post-acute Choice Obtained: Yes  Has referral(s) been sent to post-acute provider:  Yes      Anticipated Discharge Dispo: Discharge Disposition: D/T to home under [facility] care in anticipation of covered skilled care (06)    DME Needed: Yes    DME Ordered: Yes WC O2    Action(s) Taken: OTHER     RNCM  notified Valeria ( owner)of [facility]  time at 1215. Pt hasa portable concentrator and WC at bedside.  Valeria adamant that she needs O2 tanks.  RNCM spoke to Esperanza yen [employer] requesting 1-2 tanks to be delivered to the . Oli RN notified to send additional O2 tubing with pt per  owner's request.       1040  RNCM received a call from Anibal/[employer]; He will deliver 2 Oxygen tanks to [email or web address] home. Valeria's number provided.  Escalations Completed: None    Medically Clear: Yes    Next Steps: RNCM to continue to follow up with pt and medical team to address dc needs and barriers      Barriers to Discharge: None

## 2023-08-26 NOTE — CARE PLAN
The patient is Stable - Low risk of patient condition declining or worsening    Shift Goals  Clinical Goals: comfort  Patient Goals: go home  Family Goals: n/a    Progress made toward(s) clinical / shift goals:    Problem: Respiratory  Goal: Patient will achieve/maintain optimum respiratory ventilation and gas exchange  Outcome: Progressing     Problem: Risk for Aspiration  Goal: Patient's risk for aspiration will be absent or decrease  Outcome: Progressing       Patient is not progressing towards the following goals:

## 2023-08-26 NOTE — DISCHARGE PLANNING
Case Management Discharge Planning    Admission Date: 8/15/2023  GMLOS: 5.2  ALOS: 11    6-Clicks ADL Score: 15  6-Clicks Mobility Score: 12  PT and/or OT Eval ordered: Yes  Post-acute Referrals Ordered: Yes  Post-acute Choice Obtained: Yes  Has referral(s) been sent to post-acute provider:  Yes      Anticipated Discharge Dispo: Discharge Disposition: D/T to home under A care in anticipation of covered skilled care (06)  Valeria ([phone])  [address] [place] [phone]    DME Needed: Yes    DME Ordered: Yes    Action(s) Taken: Updated Provider/Nurse on Discharge Plan    Transportation set up by OTILIA Lucio Completed: None    Medically Clear: Yes    Next Steps: RNCM to continue to follow up with pt and medical team to address dc needs and barriers      Barriers to Discharge: None

## 2023-08-26 NOTE — CARE PLAN
The patient is Stable - Low risk of patient condition declining or worsening    Shift Goals  Clinical Goals: Safety, comfort  Patient Goals: rest, comfort  Family Goals: NA    Progress made toward(s) clinical / shift goals:  Patient was alert and oriented. Asking for a blanket because she was cold. Denied any pain at this time. Warm blanket provided. Complete bed change done prior patient was going to sleep. Bed in lowest and locked position. Call bell within reach.      Problem: Care Map:  Optimal Outcome for the Pneumonia Patient  Goal: Collection and monitoring of appropriate tests and labs  Description: Target End Date:  end of day 1  8/25/2023 2151 by Sanford Lazaro R.N.  Outcome: Progressing  8/25/2023 2150 by Sanford Lazaro R.N.  Outcome: Progressing     Problem: Respiratory  Goal: Patient will achieve/maintain optimum respiratory ventilation and gas exchange  Description: Target End Date:  Prior to discharge or change in level of care    Document on Assessment flowsheet    1.  Assess and monitor rate, rhythm, depth and effort of respiration  2.  Breath sounds assessed qshift and/or as needed  3.  Assess O2 saturation, administer/titrate oxygen as ordered  4.  Position patient for maximum ventilatory efficiency  5.  Turn, cough, and deep breath with splinting to improve effectiveness  6.  Collaborate with RT to administer medication/treatments per order  7.  Encourage use of incentive spirometer and encourage patient to cough after use and utilize splinting techniques if applicable  8.  Airway suctioning  9.  Monitor sputum production for changes in color, consistency and frequency  10. Perform frequent oral hygiene  11. Alternate physical activity with rest periods  8/25/2023 2151 by Sanford Lazaro R.N.  Outcome: Progressing  8/25/2023 2150 by Sanford Lazaro R.N.  Outcome: Progressing     Problem: Risk for Aspiration  Goal: Patient's risk for aspiration will be absent or  decrease  Description: Target End Date:  Prior to discharge or change in level of care    1.   Complete dysphagia screening on admission  2.   NPO until dysphagia screening complete or medically cleared  3.   Collaborate with Speech Therapy, Clinical Dietitian and interdisciplinary team  4.   Implement aspiration precautions  5.   Assist patient up to chair for meals  6.   Elevate head of bed 90 degrees if patient is unable to get out of bed  7.   Encourage small bites  8.   Ensure foods/liquids are of appropriate consistency  9.   Assess for any signs/symptoms of aspiration  10. Assess breath sounds and vital signs after oral intake  Outcome: Progressing     Problem: Hemodynamics - Pneumonia  Goal: Patient's hemodynamics, fluid balance and neurologic status will be stable or improve  Description: Target End Date:  Prior to discharge or change in level of care    Document on Assessment and I/O flowsheet templates    1.  Monitor vital signs, pulse oximetry and cardiac monitor per provider order and/or policy  2.  Manage IV fluids and IV infusions  3.  Monitor intake and output  4.  Daily weights per unit policy or provider order  5.  Assess peripheral pulses and capillary refill  6.  Monitor body temperature and assist with comfort measures to reduce fever and chills  7.  Position patient for maximum circulation/cardiac output  8.  Assess for peripheral, sacral, periorbital and abdominal edema  9. Assess for signs of deterioration - tachycardia, tachypnea, dyspnea, hypertension, hypoxemia, pallor, changes in consciousness or restlessness  Outcome: Progressing

## 2023-08-26 NOTE — PROGRESS NOTES
Assumed pt care at 0700 . Pt is A&Ox 2 . Pt denies need for pain interventions at this time.  Pt's belongings are nearby, bed is in the lowest position and locked.

## 2023-08-28 ENCOUNTER — HOSPITAL ENCOUNTER (INPATIENT)
Facility: MEDICAL CENTER | Age: 73
LOS: 4 days | DRG: 602 | End: 2023-09-01
Attending: EMERGENCY MEDICINE | Admitting: HOSPITALIST
Payer: MEDICARE

## 2023-08-28 ENCOUNTER — APPOINTMENT (OUTPATIENT)
Dept: RADIOLOGY | Facility: MEDICAL CENTER | Age: 73
DRG: 602 | End: 2023-08-28
Attending: EMERGENCY MEDICINE
Payer: MEDICARE

## 2023-08-28 DIAGNOSIS — N30.90 CYSTITIS: ICD-10-CM

## 2023-08-28 DIAGNOSIS — J44.9 COPD MIXED TYPE (HCC): ICD-10-CM

## 2023-08-28 DIAGNOSIS — L01.00 IMPETIGO: ICD-10-CM

## 2023-08-28 DIAGNOSIS — R41.82 ALTERED MENTAL STATUS, UNSPECIFIED ALTERED MENTAL STATUS TYPE: ICD-10-CM

## 2023-08-28 DIAGNOSIS — J18.9 PNEUMONIA OF RIGHT MIDDLE LOBE DUE TO INFECTIOUS ORGANISM: ICD-10-CM

## 2023-08-28 PROBLEM — S31.000A SACRAL WOUND: Status: ACTIVE | Noted: 2023-08-28

## 2023-08-28 PROBLEM — J96.11 CHRONIC RESPIRATORY FAILURE WITH HYPOXIA (HCC): Status: ACTIVE | Noted: 2023-08-28

## 2023-08-28 PROBLEM — D72.829 LEUKOCYTOSIS: Status: ACTIVE | Noted: 2023-08-28

## 2023-08-28 PROBLEM — R21 SKIN RASH: Status: ACTIVE | Noted: 2023-08-28

## 2023-08-28 PROBLEM — G93.41 ACUTE METABOLIC ENCEPHALOPATHY: Status: ACTIVE | Noted: 2023-08-28

## 2023-08-28 LAB
ALBUMIN SERPL BCP-MCNC: 3.1 G/DL (ref 3.2–4.9)
ALBUMIN/GLOB SERPL: 0.9 G/DL
ALP SERPL-CCNC: 90 U/L (ref 30–99)
ALT SERPL-CCNC: 64 U/L (ref 2–50)
AMMONIA PLAS-SCNC: 25 UMOL/L (ref 11–45)
ANION GAP SERPL CALC-SCNC: 14 MMOL/L (ref 7–16)
APPEARANCE UR: CLEAR
AST SERPL-CCNC: 53 U/L (ref 12–45)
BACTERIA #/AREA URNS HPF: NEGATIVE /HPF
BASOPHILS # BLD AUTO: 0.2 % (ref 0–1.8)
BASOPHILS # BLD: 0.03 K/UL (ref 0–0.12)
BILIRUB SERPL-MCNC: 0.9 MG/DL (ref 0.1–1.5)
BILIRUB UR QL STRIP.AUTO: NEGATIVE
BUN SERPL-MCNC: 20 MG/DL (ref 8–22)
CALCIUM ALBUM COR SERPL-MCNC: 10 MG/DL (ref 8.5–10.5)
CALCIUM SERPL-MCNC: 9.3 MG/DL (ref 8.5–10.5)
CHLORIDE SERPL-SCNC: 93 MMOL/L (ref 96–112)
CO2 SERPL-SCNC: 23 MMOL/L (ref 20–33)
COLOR UR: YELLOW
CREAT SERPL-MCNC: 1.18 MG/DL (ref 0.5–1.4)
EOSINOPHIL # BLD AUTO: 0.04 K/UL (ref 0–0.51)
EOSINOPHIL NFR BLD: 0.2 % (ref 0–6.9)
EPI CELLS #/AREA URNS HPF: NEGATIVE /HPF
ERYTHROCYTE [DISTWIDTH] IN BLOOD BY AUTOMATED COUNT: 46.1 FL (ref 35.9–50)
GFR SERPLBLD CREATININE-BSD FMLA CKD-EPI: 49 ML/MIN/1.73 M 2
GLOBULIN SER CALC-MCNC: 3.3 G/DL (ref 1.9–3.5)
GLUCOSE SERPL-MCNC: 146 MG/DL (ref 65–99)
GLUCOSE UR STRIP.AUTO-MCNC: NEGATIVE MG/DL
HCT VFR BLD AUTO: 36.9 % (ref 37–47)
HGB BLD-MCNC: 12.3 G/DL (ref 12–16)
HYALINE CASTS #/AREA URNS LPF: NORMAL /LPF
IMM GRANULOCYTES # BLD AUTO: 0.18 K/UL (ref 0–0.11)
IMM GRANULOCYTES NFR BLD AUTO: 1 % (ref 0–0.9)
KETONES UR STRIP.AUTO-MCNC: 15 MG/DL
LACTATE SERPL-SCNC: 1.5 MMOL/L (ref 0.5–2)
LEUKOCYTE ESTERASE UR QL STRIP.AUTO: ABNORMAL
LYMPHOCYTES # BLD AUTO: 1.2 K/UL (ref 1–4.8)
LYMPHOCYTES NFR BLD: 6.7 % (ref 22–41)
MCH RBC QN AUTO: 25.7 PG (ref 27–33)
MCHC RBC AUTO-ENTMCNC: 33.3 G/DL (ref 32.2–35.5)
MCV RBC AUTO: 77.2 FL (ref 81.4–97.8)
MICRO URNS: ABNORMAL
MONOCYTES # BLD AUTO: 1.56 K/UL (ref 0–0.85)
MONOCYTES NFR BLD AUTO: 8.7 % (ref 0–13.4)
NEUTROPHILS # BLD AUTO: 14.88 K/UL (ref 1.82–7.42)
NEUTROPHILS NFR BLD: 83.2 % (ref 44–72)
NITRITE UR QL STRIP.AUTO: NEGATIVE
NRBC # BLD AUTO: 0 K/UL
NRBC BLD-RTO: 0 /100 WBC (ref 0–0.2)
PH UR STRIP.AUTO: 5.5 [PH] (ref 5–8)
PLATELET # BLD AUTO: 303 K/UL (ref 164–446)
PMV BLD AUTO: 11.1 FL (ref 9–12.9)
POTASSIUM SERPL-SCNC: 4.3 MMOL/L (ref 3.6–5.5)
PROT SERPL-MCNC: 6.4 G/DL (ref 6–8.2)
PROT UR QL STRIP: NEGATIVE MG/DL
RBC # BLD AUTO: 4.78 M/UL (ref 4.2–5.4)
RBC # URNS HPF: NORMAL /HPF
RBC UR QL AUTO: NEGATIVE
SODIUM SERPL-SCNC: 130 MMOL/L (ref 135–145)
SP GR UR STRIP.AUTO: 1.01
TSH SERPL DL<=0.005 MIU/L-ACNC: 0.73 UIU/ML (ref 0.38–5.33)
UROBILINOGEN UR STRIP.AUTO-MCNC: 1 MG/DL
VIT B12 SERPL-MCNC: 527 PG/ML (ref 211–911)
WBC # BLD AUTO: 17.9 K/UL (ref 4.8–10.8)
WBC #/AREA URNS HPF: NORMAL /HPF

## 2023-08-28 PROCEDURE — 82140 ASSAY OF AMMONIA: CPT

## 2023-08-28 PROCEDURE — A9270 NON-COVERED ITEM OR SERVICE: HCPCS | Performed by: HOSPITALIST

## 2023-08-28 PROCEDURE — 87186 SC STD MICRODIL/AGAR DIL: CPT

## 2023-08-28 PROCEDURE — 700105 HCHG RX REV CODE 258: Performed by: HOSPITALIST

## 2023-08-28 PROCEDURE — 770006 HCHG ROOM/CARE - MED/SURG/GYN SEMI*

## 2023-08-28 PROCEDURE — 99223 1ST HOSP IP/OBS HIGH 75: CPT | Mod: AI | Performed by: HOSPITALIST

## 2023-08-28 PROCEDURE — 71045 X-RAY EXAM CHEST 1 VIEW: CPT

## 2023-08-28 PROCEDURE — 80053 COMPREHEN METABOLIC PANEL: CPT

## 2023-08-28 PROCEDURE — 82607 VITAMIN B-12: CPT

## 2023-08-28 PROCEDURE — 84443 ASSAY THYROID STIM HORMONE: CPT

## 2023-08-28 PROCEDURE — 99285 EMERGENCY DEPT VISIT HI MDM: CPT

## 2023-08-28 PROCEDURE — 96374 THER/PROPH/DIAG INJ IV PUSH: CPT

## 2023-08-28 PROCEDURE — 36415 COLL VENOUS BLD VENIPUNCTURE: CPT

## 2023-08-28 PROCEDURE — 87077 CULTURE AEROBIC IDENTIFY: CPT

## 2023-08-28 PROCEDURE — 96375 TX/PRO/DX INJ NEW DRUG ADDON: CPT

## 2023-08-28 PROCEDURE — 81001 URINALYSIS AUTO W/SCOPE: CPT

## 2023-08-28 PROCEDURE — 700102 HCHG RX REV CODE 250 W/ 637 OVERRIDE(OP): Performed by: HOSPITALIST

## 2023-08-28 PROCEDURE — 87086 URINE CULTURE/COLONY COUNT: CPT

## 2023-08-28 PROCEDURE — 85025 COMPLETE CBC W/AUTO DIFF WBC: CPT

## 2023-08-28 PROCEDURE — 83605 ASSAY OF LACTIC ACID: CPT

## 2023-08-28 PROCEDURE — 700111 HCHG RX REV CODE 636 W/ 250 OVERRIDE (IP): Mod: JZ,UD | Performed by: EMERGENCY MEDICINE

## 2023-08-28 PROCEDURE — 87040 BLOOD CULTURE FOR BACTERIA: CPT

## 2023-08-28 RX ORDER — ONDANSETRON 4 MG/1
4 TABLET, ORALLY DISINTEGRATING ORAL EVERY 4 HOURS PRN
Status: DISCONTINUED | OUTPATIENT
Start: 2023-08-28 | End: 2023-09-01 | Stop reason: HOSPADM

## 2023-08-28 RX ORDER — LORAZEPAM 2 MG/ML
1 INJECTION INTRAMUSCULAR ONCE
Status: COMPLETED | OUTPATIENT
Start: 2023-08-28 | End: 2023-08-28

## 2023-08-28 RX ORDER — LEVETIRACETAM 500 MG/1
250 TABLET ORAL 3 TIMES DAILY
Status: DISCONTINUED | OUTPATIENT
Start: 2023-08-28 | End: 2023-09-01 | Stop reason: HOSPADM

## 2023-08-28 RX ORDER — ESCITALOPRAM OXALATE 10 MG/1
10 TABLET ORAL DAILY
Status: DISCONTINUED | OUTPATIENT
Start: 2023-08-29 | End: 2023-09-01 | Stop reason: HOSPADM

## 2023-08-28 RX ORDER — ONDANSETRON 2 MG/ML
4 INJECTION INTRAMUSCULAR; INTRAVENOUS EVERY 4 HOURS PRN
Status: DISCONTINUED | OUTPATIENT
Start: 2023-08-28 | End: 2023-09-01 | Stop reason: HOSPADM

## 2023-08-28 RX ORDER — SODIUM CHLORIDE, SODIUM LACTATE, POTASSIUM CHLORIDE, CALCIUM CHLORIDE 600; 310; 30; 20 MG/100ML; MG/100ML; MG/100ML; MG/100ML
INJECTION, SOLUTION INTRAVENOUS CONTINUOUS
Status: DISCONTINUED | OUTPATIENT
Start: 2023-08-28 | End: 2023-08-30

## 2023-08-28 RX ORDER — CEFTRIAXONE 2 G/1
2000 INJECTION, POWDER, FOR SOLUTION INTRAMUSCULAR; INTRAVENOUS ONCE
Status: COMPLETED | OUTPATIENT
Start: 2023-08-28 | End: 2023-08-28

## 2023-08-28 RX ORDER — ACETAMINOPHEN 325 MG/1
650 TABLET ORAL EVERY 6 HOURS PRN
Status: DISCONTINUED | OUTPATIENT
Start: 2023-08-28 | End: 2023-09-01 | Stop reason: HOSPADM

## 2023-08-28 RX ORDER — DIVALPROEX SODIUM 250 MG/1
250 TABLET, EXTENDED RELEASE ORAL DAILY
Status: DISCONTINUED | OUTPATIENT
Start: 2023-08-29 | End: 2023-09-01 | Stop reason: HOSPADM

## 2023-08-28 RX ADMIN — CLOZAPINE 150 MG: 25 TABLET ORAL at 23:08

## 2023-08-28 RX ADMIN — CEFTRIAXONE SODIUM 2000 MG: 2 INJECTION, POWDER, FOR SOLUTION INTRAMUSCULAR; INTRAVENOUS at 17:49

## 2023-08-28 RX ADMIN — LORAZEPAM 1 MG: 2 INJECTION INTRAMUSCULAR; INTRAVENOUS at 17:15

## 2023-08-28 RX ADMIN — LEVETIRACETAM 250 MG: 500 TABLET, FILM COATED ORAL at 23:08

## 2023-08-28 RX ADMIN — SODIUM CHLORIDE, POTASSIUM CHLORIDE, SODIUM LACTATE AND CALCIUM CHLORIDE: 600; 310; 30; 20 INJECTION, SOLUTION INTRAVENOUS at 23:08

## 2023-08-28 ASSESSMENT — FIBROSIS 4 INDEX: FIB4 SCORE: 1.2

## 2023-08-28 NOTE — ED PROVIDER NOTES
"ED Provider Note    CHIEF COMPLAINT  Chief Complaint   Patient presents with    ALOC     X2 days, AOx0, EMS called by her home health nurse for increased confusion       LIMITATION TO HISTORY   Select: Patient is mumbling.  A alert and oriented to nothing at this time  Response to her name but cannot give most of the history    HPI    Tracie Rinaldi is a 73 y.o. female who recently was just discharged for COVID in which the report is below fatigue shortness of breath and improve possible superimposed bacterial infection now comes in with altered mental status.  Unknown etiology.  Patient to give a history.  I do get much history from the home in which she lives at.  Nurse states that she is agitated as well.  And moving around the bed.    OUTSIDE HISTORIAN(S):  Select: None.    EXTERNAL RECORDS REVIEWED    Select: Other   Admission Date  8/15/2023     CODE STATUS  Prior     HPI & HOSPITAL COUR   Tracie Rinaldi is a 73 y.o. female who presented 8/15/2023 with Lower extremities, weakness and hypoxia. When inquired when did your weakness start she states that \"its been years\" and wheel chair bound. She states that she was feeling lonely at group home and was not well taken care of. Patient seems poor historian to me and was not sure why is she in hospital and what is her medical problem although she was oriented to time, place and person. She denies chest pain, SOB but does cough with inspiration. She was asking food and most of the time incoherent answering questions.   ED course: pulse 75, RR 22, /68, O2 87%.   Labs were significant for Hyponatremia 129, mild hypocalcemia, lactic acid 0.6,  Chest X-ray remarkable for mild left basilar opacity could be scarring however cannot exclude atelectasis, small infiltrate or a small pleural effusion.  Chest CT: No evidence of PE, trace bilateral pleural effusions, trace pericardial effusion and  Emphysematous changes.  EKG showed T wave abnormalities.  Gradual " resolution of COVID-19 symptoms ongoing fatigue, shortness of breath, cough.  Patient symptoms gradually improving on dexamethasone but still complains of shortness of breath desaturating on 4 L oxygen. Repeat CXR remarkable for right middle lobe consolidation concerning superimposed bacterial pneumonia.  Pt was experiencing delirium episode and discharge was cancelled.  Over the days, patient was saturating good amount of oxygen on minimal oxygen support.      Therefore, she is discharged in good and stable condition to home with organized home healthcare and close outpatient follow-up-group home     The patient met 2-midnight criteria for an inpatient stay at the time of discharge.     Discharge Date  8/26/2023    DISCHARGE DIAGNOSES  Principal Problem:    Pneumonia due to COVID-19 virus (POA: Yes)  Active Problems:    Hypoxia (POA: Unknown)    Consolidation of middle lobe of lung (HCC) (POA: Unknown)    Hypothyroid (POA: Unknown)      Overview: Labs 4/20/2023 TSH 4.13, free T41.2.    Delirium superimposed on dementia (POA: Unknown)    Hyponatremia (POA: Unknown)      Overview: Labs 4/20/2023 sodium 133, serum OSM mildly decreased 273, urine OSM       normal 130.    Depression (POA: Unknown)  Resolved Problems:    * No resolved hospital problems. *    REVIEW OF SYSTEMS  Noted.  She cannot give a history.    PAST MEDICAL HISTORY  Past Medical History:   Diagnosis Date    Anxiety     Bladder spasm     Dental disorder     upper and lower dentures    Depression     Dermatitis     GERD (gastroesophageal reflux disease)     Hearing loss of right ear due to cerumen impaction     Hyperlipidemia     Hypothyroid     Other emphysema (HCC)     Pneumonia     2012    Psychiatric disorder     schizophrenia    Schizophrenia (HCC)     Seizure (HCC)     Stroke (HCC)     tia june 2015 no residual     Unspecified urinary incontinence     diapers     Vitamin D deficiency        FAMILY HISTORY  Family History   Problem Relation Age of  Onset    Alzheimer's Disease Mother     Heart Disease Father        SOCIAL HISTORY  Social History     Tobacco Use    Smoking status: Former     Current packs/day: 0.50     Average packs/day: 0.5 packs/day for 45.0 years (22.5 ttl pk-yrs)     Types: Cigarettes    Smokeless tobacco: Never    Tobacco comments:     unk   Vaping Use    Vaping Use: Never used   Substance Use Topics    Alcohol use: No     Comment: unk    Drug use: No     Comment: unk     Social History     Substance and Sexual Activity   Drug Use No    Comment: unk       SURGICAL HISTORY  Past Surgical History:   Procedure Laterality Date    IRRIGATION & DEBRIDEMENT ORTHO Left 10/17/2015    Procedure: IRRIGATION & DEBRIDEMENT ORTHO foot;  Surgeon: Jluis Becker M.D.;  Location: SURGERY [facility];  Service:     CLOSED REDUCTION Left 7/9/2015    Procedure: CLOSED REDUCTION -ATTEMPTED;  Surgeon: Jluis Becker M.D.;  Location: SURGERY [facility];  Service:     PIN INSERTION  7/9/2015    Procedure: PIN INSERTION FOOT;  Surgeon: Jluis Becker M.D.;  Location: SURGERY [facility];  Service:        CURRENT MEDICATIONS  No current facility-administered medications for this encounter.    Current Outpatient Medications:     Mirabegron ER (MYRBETRIQ) 50 MG TABLET SR 24 HR, Take  by mouth., Disp: , Rfl:     Zinc Oxide (DESITIN EX), Apply  topically., Disp: , Rfl:     mirtazapine (REMERON) 15 MG TABLET DISPERSIBLE, Take 7.5 mg by mouth at bedtime as needed (insomnia)., Disp: , Rfl:     Skin Protectants, Misc. (CALAZIME SKIN PROTECTANT) Paste, Apply 1 Application topically 2 times a day. Apply to red areas on buttocks, Disp: 113 g, Rfl: 3    Incontinence Supply Disposable (FQ PROTECTIVE UNDERWEAR) Misc, CHANGE 3 TIMES DAILY AS DIRECTED, Disp: 90 Each, Rfl: 10    levothyroxine (SYNTHROID) 125 MCG Tab, Take 1 Tablet by mouth every morning on an empty stomach., Disp: 30 Tablet, Rfl: 6    alendronate (FOSAMAX) 70 MG Tab, Take 1 Tablet by mouth every 7  days., Disp: 16 Tablet, Rfl: 3    divalproex ER (DEPAKOTE ER) 250 MG TABLET SR 24 HR, Take 1 Tablet by mouth every day., Disp: 90 Tablet, Rfl: 3    aspirin (ASPIRIN LOW DOSE) 81 MG EC tablet, Take 1 Tablet by mouth every day., Disp: 30 Tablet, Rfl: 11    simvastatin (ZOCOR) 40 MG Tab, TAKE 1 TABLET BY MOUTH AT BEDTIME FOR HLD (Patient taking differently: Take 40 mg by mouth every evening. TAKE 1 TABLET BY MOUTH AT BEDTIME FOR HLD), Disp: 90 Tablet, Rfl: 3    levETIRAcetam (KEPPRA) 250 MG tablet, Take 1 Tablet by mouth 3 times a day., Disp: 270 Tablet, Rfl: 3    mirtazapine (REMERON) 15 MG Tab, Take 0.5 Tablets by mouth every evening. Q hs and 1 dose 7.5 mg prn, Disp: 30 Tablet, Rfl: 5    Vitamin D, Cholecalciferol, (CHOLECALCIFEROL) 25 MCG (1000 UT) Tab, Take 1 Tablet by mouth every day., Disp: 60 Tablet, Rfl: 6    Umeclidinium Bromide (INCRUSE ELLIPTA) 62.5 MCG/ACT AEROSOL POWDER, BREATH ACTIVATED, INHALE 1 PUFF BY MOUTH ONCE DAILY (Patient taking differently: Inhale 1 Puff every day. INHALE 1 PUFF BY MOUTH ONCE DAILY), Disp: 30 Each, Rfl: 4    escitalopram (LEXAPRO) 10 MG Tab, Take 10 mg by mouth every day., Disp: , Rfl:     hydrophilic ointment (AQUAPHOR) Ointment, Apply 1 Each topically at bedtime. Apply to hands, Disp: , Rfl:     cloZAPine (CLOZARIL) 100 MG Tab, Take 1.5 Tablets by mouth 2 times a day., Disp: 30 Tablet, Rfl: 0    VENTOLIN  (90 Base) MCG/ACT Aero Soln inhalation aerosol, Inhale 2 Puffs every four hours as needed for Shortness of Breath., Disp: 8.5 g, Rfl: 2    ALLERGIES  No Known Allergies    PHYSICAL EXAM  VITAL SIGNS: /61   Pulse (!) 104   Temp 36.6 °C (97.8 °F) (Oral)   Resp 20   Wt 91.2 kg (201 lb)   SpO2 95%   BMI 32.44 kg/m²   Reviewed and tachycardic.  Constitutional: Well developed, Well nourished, altered.  Agitated.  HENT: Normocephalic, atraumatic, bilateral external ears normal, No intraoral erythema, edema, exudate  Eyes: PERRLA, conjunctiva pink, no scleral  icterus.   Cardiovascular: Regular rate and rhythm. No murmurs, rubs or gallops.  No dependent edema or calf tenderness  Respiratory: Lungs clear to auscultation bilaterally. No wheezes, rales, or rhonchi.  Abdominal:  Abdomen soft, non-tender, non distended. No rebound, or guarding.    Skin: Petechial rash on the sides of her legs and buttock area there appears to be weeping on the left buttock consistent with impetigo.  Genitourinary: No costovertebral angle tenderness.   Musculoskeletal: no deformities.   Neurologic: No facial droop noted moves all extremities.    Psychiatric: Agitated does not answer questions.                MEDICAL DECISION MAKING:  PROBLEMS EVALUATED THIS VISIT:  Altered mental status.  Ultimately status with a recent bacterial infection differential is large from head injury do infectious to metabolic among others.  At this point I suspect this is most likely infectious.  Patient moving all extremity my suspicion for head injury is low at this time.         PLAN:  Sepsis protocol initiated which includes lactic acid  CBC  Metabolic panel  Antibiotics  Fluids  Chest x-ray.  Patient received antibiotics immediately.  Urinalysis.  Urine culture  Lactate.    RISK:  Patient has altered mental status.  Elevated white cell count is noted.  I suspect the patient most likely is at high risk for serious morbidity mortality she appears to be altered and I have elevated white cell count.  Will consider covering with antibiotics look for source and then continue work finding testing if needed.    RESULTS      LABS Ordered and Reviewed by Me:  Results for orders placed or performed during the hospital encounter of 08/28/23   Lactic acid (lactate)   Result Value Ref Range    Lactic Acid 1.5 0.5 - 2.0 mmol/L   CBC With Differential   Result Value Ref Range    WBC 17.9 (H) 4.8 - 10.8 K/uL    RBC 4.78 4.20 - 5.40 M/uL    Hemoglobin 12.3 12.0 - 16.0 g/dL    Hematocrit 36.9 (L) 37.0 - 47.0 %    MCV 77.2 (L)  81.4 - 97.8 fL    MCH 25.7 (L) 27.0 - 33.0 pg    MCHC 33.3 32.2 - 35.5 g/dL    RDW 46.1 35.9 - 50.0 fL    Platelet Count 303 164 - 446 K/uL    MPV 11.1 9.0 - 12.9 fL    Neutrophils-Polys 83.20 (H) 44.00 - 72.00 %    Lymphocytes 6.70 (L) 22.00 - 41.00 %    Monocytes 8.70 0.00 - 13.40 %    Eosinophils 0.20 0.00 - 6.90 %    Basophils 0.20 0.00 - 1.80 %    Immature Granulocytes 1.00 (H) 0.00 - 0.90 %    Nucleated RBC 0.00 0.00 - 0.20 /100 WBC    Neutrophils (Absolute) 14.88 (H) 1.82 - 7.42 K/uL    Lymphs (Absolute) 1.20 1.00 - 4.80 K/uL    Monos (Absolute) 1.56 (H) 0.00 - 0.85 K/uL    Eos (Absolute) 0.04 0.00 - 0.51 K/uL    Baso (Absolute) 0.03 0.00 - 0.12 K/uL    Immature Granulocytes (abs) 0.18 (H) 0.00 - 0.11 K/uL    NRBC (Absolute) 0.00 K/uL             RADIOLOGY    I have independently interpreted the diagnostic imaging associated with this visit and am waiting the final reading from the radiologist.   My preliminary interpretation is a follows: No large infiltrates or effusions    Radiologist interpretation:   DX-CHEST-PORTABLE (1 VIEW)   Final Result         Ill-defined opacities in the bilateral mid lung could be scarring or infection.            ED COURSE:    ED Observation Status?  No.      INTERVENTIONS BY ME:  Medications   LORazepam (Ativan) injection 1 mg (1 mg Intravenous Given 8/28/23 1715)   cefTRIAXone (Rocephin) injection 2,000 mg (2,000 mg Intravenous Given 8/28/23 1749)       Response on recheck:  Patient is calmer after the Ativan..      I have discussed management of the patient with the following physicians and sources:   At this point approximately 624 call has been made out/page made out to the hospitalist regarding admission    FINAL DISPO PLAN   To be admitted to be upgraded care for altered mental status and fever.      CONDITION: Guarded..     FINAL IMPRESSION  1. Altered mental status, unspecified altered mental status type    2. Pneumonia of right middle lobe due to infectious organism     3. Impetigo

## 2023-08-28 NOTE — ED TRIAGE NOTES
BIB [facility] to rm 17 from her group home  Chief Complaint   Patient presents with    ALOC     X2 days, AOx0, EMS called by her home health nurse for increased confusion     Pt was able to follow simple commands, but is trying to get out of bed and is not easily redirected. Pupils pinpoint, no narcotics on her med list.   Discharged 2 days ago with a dx of covid pneumonia. IV started, blood sent to lab. Chart up for ERP.

## 2023-08-29 LAB
ALBUMIN SERPL BCP-MCNC: 2.9 G/DL (ref 3.2–4.9)
ALBUMIN/GLOB SERPL: 1 G/DL
ALP SERPL-CCNC: 82 U/L (ref 30–99)
ALT SERPL-CCNC: 67 U/L (ref 2–50)
ANION GAP SERPL CALC-SCNC: 10 MMOL/L (ref 7–16)
AST SERPL-CCNC: 52 U/L (ref 12–45)
BASOPHILS # BLD AUTO: 0.2 % (ref 0–1.8)
BASOPHILS # BLD: 0.03 K/UL (ref 0–0.12)
BILIRUB SERPL-MCNC: 0.5 MG/DL (ref 0.1–1.5)
BUN SERPL-MCNC: 16 MG/DL (ref 8–22)
CALCIUM ALBUM COR SERPL-MCNC: 9.7 MG/DL (ref 8.5–10.5)
CALCIUM SERPL-MCNC: 8.8 MG/DL (ref 8.5–10.5)
CHLORIDE SERPL-SCNC: 96 MMOL/L (ref 96–112)
CO2 SERPL-SCNC: 28 MMOL/L (ref 20–33)
CREAT SERPL-MCNC: 0.91 MG/DL (ref 0.5–1.4)
EOSINOPHIL # BLD AUTO: 0.1 K/UL (ref 0–0.51)
EOSINOPHIL NFR BLD: 0.8 % (ref 0–6.9)
ERYTHROCYTE [DISTWIDTH] IN BLOOD BY AUTOMATED COUNT: 47.9 FL (ref 35.9–50)
GFR SERPLBLD CREATININE-BSD FMLA CKD-EPI: 67 ML/MIN/1.73 M 2
GLOBULIN SER CALC-MCNC: 2.9 G/DL (ref 1.9–3.5)
GLUCOSE SERPL-MCNC: 121 MG/DL (ref 65–99)
HCT VFR BLD AUTO: 37.3 % (ref 37–47)
HGB BLD-MCNC: 12.2 G/DL (ref 12–16)
IMM GRANULOCYTES # BLD AUTO: 0.18 K/UL (ref 0–0.11)
IMM GRANULOCYTES NFR BLD AUTO: 1.4 % (ref 0–0.9)
LYMPHOCYTES # BLD AUTO: 1.21 K/UL (ref 1–4.8)
LYMPHOCYTES NFR BLD: 9.3 % (ref 22–41)
MCH RBC QN AUTO: 25.7 PG (ref 27–33)
MCHC RBC AUTO-ENTMCNC: 32.7 G/DL (ref 32.2–35.5)
MCV RBC AUTO: 78.7 FL (ref 81.4–97.8)
MONOCYTES # BLD AUTO: 1.17 K/UL (ref 0–0.85)
MONOCYTES NFR BLD AUTO: 9 % (ref 0–13.4)
NEUTROPHILS # BLD AUTO: 10.31 K/UL (ref 1.82–7.42)
NEUTROPHILS NFR BLD: 79.3 % (ref 44–72)
NRBC # BLD AUTO: 0 K/UL
NRBC BLD-RTO: 0 /100 WBC (ref 0–0.2)
PLATELET # BLD AUTO: 267 K/UL (ref 164–446)
PMV BLD AUTO: 10.5 FL (ref 9–12.9)
POTASSIUM SERPL-SCNC: 4.2 MMOL/L (ref 3.6–5.5)
PROCALCITONIN SERPL-MCNC: 0.12 NG/ML
PROT SERPL-MCNC: 5.8 G/DL (ref 6–8.2)
RBC # BLD AUTO: 4.74 M/UL (ref 4.2–5.4)
SODIUM SERPL-SCNC: 134 MMOL/L (ref 135–145)
WBC # BLD AUTO: 13 K/UL (ref 4.8–10.8)

## 2023-08-29 PROCEDURE — 99233 SBSQ HOSP IP/OBS HIGH 50: CPT | Performed by: INTERNAL MEDICINE

## 2023-08-29 PROCEDURE — 700102 HCHG RX REV CODE 250 W/ 637 OVERRIDE(OP): Performed by: HOSPITALIST

## 2023-08-29 PROCEDURE — 36415 COLL VENOUS BLD VENIPUNCTURE: CPT

## 2023-08-29 PROCEDURE — 700111 HCHG RX REV CODE 636 W/ 250 OVERRIDE (IP): Mod: JZ | Performed by: INTERNAL MEDICINE

## 2023-08-29 PROCEDURE — 84145 PROCALCITONIN (PCT): CPT

## 2023-08-29 PROCEDURE — 700111 HCHG RX REV CODE 636 W/ 250 OVERRIDE (IP): Mod: JZ

## 2023-08-29 PROCEDURE — 85025 COMPLETE CBC W/AUTO DIFF WBC: CPT

## 2023-08-29 PROCEDURE — 770006 HCHG ROOM/CARE - MED/SURG/GYN SEMI*

## 2023-08-29 PROCEDURE — A9270 NON-COVERED ITEM OR SERVICE: HCPCS | Performed by: HOSPITALIST

## 2023-08-29 PROCEDURE — 80053 COMPREHEN METABOLIC PANEL: CPT

## 2023-08-29 PROCEDURE — 700105 HCHG RX REV CODE 258: Performed by: INTERNAL MEDICINE

## 2023-08-29 RX ORDER — HALOPERIDOL 5 MG/ML
1 INJECTION INTRAMUSCULAR EVERY 4 HOURS PRN
Status: DISCONTINUED | OUTPATIENT
Start: 2023-08-29 | End: 2023-09-01 | Stop reason: HOSPADM

## 2023-08-29 RX ORDER — HALOPERIDOL 5 MG/ML
5 INJECTION INTRAMUSCULAR ONCE
Status: COMPLETED | OUTPATIENT
Start: 2023-08-29 | End: 2023-08-29

## 2023-08-29 RX ORDER — DIPHENHYDRAMINE HYDROCHLORIDE 50 MG/ML
25 INJECTION INTRAMUSCULAR; INTRAVENOUS EVERY 6 HOURS PRN
Status: DISCONTINUED | OUTPATIENT
Start: 2023-08-29 | End: 2023-09-01 | Stop reason: HOSPADM

## 2023-08-29 RX ADMIN — CLOZAPINE 150 MG: 25 TABLET ORAL at 16:54

## 2023-08-29 RX ADMIN — LEVETIRACETAM 250 MG: 500 TABLET, FILM COATED ORAL at 11:27

## 2023-08-29 RX ADMIN — DIPHENHYDRAMINE HYDROCHLORIDE 25 MG: 50 INJECTION, SOLUTION INTRAMUSCULAR; INTRAVENOUS at 23:45

## 2023-08-29 RX ADMIN — ESCITALOPRAM OXALATE 10 MG: 10 TABLET ORAL at 05:58

## 2023-08-29 RX ADMIN — HALOPERIDOL LACTATE 5 MG: 5 INJECTION, SOLUTION INTRAMUSCULAR at 01:45

## 2023-08-29 RX ADMIN — LEVETIRACETAM 250 MG: 500 TABLET, FILM COATED ORAL at 16:54

## 2023-08-29 RX ADMIN — AMPICILLIN AND SULBACTAM 3 G: 1; 2 INJECTION, POWDER, FOR SOLUTION INTRAMUSCULAR; INTRAVENOUS at 23:50

## 2023-08-29 RX ADMIN — LEVETIRACETAM 250 MG: 500 TABLET, FILM COATED ORAL at 05:59

## 2023-08-29 RX ADMIN — CLOZAPINE 150 MG: 25 TABLET ORAL at 05:58

## 2023-08-29 RX ADMIN — DIVALPROEX SODIUM 250 MG: 250 TABLET, EXTENDED RELEASE ORAL at 05:59

## 2023-08-29 RX ADMIN — LEVOTHYROXINE SODIUM 125 MCG: 75 TABLET ORAL at 05:58

## 2023-08-29 RX ADMIN — HALOPERIDOL LACTATE 1 MG: 5 INJECTION, SOLUTION INTRAMUSCULAR at 17:16

## 2023-08-29 RX ADMIN — HALOPERIDOL LACTATE 1 MG: 5 INJECTION, SOLUTION INTRAMUSCULAR at 09:45

## 2023-08-29 ASSESSMENT — COGNITIVE AND FUNCTIONAL STATUS - GENERAL
MOVING TO AND FROM BED TO CHAIR: A LITTLE
TURNING FROM BACK TO SIDE WHILE IN FLAT BAD: A LOT
PERSONAL GROOMING: A LOT
SUGGESTED CMS G CODE MODIFIER MOBILITY: CL
HELP NEEDED FOR BATHING: A LOT
EATING MEALS: A LOT
MOBILITY SCORE: 13
DAILY ACTIVITIY SCORE: 12
DRESSING REGULAR LOWER BODY CLOTHING: A LOT
DRESSING REGULAR UPPER BODY CLOTHING: A LOT
STANDING UP FROM CHAIR USING ARMS: A LOT
WALKING IN HOSPITAL ROOM: A LOT
SUGGESTED CMS G CODE MODIFIER DAILY ACTIVITY: CL
CLIMB 3 TO 5 STEPS WITH RAILING: A LOT
MOVING FROM LYING ON BACK TO SITTING ON SIDE OF FLAT BED: A LOT
TOILETING: A LOT

## 2023-08-29 ASSESSMENT — PATIENT HEALTH QUESTIONNAIRE - PHQ9
SUM OF ALL RESPONSES TO PHQ9 QUESTIONS 1 AND 2: 0
1. LITTLE INTEREST OR PLEASURE IN DOING THINGS: NOT AT ALL
2. FEELING DOWN, DEPRESSED, IRRITABLE, OR HOPELESS: NOT AT ALL

## 2023-08-29 ASSESSMENT — PAIN DESCRIPTION - PAIN TYPE: TYPE: ACUTE PAIN

## 2023-08-29 NOTE — ASSESSMENT & PLAN NOTE
Unknown etiology  Continue unasyn for cellulitis from buttock scratches, prn benadryl   Possible polypharmacy since she is on seizure medications and schizophrenia medications  IVF   MRI brain pending     Improving

## 2023-08-29 NOTE — ED NOTES
Pt is extremely agitated and does not follow commands or participate in triage questions. No obvious unilateral deficit. Pt placed on cardiac monitor, pulse ox, and automatic BP. Saturating above 90% on 4L NC, ST in the 100s. Call light within reach. Bed alarm on.

## 2023-08-29 NOTE — ED NOTES
Med rec updated and complete. Allergies reviewed. Per MARS  from [address].      Home pharmacy    [facility] = [phone]

## 2023-08-29 NOTE — PROGRESS NOTES
Hospital Medicine Daily Progress Note    Date of Service  8/29/2023    Chief Complaint  Tracie Rinaldi is a 73 y.o. female admitted 8/28/2023 with confusion.     Hospital Course  73 y.o. female who presented 8/28/2023 with past medical history of COVID-19 9 days ago, lower extremity weakness on wheelchair, schizophrenia, hypothyroidism, seizures, dementia who comes into the hospital for altered mental status for the past 2 days.  Patient is unable to answer my questions.  She was recently discharged in the hospital for having a bacterial pneumonia and COVID.  She was discharged to go to her group home on 4 L of oxygen.      Chest x-ray interpreted by me found no acute pulmonary process  UA was negative for infection    Interval Problem Update  WBC 13, check procal. UA negative, urine culture with enterococcus. Changed rocephin to augmentin. Sodium improving 134. Patient still very confused and agitated requiring IV haldol.     I have discussed this patient's plan of care and discharge plan at IDT rounds today with Case Management, Nursing, Nursing leadership, and other members of the IDT team.    Consultants/Specialty  N/A    Code Status  DNAR/DNI    Disposition  The patient is not medically cleared for discharge to home or a post-acute facility.  Anticipate discharge to: home with close outpatient follow-up    I have placed the appropriate orders for post-discharge needs.    Review of Systems  Review of Systems   Unable to perform ROS: Medical condition        Physical Exam  Temp:  [36.1 °C (97 °F)-37.2 °C (98.9 °F)] 36.1 °C (97 °F)  Pulse:  [] 85  Resp:  [17-28] 17  BP: (103-128)/(51-62) 118/62  SpO2:  [88 %-98 %] 93 %    Physical Exam  Vitals and nursing note reviewed.   Constitutional:       General: She is not in acute distress.     Appearance: She is ill-appearing.      Comments: Lethargic, received haldol prior to my evaluation   HENT:      Head: Normocephalic and atraumatic.   Cardiovascular:       Rate and Rhythm: Normal rate and regular rhythm.      Pulses: Normal pulses.      Heart sounds: Normal heart sounds.   Pulmonary:      Effort: Pulmonary effort is normal. No respiratory distress.      Breath sounds: Normal breath sounds. No wheezing.   Abdominal:      General: Abdomen is flat. There is no distension.      Palpations: Abdomen is soft.      Tenderness: There is no abdominal tenderness.   Musculoskeletal:         General: Normal range of motion.      Cervical back: Neck supple.      Right lower leg: No edema.      Left lower leg: No edema.   Skin:     General: Skin is warm and dry.      Findings: Lesion (Appears to have scratch marks across her left buttock with scabs) present.   Neurological:      Mental Status: She is disoriented.      Comments: Sedated          Fluids    Intake/Output Summary (Last 24 hours) at 8/29/2023 1456  Last data filed at 8/29/2023 0952  Gross per 24 hour   Intake 0 ml   Output --   Net 0 ml       Laboratory  Recent Labs     08/28/23  1630 08/29/23  0537   WBC 17.9* 13.0*   RBC 4.78 4.74   HEMOGLOBIN 12.3 12.2   HEMATOCRIT 36.9* 37.3   MCV 77.2* 78.7*   MCH 25.7* 25.7*   MCHC 33.3 32.7   RDW 46.1 47.9   PLATELETCT 303 267   MPV 11.1 10.5     Recent Labs     08/28/23  1630 08/29/23  0537   SODIUM 130* 134*   POTASSIUM 4.3 4.2   CHLORIDE 93* 96   CO2 23 28   GLUCOSE 146* 121*   BUN 20 16   CREATININE 1.18 0.91   CALCIUM 9.3 8.8                   Imaging  DX-CHEST-PORTABLE (1 VIEW)   Final Result         Ill-defined opacities in the bilateral mid lung could be scarring or infection.           Assessment/Plan  * Acute metabolic encephalopathy- (present on admission)  Assessment & Plan  Unknown etiology  Continue augmentin for possible cellulitis from buttock scratches, prn benadryl   Possible polypharmacy since she is on seizure medications and schizophrenia medications  IVF   Will consider MRI brain if does not improve    Leukocytosis  Assessment & Plan  Check  procalcitonin  Patient was on steroids    Chronic respiratory failure with hypoxia (HCC)  Assessment & Plan  On 4 L O2 which is her baseline    Sacral wound  Assessment & Plan  Many scratch marks with surrounding erythema  Change to augmentin     COPD mixed type (HCC)- (present on admission)  Assessment & Plan  Not in exacerbation  Continue home inhalers    Schizophrenia (HCC)- (present on admission)  Assessment & Plan  On Depakote and clozapine      Seizure (HCC)- (present on admission)  Assessment & Plan  Continue Keppra    Hypothyroid- (present on admission)  Assessment & Plan  Continue home Synthroid  TSH- wnl          VTE prophylaxis: SCDs    I have performed a physical exam and reviewed and updated ROS and Plan today (8/29/2023). In review of yesterday's note (8/28/2023), there are no changes except as documented above.

## 2023-08-29 NOTE — CARE PLAN
Tracie is oriented to self only, not answering questions. Intermittently compliant with care. Q2 turns performed. Incontinent care performed. PRN haldol given 2x for anxiety/agitation. Skin cleansed. Ate minimal PO intake. Able to take pills whole with water. Vitals stable, hourly rounding performed.    The patient is Watcher - Medium risk of patient condition declining or worsening    Shift Goals  Clinical Goals: reorient  Patient Goals: comfort and rest  Family Goals: JORDI      Problem: Knowledge Deficit - Standard  Goal: Patient and family/care givers will demonstrate understanding of plan of care, disease process/condition, diagnostic tests and medications  Outcome: Progressing     Problem: Skin Integrity  Goal: Skin integrity is maintained or improved  Outcome: Progressing     Problem: Fall Risk  Goal: Patient will remain free from falls  Outcome: Progressing

## 2023-08-29 NOTE — THERAPY
08/29/23 1553   Initial Contact Note    Initial Contact Note Order Received and Verified, Physical Therapy Evaluation in Progress with Full Report to Follow.   Interdisciplinary Plan of Care Collaboration   IDT Collaboration with  Nursing   Patient Position at End of Therapy In Bed   Collaboration Comments eval attempted , pt inbed without clothes , confused and unable to follow directions for eval. Ovi reattempt as a later time.   Session Information   Date / Session Number  8/29/23 attempted pt too confused ( EVAL)

## 2023-08-29 NOTE — H&P
Hospital Medicine History & Physical Note    Date of Service  8/28/2023    Primary Care Physician  DI Torres.    Consultants      Specialist Names:     Code Status  DNAR/DNI    Chief Complaint  Chief Complaint   Patient presents with    ALOC     X2 days, AOx0, EMS called by her home health nurse for increased confusion       History of Presenting Illness  Tracie Rinaldi is a 73 y.o. female who presented 8/28/2023 with past medical history of COVID-19 9 days ago, lower extremity weakness on wheelchair, schizophrenia, hypothyroidism, seizures, dementia who comes into the hospital for altered mental status for the past 2 days.  Patient is unable to answer my questions.  She was recently discharged in the hospital for having a bacterial pneumonia and COVID.  She was discharged to go to her group home on 4 L of oxygen.     Chest x-ray interpreted by me found no acute pulmonary process  UA was negative for infection      I discussed the plan of care with patient.    Review of Systems  Review of Systems   Unable to perform ROS: Acuity of condition       Past Medical History   has a past medical history of Anxiety, Bladder spasm, Dental disorder, Depression, Dermatitis, GERD (gastroesophageal reflux disease), Hearing loss of right ear due to cerumen impaction, Hyperlipidemia, Hypothyroid, Other emphysema (HCC), Pneumonia, Psychiatric disorder, Schizophrenia (HCC), Seizure (HCC), Stroke (HCC), Unspecified urinary incontinence, and Vitamin D deficiency.    Surgical History   has a past surgical history that includes closed reduction (Left, 7/9/2015); pin insertion (7/9/2015); and irrigation & debridement ortho (Left, 10/17/2015).     Family History  family history includes Alzheimer's Disease in her mother; Heart Disease in her father.   Family history reviewed with patient. There is no family history that is pertinent to the chief complaint.     Social History   reports that she has quit smoking. Her smoking  use included cigarettes. She has a 22.5 pack-year smoking history. She has never used smokeless tobacco. She reports that she does not drink alcohol and does not use drugs.    Allergies  No Known Allergies    Medications  Prior to Admission Medications   Prescriptions Last Dose Informant Patient Reported? Taking?   Incontinence Supply Disposable (FQ PROTECTIVE UNDERWEAR) Misc   No No   Sig: CHANGE 3 TIMES DAILY AS DIRECTED   Mirabegron ER (MYRBETRIQ) 50 MG TABLET SR 24 HR   Yes No   Sig: Take  by mouth.   Skin Protectants, Misc. (CALAZIME SKIN PROTECTANT) Paste   No No   Sig: Apply 1 Application topically 2 times a day. Apply to red areas on buttocks   Umeclidinium Bromide (INCRUSE ELLIPTA) 62.5 MCG/ACT AEROSOL POWDER, BREATH ACTIVATED  MAR from Other Facility No No   Sig: INHALE 1 PUFF BY MOUTH ONCE DAILY   Patient taking differently: Inhale 1 Puff every day. INHALE 1 PUFF BY MOUTH ONCE DAILY   VENTOLIN  (90 Base) MCG/ACT Aero Soln inhalation aerosol  MAR from Other Facility No No   Sig: Inhale 2 Puffs every four hours as needed for Shortness of Breath.   Vitamin D, Cholecalciferol, (CHOLECALCIFEROL) 25 MCG (1000 UT) Tab  MAR from Other Facility No No   Sig: Take 1 Tablet by mouth every day.   Zinc Oxide (DESITIN EX)   Yes No   Sig: Apply  topically.   alendronate (FOSAMAX) 70 MG Tab  MAR from Other Facility No No   Sig: Take 1 Tablet by mouth every 7 days.   aspirin (ASPIRIN LOW DOSE) 81 MG EC tablet  MAR from Other Facility No No   Sig: Take 1 Tablet by mouth every day.   cloZAPine (CLOZARIL) 100 MG Tab  MAR from Other Facility No No   Sig: Take 1.5 Tablets by mouth 2 times a day.   divalproex ER (DEPAKOTE ER) 250 MG TABLET SR 24 HR  MAR from Other Facility No No   Sig: Take 1 Tablet by mouth every day.   escitalopram (LEXAPRO) 10 MG Tab  MAR from Other Facility Yes No   Sig: Take 10 mg by mouth every day.   hydrophilic ointment (AQUAPHOR) Ointment  MAR from Other Facility Yes No   Sig: Apply 1 Each  topically at bedtime. Apply to hands   levETIRAcetam (KEPPRA) 250 MG tablet  MAR from Other Facility No No   Sig: Take 1 Tablet by mouth 3 times a day.   levothyroxine (SYNTHROID) 125 MCG Tab  MAR from Other Facility No No   Sig: Take 1 Tablet by mouth every morning on an empty stomach.   mirtazapine (REMERON) 15 MG TABLET DISPERSIBLE   Yes No   Sig: Take 7.5 mg by mouth at bedtime as needed (insomnia).   mirtazapine (REMERON) 15 MG Tab  MAR from Other Facility No No   Sig: Take 0.5 Tablets by mouth every evening. Q hs and 1 dose 7.5 mg prn   simvastatin (ZOCOR) 40 MG Tab  MAR from Other Facility No No   Sig: TAKE 1 TABLET BY MOUTH AT BEDTIME FOR HLD   Patient taking differently: Take 40 mg by mouth every evening. TAKE 1 TABLET BY MOUTH AT BEDTIME FOR HLD      Facility-Administered Medications: None       Physical Exam  Temp:  [36.4 °C (97.5 °F)-36.6 °C (97.8 °F)] 36.6 °C (97.8 °F)  Pulse:  [104] 104  Resp:  [20-28] 20  BP: (110-128)/(60-61) 128/61  SpO2:  [88 %-95 %] 95 %  Blood Pressure : 128/61   Temperature: 36.6 °C (97.8 °F)   Pulse: (!) 104   Respiration: 20   Pulse Oximetry: 95 %       Physical Exam  Vitals and nursing note reviewed.   Constitutional:       General: She is not in acute distress.     Appearance: Normal appearance. She is not ill-appearing, toxic-appearing or diaphoretic.   HENT:      Head: Normocephalic and atraumatic.      Nose: No congestion or rhinorrhea.      Mouth/Throat:      Pharynx: No oropharyngeal exudate or posterior oropharyngeal erythema.   Eyes:      General: No scleral icterus.  Neck:      Vascular: No carotid bruit or JVD.   Cardiovascular:      Rate and Rhythm: Normal rate and regular rhythm.      Pulses: Normal pulses.      Heart sounds: Normal heart sounds. No murmur heard.     No friction rub. No gallop.   Pulmonary:      Effort: Pulmonary effort is normal. No respiratory distress.      Breath sounds: No stridor. No wheezing, rhonchi or rales.   Abdominal:      General:  "Abdomen is flat. There is no distension.      Palpations: There is no mass.      Tenderness: There is no abdominal tenderness. There is no left CVA tenderness, guarding or rebound.      Hernia: No hernia is present.   Musculoskeletal:         General: No swelling. Normal range of motion.      Cervical back: No rigidity. No muscular tenderness.      Right lower leg: No edema.      Left lower leg: No edema.   Lymphadenopathy:      Cervical: No cervical adenopathy.   Skin:     General: Skin is warm and dry.      Capillary Refill: Capillary refill takes 2 to 3 seconds.      Coloration: Skin is not jaundiced or pale.      Findings: No bruising or erythema.   Neurological:      Mental Status: She is disoriented.         Laboratory:  Recent Labs     08/28/23  1630   WBC 17.9*   RBC 4.78   HEMOGLOBIN 12.3   HEMATOCRIT 36.9*   MCV 77.2*   MCH 25.7*   MCHC 33.3   RDW 46.1   PLATELETCT 303   MPV 11.1     Recent Labs     08/28/23  1630   SODIUM 130*   POTASSIUM 4.3   CHLORIDE 93*   CO2 23   GLUCOSE 146*   BUN 20   CREATININE 1.18   CALCIUM 9.3     Recent Labs     08/28/23  1630   ALTSGPT 64*   ASTSGOT 53*   ALKPHOSPHAT 90   TBILIRUBIN 0.9   GLUCOSE 146*         No results for input(s): \"NTPROBNP\" in the last 72 hours.      No results for input(s): \"TROPONINT\" in the last 72 hours.    Imaging:  DX-CHEST-PORTABLE (1 VIEW)   Final Result         Ill-defined opacities in the bilateral mid lung could be scarring or infection.          X-Ray:  I have personally reviewed the images and compared with prior images.  EKG:  I have personally reviewed the images and compared with prior images.  no X-Ray or EKG requiring interpretation    Assessment/Plan:  Justification for Admission Status  I anticipate this patient will require at least two midnights for appropriate medical management, necessitating inpatient admission because of altered mental status    Patient will need a Med/Surg bed on MEDICAL service .  The need is secondary to " altered mental status.    * Acute metabolic encephalopathy- (present on admission)  Assessment & Plan  Unknown etiology  Possible infection of her sacral wound  Check TSH, B12  Ammonia-possibly elevated from Depakote  Possible polypharmacy since she is on seizure medications and schizophrenia medications    Leukocytosis  Assessment & Plan  Check procalcitonin  Patient was on steroids    Chronic respiratory failure with hypoxia (HCC)  Assessment & Plan  On 4 L O2 which is her baseline    Sacral wound  Assessment & Plan  Start IV ceftriaxone for now      COPD mixed type (HCC)- (present on admission)  Assessment & Plan  Not in exacerbation  Continue home inhalers    Schizophrenia (HCC)- (present on admission)  Assessment & Plan  On Depakote and clozapine      Seizure (HCC)- (present on admission)  Assessment & Plan  Continue Keppra    Hypothyroid- (present on admission)  Assessment & Plan  Continue home Synthroid  Check TSH        VTE prophylaxis: SCDs/TEDs

## 2023-08-29 NOTE — ED NOTES
Pt now more calm and awake in bed, breathing with even, unlabored respirations on RA. Bed alarm remains on.

## 2023-08-29 NOTE — CARE PLAN
The patient is Watcher - Medium risk of patient condition declining or worsening    Shift Goals  Clinical Goals: reorient  Patient Goals: comfort and rest  Family Goals: JORDI    Patient is only oriented to self and place, she is very restless and not great at communicating at this time. Bed alarm on, ivf infusing and patient frequently checked on      Problem: Knowledge Deficit - Standard  Goal: Patient and family/care givers will demonstrate understanding of plan of care, disease process/condition, diagnostic tests and medications  Outcome: Not Met

## 2023-08-29 NOTE — ASSESSMENT & PLAN NOTE
Immediate Brief Procedure Note for Pain management    Patient: Mirna Ramirez  MRN: [id number]    Procedure(s):  Facet Block Lumbar/Sacral  right  L3-4, L4-5 and L5-S1    Pre-Operative Diagnosis:  M47.816 Lumbar spondylosis  (primary encounter diagnosis)  M47.817 Lumbosacral spondylosis without myelopathy  M54.5, G89.29 Chronic right-sided low back pain without sciatica    Post-operative Diagnosis:  M47.816 Lumbar spondylosis  (primary encounter diagnosis)  M47.817 Lumbosacral spondylosis without myelopathy  M54.5, G89.29 Chronic right-sided low back pain without sciatica    Procedural Physician: Umberto Spear MD    Assistant: none    Sedation: None    Findings: same    Specimen(s): none    Estimated Blood Loss :  None    Complications: None   Resolved   procal negative

## 2023-08-29 NOTE — ED NOTES
Phone report to MINAL Dove. All questions answered. Pt transported off unit with transport tech. Pt awake and breahting with even, unlabored respirations on 4L NC at time of transfer.

## 2023-08-30 LAB
ANION GAP SERPL CALC-SCNC: 13 MMOL/L (ref 7–16)
BUN SERPL-MCNC: 9 MG/DL (ref 8–22)
CALCIUM SERPL-MCNC: 8.4 MG/DL (ref 8.5–10.5)
CHLORIDE SERPL-SCNC: 97 MMOL/L (ref 96–112)
CO2 SERPL-SCNC: 26 MMOL/L (ref 20–33)
CREAT SERPL-MCNC: 0.68 MG/DL (ref 0.5–1.4)
ERYTHROCYTE [DISTWIDTH] IN BLOOD BY AUTOMATED COUNT: 48.8 FL (ref 35.9–50)
GFR SERPLBLD CREATININE-BSD FMLA CKD-EPI: 93 ML/MIN/1.73 M 2
GLUCOSE SERPL-MCNC: 98 MG/DL (ref 65–99)
HCT VFR BLD AUTO: 37.3 % (ref 37–47)
HGB BLD-MCNC: 11.9 G/DL (ref 12–16)
MCH RBC QN AUTO: 25.2 PG (ref 27–33)
MCHC RBC AUTO-ENTMCNC: 31.9 G/DL (ref 32.2–35.5)
MCV RBC AUTO: 79 FL (ref 81.4–97.8)
PLATELET # BLD AUTO: 309 K/UL (ref 164–446)
PMV BLD AUTO: 10.9 FL (ref 9–12.9)
POTASSIUM SERPL-SCNC: 3.7 MMOL/L (ref 3.6–5.5)
RBC # BLD AUTO: 4.72 M/UL (ref 4.2–5.4)
SODIUM SERPL-SCNC: 136 MMOL/L (ref 135–145)
WBC # BLD AUTO: 9.7 K/UL (ref 4.8–10.8)

## 2023-08-30 PROCEDURE — 700105 HCHG RX REV CODE 258: Performed by: INTERNAL MEDICINE

## 2023-08-30 PROCEDURE — 770006 HCHG ROOM/CARE - MED/SURG/GYN SEMI*

## 2023-08-30 PROCEDURE — 36415 COLL VENOUS BLD VENIPUNCTURE: CPT

## 2023-08-30 PROCEDURE — 99232 SBSQ HOSP IP/OBS MODERATE 35: CPT | Performed by: INTERNAL MEDICINE

## 2023-08-30 PROCEDURE — 80048 BASIC METABOLIC PNL TOTAL CA: CPT

## 2023-08-30 PROCEDURE — 85027 COMPLETE CBC AUTOMATED: CPT

## 2023-08-30 PROCEDURE — 700111 HCHG RX REV CODE 636 W/ 250 OVERRIDE (IP): Mod: JZ | Performed by: INTERNAL MEDICINE

## 2023-08-30 PROCEDURE — 700111 HCHG RX REV CODE 636 W/ 250 OVERRIDE (IP): Mod: JZ | Performed by: HOSPITALIST

## 2023-08-30 PROCEDURE — A9270 NON-COVERED ITEM OR SERVICE: HCPCS | Performed by: HOSPITALIST

## 2023-08-30 PROCEDURE — 700102 HCHG RX REV CODE 250 W/ 637 OVERRIDE(OP): Performed by: HOSPITALIST

## 2023-08-30 PROCEDURE — 700105 HCHG RX REV CODE 258: Performed by: HOSPITALIST

## 2023-08-30 RX ADMIN — CLOZAPINE 150 MG: 25 TABLET ORAL at 06:54

## 2023-08-30 RX ADMIN — ESCITALOPRAM OXALATE 10 MG: 10 TABLET ORAL at 06:52

## 2023-08-30 RX ADMIN — AMPICILLIN AND SULBACTAM 3 G: 1; 2 INJECTION, POWDER, FOR SOLUTION INTRAMUSCULAR; INTRAVENOUS at 14:49

## 2023-08-30 RX ADMIN — LEVOTHYROXINE SODIUM 125 MCG: 75 TABLET ORAL at 06:49

## 2023-08-30 RX ADMIN — LEVETIRACETAM 250 MG: 500 TABLET, FILM COATED ORAL at 12:17

## 2023-08-30 RX ADMIN — CLOZAPINE 150 MG: 25 TABLET ORAL at 17:53

## 2023-08-30 RX ADMIN — AMPICILLIN AND SULBACTAM 3 G: 1; 2 INJECTION, POWDER, FOR SOLUTION INTRAMUSCULAR; INTRAVENOUS at 21:07

## 2023-08-30 RX ADMIN — LEVETIRACETAM 250 MG: 500 TABLET, FILM COATED ORAL at 17:52

## 2023-08-30 RX ADMIN — LEVETIRACETAM 250 MG: 500 TABLET, FILM COATED ORAL at 06:49

## 2023-08-30 RX ADMIN — DIVALPROEX SODIUM 250 MG: 250 TABLET, EXTENDED RELEASE ORAL at 06:51

## 2023-08-30 RX ADMIN — ONDANSETRON 4 MG: 2 INJECTION INTRAMUSCULAR; INTRAVENOUS at 12:17

## 2023-08-30 RX ADMIN — AMPICILLIN AND SULBACTAM 3 G: 1; 2 INJECTION, POWDER, FOR SOLUTION INTRAMUSCULAR; INTRAVENOUS at 08:59

## 2023-08-30 RX ADMIN — SODIUM CHLORIDE, POTASSIUM CHLORIDE, SODIUM LACTATE AND CALCIUM CHLORIDE: 600; 310; 30; 20 INJECTION, SOLUTION INTRAVENOUS at 00:49

## 2023-08-30 ASSESSMENT — ENCOUNTER SYMPTOMS: NAUSEA: 1

## 2023-08-30 ASSESSMENT — PATIENT HEALTH QUESTIONNAIRE - PHQ9
2. FEELING DOWN, DEPRESSED, IRRITABLE, OR HOPELESS: NOT AT ALL
1. LITTLE INTEREST OR PLEASURE IN DOING THINGS: NOT AT ALL
SUM OF ALL RESPONSES TO PHQ9 QUESTIONS 1 AND 2: 0

## 2023-08-30 ASSESSMENT — PAIN SCALES - PAIN ASSESSMENT IN ADVANCED DEMENTIA (PAINAD)
FACIALEXPRESSION: SAD, FRIGHTENED, FROWN
CONSOLABILITY: DISTRACTED OR REASSURED BY VOICE/TOUCH
BREATHING: NORMAL
TOTALSCORE: 3
BODYLANGUAGE: TENSE, DISTRESSED PACING, FIDGETING

## 2023-08-30 ASSESSMENT — PAIN DESCRIPTION - PAIN TYPE: TYPE: ACUTE PAIN

## 2023-08-30 NOTE — CARE PLAN
The patient is Stable - Low risk of patient condition declining or worsening    Shift Goals  Clinical Goals: Maintain a safe environment and reorient patient  Patient Goals: Rest and comfort  Family Goals: JORDI    Progress made toward(s) clinical / shift goals:  on going  Patient is not progressing towards the following goals:      Problem: Knowledge Deficit - Standard  Goal: Patient and family/care givers will demonstrate understanding of plan of care, disease process/condition, diagnostic tests and medications  Outcome: Not Progressing     Problem: Skin Integrity  Goal: Skin integrity is maintained or improved  Outcome: Not Progressing     Problem: Fall Risk  Goal: Patient will remain free from falls  Outcome: Not Progressing safe environment will be maintained during this shift

## 2023-08-30 NOTE — PROGRESS NOTES
Patient remains restless and agitated non compliant with plan of care safe environment maintained .Patient pulled out newly inserted IV   in left hand IVF unable to continue with ivf infusion and iv abX at this time until  new iv access is placed .Endorsed to AM RN

## 2023-08-30 NOTE — PROGRESS NOTES
Hospital Medicine Daily Progress Note    Date of Service  8/30/2023    Chief Complaint  Tracie Rinaldi is a 73 y.o. female admitted 8/28/2023 with confusion.     Hospital Course  73 y.o. female who presented 8/28/2023 with past medical history of COVID-19 9 days ago, lower extremity weakness on wheelchair, schizophrenia, hypothyroidism, seizures, dementia who comes into the hospital for altered mental status for the past 2 days.  Patient is unable to answer my questions.  She was recently discharged in the hospital for having a bacterial pneumonia and COVID.  She was discharged to go to her group home on 4 L of oxygen. Chest x-ray showed no acute pulmonary process. UA was negative for infection    Interval Problem Update  8/29 WBC 13, check procal. UA negative, urine culture with enterococcus. Changed rocephin to augmentin. Sodium improving 134. Patient still very confused and agitated requiring IV haldol.     8/30 Vitals stable on room air. Leukocytosis resolved, WBC 9.7. Procalcitonin negative. Blood cultures ntd. Pending susceptibilities on enterococcus. Patient continues to be encephalopathic A&Ox1. Complaining of nausea this morning. MRI brain pending.    I have discussed this patient's plan of care and discharge plan at IDT rounds today with Case Management, Nursing, Nursing leadership, and other members of the IDT team.    Consultants/Specialty  N/A    Code Status  DNAR/DNI    Disposition  The patient is not medically cleared for discharge to home or a post-acute facility.      I have placed the appropriate orders for post-discharge needs.    Review of Systems  Review of Systems   Unable to perform ROS: Medical condition   Constitutional:  Positive for malaise/fatigue.   Gastrointestinal:  Positive for nausea.        Physical Exam  Temp:  [36.2 °C (97.1 °F)-36.4 °C (97.6 °F)] 36.3 °C (97.4 °F)  Pulse:  [89-94] 90  Resp:  [16-18] 16  BP: (114-131)/(55-69) 131/66  SpO2:  [91 %-95 %] 95 %    Physical  Exam  Vitals and nursing note reviewed.   Constitutional:       General: She is not in acute distress.     Appearance: She is ill-appearing.   HENT:      Head: Normocephalic and atraumatic.   Eyes:      Conjunctiva/sclera: Conjunctivae normal.   Cardiovascular:      Rate and Rhythm: Normal rate and regular rhythm.      Pulses: Normal pulses.      Heart sounds: Normal heart sounds.   Pulmonary:      Effort: Pulmonary effort is normal. No respiratory distress.      Breath sounds: Normal breath sounds. No wheezing.   Abdominal:      General: Abdomen is flat. There is no distension.      Palpations: Abdomen is soft.      Tenderness: There is no abdominal tenderness.   Musculoskeletal:         General: Normal range of motion.      Cervical back: Normal range of motion and neck supple.      Right lower leg: No edema.      Left lower leg: No edema.   Skin:     General: Skin is warm and dry.      Findings: Lesion (Appears to have scratch marks across her left buttock with scabs) present.   Neurological:      General: No focal deficit present.      Mental Status: She is alert. She is disoriented.      Comments: Moving all extremities  Follows some commands         Fluids    Intake/Output Summary (Last 24 hours) at 8/30/2023 1443  Last data filed at 8/29/2023 1500  Gross per 24 hour   Intake 300 ml   Output --   Net 300 ml       Laboratory  Recent Labs     08/28/23  1630 08/29/23  0537 08/30/23  0453   WBC 17.9* 13.0* 9.7   RBC 4.78 4.74 4.72   HEMOGLOBIN 12.3 12.2 11.9*   HEMATOCRIT 36.9* 37.3 37.3   MCV 77.2* 78.7* 79.0*   MCH 25.7* 25.7* 25.2*   MCHC 33.3 32.7 31.9*   RDW 46.1 47.9 48.8   PLATELETCT 303 267 309   MPV 11.1 10.5 10.9     Recent Labs     08/28/23  1630 08/29/23  0537 08/30/23  0453   SODIUM 130* 134* 136   POTASSIUM 4.3 4.2 3.7   CHLORIDE 93* 96 97   CO2 23 28 26   GLUCOSE 146* 121* 98   BUN 20 16 9   CREATININE 1.18 0.91 0.68   CALCIUM 9.3 8.8 8.4*                   Imaging  DX-CHEST-PORTABLE (1 VIEW)    Final Result         Ill-defined opacities in the bilateral mid lung could be scarring or infection.      MR-BRAIN-W/O    (Results Pending)        Assessment/Plan  * Acute metabolic encephalopathy- (present on admission)  Assessment & Plan  Unknown etiology  Continue augmentin for possible cellulitis from buttock scratches, prn benadryl   Possible polypharmacy since she is on seizure medications and schizophrenia medications  IVF   MRI brain pending     Leukocytosis  Assessment & Plan  Resolved   procal negative    Chronic respiratory failure with hypoxia (HCC)  Assessment & Plan  On 4 L O2 which is her baseline    Sacral wound  Assessment & Plan  Many scratch marks with surrounding erythema  Change to augmentin     COPD mixed type (HCC)- (present on admission)  Assessment & Plan  Not in exacerbation  Continue home inhalers    Schizophrenia (HCC)- (present on admission)  Assessment & Plan  On Depakote and clozapine      Seizure (HCC)- (present on admission)  Assessment & Plan  Continue Keppra    Hypothyroid- (present on admission)  Assessment & Plan  Continue home Synthroid  TSH- wnl          VTE prophylaxis: SCDs    I have performed a physical exam and reviewed and updated ROS and Plan today (8/30/2023). In review of yesterday's note (8/29/2023), there are no changes except as documented above.

## 2023-08-30 NOTE — CARE PLAN
Problem: Knowledge Deficit - Standard  Goal: Patient and family/care givers will demonstrate understanding of plan of care, disease process/condition, diagnostic tests and medications  Outcome: Not Progressing     Problem: Skin Integrity  Goal: Skin integrity is maintained or improved  Outcome: Progressing   The patient is Stable - Low risk of patient condition declining or worsening    Shift Goals  Clinical Goals: (P) Stable VS. Monitor LOC changes  Patient Goals: (P) rest  Family Goals: (P) JORDI    Progress made toward(s) clinical / shift goals:  patient a+o x 1, reoriented to place/time/situation, incontinent care provided prn, patient lane's, iv fluids and iv abx a/o, no needs at this time, poc ongoing    Patient is not progressing towards the following goals: n/a      Problem: Knowledge Deficit - Standard  Goal: Patient and family/care givers will demonstrate understanding of plan of care, disease process/condition, diagnostic tests and medications  Outcome: Not Progressing    Patient is confused, alert to self only.

## 2023-08-31 PROBLEM — L03.317 CELLULITIS OF BUTTOCK, LEFT: Status: ACTIVE | Noted: 2023-08-28

## 2023-08-31 PROCEDURE — 700105 HCHG RX REV CODE 258: Performed by: INTERNAL MEDICINE

## 2023-08-31 PROCEDURE — 99232 SBSQ HOSP IP/OBS MODERATE 35: CPT | Performed by: INTERNAL MEDICINE

## 2023-08-31 PROCEDURE — 700102 HCHG RX REV CODE 250 W/ 637 OVERRIDE(OP): Performed by: HOSPITALIST

## 2023-08-31 PROCEDURE — 700111 HCHG RX REV CODE 636 W/ 250 OVERRIDE (IP): Mod: JZ | Performed by: INTERNAL MEDICINE

## 2023-08-31 PROCEDURE — 770006 HCHG ROOM/CARE - MED/SURG/GYN SEMI*

## 2023-08-31 PROCEDURE — A9270 NON-COVERED ITEM OR SERVICE: HCPCS | Performed by: HOSPITALIST

## 2023-08-31 PROCEDURE — 700102 HCHG RX REV CODE 250 W/ 637 OVERRIDE(OP): Performed by: INTERNAL MEDICINE

## 2023-08-31 PROCEDURE — A9270 NON-COVERED ITEM OR SERVICE: HCPCS | Performed by: INTERNAL MEDICINE

## 2023-08-31 RX ADMIN — AMPICILLIN AND SULBACTAM 3 G: 1; 2 INJECTION, POWDER, FOR SOLUTION INTRAMUSCULAR; INTRAVENOUS at 17:29

## 2023-08-31 RX ADMIN — LEVETIRACETAM 250 MG: 500 TABLET, FILM COATED ORAL at 13:00

## 2023-08-31 RX ADMIN — AMPICILLIN AND SULBACTAM 3 G: 1; 2 INJECTION, POWDER, FOR SOLUTION INTRAMUSCULAR; INTRAVENOUS at 11:34

## 2023-08-31 RX ADMIN — LEVOTHYROXINE SODIUM 125 MCG: 75 TABLET ORAL at 04:24

## 2023-08-31 RX ADMIN — LEVETIRACETAM 250 MG: 500 TABLET, FILM COATED ORAL at 04:23

## 2023-08-31 RX ADMIN — CLOZAPINE 150 MG: 25 TABLET ORAL at 17:31

## 2023-08-31 RX ADMIN — AMPICILLIN AND SULBACTAM 3 G: 1; 2 INJECTION, POWDER, FOR SOLUTION INTRAMUSCULAR; INTRAVENOUS at 03:07

## 2023-08-31 RX ADMIN — DIVALPROEX SODIUM 250 MG: 250 TABLET, EXTENDED RELEASE ORAL at 04:24

## 2023-08-31 RX ADMIN — MICONAZOLE NITRATE: 20 CREAM TOPICAL at 20:56

## 2023-08-31 RX ADMIN — CLOZAPINE 150 MG: 25 TABLET ORAL at 04:26

## 2023-08-31 RX ADMIN — ESCITALOPRAM OXALATE 10 MG: 10 TABLET ORAL at 04:25

## 2023-08-31 RX ADMIN — LEVETIRACETAM 250 MG: 500 TABLET, FILM COATED ORAL at 17:30

## 2023-08-31 ASSESSMENT — PAIN SCALES - PAIN ASSESSMENT IN ADVANCED DEMENTIA (PAINAD)
TOTALSCORE: 3
CONSOLABILITY: DISTRACTED OR REASSURED BY VOICE/TOUCH
BODYLANGUAGE: TENSE, DISTRESSED PACING, FIDGETING
CONSOLABILITY: DISTRACTED OR REASSURED BY VOICE/TOUCH
FACIALEXPRESSION: SAD, FRIGHTENED, FROWN
BREATHING: NORMAL
BODYLANGUAGE: TENSE, DISTRESSED PACING, FIDGETING
TOTALSCORE: 3
BREATHING: NORMAL
FACIALEXPRESSION: SAD, FRIGHTENED, FROWN

## 2023-08-31 ASSESSMENT — ENCOUNTER SYMPTOMS
VOMITING: 0
SHORTNESS OF BREATH: 0
NAUSEA: 0
CONSTIPATION: 0
CHILLS: 0
ABDOMINAL PAIN: 0
FEVER: 0

## 2023-08-31 ASSESSMENT — PAIN DESCRIPTION - PAIN TYPE: TYPE: ACUTE PAIN

## 2023-08-31 NOTE — PROGRESS NOTES
Hospital Medicine Daily Progress Note    Date of Service  8/31/2023    Chief Complaint  Tracie Rinaldi is a 73 y.o. female admitted 8/28/2023 with confusion.     Hospital Course  73 y.o. female who presented 8/28/2023 with past medical history of COVID-19 9 days ago, lower extremity weakness on wheelchair, schizophrenia, hypothyroidism, seizures, dementia who comes into the hospital for altered mental status for the past 2 days.  Patient is unable to answer my questions.  She was recently discharged in the hospital for having a bacterial pneumonia and COVID.  She was discharged to go to her group home on 4 L of oxygen. Chest x-ray showed no acute pulmonary process.  She was started on antibiotics for buttock cellulitis and IV fluids.    Interval Problem Update  8/29 WBC 13, check procal. UA negative, urine culture with enterococcus. Changed rocephin to augmentin. Sodium improving 134. Patient still very confused and agitated requiring IV haldol.     8/30 Vitals stable on room air. Leukocytosis resolved, WBC 9.7. Procalcitonin negative. Blood cultures ntd. Pending susceptibilities on enterococcus. Patient continues to be encephalopathic A&Ox1. Complaining of nausea this morning. MRI brain pending.    8/31 patient's mentation is much improved today, she is alert and oriented to person, place, time but not situation.  She denies any acute complaints including shortness of breath, chest pain, nausea or abdominal pain.  She does have some pain in her buttock.  Watched patient walk with a walker from the bathroom with just standby assist.  Reports she is mostly tired.  MRI of the brain pending.      I have discussed this patient's plan of care and discharge plan at IDT rounds today with Case Management, Nursing, Nursing leadership, and other members of the IDT team.    Consultants/Specialty  N/A    Code Status  DNAR/DNI    Disposition  The patient is not medically cleared for discharge to home or a post-acute  facility.      I have placed the appropriate orders for post-discharge needs.    Review of Systems  Review of Systems   Unable to perform ROS: Medical condition   Constitutional:  Positive for malaise/fatigue. Negative for chills and fever.   Respiratory:  Negative for shortness of breath.    Cardiovascular:  Negative for chest pain.   Gastrointestinal:  Negative for abdominal pain, constipation, nausea and vomiting.        Physical Exam  Temp:  [36.1 °C (97 °F)-36.6 °C (97.9 °F)] 36.1 °C (97 °F)  Pulse:  [80-95] 80  Resp:  [16] 16  BP: (111-148)/(60-80) 111/64  SpO2:  [78 %-100 %] 95 %    Physical Exam  Vitals and nursing note reviewed.   Constitutional:       General: She is not in acute distress.     Appearance: She is ill-appearing.   HENT:      Head: Normocephalic and atraumatic.   Eyes:      Conjunctiva/sclera: Conjunctivae normal.   Cardiovascular:      Rate and Rhythm: Normal rate and regular rhythm.      Heart sounds: Normal heart sounds.   Pulmonary:      Effort: Pulmonary effort is normal. No respiratory distress.      Breath sounds: Normal breath sounds. No wheezing.   Abdominal:      General: Abdomen is flat. There is no distension.      Palpations: Abdomen is soft.      Tenderness: There is no abdominal tenderness.   Musculoskeletal:         General: Normal range of motion.      Cervical back: Neck supple.      Right lower leg: No edema.      Left lower leg: No edema.   Skin:     General: Skin is warm and dry.      Findings: Erythema (scratch marks with surround erythema) and lesion (Appears to have scratch marks across her left buttock with scabs) present.   Neurological:      General: No focal deficit present.      Mental Status: She is alert.      Gait: Gait normal.      Comments: Walking well with a walker  A&O x3, not situation   Psychiatric:         Mood and Affect: Mood normal.         Behavior: Behavior normal.         Fluids    Intake/Output Summary (Last 24 hours) at 8/31/2023 1407  Last data  filed at 8/31/2023 1400  Gross per 24 hour   Intake 1062 ml   Output --   Net 1062 ml       Laboratory  Recent Labs     08/28/23  1630 08/29/23  0537 08/30/23  0453   WBC 17.9* 13.0* 9.7   RBC 4.78 4.74 4.72   HEMOGLOBIN 12.3 12.2 11.9*   HEMATOCRIT 36.9* 37.3 37.3   MCV 77.2* 78.7* 79.0*   MCH 25.7* 25.7* 25.2*   MCHC 33.3 32.7 31.9*   RDW 46.1 47.9 48.8   PLATELETCT 303 267 309   MPV 11.1 10.5 10.9     Recent Labs     08/28/23  1630 08/29/23  0537 08/30/23  0453   SODIUM 130* 134* 136   POTASSIUM 4.3 4.2 3.7   CHLORIDE 93* 96 97   CO2 23 28 26   GLUCOSE 146* 121* 98   BUN 20 16 9   CREATININE 1.18 0.91 0.68   CALCIUM 9.3 8.8 8.4*                   Imaging  DX-CHEST-PORTABLE (1 VIEW)   Final Result         Ill-defined opacities in the bilateral mid lung could be scarring or infection.      MR-BRAIN-W/O    (Results Pending)        Assessment/Plan  * Acute metabolic encephalopathy- (present on admission)  Assessment & Plan  Unknown etiology  Continue unasyn for cellulitis from buttock scratches, prn benadryl   Possible polypharmacy since she is on seizure medications and schizophrenia medications  IVF   MRI brain pending     Improving     Leukocytosis  Assessment & Plan  Resolved   procal negative    Chronic respiratory failure with hypoxia (HCC)  Assessment & Plan  Discharged from last covid admit on 4 L NC    Has been stable on room air     Cellulitis of buttock, left  Assessment & Plan  Many scratch marks with surrounding erythema  Continue unasyn    COPD mixed type (HCC)- (present on admission)  Assessment & Plan  Not in exacerbation  Continue home inhalers    Schizophrenia (HCC)- (present on admission)  Assessment & Plan  On Depakote and clozapine      Seizure (HCC)- (present on admission)  Assessment & Plan  Continue Keppra    Hypothyroid- (present on admission)  Assessment & Plan  Continue home Synthroid  TSH- wnl        VTE prophylaxis: SCDs    I have performed a physical exam and reviewed and updated ROS and  Plan today (8/31/2023). In review of yesterday's note (8/30/2023), there are no changes except as documented above.

## 2023-08-31 NOTE — CARE PLAN
The patient is Stable - Low risk of patient condition declining or worsening    Shift Goals  Clinical Goals: Fall precautions IVabx  Patient Goals: Rest and comfort  Family Goals: JORDI   Progress made toward(s) clinical / shift goals:  on going    Patient is  progressing towards the following goals:  Problem: Skin Integrity  Goal: Skin integrity is maintained or improved  Outcome: Progressing     Problem: Fall Risk  Goal: Patient will remain free from falls  Outcome: Progressing     Problem: Pain - Standard  Goal: Alleviation of pain or a reduction in pain to the patient’s comfort goal  Outcome: Progressing     Problem: Knowledge Deficit - Standard  Goal: Patient and family/care givers will demonstrate understanding of plan of care, disease process/condition, diagnostic tests and medications  Outcome: Progressing

## 2023-09-01 ENCOUNTER — PHARMACY VISIT (OUTPATIENT)
Dept: PHARMACY | Facility: MEDICAL CENTER | Age: 73
End: 2023-09-01
Payer: MEDICARE

## 2023-09-01 ENCOUNTER — PATIENT OUTREACH (OUTPATIENT)
Dept: SCHEDULING | Facility: IMAGING CENTER | Age: 73
End: 2023-09-01
Payer: MEDICARE

## 2023-09-01 VITALS
SYSTOLIC BLOOD PRESSURE: 136 MMHG | HEART RATE: 97 BPM | RESPIRATION RATE: 18 BRPM | DIASTOLIC BLOOD PRESSURE: 59 MMHG | BODY MASS INDEX: 32.44 KG/M2 | TEMPERATURE: 97.5 F | WEIGHT: 201 LBS | OXYGEN SATURATION: 96 %

## 2023-09-01 PROBLEM — N30.90 CYSTITIS: Status: ACTIVE | Noted: 2023-09-01

## 2023-09-01 PROBLEM — E66.01 SEVERE OBESITY (HCC): Status: ACTIVE | Noted: 2023-09-01

## 2023-09-01 PROCEDURE — 700111 HCHG RX REV CODE 636 W/ 250 OVERRIDE (IP): Mod: JZ | Performed by: INTERNAL MEDICINE

## 2023-09-01 PROCEDURE — RXMED WILLOW AMBULATORY MEDICATION CHARGE: Performed by: INTERNAL MEDICINE

## 2023-09-01 PROCEDURE — 700102 HCHG RX REV CODE 250 W/ 637 OVERRIDE(OP): Performed by: HOSPITALIST

## 2023-09-01 PROCEDURE — A9270 NON-COVERED ITEM OR SERVICE: HCPCS | Performed by: HOSPITALIST

## 2023-09-01 PROCEDURE — 99239 HOSP IP/OBS DSCHRG MGMT >30: CPT | Performed by: INTERNAL MEDICINE

## 2023-09-01 PROCEDURE — 700105 HCHG RX REV CODE 258: Performed by: INTERNAL MEDICINE

## 2023-09-01 RX ORDER — AMOXICILLIN AND CLAVULANATE POTASSIUM 875; 125 MG/1; MG/1
1 TABLET, FILM COATED ORAL 2 TIMES DAILY
Qty: 10 TABLET | Refills: 0 | Status: ACTIVE | OUTPATIENT
Start: 2023-09-01 | End: 2023-09-06

## 2023-09-01 RX ADMIN — LEVETIRACETAM 250 MG: 500 TABLET, FILM COATED ORAL at 06:00

## 2023-09-01 RX ADMIN — AMPICILLIN AND SULBACTAM 3 G: 1; 2 INJECTION, POWDER, FOR SOLUTION INTRAMUSCULAR; INTRAVENOUS at 05:57

## 2023-09-01 RX ADMIN — LEVOTHYROXINE SODIUM 125 MCG: 75 TABLET ORAL at 05:59

## 2023-09-01 RX ADMIN — MICONAZOLE NITRATE: 20 CREAM TOPICAL at 06:04

## 2023-09-01 RX ADMIN — AMPICILLIN AND SULBACTAM 3 G: 1; 2 INJECTION, POWDER, FOR SOLUTION INTRAMUSCULAR; INTRAVENOUS at 00:29

## 2023-09-01 RX ADMIN — CLOZAPINE 150 MG: 25 TABLET ORAL at 16:28

## 2023-09-01 RX ADMIN — AMPICILLIN AND SULBACTAM 3 G: 1; 2 INJECTION, POWDER, FOR SOLUTION INTRAMUSCULAR; INTRAVENOUS at 11:59

## 2023-09-01 RX ADMIN — LEVETIRACETAM 250 MG: 500 TABLET, FILM COATED ORAL at 11:55

## 2023-09-01 RX ADMIN — CLOZAPINE 150 MG: 25 TABLET ORAL at 06:03

## 2023-09-01 RX ADMIN — DIVALPROEX SODIUM 250 MG: 250 TABLET, EXTENDED RELEASE ORAL at 05:59

## 2023-09-01 RX ADMIN — LEVETIRACETAM 250 MG: 500 TABLET, FILM COATED ORAL at 16:22

## 2023-09-01 RX ADMIN — ESCITALOPRAM OXALATE 10 MG: 10 TABLET ORAL at 06:00

## 2023-09-01 RX ADMIN — MICONAZOLE NITRATE: 20 CREAM TOPICAL at 17:03

## 2023-09-01 ASSESSMENT — PAIN DESCRIPTION - PAIN TYPE: TYPE: ACUTE PAIN

## 2023-09-01 NOTE — DISCHARGE SUMMARY
Discharge Summary    CHIEF COMPLAINT ON ADMISSION  Chief Complaint   Patient presents with    ALOC     X2 days, AOx0, EMS called by her home health nurse for increased confusion       Reason for Admission  EMS     Admission Date  8/28/2023    CODE STATUS  DNAR/DNI    HPI & HOSPITAL COURSE  73 y.o. female who presented 8/28/2023 with past medical history of COVID-19 9 days ago, lower extremity weakness on wheelchair, schizophrenia, hypothyroidism, seizures, dementia who comes into the hospital for altered mental status for the past 2 days.  Recently hospitalized 08/15 - 08/26 with bacterial pneumonia and COVID.  She was discharged to go to her group home on 4 L of oxygen. Chest x-ray showed no acute pulmonary process.  She was started on antibiotics. Urine culture positive for enteroroccus faecalis; transition to oral antibiotic at discharge. Patient improved clinically, leukocytosis resolved. Patient was determined satisfactory for discharge with appropriate follow up.    Therefore, she is discharged in fair and stable condition to home with close outpatient follow-up.    The patient met 2-midnight criteria for an inpatient stay at the time of discharge.    Discharge Date  09/01/2023    FOLLOW UP ITEMS POST DISCHARGE  Please follow up with PCP in 3-5 days for post hospitalization follow up and medication reconciliation.     DISCHARGE DIAGNOSES  Principal Problem:    Cystitis (POA: Unknown)  Active Problems:    Acute metabolic encephalopathy (POA: Yes)    Cellulitis of buttock, left (POA: Unknown)    Chronic respiratory failure with hypoxia (HCC) (POA: Unknown)    Severe obesity (HCC) (POA: Unknown)    Hypothyroid (POA: Yes)      Overview: Labs 4/20/2023 TSH 4.13, free T41.2.    Seizure (HCC) (POA: Yes)    Schizophrenia (HCC) (POA: Yes)    COPD mixed type (HCC) (POA: Yes)    Leukocytosis (POA: Unknown)  Resolved Problems:    * No resolved hospital problems. *      FOLLOW UP  No future appointments.  Dayne Lackey,  P.A.  [address] [facility] [address]  [phone]    Go on 9/5/2023  VANESSA Torres will come to your home on Sept 5 between 9 am - 11am.      MEDICATIONS ON DISCHARGE     Medication List        START taking these medications        Instructions   amoxicillin-clavulanate 875-125 MG Tabs  Commonly known as: Augmentin   Take 1 Tablet by mouth 2 times a day for 5 days.  Dose: 1 Tablet            CHANGE how you take these medications        Instructions   Incruse Ellipta 62.5 MCG/ACT Aepb  What changed:   how much to take  how to take this  when to take this  Generic drug: Umeclidinium Bromide   INHALE 1 PUFF BY MOUTH ONCE DAILY     simvastatin 40 MG Tabs  What changed:   how much to take  how to take this  when to take this  Commonly known as: Zocor   Doctor's comments: [facility] asked for a 90 day refill for pt's simvastatin be sent to [facility]  TAKE 1 TABLET BY MOUTH AT BEDTIME FOR HLD            CONTINUE taking these medications        Instructions   alendronate 70 MG Tabs  Commonly known as: Fosamax   Take 1 Tablet by mouth every 7 days.  Dose: 70 mg     aspirin 81 MG EC tablet  Commonly known as: Aspirin Low Dose   Take 1 Tablet by mouth every day.  Dose: 81 mg     Calazime Skin Protectant Pste   Apply 1 Application topically 2 times a day. Apply to red areas on buttocks  Dose: 1 Application     cloZAPine 100 MG Tabs  Commonly known as: Clozaril   Take 1.5 Tablets by mouth 2 times a day.  Dose: 150 mg     divalproex  MG Tb24  Commonly known as: Depakote ER   Take 1 Tablet by mouth every day.  Dose: 250 mg     escitalopram 10 MG Tabs  Commonly known as: Lexapro   Take 10 mg by mouth every day.  Dose: 10 mg     FQ Protective Underwear Misc   CHANGE 3 TIMES DAILY AS DIRECTED     hydrophilic ointment Oint   Apply 1 Each topically at bedtime. Apply to hands  Dose: 1 Each     levETIRAcetam 250 MG tablet  Commonly known as: Keppra   Take 1 Tablet by mouth 3 times a day.  Dose: 250 mg     levothyroxine 125 MCG  Tabs  Commonly known as: Synthroid   Take 1 Tablet by mouth every morning on an empty stomach.  Dose: 125 mcg     Myrbetriq 50 MG Tb24  Generic drug: Mirabegron ER   Take 50 mg by mouth every evening.  Dose: 50 mg     Ventolin  (90 Base) MCG/ACT Aers inhalation aerosol  Generic drug: albuterol   Inhale 2 Puffs every four hours as needed for Shortness of Breath.  Dose: 2 Puff     Vitamin D (Cholecalciferol) 25 MCG (1000 UT) Tabs  Commonly known as: Cholecalciferol   Take 1 Tablet by mouth every day.  Dose: 1,000 Units            STOP taking these medications      mirtazapine 15 MG Tabs  Commonly known as: Remeron     mirtazapine 15 MG Tbdp  Commonly known as: Remeron              Allergies  No Known Allergies    DIET  Orders Placed This Encounter   Procedures    Diet Order Diet: Regular     Standing Status:   Standing     Number of Occurrences:   1     Order Specific Question:   Diet:     Answer:   Regular [1]       ACTIVITY  As tolerated.  Weight bearing as tolerated      LABORATORY  Lab Results   Component Value Date    SODIUM 136 08/30/2023    POTASSIUM 3.7 08/30/2023    CHLORIDE 97 08/30/2023    CO2 26 08/30/2023    GLUCOSE 98 08/30/2023    BUN 9 08/30/2023    CREATININE 0.68 08/30/2023    CREATININE 0.9 12/31/2008        Lab Results   Component Value Date    WBC 9.7 08/30/2023    HEMOGLOBIN 11.9 (L) 08/30/2023    HEMATOCRIT 37.3 08/30/2023    PLATELETCT 309 08/30/2023        Total time of the discharge process exceeds 34 minutes.

## 2023-09-01 NOTE — WOUND TEAM
[facility]  Inpatient Services  Wound and Skin Care Brief Evaluation    Admission Date: 8/28/2023     Last order of IP CONSULT TO WOUND CARE was found on 8/29/2023 from Hospital Encounter on 8/28/2023     HPI, PMH, SH: Reviewed    Chief Complaint   Patient presents with    ALOC     X2 days, AOx0, EMS called by her home health nurse for increased confusion     Diagnosis: Altered mental status [R41.82]    Unit where seen by Wound Team: S612/01     Wound consult placed regarding buttocks. Chart and images reviewed. This discussed with bedside RN . Pt has blanching pink erythema and peeling skin on B buttocks.  Applied barrier paste.  Writing orders for miconazole barrier paste.       No pressure injuries or advanced wound care needs identified. Wound consult completed. No further follow up unless indicated and consulted.          PREVENTATIVE INTERVENTIONS:    Q shift Sebastian - performed per nursing policy  Q shift pressure point assessments - performed per nursing policy    Surface/Positioning  Reposition q 2 hours - Currently in Place  Waffle overlay  - Currently in Place    Offloading/Redistribution  Heel offloading dressing (Silicone dressing) - Currently in Place

## 2023-09-01 NOTE — HOSPITAL COURSE
73 y.o. female who presented 8/28/2023 with past medical history of COVID-19 9 days ago, lower extremity weakness on wheelchair, schizophrenia, hypothyroidism, seizures, dementia who comes into the hospital for altered mental status for the past 2 days.  Recently hospitalized 08/15 - 08/26 with bacterial pneumonia and COVID.  She was discharged to go to her group home on 4 L of oxygen. Chest x-ray showed no acute pulmonary process.  She was started on antibiotics. Urine culture positive for enteroroccus faecalis; transition to oral antibiotic at discharge. Patient improved clinically, leukocytosis resolved. Patient was determined satisfactory for discharge with appropriate follow up.

## 2023-09-01 NOTE — CARE PLAN
Problem: Fall Risk  Goal: Patient will remain free from falls  Outcome: Progressing   The patient is Stable - Low risk of patient condition declining or worsening    Shift Goals  Clinical Goals: Promote rest and relaxation  Patient Goals:  (Unable to assess)  Family Goals: JORDI    Progress made toward(s) clinical / shift goals:      Patient is not progressing towards the following goals:

## 2023-09-01 NOTE — PROGRESS NOTES
Spoke with Rima Bettencourt who is Tracie's  from the [facility]. She stated we can call her with helping in regards to her discharge. Phone number in patient chart under demographics tab.

## 2023-09-01 NOTE — DISCHARGE PLANNING
Case Management Discharge Planning    Admission Date: 8/28/2023  GMLOS: 4.6  ALOS: 4    6-Clicks ADL Score: 12  6-Clicks Mobility Score: 13  PT and/or OT Eval ordered: Yes  Post-acute Referrals Ordered: Yes  Post-acute Choice Obtained: Yes  Has referral(s) been sent to post-acute provider:  Yes      Anticipated Discharge Dispo:      DME Needed: No    Action(s) Taken: Updated Provider/Nurse on Discharge Plan    Escalations Completed: Provider and Pending Discharge Destination    Medically Clear: Yes    Next Steps: awaiting    Barriers to Discharge: Pending Placement    Is the patient up for discharge tomorrow: No    Liasion for [facility] group home stopped to visit pt and states ok to send pt back today when cleared.  [facility] is following pt to the group home.  Transport is set for 4:30 pm

## 2023-09-01 NOTE — CARE PLAN
The patient is Stable - Low risk of patient condition declining or worsening    Shift Goals  Clinical Goals: Maintain a  safe environment  Patient Goals: Rest and comfort  Family Goals: JORDI    Progress made toward(s) clinical / shift goals:  on going    Patient is progressing towards the following goals:  Problem: Fall Risk  Goal: Patient will remain free from falls  Outcome: Progressing     Problem: Knowledge Deficit - Standard  Goal: Patient and family/care givers will demonstrate understanding of plan of care, disease process/condition, diagnostic tests and medications  Outcome: Progressing

## 2023-09-01 NOTE — DISCHARGE PLANNING
DC Transport Scheduled    Received request at: 9/1/2023    Transport Company Scheduled:  [employer]    Scheduled Date: 9/1/2023  Scheduled Time: 1545    Destination: [facility]  [address] [place]    Notified care team of scheduled transport via Voalte.     If there are any changes needed to the DC transportation scheduled, please contact [facility] at ext. [phone] between the hours of 4395-4780 Mon-Fri. If outside those hours, contact the ED Case Manager at ext. [phone].

## 2023-09-01 NOTE — DISCHARGE PLANNING
DC Transport Scheduled    Received request at: 9/1/2023    Transport Company Scheduled:  [employer]  Spoke with Jennifer at [facility] to schedule transport.  [facility] Trip #: [id number]   Scheduled Date: 9/1/2023  Scheduled Time: 1630    Destination: [facility]  [address] [place]    Notified care team of scheduled transport via Voalte.     If there are any changes needed to the DC transportation scheduled, please contact [facility] at ext. [phone] between the hours of 9468-6873 Mon-Fri. If outside those hours, contact the ED Case Manager at ext. [phone].

## 2023-09-01 NOTE — THERAPY
09/01/23 0912   Interdisciplinary Plan of Care Collaboration   Collaboration Comments Eval attempted, pt refused, despite max encouragement.

## 2023-09-02 LAB
BACTERIA BLD CULT: NORMAL
BACTERIA BLD CULT: NORMAL
SIGNIFICANT IND 70042: NORMAL
SIGNIFICANT IND 70042: NORMAL
SITE SITE: NORMAL
SITE SITE: NORMAL
SOURCE SOURCE: NORMAL
SOURCE SOURCE: NORMAL

## 2023-09-02 NOTE — FACE TO FACE
"Face to Face Note  -  Durable Medical Equipment    Leo Brown D.O. - NPI: [id number]  I certify that this patient is under my care and that they had a durable medical equipment(DME)face to face encounter by myself that meets the physician DME face-to-face encounter requirements with this patient on:    Date of encounter:   Patient:                    MRN:                       YOB: 2021  Tracie Rinaldi  [id number]  1950     The encounter with the patient was in whole, or in part, for the following medical condition, which is the primary reason for durable medical equipment:  COPD    I certify that, based on my findings, the following durable medical equipment is medically necessary:    Oxygen   HOME O2 Saturation Measurements:(Values must be present for Home Oxygen orders)         ,     ,       If patient feels more short of breath, they can go up to 6 liters per minute and contact healthcare provider.    Supporting Symptoms: The patient requires supplemental oxygen, as the following interventions have been tried with limited or no improvement: \"Ambulation with oximetry and \"Incentive spirometry.    My Clinical findings support the need for the above equipment due to:  Hypoxia  "

## 2023-09-02 NOTE — DISCHARGE PLANNING
Bedside RN calls to say she received a call from the Group Home stating there concentrator only goes up to 2L.    Group home called. They state she is currently on 2L and saturating 96-97% on the portable concentrator that she has.    HAWA MURILLO places F2F and new orders.     Sent to [facility] via [employer]  [facility] called, on call team takes message    [facility]  [address] [place] [address]  [phone]    Markus from [facility] calls back [phone]    He will call the group home and try to ascertain if the equipment present is being used correctly and will call me back.

## 2023-09-02 NOTE — PROGRESS NOTES
Contacted Manda at [facility] regarding patient home oxygen. Per Manda, pt only has oxygen supply for home O2 of 2L. Dr. Brown placed updated face to face and will fax copy to Manda to contact [employer] oxygen [place]. Per Manda, will check home O2 sats at home to ensure stable on 2L, if not, will return pt to ED.

## 2023-09-02 NOTE — DISCHARGE PLANNING
Notes:     RN CM received a communication from ER CM to follow up O2 with Markus from [facility] regarding O2 concentrator for Pt in . RN CM called [facility] (), Per  owner, [facility] already replaced the O2 concentrator and its working well now.

## 2023-09-02 NOTE — PROGRESS NOTES
Discharge instructions given and discussed, signed copy in chart. Pt verbalized understanding and all questions answered. prescriptions sent with patient including antibiotic. Pt discharged in stable condition on 3L O2 via NC escorted by transport. Personal belongings sent with patient. IV removed and tolerated well. Called [facility] to verify home oxygen set-up for the patient.

## 2023-09-05 ENCOUNTER — TELEPHONE (OUTPATIENT)
Dept: NEUROLOGY | Facility: MEDICAL CENTER | Age: 73
End: 2023-09-05
Payer: MEDICARE

## 2023-09-05 PROBLEM — R09.02 HYPOXIA: Status: RESOLVED | Noted: 2023-08-15 | Resolved: 2023-09-05

## 2023-09-05 PROBLEM — R21 SKIN RASH: Status: RESOLVED | Noted: 2023-08-28 | Resolved: 2023-09-05

## 2023-09-05 PROBLEM — L85.3 DRY SKIN: Status: ACTIVE | Noted: 2023-09-05

## 2023-09-05 PROBLEM — J18.1: Status: RESOLVED | Noted: 2023-08-22 | Resolved: 2023-09-05

## 2023-09-05 PROBLEM — R11.0 NAUSEA: Status: ACTIVE | Noted: 2023-09-05

## 2023-09-05 NOTE — TELEPHONE ENCOUNTER
Please advise: Pt pharmacy sent a fax requesting a 90 day supply rather than a 30 day due to the 90 day supply having a cheaper co-pay.

## 2023-09-12 ENCOUNTER — HOSPITAL ENCOUNTER (OUTPATIENT)
Facility: MEDICAL CENTER | Age: 73
End: 2023-09-12
Attending: PSYCHIATRY & NEUROLOGY
Payer: MEDICARE

## 2023-09-12 ENCOUNTER — HOSPITAL ENCOUNTER (OUTPATIENT)
Facility: MEDICAL CENTER | Age: 73
End: 2023-09-12
Payer: MEDICARE

## 2023-09-12 LAB
25(OH)D3 SERPL-MCNC: 27 NG/ML (ref 30–100)
ALBUMIN SERPL BCP-MCNC: 2.9 G/DL (ref 3.2–4.9)
ALBUMIN/GLOB SERPL: 1.1 G/DL
ALP SERPL-CCNC: 118 U/L (ref 30–99)
ALT SERPL-CCNC: 19 U/L (ref 2–50)
ANION GAP SERPL CALC-SCNC: 17 MMOL/L (ref 7–16)
AST SERPL-CCNC: 18 U/L (ref 12–45)
BASOPHILS # BLD AUTO: 0.4 % (ref 0–1.8)
BASOPHILS # BLD AUTO: 0.4 % (ref 0–1.8)
BASOPHILS # BLD: 0.04 K/UL (ref 0–0.12)
BASOPHILS # BLD: 0.04 K/UL (ref 0–0.12)
BILIRUB SERPL-MCNC: 0.2 MG/DL (ref 0.1–1.5)
BUN SERPL-MCNC: 3 MG/DL (ref 8–22)
CALCIUM ALBUM COR SERPL-MCNC: 9.6 MG/DL (ref 8.5–10.5)
CALCIUM SERPL-MCNC: 8.7 MG/DL (ref 8.5–10.5)
CHLORIDE SERPL-SCNC: 93 MMOL/L (ref 96–112)
CHOLEST SERPL-MCNC: 120 MG/DL (ref 100–199)
CO2 SERPL-SCNC: 21 MMOL/L (ref 20–33)
CREAT SERPL-MCNC: 0.55 MG/DL (ref 0.5–1.4)
EOSINOPHIL # BLD AUTO: 0.11 K/UL (ref 0–0.51)
EOSINOPHIL # BLD AUTO: 0.11 K/UL (ref 0–0.51)
EOSINOPHIL NFR BLD: 1.1 % (ref 0–6.9)
EOSINOPHIL NFR BLD: 1.1 % (ref 0–6.9)
ERYTHROCYTE [DISTWIDTH] IN BLOOD BY AUTOMATED COUNT: 47.2 FL (ref 35.9–50)
ERYTHROCYTE [DISTWIDTH] IN BLOOD BY AUTOMATED COUNT: 47.8 FL (ref 35.9–50)
GFR SERPLBLD CREATININE-BSD FMLA CKD-EPI: 98 ML/MIN/1.73 M 2
GLOBULIN SER CALC-MCNC: 2.6 G/DL (ref 1.9–3.5)
GLUCOSE SERPL-MCNC: 138 MG/DL (ref 65–99)
HCT VFR BLD AUTO: 32.5 % (ref 37–47)
HCT VFR BLD AUTO: 33 % (ref 37–47)
HDLC SERPL-MCNC: 27 MG/DL
HGB BLD-MCNC: 10.6 G/DL (ref 12–16)
HGB BLD-MCNC: 10.8 G/DL (ref 12–16)
IMM GRANULOCYTES # BLD AUTO: 0.11 K/UL (ref 0–0.11)
IMM GRANULOCYTES # BLD AUTO: 0.15 K/UL (ref 0–0.11)
IMM GRANULOCYTES NFR BLD AUTO: 1.1 % (ref 0–0.9)
IMM GRANULOCYTES NFR BLD AUTO: 1.5 % (ref 0–0.9)
LDLC SERPL CALC-MCNC: 76 MG/DL
LYMPHOCYTES # BLD AUTO: 1.43 K/UL (ref 1–4.8)
LYMPHOCYTES # BLD AUTO: 1.63 K/UL (ref 1–4.8)
LYMPHOCYTES NFR BLD: 13.9 % (ref 22–41)
LYMPHOCYTES NFR BLD: 16.5 % (ref 22–41)
MCH RBC QN AUTO: 25.7 PG (ref 27–33)
MCH RBC QN AUTO: 25.9 PG (ref 27–33)
MCHC RBC AUTO-ENTMCNC: 32.6 G/DL (ref 32.2–35.5)
MCHC RBC AUTO-ENTMCNC: 32.7 G/DL (ref 32.2–35.5)
MCV RBC AUTO: 78.7 FL (ref 81.4–97.8)
MCV RBC AUTO: 79.1 FL (ref 81.4–97.8)
MONOCYTES # BLD AUTO: 0.88 K/UL (ref 0–0.85)
MONOCYTES # BLD AUTO: 1 K/UL (ref 0–0.85)
MONOCYTES NFR BLD AUTO: 8.9 % (ref 0–13.4)
MONOCYTES NFR BLD AUTO: 9.7 % (ref 0–13.4)
NEUTROPHILS # BLD AUTO: 7.05 K/UL (ref 1.82–7.42)
NEUTROPHILS # BLD AUTO: 7.62 K/UL (ref 1.82–7.42)
NEUTROPHILS NFR BLD: 71.6 % (ref 44–72)
NEUTROPHILS NFR BLD: 73.8 % (ref 44–72)
NRBC # BLD AUTO: 0 K/UL
NRBC # BLD AUTO: 0 K/UL
NRBC BLD-RTO: 0 /100 WBC (ref 0–0.2)
NRBC BLD-RTO: 0 /100 WBC (ref 0–0.2)
PLATELET # BLD AUTO: 390 K/UL (ref 164–446)
PLATELET # BLD AUTO: 406 K/UL (ref 164–446)
PMV BLD AUTO: 11.2 FL (ref 9–12.9)
PMV BLD AUTO: 11.7 FL (ref 9–12.9)
POTASSIUM SERPL-SCNC: 4.3 MMOL/L (ref 3.6–5.5)
PROT SERPL-MCNC: 5.5 G/DL (ref 6–8.2)
RBC # BLD AUTO: 4.13 M/UL (ref 4.2–5.4)
RBC # BLD AUTO: 4.17 M/UL (ref 4.2–5.4)
SODIUM SERPL-SCNC: 131 MMOL/L (ref 135–145)
TRIGL SERPL-MCNC: 85 MG/DL (ref 0–149)
WBC # BLD AUTO: 10.3 K/UL (ref 4.8–10.8)
WBC # BLD AUTO: 9.9 K/UL (ref 4.8–10.8)

## 2023-09-12 PROCEDURE — 85025 COMPLETE CBC W/AUTO DIFF WBC: CPT

## 2023-09-12 PROCEDURE — 82306 VITAMIN D 25 HYDROXY: CPT

## 2023-09-12 PROCEDURE — 80053 COMPREHEN METABOLIC PANEL: CPT

## 2023-09-12 PROCEDURE — 85025 COMPLETE CBC W/AUTO DIFF WBC: CPT | Mod: 91

## 2023-09-12 PROCEDURE — 80177 DRUG SCRN QUAN LEVETIRACETAM: CPT

## 2023-09-12 PROCEDURE — 80061 LIPID PANEL: CPT

## 2023-09-14 LAB — LEVETIRACETAM SERPL-MCNC: 13 UG/ML (ref 10–40)

## 2023-09-18 RX ORDER — MENTHOL
1 GEL (GRAM) TOPICAL BID
Qty: 454 | Refills: 3 | Status: ERX

## 2023-09-28 PROBLEM — Z87.440 HISTORY OF UTI: Status: ACTIVE | Noted: 2023-09-01

## 2023-10-03 ENCOUNTER — APPOINTMENT (RX ONLY)
Dept: URBAN - METROPOLITAN AREA CLINIC 6 | Facility: CLINIC | Age: 73
Setting detail: DERMATOLOGY
End: 2023-10-03

## 2023-10-03 DIAGNOSIS — L81.4 OTHER MELANIN HYPERPIGMENTATION: ICD-10-CM

## 2023-10-03 DIAGNOSIS — Z71.89 OTHER SPECIFIED COUNSELING: ICD-10-CM

## 2023-10-03 DIAGNOSIS — L82.1 OTHER SEBORRHEIC KERATOSIS: ICD-10-CM

## 2023-10-03 DIAGNOSIS — L30.8 OTHER SPECIFIED DERMATITIS: ICD-10-CM | Status: STABLE

## 2023-10-03 PROBLEM — L30.9 DERMATITIS, UNSPECIFIED: Status: ACTIVE | Noted: 2023-10-03

## 2023-10-03 PROCEDURE — 99213 OFFICE O/P EST LOW 20 MIN: CPT

## 2023-10-03 PROCEDURE — ? PRESCRIPTION MEDICATION MANAGEMENT

## 2023-10-03 PROCEDURE — ? SUNSCREEN TREATMENT REGIMEN

## 2023-10-03 PROCEDURE — ? COUNSELING

## 2023-10-03 ASSESSMENT — LOCATION DETAILED DESCRIPTION DERM
LOCATION DETAILED: SUPERIOR THORACIC SPINE
LOCATION DETAILED: 3RD WEB SPACE LEFT HAND
LOCATION DETAILED: LEFT ULNAR DORSAL HAND
LOCATION DETAILED: RIGHT RADIAL DORSAL HAND
LOCATION DETAILED: MIDDLE STERNUM
LOCATION DETAILED: 3RD WEB SPACE RIGHT HAND
LOCATION DETAILED: LEFT INFERIOR FOREHEAD

## 2023-10-03 ASSESSMENT — BSA RASH: BSA RASH: 0

## 2023-10-03 ASSESSMENT — LOCATION SIMPLE DESCRIPTION DERM
LOCATION SIMPLE: CHEST
LOCATION SIMPLE: RIGHT HAND
LOCATION SIMPLE: UPPER BACK
LOCATION SIMPLE: LEFT HAND
LOCATION SIMPLE: LEFT FOREHEAD

## 2023-10-03 ASSESSMENT — LOCATION ZONE DERM
LOCATION ZONE: TRUNK
LOCATION ZONE: FACE
LOCATION ZONE: HAND

## 2023-10-03 ASSESSMENT — ITCH NUMERIC RATING SCALE: HOW SEVERE IS YOUR ITCHING?: 0

## 2023-10-03 NOTE — PROCEDURE: PRESCRIPTION MEDICATION MANAGEMENT
Continue Regimen: Tacrolimus bid as needed\\nRecommended the use of Aquaphor or Vaseline while severely flared.\\nRegular use of moisturizer after hand washing.\\nPlan: Apply CeraVe or Eucerin multiple times a day
Plan: RTC if flaring/uncontrolled\\nConsider patch testing in the future.
Detail Level: Zone
Render In Strict Bullet Format?: No

## 2023-10-05 ENCOUNTER — HOSPITAL ENCOUNTER (OUTPATIENT)
Facility: MEDICAL CENTER | Age: 73
End: 2023-10-05
Payer: MEDICARE

## 2023-10-05 DIAGNOSIS — D64.9 ANEMIA, UNSPECIFIED TYPE: ICD-10-CM

## 2023-10-05 DIAGNOSIS — R73.01 IMPAIRED FASTING GLUCOSE: ICD-10-CM

## 2023-10-05 DIAGNOSIS — E87.1 HYPONATREMIA: ICD-10-CM

## 2023-10-05 LAB
ALBUMIN SERPL BCP-MCNC: 3.3 G/DL (ref 3.2–4.9)
ALBUMIN/GLOB SERPL: 1.9 G/DL
ALP SERPL-CCNC: 102 U/L (ref 30–99)
ALT SERPL-CCNC: 11 U/L (ref 2–50)
ANION GAP SERPL CALC-SCNC: 14 MMOL/L (ref 7–16)
AST SERPL-CCNC: 15 U/L (ref 12–45)
BILIRUB SERPL-MCNC: 0.3 MG/DL (ref 0.1–1.5)
BUN SERPL-MCNC: 4 MG/DL (ref 8–22)
CALCIUM ALBUM COR SERPL-MCNC: 9.1 MG/DL (ref 8.5–10.5)
CALCIUM SERPL-MCNC: 8.5 MG/DL (ref 8.5–10.5)
CHLORIDE SERPL-SCNC: 97 MMOL/L (ref 96–112)
CO2 SERPL-SCNC: 21 MMOL/L (ref 20–33)
CREAT SERPL-MCNC: 0.5 MG/DL (ref 0.5–1.4)
ERYTHROCYTE [DISTWIDTH] IN BLOOD BY AUTOMATED COUNT: 56.8 FL (ref 35.9–50)
EST. AVERAGE GLUCOSE BLD GHB EST-MCNC: 146 MG/DL
FERRITIN SERPL-MCNC: 90.4 NG/ML (ref 10–291)
GFR SERPLBLD CREATININE-BSD FMLA CKD-EPI: 100 ML/MIN/1.73 M 2
GLOBULIN SER CALC-MCNC: 1.7 G/DL (ref 1.9–3.5)
GLUCOSE SERPL-MCNC: 87 MG/DL (ref 65–99)
HBA1C MFR BLD: 6.7 % (ref 4–5.6)
HCT VFR BLD AUTO: 35.7 % (ref 37–47)
HGB BLD-MCNC: 10.9 G/DL (ref 12–16)
IRON SATN MFR SERPL: 10 % (ref 15–55)
IRON SERPL-MCNC: 27 UG/DL (ref 40–170)
MCH RBC QN AUTO: 25.1 PG (ref 27–33)
MCHC RBC AUTO-ENTMCNC: 30.5 G/DL (ref 32.2–35.5)
MCV RBC AUTO: 82.1 FL (ref 81.4–97.8)
PLATELET # BLD AUTO: 264 K/UL (ref 164–446)
PMV BLD AUTO: 11.3 FL (ref 9–12.9)
POTASSIUM SERPL-SCNC: 4.2 MMOL/L (ref 3.6–5.5)
PROT SERPL-MCNC: 5 G/DL (ref 6–8.2)
RBC # BLD AUTO: 4.35 M/UL (ref 4.2–5.4)
SODIUM SERPL-SCNC: 132 MMOL/L (ref 135–145)
TIBC SERPL-MCNC: 277 UG/DL (ref 250–450)
UIBC SERPL-MCNC: 250 UG/DL (ref 110–370)
WBC # BLD AUTO: 8.9 K/UL (ref 4.8–10.8)

## 2023-10-05 PROCEDURE — 80053 COMPREHEN METABOLIC PANEL: CPT

## 2023-10-05 PROCEDURE — 85027 COMPLETE CBC AUTOMATED: CPT

## 2023-10-05 PROCEDURE — 83036 HEMOGLOBIN GLYCOSYLATED A1C: CPT

## 2023-10-05 PROCEDURE — 82728 ASSAY OF FERRITIN: CPT

## 2023-10-05 PROCEDURE — 83540 ASSAY OF IRON: CPT

## 2023-10-05 PROCEDURE — 83550 IRON BINDING TEST: CPT

## 2023-10-06 RX ORDER — ASPIRIN 81 MG/1
TABLET, COATED ORAL
Qty: 30 TABLET | Refills: 11 | Status: SHIPPED | OUTPATIENT
Start: 2023-10-06

## 2023-10-16 PROBLEM — R11.0 NAUSEA: Status: RESOLVED | Noted: 2023-09-05 | Resolved: 2023-10-16

## 2023-10-16 PROBLEM — L30.9 DERMATITIS: Status: RESOLVED | Noted: 2023-05-30 | Resolved: 2023-10-16

## 2023-10-16 PROBLEM — L03.317 CELLULITIS OF BUTTOCK, LEFT: Status: RESOLVED | Noted: 2023-08-28 | Resolved: 2023-10-16

## 2023-10-16 PROBLEM — G93.41 ACUTE METABOLIC ENCEPHALOPATHY: Status: RESOLVED | Noted: 2023-08-28 | Resolved: 2023-10-16

## 2023-10-16 PROBLEM — L85.3 DRY SKIN: Status: RESOLVED | Noted: 2023-09-05 | Resolved: 2023-10-16

## 2023-10-16 PROBLEM — R41.82 ALTERED MENTAL STATE: Status: RESOLVED | Noted: 2023-08-28 | Resolved: 2023-10-16

## 2023-10-16 PROBLEM — E66.09 CLASS 1 OBESITY DUE TO EXCESS CALORIES WITHOUT SERIOUS COMORBIDITY WITH BODY MASS INDEX (BMI) OF 30.0 TO 30.9 IN ADULT: Status: ACTIVE | Noted: 2023-10-16

## 2023-10-16 PROBLEM — D64.9 NORMOCYTIC ANEMIA: Status: ACTIVE | Noted: 2023-10-16

## 2023-10-16 PROBLEM — U07.1 PNEUMONIA DUE TO COVID-19 VIRUS: Status: RESOLVED | Noted: 2023-08-15 | Resolved: 2023-10-16

## 2023-10-16 PROBLEM — E66.01 SEVERE OBESITY (HCC): Status: RESOLVED | Noted: 2023-09-01 | Resolved: 2023-10-16

## 2023-10-16 PROBLEM — E66.811 CLASS 1 OBESITY DUE TO EXCESS CALORIES WITHOUT SERIOUS COMORBIDITY WITH BODY MASS INDEX (BMI) OF 30.0 TO 30.9 IN ADULT: Status: ACTIVE | Noted: 2023-10-16

## 2023-10-16 PROBLEM — J12.82 PNEUMONIA DUE TO COVID-19 VIRUS: Status: RESOLVED | Noted: 2023-08-15 | Resolved: 2023-10-16

## 2023-10-30 DIAGNOSIS — R56.9 SEIZURE (HCC): ICD-10-CM

## 2023-11-01 ENCOUNTER — TELEPHONE (OUTPATIENT)
Dept: NEUROLOGY | Facility: MEDICAL CENTER | Age: 73
End: 2023-11-01
Payer: MEDICARE

## 2023-11-01 RX ORDER — LEVETIRACETAM 250 MG/1
250 TABLET ORAL 3 TIMES DAILY
Qty: 90 TABLET | Refills: 11 | Status: SHIPPED | OUTPATIENT
Start: 2023-11-01

## 2023-11-01 NOTE — TELEPHONE ENCOUNTER
Levetiracetam (Keppra) 250 MG Tabs    RTS - NEXT AVAILABLE FILL DATE 20231122 LAST FILL DT 20231030 FILLED AT PHARMACY [facility],PHONE #[phone] NON-SPECIALTY DRUG 20231122  + - 11/01/2023 9:13am [facility]

## 2023-11-28 PROBLEM — Z91.81 AT RISK FOR FALLS: Status: ACTIVE | Noted: 2023-11-28

## 2023-11-28 PROBLEM — F05 DELIRIUM SUPERIMPOSED ON DEMENTIA: Status: RESOLVED | Noted: 2017-02-10 | Resolved: 2023-11-28

## 2023-12-13 DIAGNOSIS — J44.9 COPD MIXED TYPE (HCC): ICD-10-CM

## 2023-12-13 RX ORDER — UMECLIDINIUM 62.5 UG/1
AEROSOL, POWDER ORAL
Qty: 30 EACH | Refills: 10 | Status: SHIPPED | OUTPATIENT
Start: 2023-12-13

## 2023-12-13 NOTE — TELEPHONE ENCOUNTER
Dear Tracie,     Your prescription for   Requested Prescriptions     Signed Prescriptions Disp Refills    umeclidinium bromide (INCRUSE ELLIPTA) 62.5 MCG/ACT AEROSOL POWDER, BREATH ACTIVATED inhaler 30 Each 10     Sig: INHALE 1 PUFF BY MOUTH ONCE DAILY     Authorizing Provider: VICENTE ROBLERO        has been sent to the   [facility] - [place], [place] - [address]  [address]  [place] [address]  Phone: [phone] Fax: [phone]    [facility] - [address], [address]  [place] [place] [address]  Phone: [phone] Fax: [phone]      Please contact your pharmacy to see when it will be ready for you to .      Thank you,     Merry Hernandez, Med Ass't

## 2024-01-18 ENCOUNTER — NON-PROVIDER VISIT (OUTPATIENT)
Dept: OCCUPATIONAL MEDICINE | Facility: CLINIC | Age: 74
End: 2024-01-18

## 2024-01-18 DIAGNOSIS — Z11.1 PPD SCREENING TEST: Primary | ICD-10-CM

## 2024-01-18 PROCEDURE — 86580 TB INTRADERMAL TEST: CPT | Performed by: NURSE PRACTITIONER

## 2024-01-21 ENCOUNTER — APPOINTMENT (OUTPATIENT)
Dept: URGENT CARE | Facility: CLINIC | Age: 74
End: 2024-01-21
Payer: MEDICARE

## 2024-01-21 LAB — TB WHEAL 3D P 5 TU DIAM: 0 MM

## 2024-01-28 NOTE — PROCEDURE: MIPS QUALITY
Detail Level: Detailed
Quality 130: Documentation Of Current Medications In The Medical Record: Current Medications Documented
Quality 111:Pneumonia Vaccination Status For Older Adults: Pneumococcal Vaccination Previously Received
Quality 226: Preventive Care And Screening: Tobacco Use: Screening And Cessation Intervention: Patient screened for tobacco use and is an ex/non-smoker
Quality 265: Biopsy Follow-Up: Biopsy results reviewed, communicated, tracked, and documented
PAST MEDICAL HISTORY:  No pertinent past medical history

## 2024-02-26 PROBLEM — I10 HYPERTENSION: Status: ACTIVE | Noted: 2023-04-17

## 2024-03-03 ENCOUNTER — HOSPITAL ENCOUNTER (INPATIENT)
Facility: MEDICAL CENTER | Age: 74
LOS: 6 days | DRG: 871 | End: 2024-03-09
Attending: EMERGENCY MEDICINE | Admitting: STUDENT IN AN ORGANIZED HEALTH CARE EDUCATION/TRAINING PROGRAM
Payer: MEDICARE

## 2024-03-03 ENCOUNTER — APPOINTMENT (OUTPATIENT)
Dept: RADIOLOGY | Facility: MEDICAL CENTER | Age: 74
DRG: 871 | End: 2024-03-03
Attending: STUDENT IN AN ORGANIZED HEALTH CARE EDUCATION/TRAINING PROGRAM
Payer: MEDICARE

## 2024-03-03 DIAGNOSIS — R53.1 WEAKNESS: ICD-10-CM

## 2024-03-03 DIAGNOSIS — A41.51 SEPSIS DUE TO ESCHERICHIA COLI WITH ENCEPHALOPATHY WITHOUT SEPTIC SHOCK (HCC): ICD-10-CM

## 2024-03-03 DIAGNOSIS — E87.1 HYPONATREMIA: ICD-10-CM

## 2024-03-03 DIAGNOSIS — J18.9 COMMUNITY ACQUIRED PNEUMONIA OF RIGHT LUNG, UNSPECIFIED PART OF LUNG: ICD-10-CM

## 2024-03-03 DIAGNOSIS — G93.40 ACUTE ENCEPHALOPATHY: ICD-10-CM

## 2024-03-03 DIAGNOSIS — N39.0 ACUTE UTI: ICD-10-CM

## 2024-03-03 DIAGNOSIS — B96.20 E COLI BACTEREMIA: ICD-10-CM

## 2024-03-03 DIAGNOSIS — G93.41 SEPSIS DUE TO ESCHERICHIA COLI WITH ENCEPHALOPATHY WITHOUT SEPTIC SHOCK (HCC): ICD-10-CM

## 2024-03-03 DIAGNOSIS — J96.11 CHRONIC RESPIRATORY FAILURE WITH HYPOXIA (HCC): Primary | ICD-10-CM

## 2024-03-03 DIAGNOSIS — J86.9 EMPYEMA (HCC): ICD-10-CM

## 2024-03-03 DIAGNOSIS — R65.20 SEPSIS DUE TO ESCHERICHIA COLI WITH ENCEPHALOPATHY WITHOUT SEPTIC SHOCK (HCC): ICD-10-CM

## 2024-03-03 DIAGNOSIS — R78.81 E COLI BACTEREMIA: ICD-10-CM

## 2024-03-03 DIAGNOSIS — J18.9 COMMUNITY ACQUIRED PNEUMONIA OF RIGHT LOWER LOBE OF LUNG: ICD-10-CM

## 2024-03-03 PROBLEM — Z71.89 ACP (ADVANCE CARE PLANNING): Status: ACTIVE | Noted: 2023-07-18

## 2024-03-03 PROBLEM — A41.9 SEPSIS (HCC): Status: ACTIVE | Noted: 2024-03-03

## 2024-03-03 PROBLEM — E87.6 HYPOKALEMIA: Status: ACTIVE | Noted: 2024-03-03

## 2024-03-03 PROBLEM — F39 MOOD DISORDER (HCC): Status: ACTIVE | Noted: 2023-08-16

## 2024-03-03 PROBLEM — N17.9 AKI (ACUTE KIDNEY INJURY) (HCC): Status: ACTIVE | Noted: 2024-03-03

## 2024-03-03 PROBLEM — E66.9 CLASS 1 OBESITY IN ADULT: Status: ACTIVE | Noted: 2023-10-16

## 2024-03-03 PROBLEM — F10.20 ALCOHOL DEPENDENCE (HCC): Status: ACTIVE | Noted: 2024-03-03

## 2024-03-03 LAB
ALBUMIN SERPL BCP-MCNC: 2.9 G/DL (ref 3.2–4.9)
ALBUMIN/GLOB SERPL: 1 G/DL
ALP SERPL-CCNC: 86 U/L (ref 30–99)
ALT SERPL-CCNC: 17 U/L (ref 2–50)
ANION GAP SERPL CALC-SCNC: 13 MMOL/L (ref 7–16)
APPEARANCE UR: CLEAR
AST SERPL-CCNC: 32 U/L (ref 12–45)
BACTERIA #/AREA URNS HPF: ABNORMAL /HPF
BASOPHILS # BLD AUTO: 0.2 % (ref 0–1.8)
BASOPHILS # BLD: 0.04 K/UL (ref 0–0.12)
BILIRUB SERPL-MCNC: 0.6 MG/DL (ref 0.1–1.5)
BILIRUB UR QL STRIP.AUTO: NEGATIVE
BUN SERPL-MCNC: 17 MG/DL (ref 8–22)
CALCIUM ALBUM COR SERPL-MCNC: 8.9 MG/DL (ref 8.5–10.5)
CALCIUM SERPL-MCNC: 8 MG/DL (ref 8.5–10.5)
CHLORIDE SERPL-SCNC: 88 MMOL/L (ref 96–112)
CO2 SERPL-SCNC: 22 MMOL/L (ref 20–33)
COLOR UR: YELLOW
CREAT SERPL-MCNC: 1.19 MG/DL (ref 0.5–1.4)
CREAT UR-MCNC: 24.69 MG/DL
CRP SERPL HS-MCNC: 27.01 MG/DL (ref 0–0.75)
EOSINOPHIL # BLD AUTO: 0.14 K/UL (ref 0–0.51)
EOSINOPHIL NFR BLD: 0.7 % (ref 0–6.9)
EPI CELLS #/AREA URNS HPF: NEGATIVE /HPF
ERYTHROCYTE [DISTWIDTH] IN BLOOD BY AUTOMATED COUNT: 44.6 FL (ref 35.9–50)
GFR SERPLBLD CREATININE-BSD FMLA CKD-EPI: 49 ML/MIN/1.73 M 2
GLOBULIN SER CALC-MCNC: 2.8 G/DL (ref 1.9–3.5)
GLUCOSE SERPL-MCNC: 142 MG/DL (ref 65–99)
GLUCOSE UR STRIP.AUTO-MCNC: NEGATIVE MG/DL
HCT VFR BLD AUTO: 29.9 % (ref 37–47)
HGB BLD-MCNC: 9.9 G/DL (ref 12–16)
HYALINE CASTS #/AREA URNS LPF: ABNORMAL /LPF
IMM GRANULOCYTES # BLD AUTO: 0.13 K/UL (ref 0–0.11)
IMM GRANULOCYTES NFR BLD AUTO: 0.7 % (ref 0–0.9)
KETONES UR STRIP.AUTO-MCNC: NEGATIVE MG/DL
LACTATE SERPL-SCNC: 1.9 MMOL/L (ref 0.5–2)
LEUKOCYTE ESTERASE UR QL STRIP.AUTO: ABNORMAL
LYMPHOCYTES # BLD AUTO: 1.46 K/UL (ref 1–4.8)
LYMPHOCYTES NFR BLD: 7.4 % (ref 22–41)
MCH RBC QN AUTO: 22.9 PG (ref 27–33)
MCHC RBC AUTO-ENTMCNC: 33.1 G/DL (ref 32.2–35.5)
MCV RBC AUTO: 69.2 FL (ref 81.4–97.8)
MICRO URNS: ABNORMAL
MONOCYTES # BLD AUTO: 2.03 K/UL (ref 0–0.85)
MONOCYTES NFR BLD AUTO: 10.2 % (ref 0–13.4)
NEUTROPHILS # BLD AUTO: 16.06 K/UL (ref 1.82–7.42)
NEUTROPHILS NFR BLD: 80.8 % (ref 44–72)
NITRITE UR QL STRIP.AUTO: NEGATIVE
NRBC # BLD AUTO: 0 K/UL
NRBC BLD-RTO: 0 /100 WBC (ref 0–0.2)
OSMOLALITY SERPL: 257 MOSM/KG H2O (ref 278–298)
OSMOLALITY UR: 154 MOSM/KG H2O (ref 300–900)
PH UR STRIP.AUTO: 6 [PH] (ref 5–8)
PLATELET # BLD AUTO: 196 K/UL (ref 164–446)
PMV BLD AUTO: 10.6 FL (ref 9–12.9)
POTASSIUM SERPL-SCNC: 3.4 MMOL/L (ref 3.6–5.5)
PROCALCITONIN SERPL-MCNC: 8.26 NG/ML
PROT SERPL-MCNC: 5.7 G/DL (ref 6–8.2)
PROT UR QL STRIP: NEGATIVE MG/DL
RBC # BLD AUTO: 4.32 M/UL (ref 4.2–5.4)
RBC # URNS HPF: ABNORMAL /HPF
RBC UR QL AUTO: ABNORMAL
SODIUM SERPL-SCNC: 123 MMOL/L (ref 135–145)
SODIUM UR-SCNC: <20 MMOL/L
SP GR UR STRIP.AUTO: 1
UROBILINOGEN UR STRIP.AUTO-MCNC: 0.2 MG/DL
WBC # BLD AUTO: 19.9 K/UL (ref 4.8–10.8)
WBC #/AREA URNS HPF: ABNORMAL /HPF

## 2024-03-03 PROCEDURE — 84300 ASSAY OF URINE SODIUM: CPT

## 2024-03-03 PROCEDURE — 87015 SPECIMEN INFECT AGNT CONCNTJ: CPT

## 2024-03-03 PROCEDURE — 99285 EMERGENCY DEPT VISIT HI MDM: CPT

## 2024-03-03 PROCEDURE — 83930 ASSAY OF BLOOD OSMOLALITY: CPT

## 2024-03-03 PROCEDURE — 700102 HCHG RX REV CODE 250 W/ 637 OVERRIDE(OP): Performed by: STUDENT IN AN ORGANIZED HEALTH CARE EDUCATION/TRAINING PROGRAM

## 2024-03-03 PROCEDURE — 700101 HCHG RX REV CODE 250: Performed by: STUDENT IN AN ORGANIZED HEALTH CARE EDUCATION/TRAINING PROGRAM

## 2024-03-03 PROCEDURE — 80053 COMPREHEN METABOLIC PANEL: CPT

## 2024-03-03 PROCEDURE — 83605 ASSAY OF LACTIC ACID: CPT

## 2024-03-03 PROCEDURE — 86140 C-REACTIVE PROTEIN: CPT

## 2024-03-03 PROCEDURE — 36415 COLL VENOUS BLD VENIPUNCTURE: CPT

## 2024-03-03 PROCEDURE — 82570 ASSAY OF URINE CREATININE: CPT

## 2024-03-03 PROCEDURE — 770006 HCHG ROOM/CARE - MED/SURG/GYN SEMI*

## 2024-03-03 PROCEDURE — 83935 ASSAY OF URINE OSMOLALITY: CPT

## 2024-03-03 PROCEDURE — 87186 SC STD MICRODIL/AGAR DIL: CPT

## 2024-03-03 PROCEDURE — 94640 AIRWAY INHALATION TREATMENT: CPT

## 2024-03-03 PROCEDURE — 85025 COMPLETE CBC W/AUTO DIFF WBC: CPT

## 2024-03-03 PROCEDURE — 96374 THER/PROPH/DIAG INJ IV PUSH: CPT

## 2024-03-03 PROCEDURE — 700111 HCHG RX REV CODE 636 W/ 250 OVERRIDE (IP): Mod: JZ | Performed by: EMERGENCY MEDICINE

## 2024-03-03 PROCEDURE — 87077 CULTURE AEROBIC IDENTIFY: CPT

## 2024-03-03 PROCEDURE — 87086 URINE CULTURE/COLONY COUNT: CPT

## 2024-03-03 PROCEDURE — 81001 URINALYSIS AUTO W/SCOPE: CPT

## 2024-03-03 PROCEDURE — 99223 1ST HOSP IP/OBS HIGH 75: CPT | Mod: AI | Performed by: STUDENT IN AN ORGANIZED HEALTH CARE EDUCATION/TRAINING PROGRAM

## 2024-03-03 PROCEDURE — A9270 NON-COVERED ITEM OR SERVICE: HCPCS | Mod: JZ | Performed by: STUDENT IN AN ORGANIZED HEALTH CARE EDUCATION/TRAINING PROGRAM

## 2024-03-03 PROCEDURE — 71045 X-RAY EXAM CHEST 1 VIEW: CPT

## 2024-03-03 PROCEDURE — 700111 HCHG RX REV CODE 636 W/ 250 OVERRIDE (IP): Performed by: STUDENT IN AN ORGANIZED HEALTH CARE EDUCATION/TRAINING PROGRAM

## 2024-03-03 PROCEDURE — 700105 HCHG RX REV CODE 258: Performed by: STUDENT IN AN ORGANIZED HEALTH CARE EDUCATION/TRAINING PROGRAM

## 2024-03-03 PROCEDURE — 94669 MECHANICAL CHEST WALL OSCILL: CPT

## 2024-03-03 PROCEDURE — 84145 PROCALCITONIN (PCT): CPT

## 2024-03-03 PROCEDURE — 87040 BLOOD CULTURE FOR BACTERIA: CPT

## 2024-03-03 RX ORDER — IPRATROPIUM BROMIDE AND ALBUTEROL SULFATE 2.5; .5 MG/3ML; MG/3ML
3 SOLUTION RESPIRATORY (INHALATION)
Status: DISCONTINUED | OUTPATIENT
Start: 2024-03-03 | End: 2024-03-03

## 2024-03-03 RX ORDER — VITAMIN B COMPLEX
2000 TABLET ORAL DAILY
Status: DISCONTINUED | OUTPATIENT
Start: 2024-03-04 | End: 2024-03-09 | Stop reason: HOSPADM

## 2024-03-03 RX ORDER — SODIUM CHLORIDE, SODIUM LACTATE, POTASSIUM CHLORIDE, AND CALCIUM CHLORIDE .6; .31; .03; .02 G/100ML; G/100ML; G/100ML; G/100ML
500 INJECTION, SOLUTION INTRAVENOUS
Status: DISCONTINUED | OUTPATIENT
Start: 2024-03-03 | End: 2024-03-09 | Stop reason: HOSPADM

## 2024-03-03 RX ORDER — IPRATROPIUM BROMIDE AND ALBUTEROL SULFATE 2.5; .5 MG/3ML; MG/3ML
3 SOLUTION RESPIRATORY (INHALATION)
Status: DISPENSED | OUTPATIENT
Start: 2024-03-03 | End: 2024-03-03

## 2024-03-03 RX ORDER — SODIUM CHLORIDE, SODIUM LACTATE, POTASSIUM CHLORIDE, CALCIUM CHLORIDE 600; 310; 30; 20 MG/100ML; MG/100ML; MG/100ML; MG/100ML
INJECTION, SOLUTION INTRAVENOUS CONTINUOUS
Status: DISCONTINUED | OUTPATIENT
Start: 2024-03-03 | End: 2024-03-03

## 2024-03-03 RX ORDER — DOXYCYCLINE 100 MG/1
100 TABLET ORAL EVERY 12 HOURS
Qty: 10 TABLET | Refills: 0 | Status: DISCONTINUED | OUTPATIENT
Start: 2024-03-03 | End: 2024-03-06

## 2024-03-03 RX ORDER — UREA 10 %
3 LOTION (ML) TOPICAL NIGHTLY
Status: DISCONTINUED | OUTPATIENT
Start: 2024-03-03 | End: 2024-03-09 | Stop reason: HOSPADM

## 2024-03-03 RX ORDER — LEVOTHYROXINE SODIUM 0.12 MG/1
125 TABLET ORAL
Status: DISCONTINUED | OUTPATIENT
Start: 2024-03-04 | End: 2024-03-09 | Stop reason: HOSPADM

## 2024-03-03 RX ORDER — LABETALOL HYDROCHLORIDE 5 MG/ML
10 INJECTION, SOLUTION INTRAVENOUS EVERY 4 HOURS PRN
Status: DISCONTINUED | OUTPATIENT
Start: 2024-03-03 | End: 2024-03-09 | Stop reason: HOSPADM

## 2024-03-03 RX ORDER — ALBUTEROL SULFATE 90 UG/1
2 AEROSOL, METERED RESPIRATORY (INHALATION) EVERY 4 HOURS PRN
Status: DISCONTINUED | OUTPATIENT
Start: 2024-03-03 | End: 2024-03-09 | Stop reason: HOSPADM

## 2024-03-03 RX ORDER — FOLIC ACID 1 MG/1
1 TABLET ORAL DAILY
Status: DISPENSED | OUTPATIENT
Start: 2024-03-03 | End: 2024-03-07

## 2024-03-03 RX ORDER — POTASSIUM CHLORIDE 20 MEQ/1
40 TABLET, EXTENDED RELEASE ORAL ONCE
Status: COMPLETED | OUTPATIENT
Start: 2024-03-03 | End: 2024-03-03

## 2024-03-03 RX ORDER — ESCITALOPRAM OXALATE 10 MG/1
10 TABLET ORAL DAILY
Status: DISCONTINUED | OUTPATIENT
Start: 2024-03-04 | End: 2024-03-09 | Stop reason: HOSPADM

## 2024-03-03 RX ORDER — GAUZE BANDAGE 2" X 2"
100 BANDAGE TOPICAL DAILY
Status: DISPENSED | OUTPATIENT
Start: 2024-03-03 | End: 2024-03-07

## 2024-03-03 RX ORDER — SIMVASTATIN 40 MG
40 TABLET ORAL NIGHTLY
Status: DISCONTINUED | OUTPATIENT
Start: 2024-03-03 | End: 2024-03-09 | Stop reason: HOSPADM

## 2024-03-03 RX ORDER — ASPIRIN 81 MG/1
81 TABLET ORAL DAILY
Status: DISCONTINUED | OUTPATIENT
Start: 2024-03-04 | End: 2024-03-07

## 2024-03-03 RX ORDER — ONDANSETRON 4 MG/1
4 TABLET, ORALLY DISINTEGRATING ORAL EVERY 4 HOURS PRN
Status: DISCONTINUED | OUTPATIENT
Start: 2024-03-03 | End: 2024-03-09 | Stop reason: HOSPADM

## 2024-03-03 RX ORDER — CEFTRIAXONE 1 G/1
1000 INJECTION, POWDER, FOR SOLUTION INTRAMUSCULAR; INTRAVENOUS ONCE
Status: COMPLETED | OUTPATIENT
Start: 2024-03-03 | End: 2024-03-03

## 2024-03-03 RX ORDER — ONDANSETRON 2 MG/ML
4 INJECTION INTRAMUSCULAR; INTRAVENOUS EVERY 4 HOURS PRN
Status: DISCONTINUED | OUTPATIENT
Start: 2024-03-03 | End: 2024-03-09 | Stop reason: HOSPADM

## 2024-03-03 RX ORDER — DOXYCYCLINE 100 MG/1
100 TABLET ORAL EVERY 12 HOURS
Status: DISCONTINUED | OUTPATIENT
Start: 2024-03-03 | End: 2024-03-03

## 2024-03-03 RX ORDER — IPRATROPIUM BROMIDE AND ALBUTEROL SULFATE 2.5; .5 MG/3ML; MG/3ML
3 SOLUTION RESPIRATORY (INHALATION)
Status: DISCONTINUED | OUTPATIENT
Start: 2024-03-03 | End: 2024-03-09 | Stop reason: HOSPADM

## 2024-03-03 RX ORDER — LEVETIRACETAM 250 MG/1
250 TABLET ORAL 3 TIMES DAILY
Status: DISCONTINUED | OUTPATIENT
Start: 2024-03-03 | End: 2024-03-07

## 2024-03-03 RX ORDER — ACETAMINOPHEN 325 MG/1
650 TABLET ORAL EVERY 6 HOURS PRN
Status: DISCONTINUED | OUTPATIENT
Start: 2024-03-03 | End: 2024-03-07

## 2024-03-03 RX ORDER — POLYETHYLENE GLYCOL 3350 17 G/17G
1 POWDER, FOR SOLUTION ORAL
Status: DISCONTINUED | OUTPATIENT
Start: 2024-03-03 | End: 2024-03-09 | Stop reason: HOSPADM

## 2024-03-03 RX ORDER — DIVALPROEX SODIUM 250 MG/1
250 TABLET, EXTENDED RELEASE ORAL DAILY
Status: DISCONTINUED | OUTPATIENT
Start: 2024-03-04 | End: 2024-03-07

## 2024-03-03 RX ORDER — AMOXICILLIN 250 MG
2 CAPSULE ORAL EVERY EVENING
Status: DISCONTINUED | OUTPATIENT
Start: 2024-03-03 | End: 2024-03-09 | Stop reason: HOSPADM

## 2024-03-03 RX ORDER — MAGNESIUM SULFATE 1 G/100ML
1 INJECTION INTRAVENOUS ONCE
Status: COMPLETED | OUTPATIENT
Start: 2024-03-03 | End: 2024-03-03

## 2024-03-03 RX ORDER — HEPARIN SODIUM 5000 [USP'U]/ML
5000 INJECTION, SOLUTION INTRAVENOUS; SUBCUTANEOUS EVERY 8 HOURS
Status: DISCONTINUED | OUTPATIENT
Start: 2024-03-03 | End: 2024-03-06

## 2024-03-03 RX ORDER — HYDROCHLOROTHIAZIDE 12.5 MG/1
12.5 TABLET ORAL EVERY MORNING
Status: DISCONTINUED | OUTPATIENT
Start: 2024-03-03 | End: 2024-03-03

## 2024-03-03 RX ORDER — SODIUM CHLORIDE, SODIUM LACTATE, POTASSIUM CHLORIDE, CALCIUM CHLORIDE 600; 310; 30; 20 MG/100ML; MG/100ML; MG/100ML; MG/100ML
INJECTION, SOLUTION INTRAVENOUS CONTINUOUS
Status: ACTIVE | OUTPATIENT
Start: 2024-03-03 | End: 2024-03-04

## 2024-03-03 RX ADMIN — CEFTRIAXONE SODIUM 1000 MG: 1 INJECTION, POWDER, FOR SOLUTION INTRAMUSCULAR; INTRAVENOUS at 13:08

## 2024-03-03 RX ADMIN — DOXYCYCLINE 100 MG: 100 TABLET, FILM COATED ORAL at 17:33

## 2024-03-03 RX ADMIN — HEPARIN SODIUM 5000 UNITS: 5000 INJECTION, SOLUTION INTRAVENOUS; SUBCUTANEOUS at 21:02

## 2024-03-03 RX ADMIN — IPRATROPIUM BROMIDE AND ALBUTEROL SULFATE 3 ML: 2.5; .5 SOLUTION RESPIRATORY (INHALATION) at 16:16

## 2024-03-03 RX ADMIN — Medication 100 MG: at 14:33

## 2024-03-03 RX ADMIN — THERA TABS 1 TABLET: TAB at 14:33

## 2024-03-03 RX ADMIN — MAGNESIUM SULFATE IN DEXTROSE 1 G: 10 INJECTION, SOLUTION INTRAVENOUS at 15:27

## 2024-03-03 RX ADMIN — FOLIC ACID 1 MG: 1 TABLET ORAL at 14:34

## 2024-03-03 RX ADMIN — SODIUM CHLORIDE, POTASSIUM CHLORIDE, SODIUM LACTATE AND CALCIUM CHLORIDE: 600; 310; 30; 20 INJECTION, SOLUTION INTRAVENOUS at 13:15

## 2024-03-03 RX ADMIN — VIBEGRON 75 MG: 75 TABLET, FILM COATED ORAL at 17:33

## 2024-03-03 RX ADMIN — LEVETIRACETAM 250 MG: 250 TABLET, FILM COATED ORAL at 14:33

## 2024-03-03 RX ADMIN — DOCUSATE SODIUM 50 MG AND SENNOSIDES 8.6 MG 2 TABLET: 8.6; 5 TABLET, FILM COATED ORAL at 17:33

## 2024-03-03 RX ADMIN — CLOZAPINE 150 MG: 25 TABLET ORAL at 17:34

## 2024-03-03 RX ADMIN — POTASSIUM CHLORIDE 40 MEQ: 1500 TABLET, EXTENDED RELEASE ORAL at 14:33

## 2024-03-03 RX ADMIN — SODIUM CHLORIDE, POTASSIUM CHLORIDE, SODIUM LACTATE AND CALCIUM CHLORIDE: 600; 310; 30; 20 INJECTION, SOLUTION INTRAVENOUS at 14:49

## 2024-03-03 RX ADMIN — SIMVASTATIN 40 MG: 40 TABLET, FILM COATED ORAL at 21:01

## 2024-03-03 RX ADMIN — Medication 3 MG: at 21:01

## 2024-03-03 RX ADMIN — LEVETIRACETAM 250 MG: 250 TABLET, FILM COATED ORAL at 18:00

## 2024-03-03 RX ADMIN — POTASSIUM CHLORIDE 40 MEQ: 1500 TABLET, EXTENDED RELEASE ORAL at 17:35

## 2024-03-03 RX ADMIN — HEPARIN SODIUM 5000 UNITS: 5000 INJECTION, SOLUTION INTRAVENOUS; SUBCUTANEOUS at 14:34

## 2024-03-03 ASSESSMENT — LIFESTYLE VARIABLES
ON A TYPICAL DAY WHEN YOU DRINK ALCOHOL HOW MANY DRINKS DO YOU HAVE: 0
TOTAL SCORE: 0
DOES PATIENT WANT TO STOP DRINKING: NO
HAVE PEOPLE ANNOYED YOU BY CRITICIZING YOUR DRINKING: NO
DOES PATIENT WANT TO STOP DRINKING: NO
AVERAGE NUMBER OF DAYS PER WEEK YOU HAVE A DRINK CONTAINING ALCOHOL: 0
EVER FELT BAD OR GUILTY ABOUT YOUR DRINKING: NO
HAVE YOU EVER FELT YOU SHOULD CUT DOWN ON YOUR DRINKING: NO
TOTAL SCORE: 0
EVER HAD A DRINK FIRST THING IN THE MORNING TO STEADY YOUR NERVES TO GET RID OF A HANGOVER: NO
HOW MANY TIMES IN THE PAST YEAR HAVE YOU HAD 5 OR MORE DRINKS IN A DAY: 0
TOTAL SCORE: 0
CONSUMPTION TOTAL: NEGATIVE
ALCOHOL_USE: NO
CONSUMPTION TOTAL: INCOMPLETE
EVER FELT BAD OR GUILTY ABOUT YOUR DRINKING: NO
SUBSTANCE_ABUSE: 0
HAVE YOU EVER FELT YOU SHOULD CUT DOWN ON YOUR DRINKING: NO
ALCOHOL_USE: NO
TOTAL SCORE: 0
TOTAL SCORE: 0
HAVE PEOPLE ANNOYED YOU BY CRITICIZING YOUR DRINKING: NO
TOTAL SCORE: 0
EVER HAD A DRINK FIRST THING IN THE MORNING TO STEADY YOUR NERVES TO GET RID OF A HANGOVER: NO

## 2024-03-03 ASSESSMENT — ENCOUNTER SYMPTOMS
MYALGIAS: 0
DEPRESSION: 0
FLANK PAIN: 0
FOCAL WEAKNESS: 0
HEADACHES: 0
DOUBLE VISION: 0
SHORTNESS OF BREATH: 0
COUGH: 0
BLURRED VISION: 0
VOMITING: 0
CHILLS: 1
HEARTBURN: 0
WHEEZING: 1
WEAKNESS: 1
DIZZINESS: 0
PALPITATIONS: 0
FEVER: 1
ABDOMINAL PAIN: 0
NAUSEA: 0
BRUISES/BLEEDS EASILY: 0

## 2024-03-03 ASSESSMENT — FIBROSIS 4 INDEX
FIB4 SCORE: 2.81
FIB4 SCORE: 1.22
FIB4 SCORE: 2.81

## 2024-03-03 ASSESSMENT — COGNITIVE AND FUNCTIONAL STATUS - GENERAL
CLIMB 3 TO 5 STEPS WITH RAILING: A LOT
EATING MEALS: A LITTLE
TOILETING: A LOT
MOBILITY SCORE: 12
MOVING FROM LYING ON BACK TO SITTING ON SIDE OF FLAT BED: A LOT
SUGGESTED CMS G CODE MODIFIER MOBILITY: CL
SUGGESTED CMS G CODE MODIFIER DAILY ACTIVITY: CL
DRESSING REGULAR UPPER BODY CLOTHING: A LOT
DRESSING REGULAR LOWER BODY CLOTHING: A LOT
DAILY ACTIVITIY SCORE: 13
HELP NEEDED FOR BATHING: A LOT
PERSONAL GROOMING: A LOT
STANDING UP FROM CHAIR USING ARMS: A LOT
TURNING FROM BACK TO SIDE WHILE IN FLAT BAD: A LOT
WALKING IN HOSPITAL ROOM: A LOT
MOVING TO AND FROM BED TO CHAIR: A LOT

## 2024-03-03 ASSESSMENT — PAIN DESCRIPTION - PAIN TYPE
TYPE: ACUTE PAIN
TYPE: ACUTE PAIN

## 2024-03-03 NOTE — ED PROVIDER NOTES
ER Provider Note    Scribed for Rima Vogel M.d. by Franny Ku. 3/3/2024  11:07 AM    Primary Care Provider: VANESSA Ricketts    CHIEF COMPLAINT  Chief Complaint   Patient presents with    Weakness     Pt bib ems from group home where she's staying as a watson of the [facility]. Group home called stating pt has increased weakness and unable to get around.     UTI     Per group home urine made them suspect UTI.      LIMITATION TO HISTORY   Select: Patient confused and poor historian    HPI/ROS  OUTSIDE HISTORIAN(S):  None    EXTERNAL RECORDS REVIEWED  Outpatient Notes outpatient primary care note reviewed from the end of February.  Has she has a history of chronic respiratory failure, obesity, COPD and trace lower extremity edema    Tracie Rinaldi is a 73 y.o. female who presents to the ED via EMS for weakness onset prior to arrival. Per EMS, patient is currently staying at a group home who reported increased weakness with difficulty getting around. Patient states she is normally not able to walk, and states she has been weak for a long time. She is unsure why the group home sent her to the ED today. Patient states she would like a wheelchair, she notes last time she was able to walk was last week. Group home was additionally concerned with possible urinary tract infection due to patient's urine. Denies dysuria or urinary frequency. The patient reports she is normally on oxygen at baseline, and is currently on 2L nasal canula. No known drug allergies.     PAST MEDICAL HISTORY  Past Medical History:   Diagnosis Date    Anxiety     Bladder spasm     Dental disorder     upper and lower dentures    Depression     Dermatitis     GERD (gastroesophageal reflux disease)     Hearing loss of right ear due to cerumen impaction     Hyperlipidemia     Hypothyroid     Other emphysema (HCC)     Pneumonia     2012    Psychiatric disorder     schizophrenia    Schizophrenia (HCC)     Seizure (HCC)     Stroke (HCC)     tia  june 2015 no residual     Unspecified urinary incontinence     diapers     Vitamin D deficiency      SURGICAL HISTORY  Past Surgical History:   Procedure Laterality Date    IRRIGATION & DEBRIDEMENT ORTHO Left 10/17/2015    Procedure: IRRIGATION & DEBRIDEMENT ORTHO foot;  Surgeon: Jluis Becker M.D.;  Location: SURGERY [facility];  Service:     CLOSED REDUCTION Left 7/9/2015    Procedure: CLOSED REDUCTION -ATTEMPTED;  Surgeon: Jluis Becker M.D.;  Location: SURGERY [facility];  Service:     PIN INSERTION  7/9/2015    Procedure: PIN INSERTION FOOT;  Surgeon: Jluis Becker M.D.;  Location: SURGERY [facility];  Service:      FAMILY HISTORY  Family History   Problem Relation Age of Onset    Alzheimer's Disease Mother     Heart Disease Father      SOCIAL HISTORY   reports that she has quit smoking. Her smoking use included cigarettes. She has a 22.5 pack-year smoking history. She has never used smokeless tobacco. She reports that she does not drink alcohol and does not use drugs.    CURRENT MEDICATIONS  Current Discharge Medication List        CONTINUE these medications which have NOT CHANGED    Details   Melatonin 3 MG TABLET DISPERSIBLE Take 3 mg by mouth every evening.  Qty: 30 Tablet, Refills: 11      hydroCHLOROthiazide 12.5 MG tablet Take 1 Tablet by mouth every morning.  Qty: 30 Tablet, Refills: 11      divalproex ER (DEPAKOTE ER) 250 MG TABLET SR 24 HR TAKE 1 TABLET BY MOUTH ONCE DAILY  Qty: 30 Tablet, Refills: 11    Associated Diagnoses: Chronic schizophrenia (HCC); Seizure (HCC)      umeclidinium bromide (INCRUSE ELLIPTA) 62.5 MCG/ACT AEROSOL POWDER, BREATH ACTIVATED inhaler INHALE 1 PUFF BY MOUTH ONCE DAILY  Qty: 30 Each, Refills: 10    Associated Diagnoses: COPD mixed type (HCC)      hydrophilic ointment (AQUAPHOR) Ointment Apply 1 Each topically at bedtime. Apply to hands  Qty: 198 g, Refills: 11    Associated Diagnoses: Generalized skin lesions      levothyroxine (SYNTHROID) 125 MCG Tab  Take 1 Tablet by mouth every morning on an empty stomach.  Qty: 90 Tablet, Refills: 3    Associated Diagnoses: Hypothyroidism, unspecified type      levETIRAcetam (KEPPRA) 250 MG tablet TAKE 1 TABLET BY MOUTH THREE TIMES A DAY  Qty: 90 Tablet, Refills: 11    Associated Diagnoses: Seizure (HCC)      ASPIRIN LOW DOSE 81 MG EC tablet TAKE 1 TABLET BY MOUTH ONCE DAILY  Qty: 30 Tablet, Refills: 11      Cholecalciferol 2000 UNIT Cap Take 1 Capsule by mouth every day.  Qty: 30 Capsule, Refills: 11    Comments: DC previous order      Mirabegron ER (MYRBETRIQ) 50 MG TABLET SR 24 HR Take 50 mg by mouth every evening.      simvastatin (ZOCOR) 40 MG Tab TAKE 1 TABLET BY MOUTH AT BEDTIME FOR HLD  Qty: 90 Tablet, Refills: 3    Comments: [facility] asked for a 90 day refill for pt's simvastatin be sent to [facility]  Associated Diagnoses: Dyslipidemia      escitalopram (LEXAPRO) 10 MG Tab Take 10 mg by mouth every day.      cloZAPine (CLOZARIL) 100 MG Tab Take 1.5 Tablets by mouth 2 times a day.  Qty: 30 Tablet, Refills: 0      Skin Protectants, Misc. (REMEDY PHYTOPLEX HYDRAGUARD) Cream Apply 1 Dose topically 2 times a day as needed. Apply thin layer to buttocks twice daily as needed for redness. DC scheduled order.      DESITIN 40 % Paste paste APPLY TOPICALLY TO AFFECTED AREA IN GROIN/BUTTOCKS TWICE A DAY  Qty: 454 g, Refills: 0    Comments: CAN WE GET A DC ORDER FOR THIS      ondansetron (ZOFRAN) 4 MG Tab tablet Take 1 Tablet by mouth every four hours as needed for Nausea/Vomiting.  Qty: 20 Tablet, Refills: 3      Incontinence Supply Disposable (FQ PROTECTIVE UNDERWEAR) Misc CHANGE 3 TIMES DAILY AS DIRECTED  Qty: 90 Each, Refills: 10      alendronate (FOSAMAX) 70 MG Tab Take 1 Tablet by mouth every 7 days.  Qty: 16 Tablet, Refills: 3    Associated Diagnoses: Age-related osteoporosis without current pathological fracture      VENTOLIN  (90 Base) MCG/ACT Aero Soln inhalation aerosol Inhale 2 Puffs every four hours as needed for  "Shortness of Breath.  Qty: 8.5 g, Refills: 2           ALLERGIES  Patient has no known allergies.    PHYSICAL EXAM  BP (!) 140/62   Pulse 86   Temp (!) 35.8 °C (96.4 °F) (Oral)   Resp 16   Ht 1.676 m (5' 6\")   Wt 90.7 kg (200 lb)   SpO2 94%   BMI 32.28 kg/m²   Constitutional: Alert in no apparent distress.  HENT: No signs of trauma, Bilateral external ears normal, Nose normal.   Eyes: Pupils are equal and reactive, Conjunctiva normal, Non-icteric.   Neck: No stridor.   Cardiovascular: Regular rate and rhythm, no murmurs.   Thorax & Lungs: Normal breath sounds, No respiratory distress, No wheezing, No chest tenderness.   Abdomen: Bowel sounds normal, Soft, No tenderness, No masses, No peritoneal signs.  Skin: Warm, Dry, No erythema, No rash.   Musculoskeletal:  No major deformities noted. No lower extremity edema. Intact sensation of her lower extremities  Neurologic: Alert, moving all extremities without difficulty, no focal deficits.    DIAGNOSTIC STUDIES & PROCEDURES  Labs:   Results for orders placed or performed during the hospital encounter of 03/03/24   CBC WITH DIFFERENTIAL   Result Value Ref Range    WBC 19.9 (H) 4.8 - 10.8 K/uL    RBC 4.32 4.20 - 5.40 M/uL    Hemoglobin 9.9 (L) 12.0 - 16.0 g/dL    Hematocrit 29.9 (L) 37.0 - 47.0 %    MCV 69.2 (L) 81.4 - 97.8 fL    MCH 22.9 (L) 27.0 - 33.0 pg    MCHC 33.1 32.2 - 35.5 g/dL    RDW 44.6 35.9 - 50.0 fL    Platelet Count 196 164 - 446 K/uL    MPV 10.6 9.0 - 12.9 fL    Neutrophils-Polys 80.80 (H) 44.00 - 72.00 %    Lymphocytes 7.40 (L) 22.00 - 41.00 %    Monocytes 10.20 0.00 - 13.40 %    Eosinophils 0.70 0.00 - 6.90 %    Basophils 0.20 0.00 - 1.80 %    Immature Granulocytes 0.70 0.00 - 0.90 %    Nucleated RBC 0.00 0.00 - 0.20 /100 WBC    Neutrophils (Absolute) 16.06 (H) 1.82 - 7.42 K/uL    Lymphs (Absolute) 1.46 1.00 - 4.80 K/uL    Monos (Absolute) 2.03 (H) 0.00 - 0.85 K/uL    Eos (Absolute) 0.14 0.00 - 0.51 K/uL    Baso (Absolute) 0.04 0.00 - 0.12 K/uL    " Immature Granulocytes (abs) 0.13 (H) 0.00 - 0.11 K/uL    NRBC (Absolute) 0.00 K/uL   COMP METABOLIC PANEL   Result Value Ref Range    Sodium 123 (L) 135 - 145 mmol/L    Potassium 3.4 (L) 3.6 - 5.5 mmol/L    Chloride 88 (L) 96 - 112 mmol/L    Co2 22 20 - 33 mmol/L    Anion Gap 13.0 7.0 - 16.0    Glucose 142 (H) 65 - 99 mg/dL    Bun 17 8 - 22 mg/dL    Creatinine 1.19 0.50 - 1.40 mg/dL    Calcium 8.0 (L) 8.5 - 10.5 mg/dL    Correct Calcium 8.9 8.5 - 10.5 mg/dL    AST(SGOT) 32 12 - 45 U/L    ALT(SGPT) 17 2 - 50 U/L    Alkaline Phosphatase 86 30 - 99 U/L    Total Bilirubin 0.6 0.1 - 1.5 mg/dL    Albumin 2.9 (L) 3.2 - 4.9 g/dL    Total Protein 5.7 (L) 6.0 - 8.2 g/dL    Globulin 2.8 1.9 - 3.5 g/dL    A-G Ratio 1.0 g/dL   URINALYSIS CULTURE, IF INDICATED    Specimen: Urine, Straight Cath   Result Value Ref Range    Color Yellow     Character Clear     Specific Gravity 1.005 <1.035    Ph 6.0 5.0 - 8.0    Glucose Negative Negative mg/dL    Ketones Negative Negative mg/dL    Protein Negative Negative mg/dL    Bilirubin Negative Negative    Urobilinogen, Urine 0.2 Negative    Nitrite Negative Negative    Leukocyte Esterase Moderate (A) Negative    Occult Blood Small (A) Negative    Micro Urine Req Microscopic    LACTIC ACID   Result Value Ref Range    Lactic Acid 1.9 0.5 - 2.0 mmol/L   ESTIMATED GFR   Result Value Ref Range    GFR (CKD-EPI) 49 (A) >60 mL/min/1.73 m 2   URINE MICROSCOPIC (W/UA)   Result Value Ref Range    WBC 10-20 (A) /hpf    RBC 0-2 /hpf    Bacteria Moderate (A) None /hpf    Epithelial Cells Negative /hpf    Hyaline Cast 0-2 /lpf   OSMOLALITY URINE   Result Value Ref Range    Osmolality Urine 154 (L) 300 - 900 mOsm/kg H2O   OSMOLALITY SERUM   Result Value Ref Range    Osmolality Serum 257 (L) 278 - 298 mOsm/kg H2O   CRP QUANTITIVE (NON-CARDIAC)   Result Value Ref Range    Stat C-Reactive Protein 27.01 (H) 0.00 - 0.75 mg/dL   PROCALCITONIN   Result Value Ref Range    Procalcitonin 8.26 (H) <0.25 ng/mL     All  labs reviewed by me.    COURSE & MEDICAL DECISION MAKING    ED Observation Status? No; Patient does not meet criteria for ED Observation.     11:07 AM - Patient seen and evaluated at bedside. Ordered CBC w/ diff, CMP and UA to evaluate. She understands and agrees to the plan of care.   Differential diagnoses include but are not limited to: urinary tract infection, electrolyte abnormality     12:40 PM - Paged Hospitalist.     12:50 PM - I discussed the patient's case and the above findings with Dr. Henderson (Hospitalist) who agreed to hospitalize the patient. Patient's care was transferred at this time.    INITIAL ASSESSMENT AND PLAN  Care Narrative: This is a 73-year-old female that presents for weakness.  Patient is really not a great historian and is hard to get additional history.  She previously however was able to walk and now is unable to secondary to generalized weakness.  She does have an elevated white count at 19.9 Schier is some associated hyponatremia as well.  She has evidence of UTI with 10-20 white cells with moderate bacteria.  She was given antibiotics.  Given the hyponatremia as well as UTI I do think she meets criteria for hospitalization for further management.  She is agreeable to this plan.                    DISPOSITION AND DISCUSSIONS  I have discussed management of the patient with the following physicians and KENNY's: Dr. Henderson (Hospitalist)    Discussion of management with other \[facility]\"" or appropriate source(s): None     Barriers to care at this time, including but not limited to:  None .     Decision tools and prescription drugs considered including, but not limited to: Antibiotics   .    DISPOSITION:  Patient will be hospitalized by Dr. Henderson in guarded condition.    FINAL IMPRESSION   1. Acute UTI    2. Weakness    3. Hyponatremia      Franny PAZ (Reina), am scribing for, and in the presence of, Rima Vogel M.D..    Electronically signed by: Franny Adhikari), 3/3/2024    Rima PAZ  JOZEF Vogel. personally performed the services described in this documentation, as scribed by Franny Ku in my presence, and it is both accurate and complete.    The note accurately reflects work and decisions made by me.  Rima Vogel M.D.  3/3/2024  5:26 PM

## 2024-03-03 NOTE — ED NOTES
Purewick applied for future urination and preventative measures for skin breakdown.   Pillows applied to L side to rotate pt onto R side.   Position in bed adjusted.   Pt quite anxious about being alone. Not complaints other than asking this RN to please not leave her.

## 2024-03-03 NOTE — ASSESSMENT & PLAN NOTE
Resolved  Acute on chronic, dehydration contributing  Urine studies appropriately sodium avid and dilute urine for free water elimination

## 2024-03-03 NOTE — ED NOTES
Pt arrived from [facility]. Paperwork from [facility] states this group home however no number provided on paperwork.   Contact information for guardian  # [phone]

## 2024-03-03 NOTE — ED NOTES
Med Rec complete per MAR from [facility] ([phone])   Allergies reviewed  Antibiotics in the past 30 days:no  Anticoagulant in past 14 days:no

## 2024-03-03 NOTE — ASSESSMENT & PLAN NOTE
She has POLST on file dated and signed 4/11/2018 by legal guardian -- DNR/DNI. Confirmed with patient.

## 2024-03-03 NOTE — H&P
Hospital Medicine History & Physical Note    Date of Service  3/3/2024    Primary Care Physician  DI Ricketts.    Consultants  None    Code Status  DNAR/DNI    Chief Complaint  Chief Complaint   Patient presents with    Weakness     Pt bib ems from group home where she[place] staying [place]. Group home called stating pt has increased weakness and unable to get around.     UTI     Per group home urine made them suspect UTI.        History of Presenting Illness  Tracie Rinaldi is a 73 y[place] with history of former tobacco abuse, COPD dependent on 2 L, alcohol dependence, seizure disorder, hypothyroidism, hyperlipidemia who presented 3/3/2024 with evaluation for generalized weakness.  Patient reported to have had increased generalized weakness, unable to ambulate.  Patient herself endorses subjective fever and chills, urinary frequency and urgency.  Her group home referred patient to ER due to concern of suspected UTI.  In ER, her UA does no pyuria, she does have leukocytosis with WBC of 19.9 K.  Admission requested by ERP.  Admitted to medicine service for further evaluation and treatment.    I discussed the plan of care with patient, family, bedside RN, and pharmacy.    Review of Systems  Review of Systems   Constitutional:  Positive for chills, fever and malaise/fatigue.   HENT:  Negative for tinnitus.    Eyes:  Negative for blurred vision and double vision.   Respiratory:  Positive for wheezing. Negative for cough and shortness of breath.    Cardiovascular:  Negative for chest pain and palpitations.   Gastrointestinal:  Negative for abdominal pain, heartburn, nausea and vomiting.   Genitourinary:  Positive for dysuria, frequency and urgency. Negative for flank pain and hematuria.   Musculoskeletal:  Negative for joint pain and myalgias.   Skin:  Negative for itching and rash.   Neurological:  Positive for weakness. Negative for dizziness, focal weakness and  headaches.   Endo/Heme/Allergies:  Negative for environmental allergies. Does not bruise/bleed easily.   Psychiatric/Behavioral:  Negative for depression and substance abuse.    All other systems reviewed and are negative.      Past Medical History   has a past medical history of Anxiety, Bladder spasm, Dental disorder, Depression, Dermatitis, GERD (gastroesophageal reflux disease), Hearing loss of right ear due to cerumen impaction, Hyperlipidemia, Hypothyroid, Other emphysema ([facility]), Pneumonia, Psychiatric disorder, Schizophrenia ([facility]), Seizure ([facility]), Stroke ([facility]), Unspecified urinary incontinence, and Vitamin D deficiency.    Surgical History   has a past surgical history that includes closed reduction (Left, 7/9/2015); pin insertion (7/9/2015); and irrigation & debridement ortho (Left, 10/17/2015).     Family History  family history includes Alzheimer's Disease in her mother; Heart Disease in her father.   Family history reviewed with patient. There is family history that is pertinent to the chief complaint.     Social History   reports that she has quit smoking. Her smoking use included cigarettes. She has a 22.5 pack-year smoking history. She has never used smokeless tobacco. She reports that she does not drink alcohol and does not use drugs.    Allergies  No Known Allergies    Medications  Prior to Admission Medications   Prescriptions Last Dose Informant Patient Reported? Taking?   ASPIRIN LOW DOSE 81 MG EC tablet   No No   Sig: TAKE 1 TABLET BY MOUTH ONCE DAILY   Cholecalciferol 2000 UNIT Cap   No No   Sig: Take 1 Capsule by mouth every day.   Patient taking differently: Take 1,000 Units by mouth every day.   DESITIN 40 % Paste paste   No No   Sig: APPLY TOPICALLY TO AFFECTED AREA IN GROIN/BUTTOCKS TWICE A DAY   Patient taking differently: Apply 1 Each topically 2 times a day as needed.   Incontinence Supply Disposable (FQ PROTECTIVE UNDERWEAR) [facility] [place]  MAR from Other Facility No No   Sig: CHANGE 3 TIMES DAILY  AS DIRECTED   Melatonin 3 MG TABLET DISPERSIBLE   No No   Sig: Take 3 mg by mouth every evening.   Mirabegron ER (MYRBETRIQ) 50 MG TABLET SR 24 HR  MAR from Other Facility Yes No   Sig: Take 50 mg by mouth every evening.   Skin Protectants, Misc. (REMEDY PHYTOPLEX HYDRAGUARD) Cream   Yes No   Sig: Apply 1 Dose topically 2 times a day as needed. Apply thin layer to buttocks twice daily as needed for redness. DC scheduled order.   VENTOLIN  (90 Base) MCG/ACT Aero Soln inhalation aerosol  MAR from Other Facility No No   Sig: Inhale 2 Puffs every four hours as needed for Shortness of Breath.   alendronate (FOSAMAX) 70 MG Tab  MAR from Other Facility No No   Sig: Take 1 Tablet by mouth every 7 days.   cloZAPine (CLOZARIL) 100 MG Tab  MAR from Other Facility No No   Sig: Take 1.5 Tablets by mouth 2 times a day.   divalproex ER (DEPAKOTE ER) 250 MG TABLET SR 24 HR   No No   Sig: TAKE 1 TABLET BY MOUTH ONCE DAILY   escitalopram (LEXAPRO) 10 MG Tab  MAR from Other Facility Yes No   Sig: Take 10 mg by mouth every day.   hydroCHLOROthiazide 12.5 MG tablet   No No   Sig: Take 1 Tablet by mouth every morning.   hydrophilic ointment (AQUAPHOR) Ointment   No No   Sig: Apply 1 Each topically at bedtime. Apply to hands   levETIRAcetam (KEPPRA) 250 MG tablet   No No   Sig: TAKE 1 TABLET BY MOUTH THREE TIMES A DAY   levothyroxine (SYNTHROID) 125 MCG Tab   No No   Sig: Take 1 Tablet by mouth every morning on an empty stomach.   ondansetron (ZOFRAN) 4 MG Tab tablet   No No   Sig: Take 1 Tablet by mouth every four hours as needed for Nausea/Vomiting.   simvastatin (ZOCOR) 40 MG Tab  MAR from Other Facility No No   Sig: TAKE 1 TABLET BY MOUTH AT BEDTIME FOR HLD   Patient taking differently: Take 40 mg by mouth every evening. TAKE 1 TABLET BY MOUTH AT BEDTIME FOR HLD   umeclidinium bromide (INCRUSE ELLIPTA) 62.5 MCG/ACT AEROSOL POWDER, BREATH ACTIVATED inhaler   No No   Sig: INHALE 1 PUFF BY MOUTH ONCE DAILY       Facility-Administered Medications: None       Physical Exam  Temp:  [35.8 °C (96.4 °F)-36.4 °C (97.5 °F)] 35.8 °C (96.4 °F)  Pulse:  [85-92] 92  Resp:  [16-18] 18  BP: (140-147)/(62-67) 147/66  SpO2:  [94 %-97 %] 96 %  Blood Pressure : (!) 146/67   Temperature: (!) 35.8 °C (96.4 °F)   Pulse: 91   Respiration: 18   Pulse Oximetry: 97 %       Physical Exam  Vitals and nursing note reviewed.   Constitutional:       General: She is not in acute distress.  HENT:      Head: Normocephalic and atraumatic.      Nose: Nose normal.      Mouth/Throat:      Mouth: Mucous membranes are dry.      Pharynx: Oropharynx is clear.   Eyes:      General: No scleral icterus.  Cardiovascular:      Rate and Rhythm: Normal rate and regular rhythm.      Pulses: Normal pulses.      Heart sounds:      No friction rub.   Pulmonary:      Effort: No respiratory distress.      Breath sounds: Wheezing and rales present.   Chest:      Chest wall: No tenderness.   Abdominal:      General: There is no distension.      Tenderness: There is no abdominal tenderness. There is no guarding or rebound.   Musculoskeletal:         General: Normal range of motion.      Cervical back: Neck supple. No tenderness.      Right lower leg: No edema.      Left lower leg: No edema.   Skin:     General: Skin is warm and dry.   Neurological:      General: No focal deficit present.      Mental Status: She is alert and oriented to person, place, and time.   Psychiatric:         Mood and Affect: Mood normal.         Laboratory:  Recent Labs     03/03/24  1100   WBC 19.9*   RBC 4.32   HEMOGLOBIN 9.9*   HEMATOCRIT 29.9*   MCV 69.2*   MCH 22.9*   MCHC 33.1   RDW 44.6   PLATELETCT 196   MPV 10.6     Recent Labs     03/03/24  1100   SODIUM 123*   POTASSIUM 3.4*   CHLORIDE 88*   CO2 22   GLUCOSE 142*   BUN 17   CREATININE 1.19   CALCIUM 8.0*     Recent Labs     03/03/24  1100   ALTSGPT 17   ASTSGOT 32   ALKPHOSPHAT 86   TBILIRUBIN 0.6   GLUCOSE 142*         No results for  "input(s): \"NTPROBNP\" in the last 72 hours.      No results for input(s): \"TROPONINT\" in the last 72 hours.    Imaging:  DX-CHEST-LIMITED (1 VIEW)   Final Result      1.  Hypoinflation with mild bibasilar atelectasis.   2.  Stable mild cardiomegaly.   3.  No lobar pneumonia or pneumothorax.          X-Ray:  I have personally reviewed the images and compared with prior images.    Assessment/Plan:  Justification for Admission Status  I anticipate this patient will require at least 2 midnights hospitalization, therefore appropriate for inpatient status.      * Acute UTI- (present on admission)  Assessment & Plan  UA pyuria  Follow urine culture  Antibiotic: Unasyn  -s/p ceftriaxone in ER    CAP (community acquired pneumonia)  Assessment & Plan  Leukopenia, elevated procalcitonin  Follow cultures  Abx: Unasyn, doxycyline    Sepsis (HCC)  Assessment & Plan  This is Sepsis Present on admission  SIRS criteria identified on my evaluation include: Leukocytosis, with WBC greater than 12,000 and Bandemia, greater than 10% bands  Clinical indicators of end organ dysfunction include Acute On Chronic Renal Failure, with creatinine >0.5 above baseline level  Source is   Sepsis protocol initiated  Crystalloid Fluid Administration: Fluid resuscitation ordered per standard protocol - 30 mL/kg per current or ideal body weight  IV antibiotics as appropriate for source of sepsis  Reassessment: I have reassessed the patient's hemodynamic status    Chronic respiratory failure with hypoxia (HCC)- (present on admission)  Assessment & Plan  Dependent on 2 L-at baseline    Mood disorder (HCC)- (present on admission)  Assessment & Plan  Clozapine, Depakote    Insomnia- (present on admission)  Assessment & Plan  Melatonin    Hyperlipidemia- (present on admission)  Assessment & Plan  Statin    Urinary incontinence- (present on admission)  Assessment & Plan  As needed bladder scan and straight cath  Alicea if needed    Hyponatremia- (present on " admission)  Assessment & Plan  Chronic  possible beer proteinemia, Hypovolemia  Check OSM    COPD mixed type ([facility])- (present on admission)  Assessment & Plan  Mild exacerbation  Audible wheezing-DuoNebs  Continue home regimen  She reported quitting smoking    Seizure ([facility])- (present on admission)  Assessment & Plan  Prior history  Continue Keppra    Hypothyroid- (present on admission)  Assessment & Plan  Synthroid    Hypokalemia  Assessment & Plan  Replace  Replace Mg    ALENA (acute kidney injury) ([facility])  Assessment & Plan  Mild, Cr 1.19  IVF  Check FeNa  Minimize nephrotoxic agents    Alcohol dependence ([facility])  Assessment & Plan  Reported consuming 1 bottle of wine daily  Multivitamins    Generalized weakness  Assessment & Plan  PT/OT  Fall precautions    Class 1 obesity in adult- (present on admission)  Assessment & Plan  Diet and lifestyle modification  Body mass index is 32.28 kg/m².      ACP (advance care planning)- (present on admission)  Assessment & Plan  She has POLST on file dated and signed 4/11/2018 by legal guardian -- DNR/DNI. Confirmed with patient.        VTE prophylaxis: heparin ppx

## 2024-03-03 NOTE — ASSESSMENT & PLAN NOTE
Mild exacerbation resolving  No wheezing today  Continue duoNebs and RT  following  Continue home regimen  She reported quitting smoking

## 2024-03-03 NOTE — ED TRIAGE NOTES
Chief Complaint   Patient presents with    Weakness     Pt bib ems from group home where she's staying [place]. Group home called stating pt has increased weakness and unable to get around.     UTI     Per group home urine made them suspect UTI.      Vitals stable on pt's 2L NC  baseline oxygen.

## 2024-03-03 NOTE — ASSESSMENT & PLAN NOTE
This is Sepsis Present on admission  SIRS criteria identified on my evaluation include: Leukocytosis, with WBC greater than 12,000 and Bandemia, greater than 10% bands  Clinical indicators of end organ dysfunction include Acute On Chronic Renal Failure, with creatinine >0.5 above baseline level  Source is   Sepsis protocol initiated  Crystalloid Fluid Administration: Fluid resuscitation ordered per standard protocol - 30 mL/kg per current or ideal body weight  IV antibiotics as appropriate for source of sepsis  Reassessment: I have reassessed the patient's hemodynamic status  resolved

## 2024-03-03 NOTE — PROGRESS NOTES
4 Eyes Skin Assessment Completed by MINAL Cabrera and MINAL Marroquin.    Head WDL  Ears WDL  Nose WDL  Mouth WDL  Neck WDL  Breast/Chest WDL  Shoulder Blades WDL  Spine WDL  (R) Arm/Elbow/Hand WDL  (L) Arm/Elbow/Hand WDL  Abdomen WDL  Groin WDL  Scrotum/Coccyx/Buttocks WDL  (R) Leg WDL  (L) Leg WDL  (R) Heel/Foot/Toe WDL  (L) Heel/Foot/Toe WDL          Devices In Places Nasal Cannula      Interventions In Place Pillows    Possible Skin Injury No    Pictures Uploaded Into Epic N/A  Wound Consult Placed N/A  RN Wound Prevention Protocol Ordered No

## 2024-03-03 NOTE — ED NOTES
Both sets of cultures collected and sent along with lactic.     Pt was incontinent of urine recently, unable to pee at this time. Brief changed.

## 2024-03-04 LAB
ALBUMIN SERPL BCP-MCNC: 3 G/DL (ref 3.2–4.9)
ALBUMIN/GLOB SERPL: 1.2 G/DL
ALP SERPL-CCNC: 85 U/L (ref 30–99)
ALT SERPL-CCNC: 18 U/L (ref 2–50)
ANION GAP SERPL CALC-SCNC: 10 MMOL/L (ref 7–16)
AST SERPL-CCNC: 27 U/L (ref 12–45)
BASOPHILS # BLD AUTO: 0.2 % (ref 0–1.8)
BASOPHILS # BLD: 0.03 K/UL (ref 0–0.12)
BILIRUB SERPL-MCNC: 0.7 MG/DL (ref 0.1–1.5)
BUN SERPL-MCNC: 14 MG/DL (ref 8–22)
CALCIUM ALBUM COR SERPL-MCNC: 8.8 MG/DL (ref 8.5–10.5)
CALCIUM SERPL-MCNC: 8 MG/DL (ref 8.5–10.5)
CHLORIDE SERPL-SCNC: 92 MMOL/L (ref 96–112)
CO2 SERPL-SCNC: 22 MMOL/L (ref 20–33)
CREAT SERPL-MCNC: 1 MG/DL (ref 0.5–1.4)
EOSINOPHIL # BLD AUTO: 0 K/UL (ref 0–0.51)
EOSINOPHIL NFR BLD: 0 % (ref 0–6.9)
ERYTHROCYTE [DISTWIDTH] IN BLOOD BY AUTOMATED COUNT: 44.8 FL (ref 35.9–50)
FERRITIN SERPL-MCNC: 102 NG/ML (ref 10–291)
GFR SERPLBLD CREATININE-BSD FMLA CKD-EPI: 60 ML/MIN/1.73 M 2
GLOBULIN SER CALC-MCNC: 2.6 G/DL (ref 1.9–3.5)
GLUCOSE SERPL-MCNC: 153 MG/DL (ref 65–99)
HCT VFR BLD AUTO: 27.9 % (ref 37–47)
HGB BLD-MCNC: 9.3 G/DL (ref 12–16)
IMM GRANULOCYTES # BLD AUTO: 0.06 K/UL (ref 0–0.11)
IMM GRANULOCYTES NFR BLD AUTO: 0.4 % (ref 0–0.9)
IRON SATN MFR SERPL: 3 % (ref 15–55)
IRON SERPL-MCNC: 8 UG/DL (ref 40–170)
LYMPHOCYTES # BLD AUTO: 1.1 K/UL (ref 1–4.8)
LYMPHOCYTES NFR BLD: 7.3 % (ref 22–41)
MAGNESIUM SERPL-MCNC: 2.1 MG/DL (ref 1.5–2.5)
MCH RBC QN AUTO: 22.9 PG (ref 27–33)
MCHC RBC AUTO-ENTMCNC: 33.3 G/DL (ref 32.2–35.5)
MCV RBC AUTO: 68.6 FL (ref 81.4–97.8)
MONOCYTES # BLD AUTO: 1.89 K/UL (ref 0–0.85)
MONOCYTES NFR BLD AUTO: 12.5 % (ref 0–13.4)
NEUTROPHILS # BLD AUTO: 12.08 K/UL (ref 1.82–7.42)
NEUTROPHILS NFR BLD: 79.6 % (ref 44–72)
NRBC # BLD AUTO: 0 K/UL
NRBC BLD-RTO: 0 /100 WBC (ref 0–0.2)
PHOSPHATE SERPL-MCNC: 2.5 MG/DL (ref 2.5–4.5)
PLATELET # BLD AUTO: 183 K/UL (ref 164–446)
PMV BLD AUTO: 10.9 FL (ref 9–12.9)
POTASSIUM SERPL-SCNC: 4.3 MMOL/L (ref 3.6–5.5)
PROT SERPL-MCNC: 5.6 G/DL (ref 6–8.2)
RBC # BLD AUTO: 4.07 M/UL (ref 4.2–5.4)
SODIUM SERPL-SCNC: 124 MMOL/L (ref 135–145)
TIBC SERPL-MCNC: 272 UG/DL (ref 250–450)
UIBC SERPL-MCNC: 264 UG/DL (ref 110–370)
WBC # BLD AUTO: 15.2 K/UL (ref 4.8–10.8)

## 2024-03-04 PROCEDURE — 83735 ASSAY OF MAGNESIUM: CPT

## 2024-03-04 PROCEDURE — 94664 DEMO&/EVAL PT USE INHALER: CPT

## 2024-03-04 PROCEDURE — 83540 ASSAY OF IRON: CPT

## 2024-03-04 PROCEDURE — 770006 HCHG ROOM/CARE - MED/SURG/GYN SEMI*

## 2024-03-04 PROCEDURE — 700102 HCHG RX REV CODE 250 W/ 637 OVERRIDE(OP): Performed by: STUDENT IN AN ORGANIZED HEALTH CARE EDUCATION/TRAINING PROGRAM

## 2024-03-04 PROCEDURE — 99233 SBSQ HOSP IP/OBS HIGH 50: CPT | Performed by: HOSPITALIST

## 2024-03-04 PROCEDURE — 700105 HCHG RX REV CODE 258: Performed by: HOSPITALIST

## 2024-03-04 PROCEDURE — 700111 HCHG RX REV CODE 636 W/ 250 OVERRIDE (IP): Performed by: STUDENT IN AN ORGANIZED HEALTH CARE EDUCATION/TRAINING PROGRAM

## 2024-03-04 PROCEDURE — 94669 MECHANICAL CHEST WALL OSCILL: CPT

## 2024-03-04 PROCEDURE — 36415 COLL VENOUS BLD VENIPUNCTURE: CPT

## 2024-03-04 PROCEDURE — 97166 OT EVAL MOD COMPLEX 45 MIN: CPT

## 2024-03-04 PROCEDURE — 84100 ASSAY OF PHOSPHORUS: CPT

## 2024-03-04 PROCEDURE — 85025 COMPLETE CBC W/AUTO DIFF WBC: CPT

## 2024-03-04 PROCEDURE — 80053 COMPREHEN METABOLIC PANEL: CPT

## 2024-03-04 PROCEDURE — A9270 NON-COVERED ITEM OR SERVICE: HCPCS | Performed by: STUDENT IN AN ORGANIZED HEALTH CARE EDUCATION/TRAINING PROGRAM

## 2024-03-04 PROCEDURE — 700111 HCHG RX REV CODE 636 W/ 250 OVERRIDE (IP)

## 2024-03-04 PROCEDURE — 83550 IRON BINDING TEST: CPT

## 2024-03-04 PROCEDURE — 99222 1ST HOSP IP/OBS MODERATE 55: CPT | Mod: GC | Performed by: STUDENT IN AN ORGANIZED HEALTH CARE EDUCATION/TRAINING PROGRAM

## 2024-03-04 PROCEDURE — 82728 ASSAY OF FERRITIN: CPT

## 2024-03-04 RX ORDER — SODIUM CHLORIDE 9 MG/ML
INJECTION, SOLUTION INTRAVENOUS CONTINUOUS
Status: DISCONTINUED | OUTPATIENT
Start: 2024-03-04 | End: 2024-03-06

## 2024-03-04 RX ADMIN — DOXYCYCLINE 100 MG: 100 TABLET, FILM COATED ORAL at 05:22

## 2024-03-04 RX ADMIN — DOXYCYCLINE 100 MG: 100 TABLET, FILM COATED ORAL at 16:14

## 2024-03-04 RX ADMIN — ESCITALOPRAM OXALATE 10 MG: 10 TABLET ORAL at 05:21

## 2024-03-04 RX ADMIN — ASPIRIN 81 MG: 81 TABLET, COATED ORAL at 05:21

## 2024-03-04 RX ADMIN — SODIUM CHLORIDE: 9 INJECTION, SOLUTION INTRAVENOUS at 16:03

## 2024-03-04 RX ADMIN — HEPARIN SODIUM 5000 UNITS: 5000 INJECTION, SOLUTION INTRAVENOUS; SUBCUTANEOUS at 21:15

## 2024-03-04 RX ADMIN — HEPARIN SODIUM 5000 UNITS: 5000 INJECTION, SOLUTION INTRAVENOUS; SUBCUTANEOUS at 05:23

## 2024-03-04 RX ADMIN — CLOZAPINE 150 MG: 25 TABLET ORAL at 05:22

## 2024-03-04 RX ADMIN — HEPARIN SODIUM 5000 UNITS: 5000 INJECTION, SOLUTION INTRAVENOUS; SUBCUTANEOUS at 13:30

## 2024-03-04 RX ADMIN — LEVOTHYROXINE SODIUM 125 MCG: 0.12 TABLET ORAL at 05:23

## 2024-03-04 RX ADMIN — Medication 3 MG: at 21:15

## 2024-03-04 RX ADMIN — DIVALPROEX SODIUM 250 MG: 250 TABLET, EXTENDED RELEASE ORAL at 05:23

## 2024-03-04 RX ADMIN — VIBEGRON 75 MG: 75 TABLET, FILM COATED ORAL at 16:14

## 2024-03-04 RX ADMIN — CEFTRIAXONE SODIUM 2000 MG: 10 INJECTION, POWDER, FOR SOLUTION INTRAVENOUS at 06:15

## 2024-03-04 RX ADMIN — SIMVASTATIN 40 MG: 40 TABLET, FILM COATED ORAL at 21:15

## 2024-03-04 RX ADMIN — Medication 2000 UNITS: at 05:23

## 2024-03-04 RX ADMIN — DOCUSATE SODIUM 50 MG AND SENNOSIDES 8.6 MG 2 TABLET: 8.6; 5 TABLET, FILM COATED ORAL at 16:14

## 2024-03-04 RX ADMIN — FOLIC ACID 1 MG: 1 TABLET ORAL at 05:22

## 2024-03-04 RX ADMIN — LEVETIRACETAM 250 MG: 250 TABLET, FILM COATED ORAL at 16:14

## 2024-03-04 RX ADMIN — CLOZAPINE 150 MG: 25 TABLET ORAL at 16:14

## 2024-03-04 RX ADMIN — LEVETIRACETAM 250 MG: 250 TABLET, FILM COATED ORAL at 13:30

## 2024-03-04 RX ADMIN — LEVETIRACETAM 250 MG: 250 TABLET, FILM COATED ORAL at 05:22

## 2024-03-04 RX ADMIN — THERA TABS 1 TABLET: TAB at 05:22

## 2024-03-04 RX ADMIN — Medication 100 MG: at 05:22

## 2024-03-04 ASSESSMENT — COGNITIVE AND FUNCTIONAL STATUS - GENERAL
DAILY ACTIVITIY SCORE: 17
DRESSING REGULAR LOWER BODY CLOTHING: A LITTLE
HELP NEEDED FOR BATHING: A LOT
SUGGESTED CMS G CODE MODIFIER DAILY ACTIVITY: CK
TOILETING: A LOT
PERSONAL GROOMING: A LITTLE
DRESSING REGULAR UPPER BODY CLOTHING: A LITTLE

## 2024-03-04 ASSESSMENT — ENCOUNTER SYMPTOMS
BLURRED VISION: 0
NEUROLOGICAL NEGATIVE: 1
DEPRESSION: 0
FOCAL WEAKNESS: 0
CARDIOVASCULAR NEGATIVE: 1
NAUSEA: 0
SORE THROAT: 0
MYALGIAS: 0
CHILLS: 0
GASTROINTESTINAL NEGATIVE: 1
FEVER: 0
PSYCHIATRIC NEGATIVE: 1
SHORTNESS OF BREATH: 0
WEIGHT LOSS: 0
PALPITATIONS: 0
COUGH: 0
BRUISES/BLEEDS EASILY: 0
EYES NEGATIVE: 1
VOMITING: 0
DIZZINESS: 0
DIAPHORESIS: 0
ABDOMINAL PAIN: 0
HEADACHES: 0
WEAKNESS: 0
MUSCULOSKELETAL NEGATIVE: 1

## 2024-03-04 ASSESSMENT — LIFESTYLE VARIABLES: SUBSTANCE_ABUSE: 0

## 2024-03-04 ASSESSMENT — ACTIVITIES OF DAILY LIVING (ADL): TOILETING: REQUIRES ASSIST

## 2024-03-04 ASSESSMENT — PAIN DESCRIPTION - PAIN TYPE: TYPE: ACUTE PAIN

## 2024-03-04 NOTE — PROGRESS NOTES
[facility] Medicine Daily Progress Note    Date of Service  3/4/2024    Chief Complaint  Tracie Rinaldi is a 73 y.o. female admitted 3/3/2024 with weakness    Hospital Course  Tracie Rinaldi is a 73 y.o[place] with history of former tobacco abuse, COPD dependent on 2 L, alcohol dependence, seizure disorder, hypothyroidism, hyperlipidemia who presented 3/3/2024 with evaluation for generalized weakness.  Patient reported to have had increased generalized weakness, unable to ambulate.  Patient herself endorses subjective fever and chills, urinary frequency and urgency.  Her group home referred patient to ER due to concern of suspected UTI.  In ER, her UA does no pyuria, she does have leukocytosis with WBC of 19.9 K.     Interval Problem Update  Axox2, person and place, thought it was Feb 4. States she is feeling better, denies dysuria. Vitals stable. Doing well on baseline 2L, NA slowly improving. ROS otherwise negative    I have discussed this patient's plan of care and discharge plan at IDT rounds today with Case Management, Nursing, Nursing leadership, and other members of the IDT team.    Consultants/Specialty    Code Status  DNAR/DNI    Disposition  The patient is not medically cleared for discharge to home or a post-acute facility.      I have placed the appropriate orders for post-discharge needs.    Review of Systems  Review of Systems   Constitutional:  Positive for malaise/fatigue. Negative for chills, diaphoresis, fever and weight loss.   HENT: Negative.  Negative for sore throat.    Eyes: Negative.  Negative for blurred vision.   Respiratory:  Negative for cough and shortness of breath.    Cardiovascular: Negative.  Negative for chest pain, palpitations and leg swelling.   Gastrointestinal: Negative.  Negative for abdominal pain, nausea and vomiting.   Genitourinary: Negative.  Negative for dysuria.   Musculoskeletal: Negative.  Negative for myalgias.   Skin: Negative.  Negative for  itching and rash.   Neurological: Negative.  Negative for dizziness, focal weakness, weakness and headaches.   Endo/Heme/Allergies: Negative.  Does not bruise/bleed easily.   Psychiatric/Behavioral: Negative.  Negative for depression, substance abuse and suicidal ideas.    All other systems reviewed and are negative.       Physical Exam  Temp:  [36.1 °C (96.9 °F)-37.3 °C (99.1 °F)] 36.5 °C (97.7 °F)  Pulse:  [72-95] 95  Resp:  [18-20] 18  BP: (128-141)/(49-68) 141/55  SpO2:  [94 %-98 %] 97 %    Physical Exam  Vitals and nursing note reviewed. Exam conducted with a chaperone present.   Constitutional:       General: She is not in acute distress.     Appearance: Normal appearance. She is not diaphoretic.   HENT:      Head: Normocephalic.      Nose: Nose normal.      Mouth/Throat:      Mouth: Mucous membranes are dry.   Eyes:      Pupils: Pupils are equal, round, and reactive to light.   Cardiovascular:      Rate and Rhythm: Normal rate and regular rhythm.      Pulses: Normal pulses.      Heart sounds: Normal heart sounds.   Pulmonary:      Effort: Pulmonary effort is normal.      Breath sounds: No rhonchi.   Abdominal:      General: Abdomen is flat. Bowel sounds are normal.      Palpations: Abdomen is soft.   Musculoskeletal:         General: No swelling or deformity. Normal range of motion.   Skin:     General: Skin is warm and dry.      Capillary Refill: Capillary refill takes less than 2 seconds.   Neurological:      General: No focal deficit present.      Mental Status: She is alert.      Cranial Nerves: No cranial nerve deficit.   Psychiatric:         Mood and Affect: Mood normal.         Behavior: Behavior normal.         Fluids    Intake/Output Summary (Last 24 hours) at 3/4/2024 1523  Last data filed at 3/4/2024 1000  Gross per 24 hour   Intake 720 ml   Output --   Net 720 ml       Laboratory  Recent Labs     03/03/24  1100 03/04/24  0556   WBC 19.9* 15.2*   RBC 4.32 4.07*   HEMOGLOBIN 9.9* 9.3*   HEMATOCRIT  29.9* 27.9*   MCV 69.2* 68.6*   MCH 22.9* 22.9*   MCHC 33.1 33.3   RDW 44.6 44.8   PLATELETCT 196 183   MPV 10.6 10.9     Recent Labs     03/03/24  1100 03/04/24  0556   SODIUM 123* 124*   POTASSIUM 3.4* 4.3   CHLORIDE 88* 92*   CO2 22 22   GLUCOSE 142* 153*   BUN 17 14   CREATININE 1.19 1.00   CALCIUM 8.0* 8.0*                   Imaging  DX-CHEST-LIMITED (1 VIEW)   Final Result      1.  Hypoinflation with mild bibasilar atelectasis.   2.  Stable mild cardiomegaly.   3.  No lobar pneumonia or pneumothorax.           Assessment/Plan  * Acute UTI- (present on admission)  Assessment & Plan  UA pyuria  Antibiotic: Unasyn  Cultures pending, so far negative    CAP (community acquired pneumonia)  Assessment & Plan  Leukopenia, elevated procalcitonin  Follow cultures  Abx: Unasyn, doxycyline    Sepsis ([facility])  Assessment & Plan  This is Sepsis Present on admission  SIRS criteria identified on my evaluation include: Leukocytosis, with WBC greater than 12,000 and Bandemia, greater than 10% bands  Clinical indicators of end organ dysfunction include Acute On Chronic Renal Failure, with creatinine >0.5 above baseline level  Source is   Sepsis protocol initiated  Crystalloid Fluid Administration: Fluid resuscitation ordered per standard protocol - 30 mL/kg per current or ideal body weight  IV antibiotics as appropriate for source of sepsis  Reassessment: I have reassessed the patient's hemodynamic status    Hypokalemia  Assessment & Plan  Replace  Replace Mg    ALENA (acute kidney injury) ([facility])  Assessment & Plan  Mild, Cr 1.19  IVF  Check FeNa  Minimize nephrotoxic agents    Alcohol dependence ([facility])  Assessment & Plan  Reported consuming 1 bottle of wine daily  Multivitamins    Generalized weakness  Assessment & Plan  PT/OT  Fall precautions    Class 1 obesity in adult- (present on admission)  Assessment & Plan  Diet and lifestyle modification  Body mass index is 32.28 kg/m².      Chronic respiratory failure with hypoxia ([facility])-  (present on admission)  Assessment & Plan  Dependent on 2 L-at baseline    Mood disorder (HCC)- (present on admission)  Assessment & Plan  Clozapine, Depakote    ACP (advance care planning)- (present on admission)  Assessment & Plan  She has POLST on file dated and signed 4/11/2018 by legal guardian -- DNR/DNI. Confirmed with patient.    Insomnia- (present on admission)  Assessment & Plan  Melatonin    Hyperlipidemia- (present on admission)  Assessment & Plan  Statin    Urinary incontinence- (present on admission)  Assessment & Plan  As needed bladder scan and straight cath  Alicea if needed    Hyponatremia- (present on admission)  Assessment & Plan  Acute on chronic, dehydration contributing  possible beer proteinemia, Hypovolemia  Slowly improving  Osmol studies pending  Following serial bmp to ensure that it does not correct too quickly   following    COPD mixed type (HCC)- (present on admission)  Assessment & Plan  Mild exacerbation  Audible wheezing-DuoNebs- now resolving, no wheezing today  Continue home regimen  She reported quitting smoking    Seizure ([facility])- (present on admission)  Assessment & Plan  Prior history  Continue Keppra  Continue seizure precautions    Hypothyroid- (present on admission)  Assessment & Plan  Synthroid         VTE prophylaxis: VTE Selection    I have performed a physical exam and reviewed and updated ROS and Plan today (3/4/2024). In review of yesterday's note (3/3/2024), there are no changes except as documented above.

## 2024-03-04 NOTE — CONSULTS
"Pulmonary Consultaiton:     Date of Service: 3/4/2024    Date of Consult: 3/4/2024    Referring Physician: Jazmine Vasquez MD    Reason for consult:  Acute on chronic respiratory failure      Hospital Course: \"Ms. Rinaldi is a 73 y.o. female who presents to the ED via EMS for weakness onset prior to arrival. Per EMS, patient is currently staying at a group home who reported increased weakness with difficulty getting around. Patient states she is normally not able to walk, and states she has been weak for a long time. She is unsure why the group home sent her to the ED today. Patient states she would like a wheelchair, she notes last time she was able to walk was last week. Group home was additionally concerned with possible urinary tract infection due to patient's urine. Denies dysuria or urinary frequency. The patient reports she is normally on oxygen at baseline, and is currently on 2L nasal canula. No known drug allergies.  \"    PFTs from 8/2019:    SPIROMETRY:  Showed FVC of 2.75 liters, 75% of predicted.  FEV1 was 1.9   liters, 67% of predicted.  FEV1/FVC ratio was 69%.  There was no significant   response to bronchodilators.     LUNG VOLUMES:  Showed severe air trapping with residual volume of 3.8 liters,   179% of predicted.  Total lung capacity was normal at 101% of predicted.     Diffusion capacity was 101% of predicted.    Overnight events/Interval Events:  96.4 F.  HR 95-95.  RR 18-20. //66. Na 124 (baseline 130-134). Iron 8. Iron sat 3%.  Ferritin 102.  Procal 8.26. CRP 27.01.  WBC 15.2 (19.9 on initial presentation) Hemoglobin 8.3. Blood culture positive for e. Coli.  Random urine sodium <20. Urine osmolality 154.  Serum osmolality 257.  UA showed moderate leukocyt esterase activity.  Ucx positive for e coli.  Primary team ordered Ceftriaxone/doxycycline.      Subjective       Subjective: C/o mild SOB. Denies fever, chills. Unable to answer whether she is experiencing any dysuria, urinary " frequency, or hematuria.       Past Medical History:     Past medical History:    Past Medical History:   Diagnosis Date    Anxiety     Bladder spasm     Dental disorder     upper and lower dentures    Depression     Dermatitis     GERD (gastroesophageal reflux disease)     Hearing loss of right ear due to cerumen impaction     Hyperlipidemia     Hypothyroid     Other emphysema (HCC)     Pneumonia     2012    Psychiatric disorder     schizophrenia    Schizophrenia (HCC)     Seizure (HCC)     Stroke ([facility])     tia june 2015 no residual     Unspecified urinary incontinence     diapers     Vitamin D deficiency         Past Surgical History:    Past Surgical History:   Procedure Laterality Date    IRRIGATION & DEBRIDEMENT ORTHO Left 10/17/2015    Procedure: IRRIGATION & DEBRIDEMENT ORTHO foot;  Surgeon: Jluis Becker M.D.;  Location: SURGERY [facility];  Service:     CLOSED REDUCTION Left 7/9/2015    Procedure: CLOSED REDUCTION -ATTEMPTED;  Surgeon: Jluis Becker M.D.;  Location: SURGERY [facility];  Service:     PIN INSERTION  7/9/2015    Procedure: PIN INSERTION FOOT;  Surgeon: Jluis Becker M.D.;  Location: SURGERY [facility];  Service:         Social History: 22.5 pack-year smoking history. Denies EtOH. Denies street drugs.    Family History:   Non contributory.  Mother had a h/o alzeimer's disase.  Father had a h/o heart disase.      Allergy:  No Known Allergies       Objective:       Intake/Output                   03/04/24 0700 - 03/05/24 0659     [id number] Total              Intake    P.O.  240  -- 240    P.O. 240 -- 240    Total Intake 240 -- 240       Output    Total Output -- -- --       Net I/O     240 -- 240            Vitals:  Vitals:    03/04/24 0748   BP: 128/68   Pulse: 88   Resp: 18   Temp: 36.1 °C (96.9 °F)   SpO2: 98%        Physical exam:    General:Appears fatigued but in no acute distress  HEENT: Normal conjunctiva, anicteric. Moist mucous membranes.  Heart: RRR. no  m/r/g  Lungs: CTAB. Mild inspiratory/expiratory wheezes noted b/l.   Abdomen: soft,  nontender, nondistended  Extremities: No BLE edema  Skin: Warm and dry.   Neuro: A&Ox3  Psych: Appropriate mood and affect        Labs:     CBC CMP   Recent Labs     03/03/24  1100 03/04/24  0556   WBC 19.9* 15.2*   RBC 4.32 4.07*   HEMOGLOBIN 9.9* 9.3*   HEMATOCRIT 29.9* 27.9*   MCV 69.2* 68.6*   MCH 22.9* 22.9*   RDW 44.6 44.8   PLATELETCT 196 183   MPV 10.6 10.9   NEUTSPOLYS 80.80* 79.60*   LYMPHOCYTES 7.40* 7.30*   MONOCYTES 10.20 12.50   EOSINOPHILS 0.70 0.00   BASOPHILS 0.20 0.20     Recent Labs     03/03/24  1100 03/04/24  0556   SODIUM 123* 124*   POTASSIUM 3.4* 4.3   CHLORIDE 88* 92*   CO2 22 22   GLUCOSE 142* 153*   BUN 17 14      Recent Labs     03/03/24  1100 03/04/24  0556   ALBUMIN 2.9* 3.0*   TBILIRUBIN 0.6 0.7   ALKPHOSPHAT 86 85   TOTPROTEIN 5.7* 5.6*   ALTSGPT 17 18   ASTSGOT 32 27   CREATININE 1.19 1.00           Recent Labs     03/03/24  1100 03/04/24  0556   WBC 19.9* 15.2*   RBC 4.32 4.07*   HEMOGLOBIN 9.9* 9.3*   HEMATOCRIT 29.9* 27.9*   MCV 69.2* 68.6*   MCH 22.9* 22.9*   RDW 44.6 44.8   PLATELETCT 196 183   MPV 10.6 10.9   NEUTSPOLYS 80.80* 79.60*   LYMPHOCYTES 7.40* 7.30*   MONOCYTES 10.20 12.50   EOSINOPHILS 0.70 0.00   BASOPHILS 0.20 0.20         Recent Labs     03/03/24  1100 03/04/24  0556   SODIUM 123* 124*   POTASSIUM 3.4* 4.3   CHLORIDE 88* 92*   CO2 22 22   GLUCOSE 142* 153*   BUN 17 14        Recent Labs     03/03/24  1100 03/04/24  0556   ALBUMIN 2.9* 3.0*   TBILIRUBIN 0.6 0.7   ALKPHOSPHAT 86 85   TOTPROTEIN 5.7* 5.6*   ALTSGPT 17 18   ASTSGOT 32 27   CREATININE 1.19 1.00          Blood culture: Susceptibility data from last 90 days.  Collected Specimen Info Organism   03/03/24 Urine Escherichia coli   03/03/24 Blood from Peripheral Escherichia coli             Imaging:       DX-CHEST-LIMITED (1 VIEW)   Final Result      1.  Hypoinflation with mild bibasilar atelectasis.   2.  Stable mild  cardiomegaly.   3.  No lobar pneumonia or pneumothorax.             Medications:     Home meds:    Home Medications    Medication Sig Taking? Last Dose Authorizing Provider   Melatonin 3 MG TABLET DISPERSIBLE Take 3 mg by mouth every evening. Yes 3/2/2024 at PM VANESSA Ricketts   hydroCHLOROthiazide 12.5 MG tablet Take 1 Tablet by mouth every morning. Yes 3/3/2024 at 0800 VANESSA Ricketts   divalproex ER (DEPAKOTE ER) 250 MG TABLET SR 24 HR TAKE 1 TABLET BY MOUTH ONCE DAILY Yes 3/3/2024 at 0800 VANESSA Ricketts   umeclidinium bromide (INCRUSE ELLIPTA) 62.5 MCG/ACT AEROSOL POWDER, BREATH ACTIVATED inhaler INHALE 1 PUFF BY MOUTH ONCE DAILY Yes 3/3/2024 at 0800 VANESSA Ricketts   hydrophilic ointment (AQUAPHOR) Ointment Apply 1 Each topically at bedtime. Apply to hands Yes 3/2/2024 at 1900 Kirstin West AGabrielaPGabrielaRGabrielaNGabriela   levothyroxine (SYNTHROID) 125 MCG Tab Take 1 Tablet by mouth every morning on an empty stomach. Yes 3/3/2024 at 0700 VANESSA Ricketts   levETIRAcetam (KEPPRA) 250 MG tablet TAKE 1 TABLET BY MOUTH THREE TIMES A DAY Yes 3/3/2024 at 0800 VANESSA Ricketts   ASPIRIN LOW DOSE 81 MG EC tablet TAKE 1 TABLET BY MOUTH ONCE DAILY Yes 3/3/2024 at 0800 VANESSA Ricketts   Cholecalciferol 2000 UNIT Cap Take 1 Capsule by mouth every day.  Patient taking differently: Take 1,000 Units by mouth every day. Yes 3/3/2024 at 0800 VANESSA Rciketts   Mirabegron ER (MYRBETRIQ) 50 MG TABLET SR 24 HR Take 50 mg by mouth every evening. Yes 3/2/2024 at PM Physician Outpatient   simvastatin (ZOCOR) 40 MG Tab TAKE 1 TABLET BY MOUTH AT BEDTIME FOR HLD  Patient taking differently: Take 40 mg by mouth every evening. TAKE 1 TABLET BY MOUTH AT BEDTIME FOR HLD Yes 3/2/2024 at 2000 VANESSA Ricketts   escitalopram (LEXAPRO) 10 MG Tab Take 10 mg by mouth every day. Yes 3/3/2024 at 0800 Physician Outpatient   cloZAPine (CLOZARIL) 100 MG Tab Take 1.5 Tablets  by mouth 2 times a day. Yes 3/3/2024 at 0800 Sultan JACKSON Gardner M.D.   Skin Protectants, Misc. (REMEDY PHYTOPLEX HYDRAGUARD) Cream Apply 1 Dose topically 2 times a day as needed. Apply thin layer to buttocks twice daily as needed for redness. DC scheduled order.  PRN at PRN VANESSA Ricketts   DESITIN 40 % Paste paste APPLY TOPICALLY TO AFFECTED AREA IN GROIN/BUTTOCKS TWICE A DAY  PRN at PRN VANESSA Ricketts   ondansetron (ZOFRAN) 4 MG Tab tablet Take 1 Tablet by mouth every four hours as needed for Nausea/Vomiting.  PRN at PRN VANESSA Ricketts   Incontinence Supply Disposable (FQ PROTECTIVE UNDERWEAR) Misc CHANGE 3 TIMES DAILY AS DIRECTED  SUPPLY at SUPPLY VANESSA Ricketts   alendronate (FOSAMAX) 70 MG Tab Take 1 Tablet by mouth every 7 days.  2/28/2024 at UNK VANESSA Ricketts   VENTOLIN  (90 Base) MCG/ACT Aero Soln inhalation aerosol Inhale 2 Puffs every four hours as needed for Shortness of Breath.  PRN at PRN Sultan JACKSON Gardner M.D.        Scheduled Medications PRN Medications IV Medications   cefTRIAXone (ROCEPHIN) IV, 2,000 mg, Q24HRS  heparin, 5,000 Units, Q8HRS  senna-docusate, 2 Tablet, Q EVENING  levETIRAcetam, 250 mg, TID  levothyroxine, 125 mcg, AM ES  melatonin, 3 mg, Nightly  simvastatin, 40 mg, Nightly  thiamine, 100 mg, DAILY   And  multivitamin, 1 Tablet, DAILY   And  folic acid, 1 mg, DAILY  vibegron, 75 mg, Q EVENING  tiotropium, 5 mcg, QDAILY (RT)  doxycycline monohydrate, 100 mg, Q12HRS  aspirin, 81 mg, DAILY  vitamin D3, 2,000 Units, DAILY  divalproex ER, 250 mg, DAILY  escitalopram, 10 mg, DAILY  cloZAPine, 150 mg, BID      LR, 500 mL, Once PRN  acetaminophen, 650 mg, Q6HRS PRN  labetalol, 10 mg, Q4HRS PRN  ondansetron, 4 mg, Q4HRS PRN  ondansetron, 4 mg, Q4HRS PRN  polyethylene glycol/lytes, 1 Packet, QDAY PRN  albuterol, 2 Puff, Q4HRS PRN  Respiratory Therapy Consult, , Continuous RT  ipratropium-albuterol, 3 mL, Q2HRS PRN (RT)             No  Known Allergies     Assessment/Plan:     #Acute on chronic hypoxic respiratory failure  #COPD - not in exacerbation  #Hyponatremia - work up most consistent with hypovolemia  #Bacteremia suspected source UTI- E coli.  *Not in acute COPD exacerbation.    - continue albuterol Q4H PRN  - continue duoneb PRN  - continue tiotropium daily  - follow up with PCP once stable for discharge      Markus Crespo  Resident  [phone]    Discussed with my attending physician,  Mckenzie Chowdhury MD

## 2024-03-04 NOTE — CARE PLAN
The patient is Stable - Low risk of patient condition declining or worsening    Shift Goals  Clinical Goals: IV abx  Patient Goals: sleep  Family Goals: JORDI    Progress made toward(s) clinical / shift goals:  Pt aox3. Scheduled meds given. Vital signs stable. No complaints of pain. No sob or difficulty in breathing. Needs met. Repositioned. Bed alarms on with bed placed in low and locked position. Call light within reach.      Problem: Knowledge Deficit - Standard  Goal: Patient and family/care givers will demonstrate understanding of plan of care, disease process/condition, diagnostic tests and medications  Outcome: Progressing     Problem: Knowledge Deficit - COPD  Goal: Patient/significant other demonstrates understanding of disease process, utilization of the Action Plan, medications and discharge instruction  Outcome: Progressing     Problem: Risk for Infection - COPD  Goal: Patient will remain free from signs and symptoms of infection  Outcome: Progressing     Problem: Nutrition - Advanced  Goal: Patient will display progressive weight gain toward goal have adequate food and fluid intake  Outcome: Progressing     Problem: Ineffective Airway Clearance  Goal: Patient will maintain patent airway with clear/clearing breath sounds  Outcome: Progressing     Problem: Impaired Gas Exchange  Goal: Patient will demonstrate improved ventilation and adequate oxygenation and participate in treatment regimen within the level of ability/situation.  Outcome: Progressing     Problem: Risk for Aspiration  Goal: Patient's risk for aspiration will be absent or decrease  Outcome: Progressing     Problem: Self Care  Goal: Patient will have the ability to perform ADLs independently or with assistance (bathe, groom, dress, toilet and feed)  Outcome: Progressing     Problem: Fall Risk  Goal: Patient will remain free from falls  Outcome: Progressing     Problem: Skin Integrity  Goal: Skin integrity is maintained or improved  Outcome:  Progressing       Patient is not progressing towards the following goals:

## 2024-03-04 NOTE — RESPIRATORY CARE
COPD EDUCATION by COPD CLINICAL EDUCATOR  3/4/2024  at  3:31 PM by Sophie Owen, RRT     Patient interviewed by education team.  Patient declined or is unable to participate in the full program.  Therefore, a short intervention has been conducted.  A comprehensive packet including information about COPD, types of treatments to manage their disease and safe home Oxygen usage was provided and reviewed with patient at the bedside.  Patient denies history of COPD, has a PFT on file from 2012, 65% ratio and declines using any respiratory medications at home. Patient is a former smoker.  COPD Screen  COPD Risk Screening  Do you have a history of COPD?: Yes  Do you have a Pulmonologist?:  (unknown)    COPD Assessment  COPD Clinical Specialists ONLY  COPD Education Initiated: Yes--Short Intervention   Is this a COPD exacerbation patient?: Yes (partial order set)  DME Company: unknown  DME Equipment Type: 2 L at home  Physician Name: VICENTE REA  Pulmonologist Name: not established with a pulmonologist  Referrals Initiated: Yes  Pulmonary Rehab: N/A  Smoking Cessation: N/A (former smoker)  Hospice: N/A  Home Health Care:  (TBD)  Mobile Urgent Care Services: N/A  Geriatric Specialty Group: N/A  Private In-Home Care Agency: N/A  $ Demo/Eval of SVN's, MDI's and Aerosols: Yes  (OP) Pulmonary Function Testing:  (12/18/2012 65% ratio)  Interdisciplinary Rounds: Attendance at Rounds (30 Min)    Meds to Beds  [facility] provides bedside medication delivery for all eligible patients at discharge.  Would you like to opt out of this program for any reason?: No - Stay Opted In     MY COPD ACTION PLAN     It is recommended that patients and physicians /healthcare providers complete this action plan together. This plan should be discussed at each physician visit and updated as needed.    The green, yellow and red zones show groups of symptoms of COPD. This list of symptoms is not comprehensive, and you may experience other  "symptoms. In the \"Actions\" column, your healthcare provider has recommended actions for you to take based on your symptoms.    Patient Name: Tracie Rinaldi   YOB: 1950   Last Updated on: 3/4/2024  3:30 PM   Green Zone:  I am doing well today Actions     Usual activitiy and exercise level   Take daily medications     Usual amounts of cough and phlegm/mucus   Use oxygen as prescribed     Sleep well at night   Continue regular exercise/diet plan     Appetite is good   At all times avoid cigarette smoke, inhaled irritants     Daily Medications (these medications are taken every day):   Umeclidinium (Incurse Ellipta) 1 Puff Once daily        Yellow Zone:  I am having a bad day or a COPD flare Actions     More breathless than usual   Continue daily medications     I have less energy for my daily activities   Use quick relief inhaler as ordered     Increased or thicker phlegm/mucus   Use oxygen as prescribed     Using quick relief inhaler/nebulizer more often   Get plenty of rest     Swelling of ankles more than usual   Use pursed lip breathing     More coughing than usual   At all times avoid cigarette smoke, inhaled irritants     I feel like I have a \"chest cold\"     Poor sleep and my symptoms woke me up     My appetite is not good     My medicine is not helping      Call provider immediately if symptoms don’t improve     Continue daily medications, add rescue medications:   Albuterol 2 Puffs Every 4 hours PRN       Medications to be used during a flare up, (as Discussed with Provider):           Additional Information:  Use spacer with albuterol    Red Zone:  I need urgent medical care Actions     Severe shortness of breath even at rest   Call 911 or seek medical care immediately     Not able to do any activity because of breathing      Fever or shaking chills      Feeling confused or very drowsy       Chest pains      Coughing up blood                  "

## 2024-03-04 NOTE — CARE PLAN
The patient is Stable - Low risk of patient condition declining or worsening    Shift Goals  Clinical Goals: Patient will remain stable throuhgout this shift  Patient Goals: rest  Family Goals: JORDI    Progress made toward(s) clinical / shift goals:  n/a    Patient is not progressing towards the following goals:      Problem: Knowledge Deficit - Standard  Goal: Patient and family/care givers will demonstrate understanding of plan of care, disease process/condition, diagnostic tests and medications  Outcome: Not Progressing  Note: Patient exteremly anxious. Able to stand and pivot to commode, but gets fearful she is going to fall. Easy to redirect.

## 2024-03-04 NOTE — CARE PLAN
The patient is Stable - Low risk of patient condition declining or worsening    Shift Goals  Clinical Goals: Abx  Patient Goals: sleep  Family Goals: JORDI    Progress made toward(s) clinical / shift goals:  Patient alert and oriented to person and place, resting comfortably in bed.  All belongings within reach.  Respirations even and unlabored.  All meds given per Mar.  Purewick draining yellow urine.      Patient is not progressing towards the following goals:      Problem: Self Care  Goal: Patient will have the ability to perform ADLs independently or with assistance (bathe, groom, dress, toilet and feed)  Outcome: Not Progressing

## 2024-03-04 NOTE — CARE PLAN
Problem: Hyperinflation  Goal: Prevent or improve atelectasis  Description: Target End Date:  3 to 4 days    1. Instruct incentive spirometry usage  2.  Perform hyperinflation therapy as indicated  Outcome: Progressing   PEP QID, achieving 250 ml on IS. Receiving 2L via nasal cannula.

## 2024-03-04 NOTE — THERAPY
Occupational Therapy   Initial Evaluation     Patient Name: Tracie Rinaldi  Age:  73 y.o., Sex:  female  Medical Record #: [id number]  Today's Date: 3/4/2024     Precautions  Precautions: Fall Risk    Assessment  Patient is a 73 y.o. female with a diagnosis of acute UTI after presenting on 3/3/24 with weakness and difficulty ambulating at her group home. Additional factors influencing patient status / progress: PMHx includes former tobacco abuse, COPD dependent on 2 L, alcohol dependence, seizure disorder, hypothyroidism, hyperlipidemia. During OT eval, pt presented with generalized weakness, decreased activity tolerance, impaired functional mobility. Pt demo'd UBD and sock mgmt with SPV seated EOB. Sonja for STS and stand-step txfer to bedside chair with FWW. Pt reports she typically uses a FWW to walk in her group home but has assist with ADLs; recommend confirming PLOF with group home staff. Presents below her baseline; recommend return to group home if they are able to assist pt with transfers and ADLs, otherwise pt may need post-acute placement.     Plan    Occupational Therapy Initial Treatment Plan   Treatment Interventions: (P) Self Care / Activities of Daily Living, Therapeutic Exercises, Therapeutic Activity, Adaptive Equipment  Treatment Frequency: (P) 2 Times per Week  Duration: (P) Until Therapy Goals Met    DC Equipment Recommendations: (P) Unable to determine at this time  Discharge Recommendations: (P) Other - (return to group home if they are able to assist with transfers and ADLs, otherwise recommend post-acute placement)     Objective     03/04/24 1358   Initial Contact Note    Initial Contact Note Order Received and Verified, Occupational Therapy Evaluation in Progress with Full Report to Follow.   Prior Living Situation   Prior Services Continuous (24 Hour) Care Giving Per Service   Housing / Facility Group Home   Bathroom Set up Walk In Shower;Grab Bars;Shower Chair   Equipment Owned  Front-Wheel Walker;Tub / Shower Seat;Grab Bar(s) In Tub / Shower;Grab Bar(s) By Toilet   Lives with - Patient's Self Care Capacity Attendant / Paid Care Giver   Comments Pt lives in a group home and reports she typically uses a FWW and has assist for most ADLs   Prior Level of ADL Function   Self Feeding Independent   Grooming / Hygiene Independent   Bathing Requires Assist   Dressing Requires Assist   Toileting Requires Assist   Prior Level of IADL Function   Medication Management Dependent   Laundry Dependent   Kitchen Mobility Independent   Finances Dependent   Home Management Dependent   Shopping Dependent   Prior Level Of Mobility Supervision With Device in Home   Driving / Transportation Relatives / Others Provide Transportation   Occupation (Pre-Hospital Vocational) Retired Due To Age   Precautions   Precautions Fall Risk   Pain   Intervention Declines   Cognition    Cognition / Consciousness X   Level of Consciousness Alert   Ability To Follow Commands 1 Step   Attention Impaired   Comments Cooperative. Needs repetition of cues at times   Active ROM Upper Body   Active ROM Upper Body  WDL   Comments Moves BUEs functionally   Strength Upper Body   Upper Body Strength  WDL   Sensation Upper Body   Upper Extremity Sensation  Not Tested   Upper Body Muscle Tone   Upper Body Muscle Tone  WDL   Neurological Concerns   Neurological Concerns No   Coordination Upper Body   Coordination WDL   Balance Assessment   Sitting Balance (Static) Fair   Sitting Balance (Dynamic) Fair -   Standing Balance (Static) Fair -   Standing Balance (Dynamic) Poor +   Weight Shift Sitting Fair   Weight Shift Standing Fair   Comments FWW   Bed Mobility    Supine to Sit Minimal Assist   Scooting Contact Guard Assist   Rolling Contact Guard Assist   Comments bed flat, use of bed rail   ADL Assessment   Eating Independent   Upper Body Dressing Supervision  (gown)   Lower Body Dressing Supervision  (socks)   Toileting   (purewick)   How much  help from another person does the patient currently need...   Putting on and taking off regular lower body clothing? 3   Bathing (including washing, rinsing, and drying)? 2   Toileting, which includes using a toilet, bedpan, or urinal? 2   Putting on and taking off regular upper body clothing? 3   Taking care of personal grooming such as brushing teeth? 3   Eating meals? 4   6 Clicks Daily Activity Score 17   Functional Mobility   Sit to Stand Minimal Assist   Bed, Chair, Wheelchair Transfer Minimal Assist   Transfer Method Stand Step   Mobility EOB->chair with FWW   Activity Tolerance   Sitting in Chair post   Sitting Edge of Bed 5 min   Standing 1 min   Patient / Family Goals   Patient / Family Goal #1 None stated   Short Term Goals   Short Term Goal # 1 Pt will complete toilet txfer with SBA   Short Term Goal # 2 Pt will don underwear/pants with SBA   Education Group   Role of Occupational Therapist Patient Response Patient;Acceptance;Explanation;Verbal Demonstration   Occupational Therapy Initial Treatment Plan    Treatment Interventions Self Care / Activities of Daily Living;Therapeutic Exercises;Therapeutic Activity;Adaptive Equipment   Treatment Frequency 2 Times per Week   Duration Until Therapy Goals Met   Problem List   Problem List Decreased Active Daily Living Skills;Decreased Homemaking Skills;Decreased Functional Mobility;Decreased Activity Tolerance;Impaired Cognitive Function;Impaired Postural Control / Balance   Anticipated Discharge Equipment and Recommendations   DC Equipment Recommendations Unable to determine at this time   Discharge Recommendations Other -  (return to group home if they are able to assist with transfers and ADLs, otherwise recommend post-acute placement)   Interdisciplinary Plan of Care Collaboration   IDT Collaboration with  Nursing   Patient Position at End of Therapy Seated;Chair Alarm On;Call Light within Reach;Tray Table within Reach;Phone within Reach   Collaboration  Comments RN updated   Session Information   Date / Session Number  3/4 #1 (1/2, 3/10)

## 2024-03-05 ENCOUNTER — APPOINTMENT (OUTPATIENT)
Dept: RADIOLOGY | Facility: MEDICAL CENTER | Age: 74
DRG: 871 | End: 2024-03-05
Attending: HOSPITALIST
Payer: MEDICARE

## 2024-03-05 LAB
ANION GAP SERPL CALC-SCNC: 10 MMOL/L (ref 7–16)
ANION GAP SERPL CALC-SCNC: 11 MMOL/L (ref 7–16)
BACTERIA UR CULT: ABNORMAL
BACTERIA UR CULT: ABNORMAL
BASOPHILS # BLD AUTO: 0.3 % (ref 0–1.8)
BASOPHILS # BLD: 0.03 K/UL (ref 0–0.12)
BUN SERPL-MCNC: 12 MG/DL (ref 8–22)
BUN SERPL-MCNC: 12 MG/DL (ref 8–22)
CALCIUM SERPL-MCNC: 7.8 MG/DL (ref 8.5–10.5)
CALCIUM SERPL-MCNC: 8.3 MG/DL (ref 8.5–10.5)
CHLORIDE SERPL-SCNC: 96 MMOL/L (ref 96–112)
CHLORIDE SERPL-SCNC: 96 MMOL/L (ref 96–112)
CO2 SERPL-SCNC: 22 MMOL/L (ref 20–33)
CO2 SERPL-SCNC: 23 MMOL/L (ref 20–33)
CREAT SERPL-MCNC: 0.87 MG/DL (ref 0.5–1.4)
CREAT SERPL-MCNC: 0.97 MG/DL (ref 0.5–1.4)
EOSINOPHIL # BLD AUTO: 0 K/UL (ref 0–0.51)
EOSINOPHIL NFR BLD: 0 % (ref 0–6.9)
ERYTHROCYTE [DISTWIDTH] IN BLOOD BY AUTOMATED COUNT: 45.5 FL (ref 35.9–50)
GFR SERPLBLD CREATININE-BSD FMLA CKD-EPI: 62 ML/MIN/1.73 M 2
GFR SERPLBLD CREATININE-BSD FMLA CKD-EPI: 71 ML/MIN/1.73 M 2
GLUCOSE SERPL-MCNC: 134 MG/DL (ref 65–99)
GLUCOSE SERPL-MCNC: 154 MG/DL (ref 65–99)
HCT VFR BLD AUTO: 29.5 % (ref 37–47)
HGB BLD-MCNC: 9.5 G/DL (ref 12–16)
IMM GRANULOCYTES # BLD AUTO: 0.1 K/UL (ref 0–0.11)
IMM GRANULOCYTES NFR BLD AUTO: 1.1 % (ref 0–0.9)
LYMPHOCYTES # BLD AUTO: 1.08 K/UL (ref 1–4.8)
LYMPHOCYTES NFR BLD: 11.5 % (ref 22–41)
MCH RBC QN AUTO: 22.4 PG (ref 27–33)
MCHC RBC AUTO-ENTMCNC: 32.2 G/DL (ref 32.2–35.5)
MCV RBC AUTO: 69.6 FL (ref 81.4–97.8)
MONOCYTES # BLD AUTO: 1.57 K/UL (ref 0–0.85)
MONOCYTES NFR BLD AUTO: 16.7 % (ref 0–13.4)
NEUTROPHILS # BLD AUTO: 6.61 K/UL (ref 1.82–7.42)
NEUTROPHILS NFR BLD: 70.4 % (ref 44–72)
NRBC # BLD AUTO: 0 K/UL
NRBC BLD-RTO: 0 /100 WBC (ref 0–0.2)
PLATELET # BLD AUTO: 205 K/UL (ref 164–446)
PMV BLD AUTO: 10.7 FL (ref 9–12.9)
POTASSIUM SERPL-SCNC: 3.9 MMOL/L (ref 3.6–5.5)
POTASSIUM SERPL-SCNC: 4.3 MMOL/L (ref 3.6–5.5)
RBC # BLD AUTO: 4.24 M/UL (ref 4.2–5.4)
SIGNIFICANT IND 70042: ABNORMAL
SITE SITE: ABNORMAL
SODIUM SERPL-SCNC: 129 MMOL/L (ref 135–145)
SODIUM SERPL-SCNC: 129 MMOL/L (ref 135–145)
SOURCE SOURCE: ABNORMAL
WBC # BLD AUTO: 9.4 K/UL (ref 4.8–10.8)

## 2024-03-05 PROCEDURE — 36415 COLL VENOUS BLD VENIPUNCTURE: CPT

## 2024-03-05 PROCEDURE — 700111 HCHG RX REV CODE 636 W/ 250 OVERRIDE (IP): Performed by: STUDENT IN AN ORGANIZED HEALTH CARE EDUCATION/TRAINING PROGRAM

## 2024-03-05 PROCEDURE — 71045 X-RAY EXAM CHEST 1 VIEW: CPT

## 2024-03-05 PROCEDURE — 700102 HCHG RX REV CODE 250 W/ 637 OVERRIDE(OP): Performed by: STUDENT IN AN ORGANIZED HEALTH CARE EDUCATION/TRAINING PROGRAM

## 2024-03-05 PROCEDURE — 80048 BASIC METABOLIC PNL TOTAL CA: CPT | Mod: 91

## 2024-03-05 PROCEDURE — 770006 HCHG ROOM/CARE - MED/SURG/GYN SEMI*

## 2024-03-05 PROCEDURE — 94669 MECHANICAL CHEST WALL OSCILL: CPT

## 2024-03-05 PROCEDURE — 94640 AIRWAY INHALATION TREATMENT: CPT

## 2024-03-05 PROCEDURE — 700111 HCHG RX REV CODE 636 W/ 250 OVERRIDE (IP)

## 2024-03-05 PROCEDURE — A9270 NON-COVERED ITEM OR SERVICE: HCPCS | Performed by: STUDENT IN AN ORGANIZED HEALTH CARE EDUCATION/TRAINING PROGRAM

## 2024-03-05 PROCEDURE — 85025 COMPLETE CBC W/AUTO DIFF WBC: CPT

## 2024-03-05 PROCEDURE — 99232 SBSQ HOSP IP/OBS MODERATE 35: CPT | Performed by: HOSPITALIST

## 2024-03-05 RX ADMIN — HEPARIN SODIUM 5000 UNITS: 5000 INJECTION, SOLUTION INTRAVENOUS; SUBCUTANEOUS at 13:45

## 2024-03-05 RX ADMIN — CLOZAPINE 150 MG: 25 TABLET ORAL at 04:08

## 2024-03-05 RX ADMIN — DOXYCYCLINE 100 MG: 100 TABLET, FILM COATED ORAL at 17:33

## 2024-03-05 RX ADMIN — HEPARIN SODIUM 5000 UNITS: 5000 INJECTION, SOLUTION INTRAVENOUS; SUBCUTANEOUS at 20:46

## 2024-03-05 RX ADMIN — TIOTROPIUM BROMIDE INHALATION SPRAY 5 MCG: 3.12 SPRAY, METERED RESPIRATORY (INHALATION) at 13:40

## 2024-03-05 RX ADMIN — DOCUSATE SODIUM 50 MG AND SENNOSIDES 8.6 MG 2 TABLET: 8.6; 5 TABLET, FILM COATED ORAL at 17:34

## 2024-03-05 RX ADMIN — LEVETIRACETAM 250 MG: 250 TABLET, FILM COATED ORAL at 04:09

## 2024-03-05 RX ADMIN — LEVOTHYROXINE SODIUM 125 MCG: 0.12 TABLET ORAL at 04:09

## 2024-03-05 RX ADMIN — ASPIRIN 81 MG: 81 TABLET, COATED ORAL at 04:08

## 2024-03-05 RX ADMIN — SIMVASTATIN 40 MG: 40 TABLET, FILM COATED ORAL at 20:32

## 2024-03-05 RX ADMIN — ALBUTEROL SULFATE 2 PUFF: 90 AEROSOL, METERED RESPIRATORY (INHALATION) at 15:34

## 2024-03-05 RX ADMIN — LEVETIRACETAM 250 MG: 250 TABLET, FILM COATED ORAL at 17:34

## 2024-03-05 RX ADMIN — Medication 3 MG: at 20:31

## 2024-03-05 RX ADMIN — DIVALPROEX SODIUM 250 MG: 250 TABLET, EXTENDED RELEASE ORAL at 04:08

## 2024-03-05 RX ADMIN — CEFTRIAXONE SODIUM 2000 MG: 10 INJECTION, POWDER, FOR SOLUTION INTRAVENOUS at 15:54

## 2024-03-05 RX ADMIN — LEVETIRACETAM 250 MG: 250 TABLET, FILM COATED ORAL at 13:45

## 2024-03-05 RX ADMIN — ESCITALOPRAM OXALATE 10 MG: 10 TABLET ORAL at 04:08

## 2024-03-05 RX ADMIN — VIBEGRON 75 MG: 75 TABLET, FILM COATED ORAL at 17:33

## 2024-03-05 RX ADMIN — CLOZAPINE 150 MG: 25 TABLET ORAL at 20:34

## 2024-03-05 RX ADMIN — DOXYCYCLINE 100 MG: 100 TABLET, FILM COATED ORAL at 04:09

## 2024-03-05 ASSESSMENT — ENCOUNTER SYMPTOMS
FEVER: 0
DIAPHORESIS: 0
SHORTNESS OF BREATH: 0
MUSCULOSKELETAL NEGATIVE: 1
PSYCHIATRIC NEGATIVE: 1
SORE THROAT: 0
PALPITATIONS: 0
DIZZINESS: 0
EYES NEGATIVE: 1
FOCAL WEAKNESS: 0
ABDOMINAL PAIN: 0
GASTROINTESTINAL NEGATIVE: 1
HEADACHES: 0
VOMITING: 0
WEIGHT LOSS: 0
NEUROLOGICAL NEGATIVE: 1
DEPRESSION: 0
WEAKNESS: 0
MYALGIAS: 0
NAUSEA: 0
BRUISES/BLEEDS EASILY: 0
BLURRED VISION: 0
CARDIOVASCULAR NEGATIVE: 1
CHILLS: 0
COUGH: 0

## 2024-03-05 ASSESSMENT — PAIN DESCRIPTION - PAIN TYPE: TYPE: ACUTE PAIN

## 2024-03-05 ASSESSMENT — LIFESTYLE VARIABLES: SUBSTANCE_ABUSE: 0

## 2024-03-05 NOTE — CARE PLAN
The patient is Stable - Low risk of patient condition declining or worsening    Shift Goals  Clinical Goals: continue antibiotics, monitor VS and O2 sat > 90%  Patient Goals: rest  Family Goals: ashutosh    Progress made toward(s) clinical / shift goals: Received patient in bed alert and oriented x3. Denies pain at this time.  Administered scheduled medication. O2 at 2L via NC. Hourly rounds performed. fall precautions in place and call light within reach. All other needs met.        Patient is not progressing towards the following goals:    Problem: Knowledge Deficit - Standard  Goal: Patient and family/care givers will demonstrate understanding of plan of care, disease process/condition, diagnostic tests and medications  Description: Target End Date:  1-3 days or as soon as patient condition allows    Document in Patient Education    1.  Patient and family/caregiver oriented to unit, equipment, visitation policy and means for communicating concern  2.  Complete/review Learning Assessment  3.  Assess knowledge level of disease process/condition, treatment plan, diagnostic tests and medications  4.  Explain disease process/condition, treatment plan, diagnostic tests and medications  Outcome: Not Progressing     Problem: Knowledge Deficit - COPD  Goal: Patient/significant other demonstrates understanding of disease process, utilization of the Action Plan, medications and discharge instruction  Description: Target End Date:  1-3 days or as soon as patient condition allows    Document in Patient Education    1.  Discuss the importance of medical follow-up care, periodic chest x-rays, sputum cultures  2.  Review worsening signs and symptoms of COPD flare and exacerbation and its management  3.  Discuss respiratory medications, side effects, adverse reactions  4.  Demonstrate technique for using a metered-dose inhaler (MDI) and utilization of a spacer  5.  Review the COPD Action Plan  6.  Instruct and reinforce the rationale  for breathing exercises, coughing effectively, and general conditioning exercises  7.  Stress importance of oral care and dental hygiene  8.  Discuss the importance of avoiding people with active respiratory infections and need for routine influenza and pneumococcal vaccinations  9.  Discuss factors that may trigger condition and encourage patient/significant other to explore ways to control these factors in and around the home and work setting  10. Review the harmful effects of smoking and advise cessation of smoking  11. Provide information about activity limitations and alternating activities with rest periods to prevent fatigue  12. Instruct asthmatic patient of use of peak flow meter, as appropriate  13. Review oxygen requirements and dosage, safe use of oxygen, and refer to the supplier as indicated  14. Educate patient/family/caregiver on the use of Nasal Intermittent Positive Pressure Ventilation (NIPPV) and possible adverse reactions  Outcome: Not Progressing

## 2024-03-05 NOTE — PROGRESS NOTES
"The patient refused her other medications, per pt. \" I don't want to take too many medications.\"  "

## 2024-03-05 NOTE — CARE PLAN
The patient is Stable - Low risk of patient condition declining or worsening    Shift Goals  Clinical Goals: manage iv abx, maintain pt safety throughout shift  Patient Goals: rest  Family Goals: ashutosh    Progress made toward(s) clinical / shift goals:  Pt aox3. Scheduled medications given. No complaints of pain or discomfort. VSS. Remains on abx for uti with no adverse reactions.  Breathing even and unlabored. Repositioned. Fall precautions in place. Bed alarm on with bed in low and locked position. Needs met. Call light within reach.      Problem: Knowledge Deficit - Standard  Goal: Patient and family/care givers will demonstrate understanding of plan of care, disease process/condition, diagnostic tests and medications  Outcome: Progressing     Problem: Knowledge Deficit - COPD  Goal: Patient/significant other demonstrates understanding of disease process, utilization of the Action Plan, medications and discharge instruction  Outcome: Progressing     Problem: Risk for Infection - COPD  Goal: Patient will remain free from signs and symptoms of infection  Outcome: Progressing     Problem: Nutrition - Advanced  Goal: Patient will display progressive weight gain toward goal have adequate food and fluid intake  Outcome: Progressing     Problem: Ineffective Airway Clearance  Goal: Patient will maintain patent airway with clear/clearing breath sounds  Outcome: Progressing     Problem: Impaired Gas Exchange  Goal: Patient will demonstrate improved ventilation and adequate oxygenation and participate in treatment regimen within the level of ability/situation.  Outcome: Progressing     Problem: Risk for Aspiration  Goal: Patient's risk for aspiration will be absent or decrease  Outcome: Progressing     Problem: Self Care  Goal: Patient will have the ability to perform ADLs independently or with assistance (bathe, groom, dress, toilet and feed)  Outcome: Progressing     Problem: Fall Risk  Goal: Patient will remain free from  falls  Outcome: Progressing     Problem: Skin Integrity  Goal: Skin integrity is maintained or improved  Outcome: Progressing     Patient is not progressing towards the following goals:

## 2024-03-05 NOTE — PROGRESS NOTES
Hospital Medicine Daily Progress Note    Date of Service  3/5/2024    Chief Complaint  Tracie Rinaldi is a 73 y.o. female admitted 3/3/2024 with weakness    Hospital Course  Tracie Rinaldi is a 73 y.o. female from intermediate with history of former tobacco abuse, COPD dependent on 2 L, alcohol dependence, seizure disorder, hypothyroidism, hyperlipidemia who presented 3/3/2024 with evaluation for generalized weakness.  Patient reported to have had increased generalized weakness, unable to ambulate.  Patient herself endorses subjective fever and chills, urinary frequency and urgency.  Her group home referred patient to ER due to concern of suspected UTI.  In ER, her UA does no pyuria, she does have leukocytosis with WBC of 19.9 K.     Interval Problem Update  She remains axox2, pleasant, she denies pain, denies sob, denies sob, currently stable on 2L which is consistent with her baseline. No cough. She denies dysuria. Leukocytosis resolve and na improving. Dehydration resolved. ROS otherwise negative.     I have discussed this patient's plan of care and discharge plan at IDT rounds today with Case Management, Nursing, Nursing leadership, and other members of the IDT team.    Consultants/Specialty    Code Status  DNAR/DNI    Disposition  The patient is not medically cleared for discharge to home or a post-acute facility.      I have placed the appropriate orders for post-discharge needs.    Review of Systems  Review of Systems   Constitutional:  Positive for malaise/fatigue. Negative for chills, diaphoresis, fever and weight loss.   HENT: Negative.  Negative for sore throat.    Eyes: Negative.  Negative for blurred vision.   Respiratory:  Negative for cough and shortness of breath.    Cardiovascular: Negative.  Negative for chest pain, palpitations and leg swelling.   Gastrointestinal: Negative.  Negative for abdominal pain, nausea and vomiting.   Genitourinary: Negative.  Negative for dysuria.    Musculoskeletal: Negative.  Negative for myalgias.   Skin: Negative.  Negative for itching and rash.   Neurological: Negative.  Negative for dizziness, focal weakness, weakness and headaches.   Endo/Heme/Allergies: Negative.  Does not bruise/bleed easily.   Psychiatric/Behavioral: Negative.  Negative for depression, substance abuse and suicidal ideas.    All other systems reviewed and are negative.       Physical Exam  Temp:  [36.1 °C (97 °F)-36.6 °C (97.8 °F)] 36.4 °C (97.6 °F)  Pulse:  [88-95] 88  Resp:  [18-22] 19  BP: (124-146)/(60-67) 124/60  SpO2:  [93 %-97 %] 95 %    Physical Exam  Vitals and nursing note reviewed. Exam conducted with a chaperone present.   Constitutional:       General: She is not in acute distress.     Appearance: Normal appearance. She is not diaphoretic.   HENT:      Head: Normocephalic.      Nose: Nose normal.   Eyes:      Pupils: Pupils are equal, round, and reactive to light.   Cardiovascular:      Rate and Rhythm: Normal rate and regular rhythm.      Pulses: Normal pulses.      Heart sounds: Normal heart sounds.   Pulmonary:      Effort: Pulmonary effort is normal.      Breath sounds: No rhonchi.   Abdominal:      General: Abdomen is flat. Bowel sounds are normal.      Palpations: Abdomen is soft.   Musculoskeletal:         General: No swelling or deformity. Normal range of motion.   Skin:     General: Skin is warm and dry.      Capillary Refill: Capillary refill takes less than 2 seconds.   Neurological:      General: No focal deficit present.      Mental Status: She is alert.      Cranial Nerves: No cranial nerve deficit.   Psychiatric:         Mood and Affect: Mood normal.         Behavior: Behavior normal.         Fluids    Intake/Output Summary (Last 24 hours) at 3/5/2024 1525  Last data filed at 3/4/2024 2207  Gross per 24 hour   Intake 200 ml   Output 700 ml   Net -500 ml       Laboratory  Recent Labs     03/03/24  1100 03/04/24  0556 03/05/24  0834   WBC 19.9* 15.2* 9.4    RBC 4.32 4.07* 4.24   HEMOGLOBIN 9.9* 9.3* 9.5*   HEMATOCRIT 29.9* 27.9* 29.5*   MCV 69.2* 68.6* 69.6*   MCH 22.9* 22.9* 22.4*   MCHC 33.1 33.3 32.2   RDW 44.6 44.8 45.5   PLATELETCT 196 183 205   MPV 10.6 10.9 10.7     Recent Labs     03/03/24  1100 03/04/24  0556 03/05/24  0834   SODIUM 123* 124* 129*   POTASSIUM 3.4* 4.3 4.3   CHLORIDE 88* 92* 96   CO2 22 22 23   GLUCOSE 142* 153* 134*   BUN 17 14 12   CREATININE 1.19 1.00 0.97   CALCIUM 8.0* 8.0* 7.8*                   Imaging  DX-CHEST-LIMITED (1 VIEW)   Final Result      1.  Hypoinflation with mild bibasilar atelectasis.   2.  Stable mild cardiomegaly.   3.  No lobar pneumonia or pneumothorax.           Assessment/Plan  * Acute UTI- (present on admission)  Assessment & Plan  UA pyuria  Antibiotic: Unasyn  Cultures pending, so far negative    CAP (community acquired pneumonia)  Assessment & Plan  Leukopenia, elevated procalcitonin  Follow cultures  Abx: Unasyn, doxycyline    Sepsis ([facility])  Assessment & Plan  This is Sepsis Present on admission  SIRS criteria identified on my evaluation include: Leukocytosis, with WBC greater than 12,000 and Bandemia, greater than 10% bands  Clinical indicators of end organ dysfunction include Acute On Chronic Renal Failure, with creatinine >0.5 above baseline level  Source is   Sepsis protocol initiated  Crystalloid Fluid Administration: Fluid resuscitation ordered per standard protocol - 30 mL/kg per current or ideal body weight  IV antibiotics as appropriate for source of sepsis  Reassessment: I have reassessed the patient's hemodynamic status    Hypokalemia  Assessment & Plan  Replace  Replace Mg    ALENA (acute kidney injury) ([facility])  Assessment & Plan  Mild, Cr 1.19  IVF  Check FeNa  Minimize nephrotoxic agents    Alcohol dependence ([facility])  Assessment & Plan  Reported consuming 1 bottle of wine daily  Multivitamins    Generalized weakness  Assessment & Plan  PT/OT  Fall precautions    Class 1 obesity in adult- (present on  admission)  Assessment & Plan  Diet and lifestyle modification  Body mass index is 32.28 kg/m².      Chronic respiratory failure with hypoxia (HCC)- (present on admission)  Assessment & Plan  Dependent on 2 L-at baseline    Mood disorder (HCC)- (present on admission)  Assessment & Plan  Clozapine, Depakote    ACP (advance care planning)- (present on admission)  Assessment & Plan  She has POLST on file dated and signed 4/11/2018 by legal guardian -- DNR/DNI. Confirmed with patient.    Insomnia- (present on admission)  Assessment & Plan  Melatonin    Hyperlipidemia- (present on admission)  Assessment & Plan  Statin    Urinary incontinence- (present on admission)  Assessment & Plan  As needed bladder scan and straight cath  Alicea if needed    Hyponatremia- (present on admission)  Assessment & Plan  Acute on chronic, dehydration contributing  possible beer proteinemia, Hypovolemia  Slowly improving  Osmol studies pending  Following serial bmp to ensure that it does not correct too quickly   following    COPD mixed type (HCC)- (present on admission)  Assessment & Plan  Mild exacerbation  Audible wheezing-DuoNebs- now resolving, no wheezing today  Continue home regimen  She reported quitting smoking    Seizure (HCC)- (present on admission)  Assessment & Plan  Prior history  Continue Keppra  Continue seizure precautions    Hypothyroid- (present on admission)  Assessment & Plan  Synthroid         VTE prophylaxis: VTE Selection    I have performed a physical exam and reviewed and updated ROS and Plan today (3/5/2024). In review of yesterday's note (3/4/2024), there are no changes except as documented above.

## 2024-03-06 ENCOUNTER — PATIENT OUTREACH (OUTPATIENT)
Dept: SCHEDULING | Facility: IMAGING CENTER | Age: 74
End: 2024-03-06
Payer: MEDICARE

## 2024-03-06 PROBLEM — N30.00 ACUTE CYSTITIS: Status: ACTIVE | Noted: 2024-03-03

## 2024-03-06 PROBLEM — R65.20 SEPSIS DUE TO ESCHERICHIA COLI WITH ENCEPHALOPATHY WITHOUT SEPTIC SHOCK (HCC): Status: RESOLVED | Noted: 2024-03-03 | Resolved: 2024-03-06

## 2024-03-06 PROBLEM — G93.41 SEPSIS DUE TO ESCHERICHIA COLI WITH ENCEPHALOPATHY WITHOUT SEPTIC SHOCK (HCC): Status: ACTIVE | Noted: 2024-03-03

## 2024-03-06 PROBLEM — G93.41 SEPSIS DUE TO ESCHERICHIA COLI WITH ENCEPHALOPATHY WITHOUT SEPTIC SHOCK (HCC): Status: RESOLVED | Noted: 2024-03-03 | Resolved: 2024-03-06

## 2024-03-06 PROBLEM — B96.20 E COLI BACTEREMIA: Status: ACTIVE | Noted: 2024-03-06

## 2024-03-06 PROBLEM — A41.51 SEPSIS DUE TO ESCHERICHIA COLI WITH ENCEPHALOPATHY WITHOUT SEPTIC SHOCK (HCC): Status: RESOLVED | Noted: 2024-03-03 | Resolved: 2024-03-06

## 2024-03-06 PROBLEM — G93.40 ACUTE ENCEPHALOPATHY: Status: ACTIVE | Noted: 2024-03-06

## 2024-03-06 PROBLEM — N30.00 ACUTE CYSTITIS: Status: RESOLVED | Noted: 2024-03-03 | Resolved: 2024-03-06

## 2024-03-06 PROBLEM — E87.6 HYPOKALEMIA: Status: RESOLVED | Noted: 2024-03-03 | Resolved: 2024-03-06

## 2024-03-06 PROBLEM — R78.81 E COLI BACTEREMIA: Status: ACTIVE | Noted: 2024-03-06

## 2024-03-06 PROBLEM — A41.51 SEPSIS DUE TO ESCHERICHIA COLI WITH ENCEPHALOPATHY WITHOUT SEPTIC SHOCK (HCC): Status: ACTIVE | Noted: 2024-03-03

## 2024-03-06 PROBLEM — N17.9 AKI (ACUTE KIDNEY INJURY) (HCC): Status: RESOLVED | Noted: 2024-03-03 | Resolved: 2024-03-06

## 2024-03-06 PROBLEM — R65.20 SEPSIS DUE TO ESCHERICHIA COLI WITH ENCEPHALOPATHY WITHOUT SEPTIC SHOCK (HCC): Status: ACTIVE | Noted: 2024-03-03

## 2024-03-06 LAB
ANION GAP SERPL CALC-SCNC: 9 MMOL/L (ref 7–16)
ANION GAP SERPL CALC-SCNC: 9 MMOL/L (ref 7–16)
BACTERIA BLD CULT: ABNORMAL
BASOPHILS # BLD AUTO: 0.8 % (ref 0–1.8)
BASOPHILS # BLD: 0.07 K/UL (ref 0–0.12)
BUN SERPL-MCNC: 11 MG/DL (ref 8–22)
BUN SERPL-MCNC: 12 MG/DL (ref 8–22)
CALCIUM SERPL-MCNC: 8.3 MG/DL (ref 8.5–10.5)
CALCIUM SERPL-MCNC: 8.3 MG/DL (ref 8.5–10.5)
CHLORIDE SERPL-SCNC: 101 MMOL/L (ref 96–112)
CHLORIDE SERPL-SCNC: 101 MMOL/L (ref 96–112)
CO2 SERPL-SCNC: 24 MMOL/L (ref 20–33)
CO2 SERPL-SCNC: 24 MMOL/L (ref 20–33)
CREAT SERPL-MCNC: 0.83 MG/DL (ref 0.5–1.4)
CREAT SERPL-MCNC: 0.88 MG/DL (ref 0.5–1.4)
EOSINOPHIL # BLD AUTO: 0.01 K/UL (ref 0–0.51)
EOSINOPHIL NFR BLD: 0.1 % (ref 0–6.9)
ERYTHROCYTE [DISTWIDTH] IN BLOOD BY AUTOMATED COUNT: 45.6 FL (ref 35.9–50)
GFR SERPLBLD CREATININE-BSD FMLA CKD-EPI: 70 ML/MIN/1.73 M 2
GFR SERPLBLD CREATININE-BSD FMLA CKD-EPI: 75 ML/MIN/1.73 M 2
GLUCOSE SERPL-MCNC: 118 MG/DL (ref 65–99)
GLUCOSE SERPL-MCNC: 98 MG/DL (ref 65–99)
HCT VFR BLD AUTO: 27.8 % (ref 37–47)
HGB BLD-MCNC: 9 G/DL (ref 12–16)
IMM GRANULOCYTES # BLD AUTO: 0.22 K/UL (ref 0–0.11)
IMM GRANULOCYTES NFR BLD AUTO: 2.5 % (ref 0–0.9)
LYMPHOCYTES # BLD AUTO: 1.37 K/UL (ref 1–4.8)
LYMPHOCYTES NFR BLD: 15.3 % (ref 22–41)
MCH RBC QN AUTO: 22.2 PG (ref 27–33)
MCHC RBC AUTO-ENTMCNC: 32.4 G/DL (ref 32.2–35.5)
MCV RBC AUTO: 68.5 FL (ref 81.4–97.8)
MONOCYTES # BLD AUTO: 1.72 K/UL (ref 0–0.85)
MONOCYTES NFR BLD AUTO: 19.3 % (ref 0–13.4)
NEUTROPHILS # BLD AUTO: 5.54 K/UL (ref 1.82–7.42)
NEUTROPHILS NFR BLD: 62 % (ref 44–72)
NRBC # BLD AUTO: 0 K/UL
NRBC BLD-RTO: 0 /100 WBC (ref 0–0.2)
PLATELET # BLD AUTO: 232 K/UL (ref 164–446)
PMV BLD AUTO: 10.9 FL (ref 9–12.9)
POTASSIUM SERPL-SCNC: 4 MMOL/L (ref 3.6–5.5)
POTASSIUM SERPL-SCNC: 4.3 MMOL/L (ref 3.6–5.5)
RBC # BLD AUTO: 4.06 M/UL (ref 4.2–5.4)
SIGNIFICANT IND 70042: ABNORMAL
SIGNIFICANT IND 70042: ABNORMAL
SITE SITE: ABNORMAL
SITE SITE: ABNORMAL
SODIUM SERPL-SCNC: 134 MMOL/L (ref 135–145)
SODIUM SERPL-SCNC: 134 MMOL/L (ref 135–145)
SOURCE SOURCE: ABNORMAL
SOURCE SOURCE: ABNORMAL
WBC # BLD AUTO: 8.9 K/UL (ref 4.8–10.8)

## 2024-03-06 PROCEDURE — A9270 NON-COVERED ITEM OR SERVICE: HCPCS | Performed by: STUDENT IN AN ORGANIZED HEALTH CARE EDUCATION/TRAINING PROGRAM

## 2024-03-06 PROCEDURE — 700101 HCHG RX REV CODE 250: Performed by: STUDENT IN AN ORGANIZED HEALTH CARE EDUCATION/TRAINING PROGRAM

## 2024-03-06 PROCEDURE — 700102 HCHG RX REV CODE 250 W/ 637 OVERRIDE(OP): Performed by: STUDENT IN AN ORGANIZED HEALTH CARE EDUCATION/TRAINING PROGRAM

## 2024-03-06 PROCEDURE — 770006 HCHG ROOM/CARE - MED/SURG/GYN SEMI*

## 2024-03-06 PROCEDURE — 700111 HCHG RX REV CODE 636 W/ 250 OVERRIDE (IP): Performed by: STUDENT IN AN ORGANIZED HEALTH CARE EDUCATION/TRAINING PROGRAM

## 2024-03-06 PROCEDURE — 92610 EVALUATE SWALLOWING FUNCTION: CPT

## 2024-03-06 PROCEDURE — 99233 SBSQ HOSP IP/OBS HIGH 50: CPT | Performed by: STUDENT IN AN ORGANIZED HEALTH CARE EDUCATION/TRAINING PROGRAM

## 2024-03-06 PROCEDURE — 94669 MECHANICAL CHEST WALL OSCILL: CPT

## 2024-03-06 PROCEDURE — 700105 HCHG RX REV CODE 258: Performed by: HOSPITALIST

## 2024-03-06 PROCEDURE — 85025 COMPLETE CBC W/AUTO DIFF WBC: CPT

## 2024-03-06 PROCEDURE — 36415 COLL VENOUS BLD VENIPUNCTURE: CPT

## 2024-03-06 PROCEDURE — 80048 BASIC METABOLIC PNL TOTAL CA: CPT

## 2024-03-06 PROCEDURE — 87040 BLOOD CULTURE FOR BACTERIA: CPT | Mod: 91

## 2024-03-06 RX ORDER — AMOXICILLIN 250 MG
2 CAPSULE ORAL EVERY EVENING
Status: SHIPPED
Start: 2024-03-06 | End: 2024-03-09

## 2024-03-06 RX ORDER — ONDANSETRON 4 MG/1
4 TABLET, ORALLY DISINTEGRATING ORAL EVERY 4 HOURS PRN
Status: SHIPPED
Start: 2024-03-06 | End: 2024-03-09

## 2024-03-06 RX ORDER — IPRATROPIUM BROMIDE AND ALBUTEROL SULFATE 2.5; .5 MG/3ML; MG/3ML
3 SOLUTION RESPIRATORY (INHALATION) EVERY 4 HOURS PRN
Status: SHIPPED
Start: 2024-03-06 | End: 2024-03-09

## 2024-03-06 RX ORDER — ENOXAPARIN SODIUM 100 MG/ML
40 INJECTION SUBCUTANEOUS DAILY
Status: DISCONTINUED | OUTPATIENT
Start: 2024-03-07 | End: 2024-03-09 | Stop reason: HOSPADM

## 2024-03-06 RX ORDER — SULFAMETHOXAZOLE AND TRIMETHOPRIM 800; 160 MG/1; MG/1
1 TABLET ORAL 2 TIMES DAILY
Qty: 14 TABLET | Refills: 0 | Status: DISCONTINUED | OUTPATIENT
Start: 2024-03-06 | End: 2024-03-06

## 2024-03-06 RX ORDER — POLYETHYLENE GLYCOL 3350 17 G/17G
17 POWDER, FOR SOLUTION ORAL
Status: SHIPPED
Start: 2024-03-06 | End: 2024-03-09

## 2024-03-06 RX ORDER — ACETAMINOPHEN 500 MG
1000 TABLET ORAL EVERY 6 HOURS PRN
Status: SHIPPED
Start: 2024-03-06 | End: 2024-03-26

## 2024-03-06 RX ADMIN — SODIUM CHLORIDE: 9 INJECTION, SOLUTION INTRAVENOUS at 06:01

## 2024-03-06 RX ADMIN — HEPARIN SODIUM 5000 UNITS: 5000 INJECTION, SOLUTION INTRAVENOUS; SUBCUTANEOUS at 21:15

## 2024-03-06 RX ADMIN — LEVOTHYROXINE SODIUM 125 MCG: 0.12 TABLET ORAL at 04:03

## 2024-03-06 RX ADMIN — CEFTRIAXONE SODIUM 2000 MG: 10 INJECTION, POWDER, FOR SOLUTION INTRAVENOUS at 18:36

## 2024-03-06 RX ADMIN — CLOZAPINE 150 MG: 25 TABLET ORAL at 04:04

## 2024-03-06 RX ADMIN — HEPARIN SODIUM 5000 UNITS: 5000 INJECTION, SOLUTION INTRAVENOUS; SUBCUTANEOUS at 05:39

## 2024-03-06 RX ADMIN — ASPIRIN 81 MG: 81 TABLET, COATED ORAL at 04:03

## 2024-03-06 NOTE — CARE PLAN
Problem: Hyperinflation  Goal: Prevent or improve atelectasis  Description: Target End Date:  3 to 4 days    1. Instruct incentive spirometry usage  2.  Perform hyperinflation therapy as indicated  Outcome: Not Met     PEP QID

## 2024-03-06 NOTE — DISCHARGE PLANNING
Discharge canceled, patient is NPO. SNF will not accept patient until she has a diet. Brinda notified via Voalte to cancel scheduled transport.

## 2024-03-06 NOTE — DISCHARGE PLANNING
CM spoke with guardian Yannick Bernstein [phone] to discuss the patient's discharge plan. She chose to send patient to accepting SNF [facility], choice form sent to [facility]. Patient required a new PASRR and LOC, PASRR complete, however, LOC is in manual review. CM will monitor for requested information from screener and submit items as appropriate. Facility notified of manual review, CM will call when it is completed    Case Management Discharge Planning    Admission Date: 3/3/2024  GMLOS: 5.1  ALOS: 3    6-Clicks ADL Score: 17  6-Clicks Mobility Score: 12  PT and/or OT Eval ordered: Yes  Post-acute Referrals Ordered: Yes  Post-acute Choice Obtained: Yes  Has referral(s) been sent to post-acute provider:  Yes      Anticipated Discharge Dispo: Discharge Disposition: D/T to SNF with Medicare cert in anticipation of skilled care (03)  Discharge Address: [address] [place] [address] [address] [place] [address]  Discharge Contact Phone Number: [phone]    DME Needed: No    Action(s) Taken: Updated Provider/Nurse on Discharge Plan, DC Assessment Complete (See below), Choice obtained, Referral(s) sent, Acceptance Received, Family Conference, and Completed PASSR/LOC    Escalations Completed: None    Medically Clear: Yes    Next Steps: CM will complete manual review requirements to obtain a current LOC, then patient to discharge to [facility]    Barriers to Discharge: Pending Placement    Care Transition Team Assessment    Information Source  Orientation Level: Oriented to person, Oriented to place, Oriented to time, Disoriented to situation  Information Given By: Legal Guardian  Informant's Name: Yannick Bernstein  Who is responsible for making decisions for patient? : Guardian  Name(s) of Primary Decision Maker: Rima Barrett    Readmission Evaluation  Is this a readmission?: No    Elopement Risk  Legal Hold: No  Ambulatory or Self Mobile in Wheelchair: No-Not an Elopement Risk  Elopement Risk: Not at Risk for Elopement    Interdisciplinary  Discharge Planning  Does Admitting Nurse Feel This Could be a Complex Discharge?: No  Primary Care Physician: Dayne Lackey  Lives with - Patient's Self Care Capacity: Attendant / Paid Care Giver  Housing / Facility: Group Home  Prior Services: Continuous (24 Hour) Care Giving Per Service    Discharge Preparedness  What is your plan after discharge?: Skilled nursing facility  What are your discharge supports?: Guardian  Prior Functional Level: Ambulatory, Needs Assist with Activities of Daily Living, Needs Assist with Medication Management  Difficulity with ADLs: Walking    Functional Assesment  Prior Functional Level: Ambulatory, Needs Assist with Activities of Daily Living, Needs Assist with Medication Management    Finances  Financial Barriers to Discharge: No  Prescription Coverage: Yes    Vision / Hearing Impairment  Vision Impairment : Yes  Right Eye Vision: Impaired, Wears Glasses  Left Eye Vision: Impaired, Wears Glasses  Hearing Impairment : No    Values / Beliefs / Concerns  Values / Beliefs Concerns : No    Advance Directive  Advance Directive?: POLST    Domestic Abuse  Have you ever been the victim of abuse or violence?: No  Physical Abuse or Sexual Abuse: No  Verbal Abuse or Emotional Abuse: No  Possible Abuse/Neglect Reported to:: Not Applicable              Anticipated Discharge Information  Discharge Disposition: D/T to SNF with Medicare cert in anticipation of skilled care (03)  Discharge Address: [address] [place] [address] [address] [place] [address]  Discharge Contact Phone Number: [phone]

## 2024-03-06 NOTE — PROGRESS NOTES
"The patient is having difficulty swallowing her meds due to coughing and eventually refuses the remaining medications. Per patient \" that's enough I don't want any other medications right now\". Trying to put in the applesauce but it still refuses.  Vital signs are stable. Charge nurse informed.   "

## 2024-03-06 NOTE — DISCHARGE PLANNING
DC Transport Scheduled    Transport Company Scheduled:  [employer]  Spoke with Pricilla at [facility] to schedule transport.  [facility] Trip #: [id number]    Scheduled Date: 3/6/2024  Scheduled Time: [id number]    Destination: [facility]   Destination address: [address] [place] [address] [address] [place] [place]    Notified care team of scheduled transport via Voalte.     If there are any changes needed to the DC transportation scheduled, please contact [facility] at ext. [phone] between the hours of 0894-4775 Mon-Fri. If outside those hours, contact the ED Case Manager at ext. [phone].

## 2024-03-06 NOTE — CARE PLAN
The patient is Stable - Low risk of patient condition declining or worsening    Shift Goals  Clinical Goals: continue antibiotics, monitor VS and O2 sat > 90%  Patient Goals: rest  Family Goals: ashutosh    Progress made toward(s) clinical / shift goals:  Received patient in bed alert and oriented x3. Denies pain at this time.  Administered scheduled medication. Maintained O2 at 2L via NC. Hourly rounds performed. fall precautions in place and call light within reach. All other needs met.     Patient is not progressing towards the following goals:    Problem: Knowledge Deficit - Standard  Goal: Patient and family/care givers will demonstrate understanding of plan of care, disease process/condition, diagnostic tests and medications  Description: Target End Date:  1-3 days or as soon as patient condition allows    Document in Patient Education    1.  Patient and family/caregiver oriented to unit, equipment, visitation policy and means for communicating concern  2.  Complete/review Learning Assessment  3.  Assess knowledge level of disease process/condition, treatment plan, diagnostic tests and medications  4.  Explain disease process/condition, treatment plan, diagnostic tests and medications  Outcome: Not Progressing

## 2024-03-06 NOTE — DISCHARGE SUMMARY
Discharge Summary    CHIEF COMPLAINT ON ADMISSION  Chief Complaint   Patient presents with    Weakness     Pt bib ems from group home where she's staying as a watson of the [facility]. Group home called stating pt has increased weakness and unable to get around.     UTI     Per group home urine made them suspect UTI.        Reason for Admission  Acute Cystitis with Encephalopathy    Admission Date  3/3/2024     Discharge Date  3/9/2024     CODE STATUS  DNAR/DNI    HPI & HOSPITAL COURSE  This is a 73 y.o. female watson of the [facility] with COPD on 2 L/min baseline, mood DO, seizure DO, hypothyroidism, and hyperlipidemia here with progressive weakness.    She was directed to the ED by her group home due to impaired mobility and somnolence. In the ED she had leukocytosis with pyuria suggestive of cystitis. CXR was initially hypoinflated without consolidation, though due to cough a subsequent CXR demonstrated bibasilar opacities suggestive of aspiration. She was initiated on ceftriaxone empirically with elevated procalcitonin suggestive of benefit of antibiotics for respiratory infiltrates. Ucx grew pan-susceptible E coli (other than ampicillin) with E coli bacteremia. She remained afebrile for 48h prior to discharge with normalization of her leukocytosis. Hospitalization was complicated by intermittent delirium for which she would refuse to take medications nor be awake enough to swallow. This is confounded by her chronic insomnia. She underwent FEES which demonstrated sufficient function for soft/bite size solids and mildly thick liquids.    Moderate hyponatremia resolved spontaneously. Urine sodium and osmolality were consistent with sodium-avid dilute urine appropriate for urinary elimination of excess water.    Therefore, she is discharged in fair and stable condition to skilled nursing facility.    The patient met 2-midnight criteria for an inpatient stay at the time of discharge.      FOLLOW UP ITEMS POST DISCHARGE    E coli  bacteremia - complete 7 day course of bactrim if reliably taking oral medication    Pharyngeal dysphagia - ongoing SLP    DISCHARGE DIAGNOSES  Principal Problem (Resolved):    E coli bacteremia (POA: Yes)  Active Problems:    Acquired hypothyroidism (POA: Yes)      Overview: Labs 4/20/2023 TSH 4.13, free T41.2.    Seizure disorder (HCC) (POA: Yes)    COPD mixed type (HCC) (POA: Yes)    Hyponatremia (POA: Yes)      Overview: Labs 4/20/2023 sodium 133, serum OSM mildly decreased 273, urine OSM       normal 130.    Urinary incontinence (POA: Yes)    Hyperlipidemia (POA: Yes)      Overview: Labs 4/20/2023 , TG 54, HDL 59, LDL 50.    Insomnia (POA: Yes)    ACP (advance care planning) (POA: Yes)    Mood disorder (HCC) (POA: Yes)    Chronic respiratory failure with hypoxia (HCC) (POA: Yes)    Class 1 obesity in adult (POA: Yes)    Generalized weakness (POA: Yes)    Alcohol dependence (HCC) (POA: Yes)    Community acquired pneumonia of right lower lobe of lung (POA: Yes)  Resolved Problems:    Acute cystitis (POA: Yes)    ALENA (acute kidney injury) (HCC) (POA: Yes)    Hypokalemia (POA: Yes)    Sepsis due to Escherichia coli with encephalopathy without septic shock (HCC) (POA: Yes)    Acute encephalopathy (POA: Clinically Undetermined)      FOLLOW UP  Future Appointments   Date Time Provider Department Center   5/16/2024 10:00 AM Jesus Rand M.D. [facility] None     [facility]  [address] [place] [address]  [phone]        Dayne Rosario, P.A.  [address] [id number] [phone]    Call  Please call your primary care provider to schedule a hospital follow up. Thank you.      MEDICATIONS ON DISCHARGE     Medication List        START taking these medications        Instructions   acetaminophen 500 MG Tabs  Commonly known as: Tylenol   Take 2 Tablets by mouth every 6 hours as needed for Mild Pain.  Dose: 1,000 mg     sulfamethoxazole-trimethoprim 800-160 MG  tablet  Commonly known as: Bactrim DS   Take 1 Tablet by mouth one time for 1 dose. In the evening 3/9/24.  Dose: 1 Tablet            CHANGE how you take these medications        Instructions   Cholecalciferol 2000 UNIT Caps  What changed: how much to take   Doctor's comments: DC previous order  Take 1 Capsule by mouth every day.  Dose: 2,000 Units     simvastatin 40 MG Tabs  What changed:   how much to take  how to take this  when to take this  Commonly known as: Zocor   Doctor's comments: [facility] asked for a 90 day refill for pt's simvastatin be sent to [facility]  TAKE 1 TABLET BY MOUTH AT BEDTIME FOR HLD            CONTINUE taking these medications        Instructions   alendronate 70 MG Tabs  Commonly known as: Fosamax   Take 1 Tablet by mouth every 7 days.  Dose: 70 mg     Aspirin Low Dose 81 MG EC tablet  Generic drug: aspirin   TAKE 1 TABLET BY MOUTH ONCE DAILY     cloZAPine 100 MG Tabs  Commonly known as: Clozaril   Take 1.5 Tablets by mouth 2 times a day.  Dose: 150 mg     Desitin 40 % Pste paste  Generic drug: zinc oxide   Doctor's comments: CAN WE GET A DC ORDER FOR THIS  APPLY TOPICALLY TO AFFECTED AREA IN GROIN/BUTTOCKS TWICE A DAY     divalproex  MG Tb24  Commonly known as: Depakote ER   TAKE 1 TABLET BY MOUTH ONCE DAILY  Dose: 250 mg     escitalopram 10 MG Tabs  Commonly known as: Lexapro   Take 10 mg by mouth every day.  Dose: 10 mg     FQ Protective Underwear Misc   CHANGE 3 TIMES DAILY AS DIRECTED     hydroCHLOROthiazide 12.5 MG tablet   Take 1 Tablet by mouth every morning.  Dose: 12.5 mg     hydrophilic ointment Oint   Apply 1 Each topically at bedtime. Apply to hands  Dose: 1 Each     Incruse Ellipta 62.5 MCG/ACT Aepb inhaler  Generic drug: umeclidinium bromide   INHALE 1 PUFF BY MOUTH ONCE DAILY     levETIRAcetam 250 MG tablet  Commonly known as: Keppra   TAKE 1 TABLET BY MOUTH THREE TIMES A DAY  Dose: 250 mg     levothyroxine 125 MCG Tabs  Commonly known as: Synthroid   Take 1 Tablet by  mouth every morning on an empty stomach.  Dose: 125 mcg     Melatonin 3 MG Tbdp   Take 3 mg by mouth every evening.  Dose: 3 mg     Myrbetriq 50 MG Tb24  Generic drug: Mirabegron ER   Take 50 mg by mouth every evening.  Dose: 50 mg     ondansetron 4 MG Tabs tablet  Commonly known as: Zofran   Take 1 Tablet by mouth every four hours as needed for Nausea/Vomiting.  Dose: 4 mg     Remedy Phytoplex Hydraguard Crea   Apply 1 Dose topically 2 times a day as needed. Apply thin layer to buttocks twice daily as needed for redness. DC scheduled order.  Dose: 1 Dose     Ventolin  (90 Base) MCG/ACT Aers inhalation aerosol  Generic drug: albuterol   Inhale 2 Puffs every four hours as needed for Shortness of Breath.  Dose: 2 Puff              Allergies  No Known Allergies    DIET  Orders Placed This Encounter   Procedures    Diet Order Diet: Level 6 - Soft and Bite Sized (float meds in puree); Liquid level: Level 2 - Mildly Thick; Tray Modifications (optional): No Straws, SLP - 1:1 Supervision by Nursing     Standing Status:   Standing     Number of Occurrences:   1     Order Specific Question:   Diet:     Answer:   Level 6 - Soft and Bite Sized [23]     Comments:   float meds in puree     Order Specific Question:   Liquid level     Answer:   Level 2 - Mildly Thick     Order Specific Question:   Tray Modifications (optional)     Answer:   No Straws     Order Specific Question:   Tray Modifications (optional)     Answer:   SLP - 1:1 Supervision by Nursing       ACTIVITY  As tolerated and directed by skilled nursing.  Weight bearing as tolerated    LINES, DRAINS, AND WOUNDS  This is an automated list. Peripheral IVs will be removed prior to discharge.  Peripheral IV 03/05/24 22 G Anterior;Distal;Right Forearm (Active)   Site Assessment Clean;Dry;Intact 03/05/24 2035   Dressing Type Transparent 03/05/24 2035   Line Status Infusing 03/05/24 2035   Dressing Status Clean;Intact;Dry 03/05/24 2035   Dressing Intervention Initial  dressing 03/05/24 2035   Dressing Change Due 03/09/24 03/05/24 2035   Infiltration Grading ([facility], [facility]) 0 03/05/24 2035   Phlebitis Scale ([facility] Only) 0 03/05/24 2035     External Urinary Catheter (Female [facility]) (Active)      Wound 08/31/23 Buttocks Midline Bilateral Nonblanchable redness, flaky, peeling (Active)       Peripheral IV 03/05/24 22 G Anterior;Distal;Right Forearm (Active)   Site Assessment Clean;Dry;Intact 03/05/24 2035   Dressing Type Transparent 03/05/24 2035   Line Status Infusing 03/05/24 2035   Dressing Status Clean;Intact;Dry 03/05/24 2035   Dressing Intervention Initial dressing 03/05/24 2035   Dressing Change Due 03/09/24 03/05/24 2035   Infiltration Grading ([facility], [facility]) 0 03/05/24 2035   Phlebitis Scale ([facility] Only) 0 03/05/24 2035               MENTAL STATUS ON TRANSFER     Awake, appropriately conversant, does not have decisional capacity        CONSULTATIONS  None    PROCEDURES  None    LABORATORY  Lab Results   Component Value Date    SODIUM 139 03/08/2024    POTASSIUM 4.2 03/08/2024    CHLORIDE 102 03/08/2024    CO2 27 03/08/2024    GLUCOSE 107 (H) 03/08/2024    BUN 11 03/08/2024    CREATININE 0.77 03/08/2024    CREATININE 0.9 12/31/2008        Lab Results   Component Value Date    WBC 9.2 03/07/2024    HEMOGLOBIN 9.7 (L) 03/07/2024    HEMATOCRIT 29.6 (L) 03/07/2024    PLATELETCT 250 03/07/2024        Total time of the discharge process exceeds 32 minutes.

## 2024-03-07 LAB
ANION GAP SERPL CALC-SCNC: 10 MMOL/L (ref 7–16)
ANION GAP SERPL CALC-SCNC: 11 MMOL/L (ref 7–16)
BASOPHILS # BLD AUTO: 0.7 % (ref 0–1.8)
BASOPHILS # BLD: 0.06 K/UL (ref 0–0.12)
BUN SERPL-MCNC: 10 MG/DL (ref 8–22)
BUN SERPL-MCNC: 11 MG/DL (ref 8–22)
CALCIUM SERPL-MCNC: 8.4 MG/DL (ref 8.5–10.5)
CALCIUM SERPL-MCNC: 8.4 MG/DL (ref 8.5–10.5)
CHLORIDE SERPL-SCNC: 101 MMOL/L (ref 96–112)
CHLORIDE SERPL-SCNC: 104 MMOL/L (ref 96–112)
CO2 SERPL-SCNC: 24 MMOL/L (ref 20–33)
CO2 SERPL-SCNC: 24 MMOL/L (ref 20–33)
CREAT SERPL-MCNC: 0.7 MG/DL (ref 0.5–1.4)
CREAT SERPL-MCNC: 0.86 MG/DL (ref 0.5–1.4)
EOSINOPHIL # BLD AUTO: 0 K/UL (ref 0–0.51)
EOSINOPHIL NFR BLD: 0 % (ref 0–6.9)
ERYTHROCYTE [DISTWIDTH] IN BLOOD BY AUTOMATED COUNT: 46.5 FL (ref 35.9–50)
EXTRA TUBE BLU BLU: NORMAL
GFR SERPLBLD CREATININE-BSD FMLA CKD-EPI: 72 ML/MIN/1.73 M 2
GFR SERPLBLD CREATININE-BSD FMLA CKD-EPI: 92 ML/MIN/1.73 M 2
GLUCOSE SERPL-MCNC: 100 MG/DL (ref 65–99)
GLUCOSE SERPL-MCNC: 92 MG/DL (ref 65–99)
HCT VFR BLD AUTO: 29.6 % (ref 37–47)
HGB BLD-MCNC: 9.7 G/DL (ref 12–16)
IMM GRANULOCYTES # BLD AUTO: 0.31 K/UL (ref 0–0.11)
IMM GRANULOCYTES NFR BLD AUTO: 3.4 % (ref 0–0.9)
LYMPHOCYTES # BLD AUTO: 1.37 K/UL (ref 1–4.8)
LYMPHOCYTES NFR BLD: 14.9 % (ref 22–41)
MCH RBC QN AUTO: 22.7 PG (ref 27–33)
MCHC RBC AUTO-ENTMCNC: 32.8 G/DL (ref 32.2–35.5)
MCV RBC AUTO: 69.3 FL (ref 81.4–97.8)
MONOCYTES # BLD AUTO: 1.49 K/UL (ref 0–0.85)
MONOCYTES NFR BLD AUTO: 16.2 % (ref 0–13.4)
NEUTROPHILS # BLD AUTO: 5.96 K/UL (ref 1.82–7.42)
NEUTROPHILS NFR BLD: 64.8 % (ref 44–72)
NRBC # BLD AUTO: 0 K/UL
NRBC BLD-RTO: 0 /100 WBC (ref 0–0.2)
PLATELET # BLD AUTO: 250 K/UL (ref 164–446)
PMV BLD AUTO: 11 FL (ref 9–12.9)
POTASSIUM SERPL-SCNC: 3.9 MMOL/L (ref 3.6–5.5)
POTASSIUM SERPL-SCNC: 4.3 MMOL/L (ref 3.6–5.5)
RBC # BLD AUTO: 4.27 M/UL (ref 4.2–5.4)
SODIUM SERPL-SCNC: 136 MMOL/L (ref 135–145)
SODIUM SERPL-SCNC: 138 MMOL/L (ref 135–145)
WBC # BLD AUTO: 9.2 K/UL (ref 4.8–10.8)

## 2024-03-07 PROCEDURE — 770006 HCHG ROOM/CARE - MED/SURG/GYN SEMI*

## 2024-03-07 PROCEDURE — 700102 HCHG RX REV CODE 250 W/ 637 OVERRIDE(OP): Performed by: STUDENT IN AN ORGANIZED HEALTH CARE EDUCATION/TRAINING PROGRAM

## 2024-03-07 PROCEDURE — 92526 ORAL FUNCTION THERAPY: CPT

## 2024-03-07 PROCEDURE — 700105 HCHG RX REV CODE 258

## 2024-03-07 PROCEDURE — A9270 NON-COVERED ITEM OR SERVICE: HCPCS | Performed by: STUDENT IN AN ORGANIZED HEALTH CARE EDUCATION/TRAINING PROGRAM

## 2024-03-07 PROCEDURE — 85025 COMPLETE CBC W/AUTO DIFF WBC: CPT

## 2024-03-07 PROCEDURE — 700111 HCHG RX REV CODE 636 W/ 250 OVERRIDE (IP): Performed by: STUDENT IN AN ORGANIZED HEALTH CARE EDUCATION/TRAINING PROGRAM

## 2024-03-07 PROCEDURE — 700111 HCHG RX REV CODE 636 W/ 250 OVERRIDE (IP)

## 2024-03-07 PROCEDURE — 94669 MECHANICAL CHEST WALL OSCILL: CPT

## 2024-03-07 PROCEDURE — 94640 AIRWAY INHALATION TREATMENT: CPT

## 2024-03-07 PROCEDURE — 700101 HCHG RX REV CODE 250: Performed by: STUDENT IN AN ORGANIZED HEALTH CARE EDUCATION/TRAINING PROGRAM

## 2024-03-07 PROCEDURE — 36415 COLL VENOUS BLD VENIPUNCTURE: CPT

## 2024-03-07 PROCEDURE — 99232 SBSQ HOSP IP/OBS MODERATE 35: CPT | Performed by: STUDENT IN AN ORGANIZED HEALTH CARE EDUCATION/TRAINING PROGRAM

## 2024-03-07 PROCEDURE — 80048 BASIC METABOLIC PNL TOTAL CA: CPT | Mod: 91

## 2024-03-07 RX ORDER — SULFAMETHOXAZOLE AND TRIMETHOPRIM 800; 160 MG/1; MG/1
1 TABLET ORAL 3 TIMES DAILY
Status: DISCONTINUED | OUTPATIENT
Start: 2024-03-08 | End: 2024-03-09 | Stop reason: HOSPADM

## 2024-03-07 RX ORDER — ACETAMINOPHEN 325 MG/1
650 TABLET ORAL EVERY 6 HOURS PRN
Status: DISCONTINUED | OUTPATIENT
Start: 2024-03-07 | End: 2024-03-09 | Stop reason: HOSPADM

## 2024-03-07 RX ORDER — ASPIRIN 300 MG/1
300 SUPPOSITORY RECTAL DAILY
Status: DISCONTINUED | OUTPATIENT
Start: 2024-03-07 | End: 2024-03-09 | Stop reason: HOSPADM

## 2024-03-07 RX ORDER — ASPIRIN 81 MG/1
81 TABLET ORAL DAILY
Status: DISCONTINUED | OUTPATIENT
Start: 2024-03-07 | End: 2024-03-09 | Stop reason: HOSPADM

## 2024-03-07 RX ORDER — ACETAMINOPHEN 650 MG/1
650 SUPPOSITORY RECTAL EVERY 6 HOURS PRN
Status: DISCONTINUED | OUTPATIENT
Start: 2024-03-07 | End: 2024-03-09 | Stop reason: HOSPADM

## 2024-03-07 RX ORDER — LEVETIRACETAM 500 MG/5ML
250 INJECTION, SOLUTION, CONCENTRATE INTRAVENOUS 3 TIMES DAILY
Status: DISCONTINUED | OUTPATIENT
Start: 2024-03-07 | End: 2024-03-08

## 2024-03-07 RX ADMIN — DEXTROSE MONOHYDRATE 125 MG: 50 INJECTION, SOLUTION INTRAVENOUS at 11:11

## 2024-03-07 RX ADMIN — LEVETIRACETAM 250 MG: 100 INJECTION, SOLUTION, CONCENTRATE INTRAVENOUS at 10:35

## 2024-03-07 RX ADMIN — CEFTRIAXONE SODIUM 2000 MG: 10 INJECTION, POWDER, FOR SOLUTION INTRAVENOUS at 17:37

## 2024-03-07 RX ADMIN — VIBEGRON 75 MG: 75 TABLET, FILM COATED ORAL at 17:37

## 2024-03-07 RX ADMIN — LEVETIRACETAM 250 MG: 100 INJECTION, SOLUTION, CONCENTRATE INTRAVENOUS at 14:33

## 2024-03-07 RX ADMIN — DOCUSATE SODIUM 50 MG AND SENNOSIDES 8.6 MG 2 TABLET: 8.6; 5 TABLET, FILM COATED ORAL at 17:38

## 2024-03-07 RX ADMIN — DEXTROSE MONOHYDRATE 125 MG: 50 INJECTION, SOLUTION INTRAVENOUS at 17:42

## 2024-03-07 RX ADMIN — IPRATROPIUM BROMIDE AND ALBUTEROL SULFATE 3 ML: .5; 3 SOLUTION RESPIRATORY (INHALATION) at 11:41

## 2024-03-07 RX ADMIN — CLOZAPINE 150 MG: 25 TABLET ORAL at 17:37

## 2024-03-07 RX ADMIN — Medication 3 MG: at 21:15

## 2024-03-07 RX ADMIN — ENOXAPARIN SODIUM 40 MG: 100 INJECTION SUBCUTANEOUS at 17:38

## 2024-03-07 RX ADMIN — SIMVASTATIN 40 MG: 40 TABLET, FILM COATED ORAL at 21:15

## 2024-03-07 RX ADMIN — LEVETIRACETAM 250 MG: 100 INJECTION, SOLUTION, CONCENTRATE INTRAVENOUS at 17:38

## 2024-03-07 RX ADMIN — ASPIRIN 81 MG: 81 TABLET, COATED ORAL at 14:01

## 2024-03-07 ASSESSMENT — PAIN DESCRIPTION - PAIN TYPE
TYPE: ACUTE PAIN

## 2024-03-07 NOTE — THERAPY
"Speech Language Pathology   Clinical Swallow Evaluation     Patient Name: Tracie Rinaldi  AGE:  73 y.o., SEX:  female  Medical Record #: [id number]  Date of Service: 3/6/2024      History of Present Illness  Pt admitted with weakness. UTI, dehydration, leukocytosis and weakness noted in the ER. In addition per notes, sepsis, and low K+. Pulm MD notes indicate acute on chronic hypoxic resp failure. Per 3/6 RN notes, pt with difficulty swallowing her meds with coughing documented.     PMH-\"one bottle of wine daily.\" Pt last seen by SLP 8/2023 and recommended for RG/TNO    General Information:     Level of Consciousness: Lethargic, Drowsy  Patient Behaviors:  (brief wakefulness with stimulation.)  Orientation:  (unable to assess.)  Follows Directives: No      Oral Mechanism Evaluation:  Dentition: Upper denture, Lower denture   Facial Symmetry: Pt did not follow commands to assess  Facial Sensation: Pt did not follow commands to assess     Labial Observations: Pt did not follow commands to assess   Lingual Observations: Pt did not follow commands to assess  Motor Speech: unable to assess            Laryngeal Function:  Secretion Management: Drooling  Voice Quality: Wet        Cough: Pt did not follow commands to assess         Subjective  Pt did not state.      Assessment  Current Method of Nutrition: NPO until cleared by speech pathology  Positioning: Whitaker's (60-90 degrees)  Bolus Administration: SLP       Factor(s) Affecting Performance: Impaired command following, Other (see comments) (poor maintained wakefulness)  Tracheostomy : No     Swallowing Trials:  Swallowing Trials  Ice: Impaired      Comments: Pt moved up in bed with CNA. Oral care provided. Verbal and tactile cues/stimulation with decreased wakefulness. Low intensity of voice with mild wet quality. Mild drooling left lateral sulcus. Oral suction for mild amount of clear and thin mucous. One swallow triggered post single and small ice chips x2. Oral " suction for small amount of clear secretions. No further boluses r/t pt not at the level for safe intake. Dentures removed and SLP placed them in denture soaking solution. Pt sleeping when not stimulated.     Clinical Impressions  Pt demonstrating decreased wakefulness and responsiveness for safe oral intake. NPO for meds and nutrition. Pt to d/c later today per RN. SLP recommended continued hospitalization, as medically approp and if ok with MD, and until pt's swallow status improves and pt's source of nutrition is determined. RN to d/w MD.     Recommendations  Diet Consistency: NPO for meds and nutrition.  Instrumentation:  (Dependent on pt status and POC)  Medication: Non Oral           Oral Care: Q4h         SLP Treatment Plan  Treatment Plan: Dysphagia Treatment  SLP Frequency: 3x Per Week  Estimated Duration: Until Therapy Goals Met      Anticipated Discharge Needs  Discharge Recommendations:  (dependent on pt status and POC)            Patient / Family Goals  Patient / Family Goal #1: pt did not state.  Short Term Goals  Short Term Goal # 1: Pt will maintain wakefulness for greater than 15 min with min stimulation.  Goal Outcome # 1: Goal not met  Short Term Goal # 2: Pt will trigger on swallow in less than 3 seconds for single ice chips.  Goal Outcome # 2 : Goal not met  Short Term Goal # 3: Pt will manage oral secretions with no loss of saliva or drooling.  Goal Outcome  # 3: Goal not met      Brittani Lee, SLP

## 2024-03-07 NOTE — PROGRESS NOTES
made aware of increased oxygen demand. 1lmp>3lpm via NC and fatigue for most of the day.  in place. Remains NPO, pending further eval from SLP. Orders received for IV abx. VSS.

## 2024-03-07 NOTE — CARE PLAN
The patient is Watcher - Medium risk of patient condition declining or worsening    Shift Goals  Clinical Goals: monitor respiratory status  Patient Goals: rest  Family Goals: ashutosh    Progress made toward(s) clinical / shift goals:    Problem: Risk for Aspiration  Goal: Patient's risk for aspiration will be absent or decrease  Outcome: Not Progressing       Patient is not progressing towards the following goals:patient is very lethargic, unable to even answer orientation questions without falling asleep. Currently on 4 L of o2, sats are 94% on . Lung sounds crackles throughout, patient instructed to cough but is unable to. Educated on use of incentive spirometer, only able to get it up to 500. Call light and personal items within reach, bed alarm on, frequent rounding in place

## 2024-03-07 NOTE — DISCHARGE PLANNING
"YESENIA called guardian Yannick Bernstein [phone] to notify her that the intended discharge to [facility] was canceled last evening due to a change in condition. MD anticipates that she will remain inpatient for a \"few more days\". Yannick was appreciative of the information and CM informed her that she would be notified if there are any other changes.  "

## 2024-03-07 NOTE — PROGRESS NOTES
Hospital Medicine Daily Progress Note    Date of Service  3/6/2024    Chief Complaint  Tracie Rinaldi is a 73 y.o. female admitted 3/3/2024 with weakness    Hospital Course  Tracie Rinaldi is a 73 y.o. female from [facility] with history of former tobacco abuse, COPD dependent on 2 L, alcohol dependence, seizure disorder, hypothyroidism, hyperlipidemia who presented 3/3/2024 with evaluation for generalized weakness.  Patient reported to have had increased generalized weakness, unable to ambulate.  Patient herself endorses subjective fever and chills, urinary frequency and urgency.  Her group home referred patient to ER due to concern of suspected UTI.  In ER, her UA does no pyuria, she does have leukocytosis with WBC of 19.9 K.     Interval Problem Update    ROHITH ON  Somnolent today.  Refused to take medications this morning.  While awake coughed profusely with eating.  During our encounter she is lethargic and does not open her eyes to voice nor touch.  Made NPO. SLP recommends maintaining NPO.  Unable to transfer to SNF due to mental status.  CBC reviewed, Hgb 9 stable.  BMP reviewed, Na improved 134.    I have discussed this patient's plan of care and discharge plan at IDT rounds today with Case Management, Nursing, Nursing leadership, and other members of the IDT team.    Consultants/Specialty    Code Status  DNAR/DNI    Disposition  The patient is not medically cleared for discharge to home or a post-acute facility.  Anticipate discharge to: skilled nursing facility    I have placed the appropriate orders for post-discharge needs.    Review of Systems  Review of Systems   Unable to perform ROS: Mental acuity        Physical Exam  Temp:  [36.1 °C (96.9 °F)-37.1 °C (98.8 °F)] 36.3 °C (97.4 °F)  Pulse:  [84-86] 85  Resp:  [16-20] 16  BP: (121-146)/(54-74) 146/74  SpO2:  [90 %-98 %] 98 %    Physical Exam  Vitals and nursing note reviewed.   Constitutional:       General: She is not in acute distress.      Appearance: She is ill-appearing (Chronically). She is not toxic-appearing or diaphoretic.      Interventions: Nasal cannula in place.   HENT:      Head: Normocephalic.      Nose: Nose normal.      Mouth/Throat:      Mouth: Mucous membranes are dry.   Eyes:      General: No scleral icterus.     Conjunctiva/sclera: Conjunctivae normal.   Cardiovascular:      Rate and Rhythm: Normal rate and regular rhythm.      Pulses: Normal pulses.      Heart sounds: Normal heart sounds. No murmur heard.     No friction rub. No gallop.   Pulmonary:      Effort: Pulmonary effort is normal. No respiratory distress.      Breath sounds: Normal breath sounds. No wheezing, rhonchi or rales.   Abdominal:      General: Abdomen is flat. Bowel sounds are normal. There is no distension.      Palpations: Abdomen is soft.      Tenderness: There is no abdominal tenderness. There is no guarding or rebound.   Genitourinary:     Comments: No heredia  Musculoskeletal:      Cervical back: Neck supple.      Right lower leg: No edema.      Left lower leg: No edema.   Skin:     General: Skin is warm and dry.   Neurological:      Mental Status: She is lethargic.      Comments: Does not open eyes to voice nor touch.   Psychiatric:      Comments: Unable to assess         Fluids    Intake/Output Summary (Last 24 hours) at 3/6/2024 2115  Last data filed at 3/6/2024 1836  Gross per 24 hour   Intake --   Output 1400 ml   Net -1400 ml       Laboratory  Recent Labs     03/04/24  0556 03/05/24  0834 03/06/24  0201   WBC 15.2* 9.4 8.9   RBC 4.07* 4.24 4.06*   HEMOGLOBIN 9.3* 9.5* 9.0*   HEMATOCRIT 27.9* 29.5* 27.8*   MCV 68.6* 69.6* 68.5*   MCH 22.9* 22.4* 22.2*   MCHC 33.3 32.2 32.4   RDW 44.8 45.5 45.6   PLATELETCT 183 205 232   MPV 10.9 10.7 10.9     Recent Labs     03/05/24  0834 03/05/24  2120 03/06/24  1006   SODIUM 129* 129* 134*   POTASSIUM 4.3 3.9 4.3   CHLORIDE 96 96 101   CO2 23 22 24   GLUCOSE 134* 154* 118*   BUN 12 12 12   CREATININE 0.97 0.87 0.88    CALCIUM 7.8* 8.3* 8.3*                   Imaging  DX-CHEST-LIMITED (1 VIEW)   Final Result      Hypoinflation with bronchial linear bibasilar opacities, likely atelectasis. Pneumonia/recent aspiration is less likely, but still can be considered in the appropriate clinical settings.      DX-CHEST-LIMITED (1 VIEW)   Final Result      1.  Hypoinflation with mild bibasilar atelectasis.   2.  Stable mild cardiomegaly.   3.  No lobar pneumonia or pneumothorax.           Assessment/Plan  * E coli bacteremia- (present on admission)  Assessment & Plan  From urinary source  Continue ceftriaxone  When clinically improving and able to take PO can transition to bactrim    Acute encephalopathy  Assessment & Plan  Likely due to bacteremia  No autonomic signs of alcohol withdrawal  Minimize sedating medications  Mild-moderate hyponatremia unlikely to be contributing  Repeat BCx    Community acquired pneumonia of right lower lobe of lung- (present on admission)  Assessment & Plan  Likely aspiration pneumonia, elevated procalcitonin  Abx: ceftriaxone, doxycyline    Alcohol dependence (HCC)- (present on admission)  Assessment & Plan  Reported consuming 1 bottle of wine daily  Multivitamins    Generalized weakness- (present on admission)  Assessment & Plan  PT/OT  Fall precautions    Class 1 obesity in adult- (present on admission)  Assessment & Plan  Diet and lifestyle modification  Body mass index is 32.28 kg/m².      Chronic respiratory failure with hypoxia (HCC)- (present on admission)  Assessment & Plan  Dependent on 2 L-at baseline    Mood disorder (HCC)- (present on admission)  Assessment & Plan  Clozapine, Depakote    ACP (advance care planning)- (present on admission)  Assessment & Plan  She has POLST on file dated and signed 4/11/2018 by legal guardian -- DNR/DNI. Confirmed with patient.    Insomnia- (present on admission)  Assessment & Plan  Melatonin    Hyperlipidemia- (present on admission)  Assessment &  Plan  Statin    Urinary incontinence- (present on admission)  Assessment & Plan  As needed bladder scan and straight cath  Alicea if needed    Hyponatremia- (present on admission)  Assessment & Plan  Improving  Acute on chronic, dehydration contributing  Urine studies appropriately sodium avid and dilute urine for free water elimination  Repeat AM BMP    COPD mixed type (HCC)- (present on admission)  Assessment & Plan  Mild exacerbation resolving  No wheezing today  Continue duoNebs and RT  following  Continue home regimen  She reported quitting smoking    Seizure disorder (HCC)- (present on admission)  Assessment & Plan  Prior history  Continue Keppra  Continue seizure precautions    Acquired hypothyroidism- (present on admission)  Assessment & Plan  Continue Synthroid      VTE prophylaxis:    enoxaparin ppx      I have performed a physical exam and reviewed and updated ROS and Plan today (3/6/2024). In review of yesterday's note (3/5/2024), there are no changes except as documented above.

## 2024-03-07 NOTE — THERAPY
Speech Language Pathology   Daily Treatment     Patient Name: Tracie Rinaldi  AGE:  73 y.o., SEX:  female  Medical Record #: [id number]  Date of Service: 3/7/2024      Precautions:  Precautions: Fall Risk, Swallow Precautions         Subjective  Patient hungry for water per RN report. Patient impulsive to take serial swallows of thin liquids despite max verbal/tactile cues to remove cup.      Assessment  Patient swallowed serial swallows of PO thin liquids with resulting cough response that was perceptually WFL to clear secretions. However, patient impulsive to take serial swallows despite max verbal/tactile cues to remove cup. Trials of 4- Pureed textures by madi were tolerated with no overt s/sx of aspiration appreciated; patient denied globus sensation following PO trials of 4- Puree. Patient's vocal quality is moderately hoarse which she reports is new.    Clinical Impressions  Pt presents with suspected oropharyngeal dysphagia; suspect acute r/t generalized weakness. Changes to vocal quality are a potential indicator of laryngeal insufficiency. Recommend Pt remain NPO except for single ice chips to facilitate expectoration of secretions, prevent oropharyngeal atrophy, and minimize xerostomia pending MBSS results.  Recommend necessary medications be administered crushed in puree, as tolerated. Please discontinue PO feedings of ice chips and/or PO medication administration if patient continues to display overt s/sx of aspiration, decline in respiratory status.    Recommendations  Treatment Completed: Dysphagia Treatment     Dysphagia Treatment  Diet Consistency: NPO except meds and single ice chips pending MBSS  Instrumentation: VFSS (MBSS)  Medication: Crush with pudding/puree, as appropriate  Supervision: 1:1 feeding with constant supervision, Careful 1:1 hand feeding (1:1 d/t impulsivity) ICE CHIPS, SIPS/tsp c STAFF ONLY  Positioning: Fully upright and midline during oral intake, Meals sitting upright in  "a chair, as tolerated  Risk Management : None, Small bites/sips (single ice chips only)  Oral Care: Q4h (top and bottom dentures)    SLP Treatment Plan  Treatment Plan: Dysphagia Treatment  SLP Frequency: 3x Per Week  Estimated Duration: Until Therapy Goals Met    Anticipated Discharge Needs  Discharge Recommendations:  (dependent on MBSS results)  Therapy Recommendations Upon DC: Dysphagia Training, Community Re-Integration    Patient / Family Goals  Patient / Family Goal #1: \"can I have more water?\"  Goal #1 Outcome: Progressing as expected  Short Term Goals  Short Term Goal # 1: Pt will maintain wakefulness for greater than 15 min with min stimulation.  Goal Outcome # 1: Goal met  Short Term Goal # 2: Pt will trigger on swallow in less than 3 seconds for single ice chips.  Goal Outcome # 2 : Progressing as expected  Short Term Goal # 3: Pt will manage oral secretions with no loss of saliva or drooling.  Goal Outcome  # 3: Goal met    LESA Verduzco  "

## 2024-03-07 NOTE — ASSESSMENT & PLAN NOTE
Likely due to bacteremia  No autonomic signs of alcohol withdrawal  Minimize sedating medications  Mild-moderate hyponatremia unlikely to be contributing  Repeat BCx

## 2024-03-07 NOTE — CARE PLAN
The patient is Watcher - Medium risk of patient condition declining or worsening    Shift Goals  Clinical Goals: continue antibiotics, monitor VS and O2 sat > 90%  Patient Goals: rest  Family Goals: ashutosh    Progress made toward(s) clinical / shift goals:  education done on POC, no learning evident        Problem: Knowledge Deficit - Standard  Goal: Patient and family/care givers will demonstrate understanding of plan of care, disease process/condition, diagnostic tests and medications  Outcome: Progressing     Problem: Knowledge Deficit - COPD  Goal: Patient/significant other demonstrates understanding of disease process, utilization of the Action Plan, medications and discharge instruction  Outcome: Progressing

## 2024-03-08 ENCOUNTER — APPOINTMENT (OUTPATIENT)
Dept: RADIOLOGY | Facility: MEDICAL CENTER | Age: 74
DRG: 871 | End: 2024-03-08
Attending: STUDENT IN AN ORGANIZED HEALTH CARE EDUCATION/TRAINING PROGRAM
Payer: MEDICARE

## 2024-03-08 LAB
ANION GAP SERPL CALC-SCNC: 10 MMOL/L (ref 7–16)
BUN SERPL-MCNC: 11 MG/DL (ref 8–22)
CALCIUM SERPL-MCNC: 9.1 MG/DL (ref 8.5–10.5)
CHLORIDE SERPL-SCNC: 102 MMOL/L (ref 96–112)
CO2 SERPL-SCNC: 27 MMOL/L (ref 20–33)
CREAT SERPL-MCNC: 0.77 MG/DL (ref 0.5–1.4)
GFR SERPLBLD CREATININE-BSD FMLA CKD-EPI: 82 ML/MIN/1.73 M 2
GLUCOSE SERPL-MCNC: 107 MG/DL (ref 65–99)
POTASSIUM SERPL-SCNC: 4.2 MMOL/L (ref 3.6–5.5)
SODIUM SERPL-SCNC: 139 MMOL/L (ref 135–145)

## 2024-03-08 PROCEDURE — 700111 HCHG RX REV CODE 636 W/ 250 OVERRIDE (IP)

## 2024-03-08 PROCEDURE — 700105 HCHG RX REV CODE 258

## 2024-03-08 PROCEDURE — 99232 SBSQ HOSP IP/OBS MODERATE 35: CPT | Performed by: STUDENT IN AN ORGANIZED HEALTH CARE EDUCATION/TRAINING PROGRAM

## 2024-03-08 PROCEDURE — 700102 HCHG RX REV CODE 250 W/ 637 OVERRIDE(OP): Performed by: STUDENT IN AN ORGANIZED HEALTH CARE EDUCATION/TRAINING PROGRAM

## 2024-03-08 PROCEDURE — 700111 HCHG RX REV CODE 636 W/ 250 OVERRIDE (IP): Mod: JZ | Performed by: STUDENT IN AN ORGANIZED HEALTH CARE EDUCATION/TRAINING PROGRAM

## 2024-03-08 PROCEDURE — A9270 NON-COVERED ITEM OR SERVICE: HCPCS | Performed by: STUDENT IN AN ORGANIZED HEALTH CARE EDUCATION/TRAINING PROGRAM

## 2024-03-08 PROCEDURE — 80048 BASIC METABOLIC PNL TOTAL CA: CPT

## 2024-03-08 PROCEDURE — 92612 ENDOSCOPY SWALLOW (FEES) VID: CPT

## 2024-03-08 PROCEDURE — 36415 COLL VENOUS BLD VENIPUNCTURE: CPT

## 2024-03-08 PROCEDURE — 74210 X-RAY XM PHRNX&/CRV ESOPH C+: CPT

## 2024-03-08 PROCEDURE — 770006 HCHG ROOM/CARE - MED/SURG/GYN SEMI*

## 2024-03-08 RX ORDER — VALPROIC ACID 250 MG/5ML
125 SOLUTION ORAL 2 TIMES DAILY
Status: DISCONTINUED | OUTPATIENT
Start: 2024-03-08 | End: 2024-03-09 | Stop reason: HOSPADM

## 2024-03-08 RX ORDER — LEVETIRACETAM 250 MG/1
250 TABLET ORAL 3 TIMES DAILY
Status: DISCONTINUED | OUTPATIENT
Start: 2024-03-08 | End: 2024-03-09 | Stop reason: HOSPADM

## 2024-03-08 RX ADMIN — CLOZAPINE 150 MG: 25 TABLET ORAL at 06:31

## 2024-03-08 RX ADMIN — Medication 2000 UNITS: at 06:31

## 2024-03-08 RX ADMIN — VIBEGRON 75 MG: 75 TABLET, FILM COATED ORAL at 17:32

## 2024-03-08 RX ADMIN — ESCITALOPRAM OXALATE 10 MG: 10 TABLET ORAL at 06:31

## 2024-03-08 RX ADMIN — SULFAMETHOXAZOLE AND TRIMETHOPRIM 1 TABLET: 800; 160 TABLET ORAL at 17:32

## 2024-03-08 RX ADMIN — CLOZAPINE 150 MG: 25 TABLET ORAL at 17:31

## 2024-03-08 RX ADMIN — VALPROIC ACID 125 MG: 500 SOLUTION ORAL at 17:38

## 2024-03-08 RX ADMIN — DOCUSATE SODIUM 50 MG AND SENNOSIDES 8.6 MG 2 TABLET: 8.6; 5 TABLET, FILM COATED ORAL at 17:32

## 2024-03-08 RX ADMIN — DEXTROSE MONOHYDRATE 125 MG: 50 INJECTION, SOLUTION INTRAVENOUS at 06:36

## 2024-03-08 RX ADMIN — LEVETIRACETAM 250 MG: 250 TABLET, FILM COATED ORAL at 21:33

## 2024-03-08 RX ADMIN — SIMVASTATIN 40 MG: 40 TABLET, FILM COATED ORAL at 21:33

## 2024-03-08 RX ADMIN — LEVOTHYROXINE SODIUM 125 MCG: 0.12 TABLET ORAL at 06:31

## 2024-03-08 RX ADMIN — Medication 3 MG: at 21:33

## 2024-03-08 RX ADMIN — ENOXAPARIN SODIUM 40 MG: 100 INJECTION SUBCUTANEOUS at 17:32

## 2024-03-08 RX ADMIN — LEVETIRACETAM 250 MG: 100 INJECTION, SOLUTION, CONCENTRATE INTRAVENOUS at 06:31

## 2024-03-08 RX ADMIN — LEVETIRACETAM 250 MG: 100 INJECTION, SOLUTION, CONCENTRATE INTRAVENOUS at 11:37

## 2024-03-08 ASSESSMENT — PAIN DESCRIPTION - PAIN TYPE
TYPE: ACUTE PAIN
TYPE: ACUTE PAIN

## 2024-03-08 NOTE — PROGRESS NOTES
Pt reports thirst and requests sips of water. Education provided on NPO status. VSS, bed alarm on and locked in lowest position. Treaded socks in place. All other needs met at this time.

## 2024-03-08 NOTE — CARE PLAN
The patient is Watcher - Medium risk of patient condition declining or worsening    Shift Goals  Clinical Goals: Pt will remain stable throughout shift  Patient Goals: rest  Family Goals: ashutosh    Progress made toward(s) clinical / shift goals:  VSS    Patient is not progressing towards the following goals:      Problem: Ineffective Airway Clearance  Goal: Patient will maintain patent airway with clear/clearing breath sounds  Outcome: Not Met     Pt continues to have crackles and wheezing during inhalation/exhalation and after cough.

## 2024-03-08 NOTE — THERAPY
"Speech Language Pathology   Flexible Endoscopic Evaluation of Swallowing (FEES)        Patient Name: Tracie Rinaldi  AGE:  73 y.o., SEX:  female  Medical Record #: [id number]  Date of Service: 3/8/2024      History of Present Illness  74 YO female admitted 3/3 due to generalized weakness.     PMHx: tobacco abuse, COPD, alcohol dependence, seizure disorder, hypothyroidism, hyperlipidemia, TIA 2015 with no residual deficits. No SLP hx prior to this admission.     CMHx: eColi bacteremia, acute encephalopathy, pneumonia of right lower lobe of lung, alcohol dependence, generalized weakness, class 1 obesity in adult, chronic respiratory failure with hypoxia, mood disorder, insomnia, hyperlipidemia, urinary incontinence, hyponatremia, COPD mixed, seizure disorder.     CXR 3/5/24 \"Hypoinflation with bronchial linear bibasilar opacities, likely atelectasis. Pneumonia/recent aspiration is less likely, but still can be considered in the appropriate clinical settings.\"    Pertinent Information  Current Method of Nutrition: NPO until cleared by speech pathology  Patient Behaviors: Confused, Fatigue   Dentition: Full dentures   Feeding Tube: None   Tracheostomy: No       Factor(s) Affecting Performance: Impaired mental status, Impaired command following     Discussed the risks, benefits, and alternatives of the FEES procedure. Patient/family acknowledged and agreed to proceed.      Assessment  Flexible Endoscopic Evaluation of Swallowing (FEES) completed at bedside today. The endoscope was passed transnasally via Right nare to evaluate the anatomy and physiology of swallowing. Pt tolerated the procedure with no apparent distress.    Anatomic Findings: Edema and hyperfunction of right arytenoid. Limited abduction and adduction of left arytenoid. Pt did NOT ACHIEVE vocal fold adduction.     Secretion Management: Adequate  PO Trials: Thin Liquid, Mildly Thick Liquid, Liquidised, Pudding, Soft & Bite Sized      Consistency PAS Score " Timing Residue Comments   Thin Liquid 8 During swallow Vallecular Residue: Trace (1%-5%)  Pyriform Sinus Residue: Trace (1%-5%) Tsp: PAS 3 during (inferred)  Tsp: PAS 5 during (inferred) and PAS 8 after   Cup: PAS 7 before    Mildly Thick 1 N/A  Pyriform Sinus Residue: None (0%)    Liquidised 1 N/A Vallecular Residue: Trace (1%-5%)  Pyriform Sinus Residue: None (0%)    Pudding 1 N/A Vallecular Residue: None (0%)  Pyriform Sinus Residue: None (0%)    Soft & Bite Sized 1 N/A Vallecular Residue: None (0%)  Pyriform Sinus Residue: None (0%)      Penetration-Aspiration Scale (PAS)  1     No contrast enters airway  2     Contrast enters the airway, remains above the vocal folds, and is ejected from the airway (not seen in the airway at the end of the swallow).  3     Contrast enters the airway, remains above the vocal folds, and is not ejected from the airway (is seen in the airway after the swallow).  4     Contrast enters the airway, contacts the vocal folds, and is ejected from the airway.  5     Contrast enters the airway, contacts the vocal folds, and is not ejected from the airway  6     Contrast enters the airway, crosses the plane of the vocal folds, and is ejected from the airway.  7     Contrast enters the airway, crosses the plane of the vocal folds, and is not ejected from the airway despite effort.  8     Contrast enters the airway, crosses the plane of the vocal folds, is not ejected from the airway and there is no response to aspiration.    Oral phase:  - Adequate oral acceptance and containment.   - Functional mastication of soft solids with prolonged mastication of fibrous solids  - Swallow initiation at the level of the piriform sinuses with liquids, and at the vallecula with solids     Pharyngeal phase:  - Suspected deep penetration during the swallow with thins by tsp evidenced by blue along the vocal folds after the swallow, with silent aspiration after the swallow via bolus falling onto anterior  "trachea. Of note, pt did execute spontaneous cleansing swallow to episodes of penetration, however, this was ineffective in ejecting penetrates from laryngeal vestibule.   - Aspiration with delayed and weak/inefficient cough response occurred before the swallow with thins by cup due to loss of bolus control over the tip of the epiglottis into the airway. Spontaneous cough was not effective in ejecting aspirates  - No significant pharyngeal residue appreciated.     Esophageal Phase  - bubbling of liquidized through the UES onto the post cricoid space visualized, which can be concerning for retrograde flow.     Compensatory Strategies:  - spontaneous cough and cleansing swallow was ineffective in ejecting penetrates/aspirates from the airway  - Increased viscosity of liquid resulted in improved bolus control and prevented airway invasion.     Severity Rating:  Severity Rating: YOSHI  YOSHI: Mild    Clinical Impressions  The pt presents with a mild oropharyngeal dysphagia with suspected esophageal component, given prolonged mastication, aspiration of thins and suspected retrograde flow. Swallow safety is impaired but improved with mildly thick liquids and swallow efficiency is intact. Suspect dysphagia is acute on chronic given hx of COPD, alcohol dependence, seizure disorder and edentulism in the setting of acute respiratory failure and encephalopathy. Pt appears to be a \"fair\" candidate for compensatory strategy based dysphagia intervention at this time.     Recommendations  Diet Consistency: Soft Bite Size solids/Mildly Thick Liquids (okay for single ice chips after oral care 1:1 with nursing)  Medication: Whole with puree  Supervision: Assist with meal tray set up, Direct supervision during meals (may require feeding assistance)  Positioning: Fully upright and midline during oral intake, Meals sitting upright in a chair, as tolerated  Strategies: Small bites/sips, Alternate bites and sips, No straws, Reduce " "environmental distractions  Oral Care: Q8h  Additional Instrumentation: Repeat diagnostic study when clinically appropriate  Consult Referral(s): ENT (asymmetrical vocal folds, limited movement of left arytenoid), Neurologist, GI (suspected retrograde flow)    SLP Treatment Plan  Treatment Plan: Dysphagia Treatment, Patient/Family/Caregiver Training  SLP Frequency: 3x Per Week  Estimated Duration: Until Therapy Goals Met      Anticipated Discharge Needs  Discharge Recommendations: Recommend post-acute placement for additional speech therapy services prior to discharge home   Therapy Recommendations Upon DC: Dysphagia Training, Patient / Family / Caregiver Education, Community Re-Integration       Patient / Family Goals  Patient / Family Goal #1: \"can I have more water?\"  Goal #1 Outcome: Progressing slower than expected  Short Term Goal # 1: Pt will maintain wakefulness for greater than 15 min with min stimulation.  Goal Outcome # 1: Goal met  Short Term Goal # 2: Pt will trigger on swallow in less than 3 seconds for single ice chips.  Goal Outcome # 2 : Goal met, new goal aded  Short Term Goal # 2 B : Pt will consume a diet of SB/MT with no s/sx of aspiration and min cues.  Short Term Goal # 3: Pt will manage oral secretions with no loss of saliva or drooling.  Goal Outcome  # 3: Goal met      Brady Wheat, SLP  "

## 2024-03-08 NOTE — CARE PLAN
The patient is Watcher - Medium risk of patient condition declining or worsening    Shift Goals  Clinical Goals: Incentive spirometer use at least twice during every hourly round during this shift.  Patient Goals: Discharge home.  Family Goals: JORDI    Progress made toward(s) clinical / shift goals:  Progressing and  Pt uses incentive spirometer with nursing reminders. She was seen by respiratory therapy, MD, and speech therapy today. See speech therapy note: pt to be supervised until MBSS procedure. She continues to wear oxygen 3 liter via nc. She has a wet productive cough with thick mucous that she spits in tissues. She ambulated to bathroom and had a shower. During rounds I would provide fluids approx 1,000 ml of water provided today. She has purewick in place. Pt report BM on 3/5/2023; laxatives taken this evening as ordered.   At approx 1745, she called and stated her PIV hurts. PIV infiltrated & it was removed by writer. This writer attempted x 1 PIV; unsuccessful. Charge RN was informed endorse to night shift if charge RN is unable to place PIV.    Problem: Respiratory  Goal: Patient will achieve/maintain optimum respiratory ventilation and gas exchange  Outcome: Progressing

## 2024-03-08 NOTE — CARE PLAN
The patient is Watcher - Medium risk of patient condition declining or worsening    Shift Goals  Clinical Goals: Pt will remain stable throughout shift  Patient Goals: rest  Family Goals: ashutosh    Progress made toward(s) clinical / shift goals:  Vital signs remained stable throughout shift. Pt able to rest quietly.    Patient is not progressing towards the following goals:      Problem: Knowledge Deficit - Standard  Goal: Patient and family/care givers will demonstrate understanding of plan of care, disease process/condition, diagnostic tests and medications  Outcome: Not Progressing     Problem: Ineffective Airway Clearance  Goal: Patient will maintain patent airway with clear/clearing breath sounds  3/8/2024 0520 by Norbert Almanza R.N.  Outcome: Not Progressing   Pt requests sips of water and was re-educated on her strict NPO status. Pt continued to have crackles and wheezing throughout lung fields during inhalation/exhalation and after coughing and repositioning.

## 2024-03-08 NOTE — PROGRESS NOTES
Hospital Medicine Daily Progress Note    Date of Service  3/7/2024    Chief Complaint  Tracie Rinaldi is a 73 y.o. female admitted 3/3/2024 with weakness    Hospital Course  Tracie Rinaldi is a 73 y.o. female from [facility] with history of former tobacco abuse, COPD dependent on 2 L, alcohol dependence, seizure disorder, hypothyroidism, hyperlipidemia who presented 3/3/2024 with evaluation for generalized weakness.  Patient reported to have had increased generalized weakness, unable to ambulate.  Patient herself endorses subjective fever and chills, urinary frequency and urgency.  Her group home referred patient to ER due to concern of suspected UTI.  In ER, her UA does no pyuria, she does have leukocytosis with WBC of 19.9 K.     Interval Problem Update    ROHITH ON  Afebrile  She is awake, alert, and requests food and drink today.  She has no recollection of yesterday.  SLP reports high risk of dysphagia and needs MBSS.  Primary limitation is cognitive with inability to self-regulate.  CBC reviewed, improved Hgb 9.7.  BMP reviewed, normal.  Bcx 3/6 NGTD  POC discussed with ID pharmacist Olivia, will de-escalate to bactrim again if able to tolerate PO.    I have discussed this patient's plan of care and discharge plan at IDT rounds today with Case Management, Nursing, Nursing leadership, and other members of the IDT team.    Consultants/Specialty    Code Status  DNAR/DNI    Disposition  The patient is medically cleared for discharge to home or a post-acute facility.  Anticipate discharge to: home with organized home healthcare and close outpatient follow-up    I have placed the appropriate orders for post-discharge needs.    Review of Systems  Review of Systems   Unable to perform ROS: Dementia      Physical Exam  Temp:  [36.3 °C (97.3 °F)-36.6 °C (97.8 °F)] 36.6 °C (97.8 °F)  Pulse:  [61-87] 61  Resp:  [16-20] 19  BP: (115-168)/(49-74) 115/49  SpO2:  [90 %-98 %] 93 %    Physical Exam  Vitals and nursing note  reviewed.   Constitutional:       General: She is not in acute distress.     Appearance: She is ill-appearing (Chronically). She is not toxic-appearing or diaphoretic.      Interventions: Nasal cannula in place.   HENT:      Head: Normocephalic.      Nose: Nose normal.      Mouth/Throat:      Mouth: Mucous membranes are dry.   Eyes:      General: No scleral icterus.     Conjunctiva/sclera: Conjunctivae normal.   Cardiovascular:      Rate and Rhythm: Normal rate and regular rhythm.      Pulses: Normal pulses.      Heart sounds: Normal heart sounds. No murmur heard.     No friction rub. No gallop.   Pulmonary:      Effort: Pulmonary effort is normal. No respiratory distress.      Breath sounds: Normal breath sounds. No wheezing, rhonchi or rales.   Abdominal:      General: Abdomen is flat. Bowel sounds are normal. There is no distension.      Palpations: Abdomen is soft.      Tenderness: There is no abdominal tenderness. There is no guarding or rebound.   Genitourinary:     Comments: No heredia  Musculoskeletal:      Cervical back: Neck supple.      Right lower leg: No edema.      Left lower leg: No edema.   Skin:     General: Skin is warm and dry.   Neurological:      Mental Status: She is alert.      Comments: Moves all extremities   Psychiatric:         Attention and Perception: Attention and perception normal.         Mood and Affect: Mood and affect normal.         Speech: Speech is delayed.         Behavior: Behavior is slowed. Behavior is cooperative.         Thought Content: Thought content normal.         Cognition and Memory: Cognition normal. She exhibits impaired recent memory.         Judgment: Judgment normal.      Comments: Unable to assess         Fluids    Intake/Output Summary (Last 24 hours) at 3/7/2024 1712  Last data filed at 3/7/2024 1000  Gross per 24 hour   Intake --   Output 2250 ml   Net -2250 ml       Laboratory  Recent Labs     03/05/24  0834 03/06/24  0201 03/07/24  0114   WBC 9.4 8.9 9.2    RBC 4.24 4.06* 4.27   HEMOGLOBIN 9.5* 9.0* 9.7*   HEMATOCRIT 29.5* 27.8* 29.6*   MCV 69.6* 68.5* 69.3*   MCH 22.4* 22.2* 22.7*   MCHC 32.2 32.4 32.8   RDW 45.5 45.6 46.5   PLATELETCT 205 232 250   MPV 10.7 10.9 11.0     Recent Labs     03/06/24  1006 03/06/24  2152 03/07/24  0114   SODIUM 134* 134* 136   POTASSIUM 4.3 4.0 4.3   CHLORIDE 101 101 101   CO2 24 24 24   GLUCOSE 118* 98 100*   BUN 12 11 11   CREATININE 0.88 0.83 0.86   CALCIUM 8.3* 8.3* 8.4*                   Imaging  DX-CHEST-LIMITED (1 VIEW)   Final Result      Hypoinflation with bronchial linear bibasilar opacities, likely atelectasis. Pneumonia/recent aspiration is less likely, but still can be considered in the appropriate clinical settings.      DX-CHEST-LIMITED (1 VIEW)   Final Result      1.  Hypoinflation with mild bibasilar atelectasis.   2.  Stable mild cardiomegaly.   3.  No lobar pneumonia or pneumothorax.           Assessment/Plan  * E coli bacteremia- (present on admission)  Assessment & Plan  From urinary source  Continue ceftriaxone  When clinically improving and able to take PO can transition to bactrim    Acute encephalopathy  Assessment & Plan  Likely due to bacteremia  No autonomic signs of alcohol withdrawal  Minimize sedating medications  Mild-moderate hyponatremia unlikely to be contributing  Repeat BCx    Community acquired pneumonia of right lower lobe of lung- (present on admission)  Assessment & Plan  Likely aspiration pneumonia, elevated procalcitonin  Abx: ceftriaxone, doxycyline    Alcohol dependence (HCC)- (present on admission)  Assessment & Plan  Reported consuming 1 bottle of wine daily  Multivitamins    Generalized weakness- (present on admission)  Assessment & Plan  PT/OT  Fall precautions    Class 1 obesity in adult- (present on admission)  Assessment & Plan  Diet and lifestyle modification  Body mass index is 32.28 kg/m².      Chronic respiratory failure with hypoxia (HCC)- (present on admission)  Assessment &  Plan  Dependent on 2 L-at baseline    Mood disorder (HCC)- (present on admission)  Assessment & Plan  Clozapine, Depakote    ACP (advance care planning)- (present on admission)  Assessment & Plan  She has POLST on file dated and signed 4/11/2018 by legal guardian -- DNR/DNI. Confirmed with patient.    Insomnia- (present on admission)  Assessment & Plan  Melatonin    Hyperlipidemia- (present on admission)  Assessment & Plan  Statin    Urinary incontinence- (present on admission)  Assessment & Plan  As needed bladder scan and straight cath  Alicea if needed    Hyponatremia- (present on admission)  Assessment & Plan  Resolved  Acute on chronic, dehydration contributing  Urine studies appropriately sodium avid and dilute urine for free water elimination  Repeat AM BMP    COPD mixed type (HCC)- (present on admission)  Assessment & Plan  Mild exacerbation resolving  No wheezing today  Continue duoNebs and RT  following  Continue home regimen  She reported quitting smoking    Seizure disorder (HCC)- (present on admission)  Assessment & Plan  Prior history  Continue Keppra  Continue seizure precautions    Acquired hypothyroidism- (present on admission)  Assessment & Plan  Continue Synthroid      VTE prophylaxis:    enoxaparin ppx      I have performed a physical exam and reviewed and updated ROS and Plan today (3/7/2024). In review of yesterday's note (3/6/2024), there are no changes except as documented above.

## 2024-03-09 ENCOUNTER — PHARMACY VISIT (OUTPATIENT)
Dept: PHARMACY | Facility: MEDICAL CENTER | Age: 74
End: 2024-03-09
Payer: COMMERCIAL

## 2024-03-09 VITALS
BODY MASS INDEX: 33.62 KG/M2 | TEMPERATURE: 96.9 F | RESPIRATION RATE: 18 BRPM | OXYGEN SATURATION: 92 % | HEART RATE: 78 BPM | SYSTOLIC BLOOD PRESSURE: 122 MMHG | HEIGHT: 66 IN | DIASTOLIC BLOOD PRESSURE: 52 MMHG | WEIGHT: 209.22 LBS

## 2024-03-09 PROBLEM — G93.40 ACUTE ENCEPHALOPATHY: Status: RESOLVED | Noted: 2024-03-06 | Resolved: 2024-03-09

## 2024-03-09 PROBLEM — B96.20 E COLI BACTEREMIA: Status: RESOLVED | Noted: 2024-03-06 | Resolved: 2024-03-09

## 2024-03-09 PROBLEM — R78.81 E COLI BACTEREMIA: Status: RESOLVED | Noted: 2024-03-06 | Resolved: 2024-03-09

## 2024-03-09 PROCEDURE — RXMED WILLOW AMBULATORY MEDICATION CHARGE: Performed by: STUDENT IN AN ORGANIZED HEALTH CARE EDUCATION/TRAINING PROGRAM

## 2024-03-09 PROCEDURE — A9270 NON-COVERED ITEM OR SERVICE: HCPCS | Performed by: STUDENT IN AN ORGANIZED HEALTH CARE EDUCATION/TRAINING PROGRAM

## 2024-03-09 PROCEDURE — 99239 HOSP IP/OBS DSCHRG MGMT >30: CPT | Performed by: STUDENT IN AN ORGANIZED HEALTH CARE EDUCATION/TRAINING PROGRAM

## 2024-03-09 PROCEDURE — 700102 HCHG RX REV CODE 250 W/ 637 OVERRIDE(OP): Performed by: STUDENT IN AN ORGANIZED HEALTH CARE EDUCATION/TRAINING PROGRAM

## 2024-03-09 RX ORDER — SULFAMETHOXAZOLE AND TRIMETHOPRIM 800; 160 MG/1; MG/1
1 TABLET ORAL ONCE
Qty: 1 TABLET | Refills: 0 | Status: ACTIVE | OUTPATIENT
Start: 2024-03-09 | End: 2024-03-10

## 2024-03-09 RX ADMIN — SULFAMETHOXAZOLE AND TRIMETHOPRIM 1 TABLET: 800; 160 TABLET ORAL at 05:06

## 2024-03-09 RX ADMIN — LEVOTHYROXINE SODIUM 125 MCG: 0.12 TABLET ORAL at 05:06

## 2024-03-09 RX ADMIN — LEVETIRACETAM 250 MG: 250 TABLET, FILM COATED ORAL at 08:26

## 2024-03-09 RX ADMIN — ASPIRIN 81 MG: 81 TABLET, COATED ORAL at 05:06

## 2024-03-09 RX ADMIN — SULFAMETHOXAZOLE AND TRIMETHOPRIM 1 TABLET: 800; 160 TABLET ORAL at 12:00

## 2024-03-09 RX ADMIN — VALPROIC ACID 125 MG: 500 SOLUTION ORAL at 05:06

## 2024-03-09 RX ADMIN — ESCITALOPRAM OXALATE 10 MG: 10 TABLET ORAL at 05:06

## 2024-03-09 RX ADMIN — Medication 2000 UNITS: at 05:06

## 2024-03-09 RX ADMIN — CLOZAPINE 150 MG: 25 TABLET ORAL at 05:05

## 2024-03-09 RX ADMIN — TIOTROPIUM BROMIDE INHALATION SPRAY 5 MCG: 3.12 SPRAY, METERED RESPIRATORY (INHALATION) at 08:26

## 2024-03-09 ASSESSMENT — PAIN DESCRIPTION - PAIN TYPE: TYPE: ACUTE PAIN

## 2024-03-09 NOTE — PROGRESS NOTES
Pt alert/oriented x4, no report of pain/discomfort. Pt fatigued, resting quietly in bed, needs met.Call light within reach, personal belongings available, bed in lowest position, treaded socks on, and bed alarm on. Hourly rounding in place.

## 2024-03-09 NOTE — CARE PLAN
" The patient is Stable - Low risk of patient condition declining or worsening    Shift Goals  Clinical Goals: Pt will remain safe and free of falls throughout shift  Patient Goals: rest  Family Goals: JORDI    Progress made toward(s) clinical / shift goals: Pt remained free of falls, able to rest quietly.    Patient is not progressing towards the following goals:      Problem: Knowledge Deficit - Standard  Goal: Patient and family/care givers will demonstrate understanding of plan of care, disease process/condition, diagnostic tests and medications  Outcome: Not Met  Pt expresses want for \"regular water\" and requires re-education on diet of soft foods and mildly thick liquids in order to reduce risk of aspiration.   "

## 2024-03-09 NOTE — PROGRESS NOTES
Hospital Medicine Daily Progress Note    Date of Service  3/8/2024    Chief Complaint  Tracie Rinaldi is a 73 y.o. female admitted 3/3/2024 with weakness    Hospital Course  Tracie Rinaldi is a 73 y.o. female from prison with history of former tobacco abuse, COPD dependent on 2 L, alcohol dependence, seizure disorder, hypothyroidism, hyperlipidemia who presented 3/3/2024 with evaluation for generalized weakness.  Patient reported to have had increased generalized weakness, unable to ambulate.  Patient herself endorses subjective fever and chills, urinary frequency and urgency.  Her group home referred patient to ER due to concern of suspected UTI.  In ER, her UA does no pyuria, she does have leukocytosis with WBC of 19.9 K.     Interval Problem Update    ROHITH ON  Afebrile  She is awake and alert.  Underwent FEES and recommended for soft bite-sized solids and mildly-thick liquids.  She has no complaints. Eager to go home tomorrow morning.  Repeat Bcx NGTD.  Transitioned to bactrim today to complete 7-day course for E coli bacteremia.  BMP reviewed, normal.    I have discussed this patient's plan of care and discharge plan at IDT rounds today with Case Management, Nursing, Nursing leadership, and other members of the IDT team.    Consultants/Specialty    Code Status  DNAR/DNI    Disposition  The patient is not medically cleared for discharge to home or a post-acute facility.  Anticipate discharge to: home with organized home healthcare and close outpatient follow-up    I have placed the appropriate orders for post-discharge needs.    Review of Systems  Review of Systems   Unable to perform ROS: Dementia      Physical Exam  Temp:  [36.2 °C (97.1 °F)-36.6 °C (97.8 °F)] 36.4 °C (97.6 °F)  Pulse:  [77-85] 77  Resp:  [18-20] 18  BP: (141-148)/(62-66) 141/65  SpO2:  [93 %-94 %] 94 %    Physical Exam  Vitals and nursing note reviewed.   Constitutional:       General: She is not in acute distress.     Appearance:  She is ill-appearing (Chronically). She is not toxic-appearing or diaphoretic.      Interventions: Nasal cannula in place.   HENT:      Head: Normocephalic.      Nose: Nose normal.      Mouth/Throat:      Mouth: Mucous membranes are moist.   Eyes:      General: No scleral icterus.     Conjunctiva/sclera: Conjunctivae normal.   Cardiovascular:      Rate and Rhythm: Normal rate and regular rhythm.      Pulses: Normal pulses.      Heart sounds: Normal heart sounds. No murmur heard.     No friction rub. No gallop.   Pulmonary:      Effort: Pulmonary effort is normal. No respiratory distress.      Breath sounds: Normal breath sounds. No wheezing, rhonchi or rales.   Abdominal:      General: Abdomen is flat. Bowel sounds are normal. There is no distension.      Palpations: Abdomen is soft.      Tenderness: There is no abdominal tenderness. There is no guarding or rebound.   Genitourinary:     Comments: No heredia  Musculoskeletal:      Cervical back: Neck supple.      Right lower leg: No edema.      Left lower leg: No edema.   Skin:     General: Skin is warm and dry.   Neurological:      Mental Status: She is alert.      Comments: Moves all extremities   Psychiatric:         Attention and Perception: Attention and perception normal.         Mood and Affect: Mood and affect normal.         Speech: Speech normal.         Behavior: Behavior is slowed. Behavior is cooperative.         Thought Content: Thought content normal.         Cognition and Memory: Cognition normal. She exhibits impaired recent memory.         Judgment: Judgment normal.         Fluids    Intake/Output Summary (Last 24 hours) at 3/8/2024 1917  Last data filed at 3/8/2024 1700  Gross per 24 hour   Intake 720 ml   Output 500 ml   Net 220 ml       Laboratory  Recent Labs     03/06/24  0201 03/07/24  0114   WBC 8.9 9.2   RBC 4.06* 4.27   HEMOGLOBIN 9.0* 9.7*   HEMATOCRIT 27.8* 29.6*   MCV 68.5* 69.3*   MCH 22.2* 22.7*   MCHC 32.4 32.8   RDW 45.6 46.5    PLATELETCT 232 250   MPV 10.9 11.0     Recent Labs     03/07/24  0114 03/07/24  2130 03/08/24  0854   SODIUM 136 138 139   POTASSIUM 4.3 3.9 4.2   CHLORIDE 101 104 102   CO2 24 24 27   GLUCOSE 100* 92 107*   BUN 11 10 11   CREATININE 0.86 0.70 0.77   CALCIUM 8.4* 8.4* 9.1                   Imaging  DX-ESOPH. FLUORO (BARIUM SWALLOW)   Final Result         1.  Immediate aspiration upon initiation of contrast ingestion. Swallow study is recommended for further evaluation.   2.  Diffuse tertiary dysmotility.      DX-CHEST-LIMITED (1 VIEW)   Final Result      Hypoinflation with bronchial linear bibasilar opacities, likely atelectasis. Pneumonia/recent aspiration is less likely, but still can be considered in the appropriate clinical settings.      DX-CHEST-LIMITED (1 VIEW)   Final Result      1.  Hypoinflation with mild bibasilar atelectasis.   2.  Stable mild cardiomegaly.   3.  No lobar pneumonia or pneumothorax.           Assessment/Plan  * E coli bacteremia- (present on admission)  Assessment & Plan  From urinary source  Afebrile >48h, transitioned to bactrim to complete 7-day course    Acute encephalopathy  Assessment & Plan  Likely due to bacteremia  No autonomic signs of alcohol withdrawal  Minimize sedating medications  Mild-moderate hyponatremia unlikely to be contributing  Repeat BCx    Community acquired pneumonia of right lower lobe of lung- (present on admission)  Assessment & Plan  Likely aspiration pneumonia, elevated procalcitonin  Abx: ceftriaxone, doxycyline    Alcohol dependence (HCC)- (present on admission)  Assessment & Plan  Reported consuming 1 bottle of wine daily  Multivitamins    Generalized weakness- (present on admission)  Assessment & Plan  PT/OT  Fall precautions    Class 1 obesity in adult- (present on admission)  Assessment & Plan  Diet and lifestyle modification  Body mass index is 32.28 kg/m².      Chronic respiratory failure with hypoxia (HCC)- (present on admission)  Assessment &  Plan  Dependent on 2 L-at baseline    Mood disorder (HCC)- (present on admission)  Assessment & Plan  Clozapine, Depakote    ACP (advance care planning)- (present on admission)  Assessment & Plan  She has POLST on file dated and signed 4/11/2018 by legal guardian -- DNR/DNI. Confirmed with patient.    Insomnia- (present on admission)  Assessment & Plan  Melatonin    Hyperlipidemia- (present on admission)  Assessment & Plan  Statin    Urinary incontinence- (present on admission)  Assessment & Plan  As needed bladder scan and straight cath  Alicea if needed    Hyponatremia- (present on admission)  Assessment & Plan  Resolved  Acute on chronic, dehydration contributing  Urine studies appropriately sodium avid and dilute urine for free water elimination    COPD mixed type (HCC)- (present on admission)  Assessment & Plan  Mild exacerbation resolving  No wheezing today  Continue duoNebs and RT  following  Continue home regimen  She reported quitting smoking    Seizure disorder (HCC)- (present on admission)  Assessment & Plan  Prior history  Continue Keppra  Continue seizure precautions    Acquired hypothyroidism- (present on admission)  Assessment & Plan  Continue Synthroid      VTE prophylaxis:    enoxaparin ppx    I have performed a physical exam and reviewed and updated ROS and Plan today (3/8/2024). In review of yesterday's note (3/7/2024), there are no changes except as documented above.

## 2024-03-09 NOTE — DISCHARGE PLANNING
Case Management Discharge Planning    Admission Date: 3/3/2024  GMLOS: 5.1  ALOS: 6    6-Clicks ADL Score: 17  6-Clicks Mobility Score: 12  PT and/or OT Eval ordered: Yes  Post-acute Referrals Ordered: Yes  Post-acute Choice Obtained: Yes  Has referral(s) been sent to post-acute provider:  Yes      Anticipated Discharge Dispo: Discharge Disposition: D/T to SNF with Medicare cert in anticipation of skilled care (03)  Discharge Address: [address] [place] [address] [address] [place] [place] [address]  Discharge Contact Phone Number: [phone]    DME Needed: No    Action(s) Taken: Updated Provider/Nurse on Discharge Plan    Pt is medically cleared per Dr. Arriaga. Pt has been accepted by [facility] and they have available bed for Pt today. [facility] is aware that Pt needs one on one feeding with nursing supervision. Transport requested at 1300 via [facility], waiting for confirmation. RN CM called and left VM to Pt's guardian, Rima and was informed regarding Pt's discharge to [facility] today. Dr. Arriaga aware.    1000- Transport confirmed time at 1320 via [facility].    COBRA packet completed, given to bedside nurse.    Escalations Completed: Provider    Medically Clear: Yes    Next Steps:  RN CM to continue to assist in Pt's discharge needs.     Barriers to Discharge: None    Is the patient up for discharge tomorrow: No  Pt is discharging today.

## 2024-03-09 NOTE — DOCUMENTATION QUERY
[facility]                                                                       Query Response Note      PATIENT:               NGA OCAMPO  ACCT #:                  [id number]  MRN:                     [id number]  :                      1950  ADMIT DATE:       3/3/2024 10:55 AM  DISCH DATE:          RESPONDING  PROVIDER #:        [id number]           QUERY TEXT:    Encephalopathy is documented in the Medical Record.     Please specify type.    The patient's Clinical Indicators include:  - Findings:  Acute encephalopathy Likely due to bacteremia, No autonomic signs of alcohol withdrawal, Mild-moderate hyponatremia unlikely to be contributing    - Progress note 3/7:  She is awake, alert, and requests food and drink today.  She has no recollection of yesterday.    - Treatments:  Minimize sedating medications, repeat blood cultures, antibiotics, chest x ray    - Risk factors:  sepsis, aspiration pneumonia, ALENA, hyponatremia, acute cystitis, epilepsy, alcohol dependence, mood disorder     Thank You,  Abimbola Jose RN  Clinical Documentation   [email or web address]  Connect via [email or web address]  Options provided:   -- Metabolic encephalopathy   -- Septic encephalopathy   -- Alcoholic encephalopathy   -- Other type of encephalopathy, please specify other type of encephalopathy   -- Other explanation, (please specify other explanation)   -- Unable to determine      Query created by: Abimbola Jose on 3/8/2024 12:51 PM    RESPONSE TEXT:    Other type of encephalopathy- Delirium superimposed on neurocognitive disorder          Electronically signed by:  DAMIEN HIGGINS MD 3/8/2024 4:30 PM

## 2024-03-09 NOTE — CARE PLAN
The patient is Stable - Low risk of patient condition declining or worsening    Shift Goals  Clinical Goals: Pt will remain safe and free of falls throughout shift  Patient Goals: rest  Family Goals: JORDI    Progress made toward(s) clinical / shift goals:    Problem: Knowledge Deficit - Standard  Goal: Patient and family/care givers will demonstrate understanding of plan of care, disease process/condition, diagnostic tests and medications  3/9/2024 0736 by Ranjit Trujillo R.N.  Outcome: Progressing  3/9/2024 0735 by Ranjit Trujillo R.N.  Outcome: Progressing       Patient is not progressing towards the following goals:

## 2024-03-09 NOTE — DISCHARGE PLANNING
Patient to transfer to [facility] today at 1320 via [facility]. Lucy at  [facility] advised of transport time. YESENIA Espino advised.

## 2024-03-26 PROBLEM — D50.9 MICROCYTIC ANEMIA: Status: ACTIVE | Noted: 2023-10-16

## 2024-03-26 PROBLEM — E88.09 HYPOALBUMINEMIA: Status: ACTIVE | Noted: 2024-03-26

## 2024-04-02 ENCOUNTER — APPOINTMENT (RX ONLY)
Dept: URBAN - METROPOLITAN AREA CLINIC 6 | Facility: CLINIC | Age: 74
Setting detail: DERMATOLOGY
End: 2024-04-02

## 2024-04-02 DIAGNOSIS — D18.0 HEMANGIOMA: ICD-10-CM

## 2024-04-02 DIAGNOSIS — L82.1 OTHER SEBORRHEIC KERATOSIS: ICD-10-CM

## 2024-04-02 DIAGNOSIS — D22 MELANOCYTIC NEVI: ICD-10-CM

## 2024-04-02 DIAGNOSIS — L30.8 OTHER SPECIFIED DERMATITIS: ICD-10-CM | Status: WELL CONTROLLED

## 2024-04-02 DIAGNOSIS — L81.4 OTHER MELANIN HYPERPIGMENTATION: ICD-10-CM

## 2024-04-02 DIAGNOSIS — Z71.89 OTHER SPECIFIED COUNSELING: ICD-10-CM

## 2024-04-02 DIAGNOSIS — L57.0 ACTINIC KERATOSIS: ICD-10-CM

## 2024-04-02 PROBLEM — D22.5 MELANOCYTIC NEVI OF TRUNK: Status: ACTIVE | Noted: 2024-04-02

## 2024-04-02 PROBLEM — D22.62 MELANOCYTIC NEVI OF LEFT UPPER LIMB, INCLUDING SHOULDER: Status: ACTIVE | Noted: 2024-04-02

## 2024-04-02 PROBLEM — D22.71 MELANOCYTIC NEVI OF RIGHT LOWER LIMB, INCLUDING HIP: Status: ACTIVE | Noted: 2024-04-02

## 2024-04-02 PROBLEM — D18.01 HEMANGIOMA OF SKIN AND SUBCUTANEOUS TISSUE: Status: ACTIVE | Noted: 2024-04-02

## 2024-04-02 PROBLEM — L30.9 DERMATITIS, UNSPECIFIED: Status: ACTIVE | Noted: 2024-04-02

## 2024-04-02 PROBLEM — D22.72 MELANOCYTIC NEVI OF LEFT LOWER LIMB, INCLUDING HIP: Status: ACTIVE | Noted: 2024-04-02

## 2024-04-02 PROBLEM — D22.61 MELANOCYTIC NEVI OF RIGHT UPPER LIMB, INCLUDING SHOULDER: Status: ACTIVE | Noted: 2024-04-02

## 2024-04-02 PROCEDURE — 99213 OFFICE O/P EST LOW 20 MIN: CPT | Mod: 25

## 2024-04-02 PROCEDURE — 17003 DESTRUCT PREMALG LES 2-14: CPT

## 2024-04-02 PROCEDURE — ? PRESCRIPTION MEDICATION MANAGEMENT

## 2024-04-02 PROCEDURE — ? LIQUID NITROGEN

## 2024-04-02 PROCEDURE — ? SUNSCREEN TREATMENT REGIMEN

## 2024-04-02 PROCEDURE — ? COUNSELING

## 2024-04-02 PROCEDURE — 17000 DESTRUCT PREMALG LESION: CPT

## 2024-04-02 ASSESSMENT — LOCATION DETAILED DESCRIPTION DERM
LOCATION DETAILED: SUPERIOR THORACIC SPINE
LOCATION DETAILED: LEFT ULNAR DORSAL HAND
LOCATION DETAILED: RIGHT LATERAL ZYGOMA
LOCATION DETAILED: LEFT MEDIAL FOREHEAD
LOCATION DETAILED: LEFT PROXIMAL POSTERIOR UPPER ARM
LOCATION DETAILED: LEFT SUPERIOR MEDIAL UPPER BACK
LOCATION DETAILED: RIGHT RADIAL DORSAL HAND
LOCATION DETAILED: LEFT INFERIOR FOREHEAD
LOCATION DETAILED: LEFT ANTERIOR PROXIMAL THIGH
LOCATION DETAILED: INFERIOR THORACIC SPINE
LOCATION DETAILED: 3RD WEB SPACE RIGHT HAND
LOCATION DETAILED: RIGHT MEDIAL FOREHEAD
LOCATION DETAILED: RIGHT DISTAL POSTERIOR UPPER ARM
LOCATION DETAILED: 3RD WEB SPACE LEFT HAND
LOCATION DETAILED: RIGHT ANTERIOR PROXIMAL THIGH
LOCATION DETAILED: RIGHT INFERIOR LATERAL FOREHEAD
LOCATION DETAILED: PERIUMBILICAL SKIN

## 2024-04-02 ASSESSMENT — LOCATION SIMPLE DESCRIPTION DERM
LOCATION SIMPLE: RIGHT POSTERIOR UPPER ARM
LOCATION SIMPLE: ABDOMEN
LOCATION SIMPLE: LEFT POSTERIOR UPPER ARM
LOCATION SIMPLE: RIGHT ZYGOMA
LOCATION SIMPLE: LEFT THIGH
LOCATION SIMPLE: LEFT HAND
LOCATION SIMPLE: RIGHT THIGH
LOCATION SIMPLE: RIGHT HAND
LOCATION SIMPLE: UPPER BACK
LOCATION SIMPLE: LEFT UPPER BACK
LOCATION SIMPLE: RIGHT FOREHEAD
LOCATION SIMPLE: LEFT FOREHEAD

## 2024-04-02 ASSESSMENT — LOCATION ZONE DERM
LOCATION ZONE: LEG
LOCATION ZONE: HAND
LOCATION ZONE: ARM
LOCATION ZONE: TRUNK
LOCATION ZONE: FACE

## 2024-04-02 NOTE — PROCEDURE: LIQUID NITROGEN
Post-Care Instructions: I reviewed with the patient in detail post-care instructions. Patient is to wear sunprotection, and avoid picking at any of the treated lesions. Pt may apply Vaseline to crusted or scabbing areas.
Duration Of Freeze Thaw-Cycle (Seconds): 10
Show Aperture Variable?: Yes
Render Note In Bullet Format When Appropriate: No
Consent: The patient's consent was obtained including but not limited to risks of crusting, scabbing, blistering, scarring, darker or lighter pigmentary change, recurrence, incomplete removal and infection.
Number Of Freeze-Thaw Cycles: 2 freeze-thaw cycles
Detail Level: Detailed

## 2024-04-09 ENCOUNTER — HOSPITAL ENCOUNTER (OUTPATIENT)
Facility: MEDICAL CENTER | Age: 74
End: 2024-04-09
Payer: MEDICARE

## 2024-04-09 DIAGNOSIS — I10 HYPERTENSION, UNSPECIFIED TYPE: ICD-10-CM

## 2024-04-09 DIAGNOSIS — E88.09 HYPOALBUMINEMIA: ICD-10-CM

## 2024-04-09 DIAGNOSIS — E55.9 VITAMIN D DEFICIENCY: ICD-10-CM

## 2024-04-09 DIAGNOSIS — D50.9 MICROCYTIC ANEMIA: ICD-10-CM

## 2024-04-09 DIAGNOSIS — R73.01 IMPAIRED FASTING GLUCOSE: ICD-10-CM

## 2024-04-09 LAB
25(OH)D3 SERPL-MCNC: 37 NG/ML (ref 30–100)
ALBUMIN SERPL BCP-MCNC: 3.9 G/DL (ref 3.2–4.9)
ALBUMIN/GLOB SERPL: 1.9 G/DL
ALP SERPL-CCNC: 103 U/L (ref 30–99)
ALT SERPL-CCNC: 9 U/L (ref 2–50)
ANION GAP SERPL CALC-SCNC: 15 MMOL/L (ref 7–16)
ANISOCYTOSIS BLD QL SMEAR: ABNORMAL
AST SERPL-CCNC: 17 U/L (ref 12–45)
BASOPHILS # BLD AUTO: 0.9 % (ref 0–1.8)
BASOPHILS # BLD: 0.08 K/UL (ref 0–0.12)
BILIRUB SERPL-MCNC: 0.2 MG/DL (ref 0.1–1.5)
BUN SERPL-MCNC: 9 MG/DL (ref 8–22)
CALCIUM ALBUM COR SERPL-MCNC: 9 MG/DL (ref 8.5–10.5)
CALCIUM SERPL-MCNC: 8.9 MG/DL (ref 8.5–10.5)
CHLORIDE SERPL-SCNC: 88 MMOL/L (ref 96–112)
CO2 SERPL-SCNC: 23 MMOL/L (ref 20–33)
COMMENT NL1176: NORMAL
CREAT SERPL-MCNC: 0.73 MG/DL (ref 0.5–1.4)
EOSINOPHIL # BLD AUTO: 0 K/UL (ref 0–0.51)
EOSINOPHIL NFR BLD: 0 % (ref 0–6.9)
ERYTHROCYTE [DISTWIDTH] IN BLOOD BY AUTOMATED COUNT: 55.6 FL (ref 35.9–50)
EST. AVERAGE GLUCOSE BLD GHB EST-MCNC: 123 MG/DL
FERRITIN SERPL-MCNC: 21.2 NG/ML (ref 10–291)
GFR SERPLBLD CREATININE-BSD FMLA CKD-EPI: 87 ML/MIN/1.73 M 2
GLOBULIN SER CALC-MCNC: 2.1 G/DL (ref 1.9–3.5)
GLUCOSE SERPL-MCNC: 140 MG/DL (ref 65–99)
HBA1C MFR BLD: 5.9 % (ref 4–5.6)
HCT VFR BLD AUTO: 34.4 % (ref 37–47)
HGB BLD-MCNC: 10.9 G/DL (ref 12–16)
HYPOCHROMIA BLD QL SMEAR: ABNORMAL
IRON SATN MFR SERPL: 10 % (ref 15–55)
IRON SERPL-MCNC: 40 UG/DL (ref 40–170)
LYMPHOCYTES # BLD AUTO: 4.22 K/UL (ref 1–4.8)
LYMPHOCYTES NFR BLD: 47.4 % (ref 22–41)
MANUAL DIFF BLD: NORMAL
MCH RBC QN AUTO: 22.9 PG (ref 27–33)
MCHC RBC AUTO-ENTMCNC: 31.7 G/DL (ref 32.2–35.5)
MCV RBC AUTO: 72.1 FL (ref 81.4–97.8)
MICROCYTES BLD QL SMEAR: ABNORMAL
MONOCYTES # BLD AUTO: 0.31 K/UL (ref 0–0.85)
MONOCYTES NFR BLD AUTO: 3.5 % (ref 0–13.4)
MORPHOLOGY BLD-IMP: NORMAL
NEUTROPHILS # BLD AUTO: 4.29 K/UL (ref 1.82–7.42)
NEUTROPHILS NFR BLD: 48.2 % (ref 44–72)
NRBC # BLD AUTO: 0 K/UL
NRBC BLD-RTO: 0 /100 WBC (ref 0–0.2)
PLATELET # BLD AUTO: 276 K/UL (ref 164–446)
PLATELET BLD QL SMEAR: NORMAL
PMV BLD AUTO: 10.7 FL (ref 9–12.9)
POTASSIUM SERPL-SCNC: 3.9 MMOL/L (ref 3.6–5.5)
PROT SERPL-MCNC: 6 G/DL (ref 6–8.2)
RBC # BLD AUTO: 4.77 M/UL (ref 4.2–5.4)
RBC BLD AUTO: PRESENT
SMUDGE CELLS BLD QL SMEAR: NORMAL
SODIUM SERPL-SCNC: 126 MMOL/L (ref 135–145)
TIBC SERPL-MCNC: 383 UG/DL (ref 250–450)
UIBC SERPL-MCNC: 343 UG/DL (ref 110–370)
WBC # BLD AUTO: 8.9 K/UL (ref 4.8–10.8)

## 2024-04-09 PROCEDURE — 82728 ASSAY OF FERRITIN: CPT

## 2024-04-09 PROCEDURE — 83540 ASSAY OF IRON: CPT

## 2024-04-09 PROCEDURE — 85027 COMPLETE CBC AUTOMATED: CPT

## 2024-04-09 PROCEDURE — 85007 BL SMEAR W/DIFF WBC COUNT: CPT

## 2024-04-09 PROCEDURE — 83036 HEMOGLOBIN GLYCOSYLATED A1C: CPT

## 2024-04-09 PROCEDURE — 82306 VITAMIN D 25 HYDROXY: CPT

## 2024-04-09 PROCEDURE — 83550 IRON BINDING TEST: CPT

## 2024-04-09 PROCEDURE — 80053 COMPREHEN METABOLIC PANEL: CPT

## 2024-05-13 PROBLEM — J18.9 COMMUNITY ACQUIRED PNEUMONIA OF RIGHT LOWER LOBE OF LUNG: Status: RESOLVED | Noted: 2024-03-03 | Resolved: 2024-05-13

## 2024-05-13 PROBLEM — F10.21 ALCOHOLISM IN REMISSION (HCC): Status: ACTIVE | Noted: 2024-03-03

## 2024-05-13 PROBLEM — E88.09 HYPOALBUMINEMIA: Status: RESOLVED | Noted: 2024-03-26 | Resolved: 2024-05-13

## 2024-05-13 PROBLEM — D72.829 LEUKOCYTOSIS: Status: RESOLVED | Noted: 2023-08-28 | Resolved: 2024-05-13

## 2024-05-14 ENCOUNTER — OFFICE VISIT (OUTPATIENT)
Dept: SLEEP MEDICINE | Facility: MEDICAL CENTER | Age: 74
End: 2024-05-14
Payer: MEDICARE

## 2024-05-14 VITALS
HEIGHT: 67 IN | SYSTOLIC BLOOD PRESSURE: 116 MMHG | HEART RATE: 92 BPM | OXYGEN SATURATION: 96 % | DIASTOLIC BLOOD PRESSURE: 70 MMHG | BODY MASS INDEX: 30.45 KG/M2 | WEIGHT: 194 LBS

## 2024-05-14 DIAGNOSIS — J44.9 COPD MIXED TYPE (HCC): ICD-10-CM

## 2024-05-14 PROCEDURE — 99213 OFFICE O/P EST LOW 20 MIN: CPT | Performed by: INTERNAL MEDICINE

## 2024-05-14 PROCEDURE — 3078F DIAST BP <80 MM HG: CPT | Performed by: INTERNAL MEDICINE

## 2024-05-14 PROCEDURE — 3074F SYST BP LT 130 MM HG: CPT | Performed by: INTERNAL MEDICINE

## 2024-05-14 RX ORDER — IPRATROPIUM BROMIDE AND ALBUTEROL SULFATE 2.5; .5 MG/3ML; MG/3ML
3 SOLUTION RESPIRATORY (INHALATION) EVERY 6 HOURS PRN
Qty: 120 EACH | Refills: 3 | Status: SHIPPED | OUTPATIENT
Start: 2024-05-14 | End: 2024-09-11

## 2024-05-14 ASSESSMENT — FIBROSIS 4 INDEX: FIB4 SCORE: 1.46

## 2024-05-14 NOTE — PROCEDURES
Multi-Ox Readings  Multi Ox #1 Room air   O2 sat % at rest 94 (P: 91)   O2 sat % on exertion 91 (P: 99)   O2 sat average on exertion     Multi Ox #2     O2 sat % at rest     O2 sat % on exertion     O2 sat average on exertion       Oxygen Use 2   Oxygen Frequency Nocturnal   Duration of need     Is the patient mobile within the home?     CPAP Use?     BIPAP Use?     Servo Titration

## 2024-05-14 NOTE — PROGRESS NOTES
Pulmonary Clinic follow up    Date of Service: 5/14/2024    Reason for follow up:  Hospital Follow-up (ED 3/3/24-03/09/24 Pulm. Consult: 03/04/24)      Problem List Items Addressed This Visit          Pulmonary/Sleep Medicine Problems    COPD mixed type (HCC)     Moderate COPD FEV1 67%  Optimized on incruse and prn ventolin  She is also on 2 l continuous o2  Reassessed o2 needs today    Follow up one year           Relevant Medications    ipratropium-albuterol (DUONEB) 0.5-2.5 (3) MG/3ML nebulizer solution    Other Relevant Orders    Multiple Oximetry (Completed)         History of Present Illness: Tracie Rinaldi is a 73 y.o. female with a past medical history of copd, recently admitted to the hospital for weakness  On 2 l nocturnal oxygen  Lives in a group home  On spiriva  Was not in exacerbation    Doing well but her health assistant is worried re: nebulizers as she has not noticed pt getting any nebulizers    PFTs from 8/2019:     SPIROMETRY:  Showed FVC of 2.75 liters, 75% of predicted.  FEV1 was 1.9   liters, 67% of predicted.  FEV1/FVC ratio was 69%.  There was no significant   response to bronchodilators.     LUNG VOLUMES:  Showed severe air trapping with residual volume of 3.8 liters,   179% of predicted.  Total lung capacity was normal at 101% of predicted.     Diffusion capacity was 101% of predicted.    Multi-Ox Readings  Multi Ox #1 Room air   O2 sat % at rest 94 (P: 91)   O2 sat % on exertion 91 (P: 99)   O2 sat average on exertion     Multi Ox #2     O2 sat % at rest     O2 sat % on exertion     O2 sat average on exertion       Oxygen Use 2   Oxygen Frequency Nocturnal   Duration of need     Is the patient mobile within the home?     CPAP Use?     BIPAP Use?     Servo Titration             Review of Systems   Constitutional: Negative.    HENT: Negative.     Eyes: Negative.    Respiratory: Negative.     Cardiovascular: Negative.    Gastrointestinal: Negative.    Genitourinary: Negative.     Musculoskeletal: Negative.    Skin: Negative.    Neurological: Negative.    Endo/Heme/Allergies: Negative.    Psychiatric/Behavioral:  The patient is nervous/anxious.        Current Outpatient Medications on File Prior to Visit   Medication Sig Dispense Refill    ferrous sulfate 325 (65 Fe) MG tablet Take 1 Tablet by mouth every day. 30 Tablet 6    ascorbic acid (VITAMIN C) 500 MG tablet Take 1 Tablet by mouth every day. 30 Tablet 6    escitalopram (LEXAPRO) 10 MG Tab TAKE 1 TABLET BY MOUTH EVERY MORNING 30 Tablet 11    D-Mannose 500 MG Cap Take 1,000 mg by mouth 2 times a day. 120 Capsule 3    Melatonin 3 MG TABLET DISPERSIBLE Take 3 mg by mouth every evening. 30 Tablet 11    hydroCHLOROthiazide 12.5 MG tablet Take 1 Tablet by mouth every morning. 30 Tablet 11    divalproex ER (DEPAKOTE ER) 250 MG TABLET SR 24 HR TAKE 1 TABLET BY MOUTH ONCE DAILY 30 Tablet 11    hydrophilic ointment (AQUAPHOR) Ointment Apply 1 Each topically at bedtime. Apply to hands 198 g 11    levothyroxine (SYNTHROID) 125 MCG Tab Take 1 Tablet by mouth every morning on an empty stomach. 90 Tablet 3    Skin Protectants, Misc. (REMEDY PHYTOPLEX HYDRAGUARD) Cream Apply 1 Dose topically 2 times a day as needed. Apply thin layer to buttocks twice daily as needed for redness. DC scheduled order.      ASPIRIN LOW DOSE 81 MG EC tablet TAKE 1 TABLET BY MOUTH ONCE DAILY 30 Tablet 11    DESITIN 40 % Paste paste APPLY TOPICALLY TO AFFECTED AREA IN GROIN/BUTTOCKS TWICE A  g 0    Cholecalciferol 2000 UNIT Cap Take 1 Capsule by mouth every day. 30 Capsule 11    Mirabegron ER (MYRBETRIQ) 50 MG TABLET SR 24 HR Take 50 mg by mouth every evening.      Incontinence Supply Disposable (FQ PROTECTIVE UNDERWEAR) Misc CHANGE 3 TIMES DAILY AS DIRECTED 90 Each 10    alendronate (FOSAMAX) 70 MG Tab Take 1 Tablet by mouth every 7 days. 16 Tablet 3    simvastatin (ZOCOR) 40 MG Tab TAKE 1 TABLET BY MOUTH AT BEDTIME FOR HLD (Patient taking differently: Take 40 mg by  mouth every evening. TAKE 1 TABLET BY MOUTH AT BEDTIME FOR HLD) 90 Tablet 3    cloZAPine (CLOZARIL) 100 MG Tab Take 1.5 Tablets by mouth 2 times a day. 30 Tablet 0    umeclidinium bromide (INCRUSE ELLIPTA) 62.5 MCG/ACT AEROSOL POWDER, BREATH ACTIVATED inhaler INHALE 1 PUFF BY MOUTH ONCE DAILY 30 Each 10    ondansetron (ZOFRAN) 4 MG Tab tablet Take 1 Tablet by mouth every four hours as needed for Nausea/Vomiting. 20 Tablet 3    VENTOLIN  (90 Base) MCG/ACT Aero Soln inhalation aerosol Inhale 2 Puffs every four hours as needed for Shortness of Breath. 8.5 g 2     No current facility-administered medications on file prior to visit.       Social History     Tobacco Use    Smoking status: Former     Current packs/day: 0.50     Average packs/day: 0.5 packs/day for 45.0 years (22.5 ttl pk-yrs)     Types: Cigarettes    Smokeless tobacco: Never    Tobacco comments:     unk   Vaping Use    Vaping status: Never Used   Substance Use Topics    Alcohol use: No     Comment: unk    Drug use: No     Comment: unk        Past Medical History:   Diagnosis Date    Anxiety     Bladder spasm     Dental disorder     upper and lower dentures    Depression     Dermatitis     GERD (gastroesophageal reflux disease)     Hearing loss of right ear due to cerumen impaction     Hyperlipidemia     Hypothyroid     Other emphysema (HCC)     Pneumonia     2012    Psychiatric disorder     schizophrenia    Schizophrenia (HCC)     Seizure (HCC)     Stroke (HCC)     tia june 2015 no residual     Unspecified urinary incontinence     diapers     Vitamin D deficiency        Past Surgical History:   Procedure Laterality Date    IRRIGATION & DEBRIDEMENT ORTHO Left 10/17/2015    Procedure: IRRIGATION & DEBRIDEMENT ORTHO foot;  Surgeon: Jluis Becker M.D.;  Location: SURGERY [facility];  Service:     CLOSED REDUCTION Left 7/9/2015    Procedure: CLOSED REDUCTION -ATTEMPTED;  Surgeon: Jluis Becker M.D.;  Location: SURGERY [facility];  Service:   "   PIN INSERTION  7/9/2015    Procedure: PIN INSERTION FOOT;  Surgeon: Jluis Becker M.D.;  Location: SURGERY [facility];  Service:        Allergies: Patient has no known allergies.    Family History   Problem Relation Age of Onset    Alzheimer's Disease Mother     Heart Disease Father        Vitals:    05/14/24 1010   Height: 1.702 m (5' 7\")   Weight: 88 kg (194 lb)   Weight % change since last entry.: 0 %   BP: 116/70   Pulse: 92   BMI (Calculated): 30.38       Physical Examination  Physical Exam  HENT:      Head: Normocephalic and atraumatic.      Mouth/Throat:      Mouth: Mucous membranes are moist.   Eyes:      Extraocular Movements: Extraocular movements intact.      Pupils: Pupils are equal, round, and reactive to light.   Cardiovascular:      Rate and Rhythm: Normal rate.   Pulmonary:      Effort: Pulmonary effort is normal.      Breath sounds: Normal breath sounds.   Musculoskeletal:         General: Normal range of motion.      Comments: With her walker   Neurological:      Mental Status: She is alert. Mental status is at baseline.   Psychiatric:         Mood and Affect: Mood normal.      Comments: Judgement and insight not intact - appears to be her baseline           Frank Mcduffie M.D., MD MPH [facility]  [facility]  "

## 2024-05-16 ENCOUNTER — TELEPHONE (OUTPATIENT)
Dept: NEUROLOGY | Facility: MEDICAL CENTER | Age: 74
End: 2024-05-16

## 2024-05-16 ENCOUNTER — OFFICE VISIT (OUTPATIENT)
Dept: NEUROLOGY | Facility: MEDICAL CENTER | Age: 74
End: 2024-05-16
Attending: PSYCHIATRY & NEUROLOGY
Payer: MEDICARE

## 2024-05-16 VITALS
SYSTOLIC BLOOD PRESSURE: 112 MMHG | WEIGHT: 197.09 LBS | HEART RATE: 88 BPM | RESPIRATION RATE: 16 BRPM | HEIGHT: 66 IN | DIASTOLIC BLOOD PRESSURE: 76 MMHG | TEMPERATURE: 98.2 F | OXYGEN SATURATION: 93 % | BODY MASS INDEX: 31.68 KG/M2

## 2024-05-16 DIAGNOSIS — F03.90 DEMENTIA WITHOUT BEHAVIORAL DISTURBANCE (HCC): ICD-10-CM

## 2024-05-16 DIAGNOSIS — G40.909 SEIZURE DISORDER (HCC): Primary | ICD-10-CM

## 2024-05-16 PROCEDURE — 99215 OFFICE O/P EST HI 40 MIN: CPT | Performed by: PSYCHIATRY & NEUROLOGY

## 2024-05-16 PROCEDURE — 3074F SYST BP LT 130 MM HG: CPT | Performed by: PSYCHIATRY & NEUROLOGY

## 2024-05-16 PROCEDURE — 3078F DIAST BP <80 MM HG: CPT | Performed by: PSYCHIATRY & NEUROLOGY

## 2024-05-16 RX ORDER — LEVETIRACETAM 250 MG/1
250 TABLET ORAL 3 TIMES DAILY
Qty: 270 TABLET | Refills: 3 | Status: SHIPPED | OUTPATIENT
Start: 2024-05-16

## 2024-05-16 ASSESSMENT — ENCOUNTER SYMPTOMS
FALLS: 0
SEIZURES: 0
HEADACHES: 0
TREMORS: 0
LOSS OF CONSCIOUSNESS: 0
MEMORY LOSS: 1

## 2024-05-16 ASSESSMENT — FIBROSIS 4 INDEX: FIB4 SCORE: 1.46

## 2024-05-16 NOTE — TELEPHONE ENCOUNTER
Received Refill PA request via [employer]  for Levetiracetam (Keppra) 250 MG Tabs. (Quantity:270, Day Supply:90) - Copay $0.00 - NO PA REQUIRED     Insurance: BRIJESH [employer] ([employer])  Member ID:  [id number]  BIN: [id number]  PCN: [id number]  Group: [id number]     Ran Test claim via [place] & medication Pays for a $0.00 copay. Will outreach to patient to offer specialty pharmacy services and or release to preferred pharmacy

## 2024-05-16 NOTE — PROGRESS NOTES
Subjective     Tracie Rinaldi is a 73 y.o. female who presents with her court appointed guardian, Yannick, for your follow-up, with a history of symptomatic seizures and dementia.  History is gotten from review of records as well as discussions with the patient and mostly Yannick.     HPI    Seizures: Tracie has been compliant, medications are given to her, she takes Keppra 250 mg, 3 times daily, without issue.  Her behavior has been stable, there are no problems with drowsiness, tremor, GI disturbances, or dizziness.    Dementia: Still at her group home she has her own room, she does maintain cleanliness with only minimal help.  For the most part she is independent with her ADLs, she is day 3 times a week, does so willingly.  She is not incontinent, though she tends to get up in middle the night to go to the bathroom.  There is a commode that is available for her own personal use.  She has a calendar which is used with staff to remind her of events ongoing at the group home.  She eats on a regular basis.  She participates in activities willingly.    Behaviors have been well-controlled, she remains on Depakote and Clozaril.  There have been no outbursts of violent behavior, she has not been paranoid.  Balance is curtailed, she continues to walk with a shortened stride length, she has been given a walker to use when she is out and about, staff is encouraging her to walk without it while in the group home.    She was recently admitted for increasing balance difficulties and sleepiness earlier this year.  She was discharged to a rehabilitation hospital for several weeks but has been back at her group home.    Medical, surgical and family histories are reviewed, there are no new drug allergies.  She is on Keppra 250 mg, 3 times daily, also Depakote  mg daily, Clozaril 150 mg twice daily, Fosamax, vitamin D with calcium, Ellipta and ipratropium inhalers, Zocor, Synthroid, baby aspirin, Lexapro, 65 FE, HCTZ, melatonin,  "Myrbetriq, Zofran and Ventolin.    Review of Systems   Musculoskeletal:  Negative for falls.   Neurological:  Negative for tremors, seizures, loss of consciousness and headaches.   Psychiatric/Behavioral:  Positive for memory loss.    All other systems reviewed and are negative.    Objective     /76 (BP Location: Right arm, Patient Position: Sitting, BP Cuff Size: Adult)   Pulse 88   Temp 36.8 °C (98.2 °F) (Temporal)   Resp 16   Ht 1.676 m (5' 6\")   Wt 89.4 kg (197 lb 1.5 oz)   SpO2 93%   BMI 31.81 kg/m²      Physical Exam    She appears in no acute distress.  Her vital signs are stable.  She is clean and appropriately dressed, there is no malar rash or sialorrhea.  Her neck is supple, range of motion is full.  Cardiac evaluation is unremarkable.     Neurological Exam    Mental processing speed is slowed, but she is fully oriented.  She names and repeats, there is a slight reduction in word fluency, but paraphasic errors are not used.  She follows multistep commands but it takes her a while.  She spells the word \"world\" forwards and backwards without cueing.  Similarities are actually done quite well.  Frontal release signs are absent.  She knows her birthday is tomorrow.  She recognizes me, but is not sure why she is seeing me.  She identifies Yannick easily.    PERRLA/EOMI, visual fields are grossly full to movement detection.  There is still hypophonia and a slight degree of bradykinesia with the reduction in the eye blink frequency.  Facial movements are symmetric.  Sensory exam is intact to temperature.  Jaw movements are intact, shoulder shrug and head rotation are symmetric.    Musculoskeletal exam reveals bradykinesia, no tremor in the upper extremities.  With distraction there is only a suggestion of rigidity bilaterally in the upper extremities.  There is no asterixis.  Strength is intact throughout.  There are no obvious reflex asymmetries though the right toe is upgoing, the left is downgoing " on plantar stimulation.    She is slow to stand, stride length is shortened, there is a retropulsive quality getting up from a seated position.  There is an apractic quality as she walks.  There is no appendicular dystaxia.  Repetitive movements are diminished in amplitude throughout, without asymmetry from side-to-side.    Sensory exam suggest a slight stocking pattern decameter vibration below the ankles.  Temperature is intact.    Assessment & Plan     1. Seizure disorder (HCC)  Stable, Keppra will be continued unchanged.  Compliance is not an issue.  She tolerates the drug.  There is no reason to repeat diagnostics at this time.    - levETIRAcetam (KEPPRA) 250 MG tablet; Take 1 Tablet by mouth 3 times a day.  Dispense: 270 Tablet; Refill: 3    2. Dementia without behavioral disturbance (HCC)  Things seem to be stable, there is no additional symptomatology that warrants treatment.  Parkinsonian-like features are likely related to the Clozaril, I do not think that she has primary, idiopathic Parkinson's disease.  Her symptoms think that we are seeing something such as Lewy Body Disease.  I am a little concerned about the right-sided Babinski sign which was not present 1 year ago.  She has nothing else on exam that suggests right-sided hemiparesis, the history is not consistent with a cerebrovascular event when she was admitted, or even a myelopathic process.  She is already on mood stabilizing agents for her schizophrenia and affective disorder, but this has nothing to do with the abnormal reflex.  She and I will see each other in 1 year.    Time: 40 minutes in total spent on patient care including pre-charting, record review, discussion with healthcare staff and documentation.  This includes face-to-face time for exam, review, discussion, as well as counseling and coordinating care.

## 2024-05-22 ASSESSMENT — ENCOUNTER SYMPTOMS
RESPIRATORY NEGATIVE: 1
NEUROLOGICAL NEGATIVE: 1
NERVOUS/ANXIOUS: 1
EYES NEGATIVE: 1
CONSTITUTIONAL NEGATIVE: 1
CARDIOVASCULAR NEGATIVE: 1
MUSCULOSKELETAL NEGATIVE: 1
GASTROINTESTINAL NEGATIVE: 1

## 2024-05-22 NOTE — ASSESSMENT & PLAN NOTE
Moderate COPD FEV1 67%  Optimized on incruse and prn ventolin  She is also on 2 l continuous o2  Reassessed o2 needs today    Follow up one year

## 2024-06-07 ENCOUNTER — TELEPHONE (OUTPATIENT)
Dept: SLEEP MEDICINE | Facility: MEDICAL CENTER | Age: 74
End: 2024-06-07
Payer: MEDICARE

## 2024-06-07 DIAGNOSIS — J44.9 COPD MIXED TYPE (HCC): ICD-10-CM

## 2024-06-07 NOTE — TELEPHONE ENCOUNTER
VOICEMAIL  1. Caller Name:   [place]                      Call Back Number: [phone]    2. Message: Yannick called and said pt was seen with Dr Montes a few weeks ago.  They thought they had a nebulizer machine but they didn't. They would like an order for this.     3. Patient approves office to leave a detailed voicemail/MyChart message: N\A      Order Pended.

## 2024-06-24 PROBLEM — R53.1 GENERALIZED WEAKNESS: Status: RESOLVED | Noted: 2024-03-03 | Resolved: 2024-06-24

## 2024-08-05 PROBLEM — L60.3 DYSTROPHIC NAIL: Status: ACTIVE | Noted: 2024-08-05

## 2024-08-05 PROBLEM — E66.9 OBESITY (BMI 30-39.9): Status: RESOLVED | Noted: 2022-07-05 | Resolved: 2024-08-05

## 2024-09-13 RX ORDER — DIAPER,BRIEF,ADULT, DISPOSABLE
EACH MISCELLANEOUS
Qty: 90 EACH | Refills: 10 | OUTPATIENT
Start: 2024-09-13

## 2024-09-13 RX ORDER — DIAPER,BRIEF,ADULT, DISPOSABLE
EACH MISCELLANEOUS
Qty: 90 EACH | Refills: 11 | OUTPATIENT
Start: 2024-09-13

## 2024-10-08 RX ORDER — DIAPER,BRIEF,ADULT, DISPOSABLE
EACH MISCELLANEOUS
Qty: 90 EACH | Refills: 11 | Status: SHIPPED | OUTPATIENT
Start: 2024-10-08

## 2024-10-31 PROBLEM — R73.03 PREDIABETES: Status: ACTIVE | Noted: 2024-10-31

## 2024-11-19 ENCOUNTER — APPOINTMENT (RX ONLY)
Dept: URBAN - METROPOLITAN AREA CLINIC 6 | Facility: CLINIC | Age: 74
Setting detail: DERMATOLOGY
End: 2024-11-19

## 2024-11-19 DIAGNOSIS — Z71.89 OTHER SPECIFIED COUNSELING: ICD-10-CM

## 2024-11-19 DIAGNOSIS — D22 MELANOCYTIC NEVI: ICD-10-CM

## 2024-11-19 DIAGNOSIS — D18.0 HEMANGIOMA: ICD-10-CM

## 2024-11-19 DIAGNOSIS — L81.4 OTHER MELANIN HYPERPIGMENTATION: ICD-10-CM

## 2024-11-19 DIAGNOSIS — L57.0 ACTINIC KERATOSIS: ICD-10-CM

## 2024-11-19 DIAGNOSIS — L82.1 OTHER SEBORRHEIC KERATOSIS: ICD-10-CM

## 2024-11-19 PROBLEM — D22.61 MELANOCYTIC NEVI OF RIGHT UPPER LIMB, INCLUDING SHOULDER: Status: ACTIVE | Noted: 2024-11-19

## 2024-11-19 PROBLEM — D22.62 MELANOCYTIC NEVI OF LEFT UPPER LIMB, INCLUDING SHOULDER: Status: ACTIVE | Noted: 2024-11-19

## 2024-11-19 PROBLEM — D22.5 MELANOCYTIC NEVI OF TRUNK: Status: ACTIVE | Noted: 2024-11-19

## 2024-11-19 PROBLEM — D18.01 HEMANGIOMA OF SKIN AND SUBCUTANEOUS TISSUE: Status: ACTIVE | Noted: 2024-11-19

## 2024-11-19 PROCEDURE — ? LIQUID NITROGEN

## 2024-11-19 PROCEDURE — 17000 DESTRUCT PREMALG LESION: CPT

## 2024-11-19 PROCEDURE — ? SUNSCREEN TREATMENT REGIMEN

## 2024-11-19 PROCEDURE — 99213 OFFICE O/P EST LOW 20 MIN: CPT | Mod: 25

## 2024-11-19 PROCEDURE — ? COUNSELING

## 2024-11-19 ASSESSMENT — LOCATION DETAILED DESCRIPTION DERM
LOCATION DETAILED: LEFT ULNAR DORSAL HAND
LOCATION DETAILED: PERIUMBILICAL SKIN
LOCATION DETAILED: RIGHT DISTAL POSTERIOR UPPER ARM
LOCATION DETAILED: LEFT INFERIOR FOREHEAD
LOCATION DETAILED: LEFT PROXIMAL POSTERIOR UPPER ARM
LOCATION DETAILED: RIGHT INFERIOR FOREHEAD
LOCATION DETAILED: LEFT SUPERIOR MEDIAL UPPER BACK
LOCATION DETAILED: RIGHT RADIAL DORSAL HAND
LOCATION DETAILED: INFERIOR THORACIC SPINE
LOCATION DETAILED: SUPERIOR THORACIC SPINE

## 2024-11-19 ASSESSMENT — LOCATION SIMPLE DESCRIPTION DERM
LOCATION SIMPLE: RIGHT POSTERIOR UPPER ARM
LOCATION SIMPLE: LEFT POSTERIOR UPPER ARM
LOCATION SIMPLE: LEFT UPPER BACK
LOCATION SIMPLE: LEFT FOREHEAD
LOCATION SIMPLE: ABDOMEN
LOCATION SIMPLE: RIGHT FOREHEAD
LOCATION SIMPLE: RIGHT HAND
LOCATION SIMPLE: UPPER BACK
LOCATION SIMPLE: LEFT HAND

## 2024-11-19 ASSESSMENT — LOCATION ZONE DERM
LOCATION ZONE: ARM
LOCATION ZONE: HAND
LOCATION ZONE: FACE
LOCATION ZONE: TRUNK

## 2024-11-19 NOTE — PROCEDURE: LIQUID NITROGEN
Consent: The patient's consent was obtained including but not limited to risks of crusting, scabbing, blistering, scarring, darker or lighter pigmentary change, recurrence, incomplete removal and infection.
Detail Level: Detailed
Render Post-Care Instructions In Note?: no
Show Aperture Variable?: Yes
Number Of Freeze-Thaw Cycles: 2 freeze-thaw cycles
Duration Of Freeze Thaw-Cycle (Seconds): 10
Post-Care Instructions: I reviewed with the patient in detail post-care instructions. Patient is to wear sunprotection, and avoid picking at any of the treated lesions. Pt may apply Vaseline to crusted or scabbing areas.

## 2024-12-17 ENCOUNTER — OFFICE VISIT (OUTPATIENT)
Dept: URGENT CARE | Facility: CLINIC | Age: 74
End: 2024-12-17
Payer: MEDICARE

## 2024-12-17 VITALS
DIASTOLIC BLOOD PRESSURE: 70 MMHG | OXYGEN SATURATION: 98 % | RESPIRATION RATE: 16 BRPM | TEMPERATURE: 98.2 F | WEIGHT: 185 LBS | HEART RATE: 93 BPM | SYSTOLIC BLOOD PRESSURE: 102 MMHG | BODY MASS INDEX: 29.86 KG/M2

## 2024-12-17 DIAGNOSIS — N30.00 ACUTE CYSTITIS WITHOUT HEMATURIA: ICD-10-CM

## 2024-12-17 DIAGNOSIS — R35.0 URINARY FREQUENCY: ICD-10-CM

## 2024-12-17 LAB
APPEARANCE UR: CLEAR
BILIRUB UR STRIP-MCNC: NEGATIVE MG/DL
COLOR UR AUTO: YELLOW
GLUCOSE UR STRIP.AUTO-MCNC: NEGATIVE MG/DL
KETONES UR STRIP.AUTO-MCNC: NEGATIVE MG/DL
LEUKOCYTE ESTERASE UR QL STRIP.AUTO: NORMAL
NITRITE UR QL STRIP.AUTO: NEGATIVE
PH UR STRIP.AUTO: 5.5 [PH] (ref 5–8)
PROT UR QL STRIP: NEGATIVE MG/DL
RBC UR QL AUTO: NEGATIVE
SP GR UR STRIP.AUTO: <=1.005
UROBILINOGEN UR STRIP-MCNC: 0.2 MG/DL

## 2024-12-17 PROCEDURE — 81002 URINALYSIS NONAUTO W/O SCOPE: CPT | Performed by: NURSE PRACTITIONER

## 2024-12-17 PROCEDURE — 99214 OFFICE O/P EST MOD 30 MIN: CPT | Performed by: NURSE PRACTITIONER

## 2024-12-17 PROCEDURE — 3074F SYST BP LT 130 MM HG: CPT | Performed by: NURSE PRACTITIONER

## 2024-12-17 PROCEDURE — 3078F DIAST BP <80 MM HG: CPT | Performed by: NURSE PRACTITIONER

## 2024-12-17 RX ORDER — CEFDINIR 300 MG/1
300 CAPSULE ORAL 2 TIMES DAILY
Qty: 10 CAPSULE | Refills: 0 | Status: SHIPPED | OUTPATIENT
Start: 2024-12-17 | End: 2024-12-17 | Stop reason: SDUPTHER

## 2024-12-17 RX ORDER — CEFDINIR 300 MG/1
300 CAPSULE ORAL 2 TIMES DAILY
Qty: 10 CAPSULE | Refills: 0 | Status: SHIPPED | OUTPATIENT
Start: 2024-12-17 | End: 2024-12-22

## 2024-12-17 ASSESSMENT — ENCOUNTER SYMPTOMS
SORE THROAT: 0
CHILLS: 0
VOMITING: 0
FEVER: 0
MYALGIAS: 0
EYE REDNESS: 0
NAUSEA: 0
DIZZINESS: 0
SHORTNESS OF BREATH: 0
WEAKNESS: 1
FATIGUE: 1

## 2024-12-17 ASSESSMENT — FIBROSIS 4 INDEX: FIB4 SCORE: 1.478260869565217391

## 2024-12-18 NOTE — PROGRESS NOTES
Subjective:   Tracie Rinaldi is a 74 y.o. female who presents for UTI      UTI  This is a new problem. Episode onset: HPI provided by caregiver.  Patient with a history of recurring UTIs.  Does have dementia having more confusion, weakness urinary frequency. The problem occurs constantly. Associated symptoms include fatigue, urinary symptoms and weakness. Pertinent negatives include no chest pain, chills, fever, myalgias, nausea, rash, sore throat or vomiting. Associated symptoms comments: Weakness,. Nothing aggravates the symptoms. She has tried nothing for the symptoms.       Review of Systems   Constitutional:  Positive for fatigue and malaise/fatigue. Negative for chills and fever.   HENT:  Negative for sore throat.    Eyes:  Negative for redness.   Respiratory:  Negative for shortness of breath.    Cardiovascular:  Negative for chest pain.   Gastrointestinal:  Negative for nausea and vomiting.   Genitourinary:  Positive for frequency. Negative for dysuria.   Musculoskeletal:  Negative for myalgias.   Skin:  Negative for rash.   Neurological:  Positive for weakness. Negative for dizziness.        Forgetfulness       Medications:    alendronate Tabs  ascorbic acid Tabs  Aspirin Low Dose Tbec  cloZAPine Tabs  D-Mannose Caps  divalproex ER Tb24  escitalopram Tabs  ferrous sulfate  FQ Protective Underwear Misc  hydrophilic ointment Oint  Incruse Ellipta Aepb  levETIRAcetam  levothyroxine Tabs  Melatonin Tbdp  Myrbetriq Tb24  simvastatin Tabs  Ventolin HFA Aers  Vitamin D-3 Super Strength Tabs    Allergies: Patient has no known allergies.    Problem List: Tracie Rinaldi does not have any pertinent problems on file.    Surgical History:  Past Surgical History:   Procedure Laterality Date    IRRIGATION & DEBRIDEMENT ORTHO Left 10/17/2015    Procedure: IRRIGATION & DEBRIDEMENT ORTHO foot;  Surgeon: Jluis Becker M.D.;  Location: SURGERY [facility];  Service:     CLOSED REDUCTION Left 7/9/2015     Procedure: CLOSED REDUCTION -ATTEMPTED;  Surgeon: Jluis Becker M.D.;  Location: SURGERY [facility];  Service:     PIN INSERTION  7/9/2015    Procedure: PIN INSERTION FOOT;  Surgeon: Jluis Becker M.D.;  Location: SURGERY [facility];  Service:        Past Social Hx: Tracie Rinaldi  reports that she has quit smoking. Her smoking use included cigarettes. She has a 22.5 pack-year smoking history. She has never used smokeless tobacco. She reports that she does not drink alcohol and does not use drugs.     Past Family Hx:  Tracie Rinaldi family history includes Alzheimer's Disease in her mother; Heart Disease in her father.     Problem list, medications, and allergies reviewed by myself today in Epic.     Objective:     There were no vitals taken for this visit.    Physical Exam  Constitutional:       Appearance: Normal appearance. She is not ill-appearing or toxic-appearing.   HENT:      Head: Normocephalic.      Right Ear: External ear normal.      Left Ear: External ear normal.      Nose: Nose normal.      Mouth/Throat:      Lips: Pink.   Eyes:      General: Lids are normal.   Pulmonary:      Effort: Pulmonary effort is normal. No accessory muscle usage.   Abdominal:      Tenderness: There is no abdominal tenderness. There is no right CVA tenderness or left CVA tenderness.   Musculoskeletal:      Cervical back: Full passive range of motion without pain.   Neurological:      Mental Status: She is alert and oriented to person, place, and time.   Psychiatric:         Mood and Affect: Mood normal.         Thought Content: Thought content normal.         Assessment/Plan:     Diagnosis and associated orders:     1. Urinary frequency  POCT Urinalysis    cefdinir (OMNICEF) 300 MG Cap    DISCONTINUED: cefdinir (OMNICEF) 300 MG Cap      2. Acute cystitis without hematuria               Comments/MDM:   I personally reviewed prior external notes and prior test results pertinent to today's visit.   Patient  with a history of recurrent UTIs does appear to have a UTI on today's exam, vital signs are stable.  Start antibiotics discussed management options, risks and benefits, and alternatives to treatment plan agreed upon.   Red flags discussed and indications to immediately call 911 or present to the Emergency Department.   Supportive care, differential diagnoses, and indications for immediate follow-up discussed with patient.    Patient expresses understanding and agrees to plan. Patient denies any other questions or concerns.         .             Please note that this dictation was created using voice recognition software. I have made a reasonable attempt to correct obvious errors, but I expect that there are errors of grammar and possibly content that I did not discover before finalizing the note.    This note was electronically signed by Petr DICKEY.

## 2024-12-23 ENCOUNTER — HOSPITAL ENCOUNTER (OUTPATIENT)
Facility: MEDICAL CENTER | Age: 74
End: 2024-12-23
Attending: NURSE PRACTITIONER
Payer: MEDICARE

## 2024-12-23 DIAGNOSIS — N39.0 URINARY TRACT INFECTION WITHOUT HEMATURIA, SITE UNSPECIFIED: ICD-10-CM

## 2024-12-23 LAB
APPEARANCE UR: CLEAR
BACTERIA #/AREA URNS HPF: NORMAL /HPF
BILIRUB UR QL STRIP.AUTO: NEGATIVE
CASTS URNS QL MICRO: NORMAL /LPF (ref 0–2)
COLOR UR: YELLOW
EPITHELIAL CELLS 1715: NORMAL /HPF (ref 0–5)
GLUCOSE UR STRIP.AUTO-MCNC: NEGATIVE MG/DL
KETONES UR STRIP.AUTO-MCNC: NEGATIVE MG/DL
LEUKOCYTE ESTERASE UR QL STRIP.AUTO: ABNORMAL
MICRO URNS: ABNORMAL
NITRITE UR QL STRIP.AUTO: NEGATIVE
PH UR STRIP.AUTO: 7.5 [PH] (ref 5–8)
PROT UR QL STRIP: NEGATIVE MG/DL
RBC # URNS HPF: NORMAL /HPF (ref 0–2)
RBC UR QL AUTO: NEGATIVE
SP GR UR STRIP.AUTO: 1
UROBILINOGEN UR STRIP.AUTO-MCNC: 0.2 EU/DL
WBC #/AREA URNS HPF: NORMAL /HPF

## 2024-12-23 PROCEDURE — 81001 URINALYSIS AUTO W/SCOPE: CPT

## 2025-01-14 ENCOUNTER — HOSPITAL ENCOUNTER (OUTPATIENT)
Dept: LAB | Facility: MEDICAL CENTER | Age: 75
End: 2025-01-14
Attending: NURSE PRACTITIONER
Payer: MEDICARE

## 2025-01-14 DIAGNOSIS — Z20.1 HISTORY OF EXPOSURE TO TUBERCULOSIS: ICD-10-CM

## 2025-01-14 PROCEDURE — 36415 COLL VENOUS BLD VENIPUNCTURE: CPT

## 2025-01-14 PROCEDURE — 86480 TB TEST CELL IMMUN MEASURE: CPT

## 2025-01-16 LAB
GAMMA INTERFERON BACKGROUND BLD IA-ACNC: 0.04 IU/ML
M TB IFN-G BLD-IMP: NEGATIVE
M TB IFN-G CD4+ BCKGRND COR BLD-ACNC: 0.01 IU/ML
MITOGEN IGNF BCKGRD COR BLD-ACNC: >10 IU/ML
QFT TB2 - NIL TBQ2: 0.01 IU/ML

## 2025-03-19 PROBLEM — R73.03 PREDIABETES: Status: RESOLVED | Noted: 2024-10-31 | Resolved: 2025-03-19

## 2025-03-19 PROBLEM — R63.4 UNINTENTIONAL WEIGHT LOSS: Status: ACTIVE | Noted: 2025-03-19

## 2025-03-23 ENCOUNTER — APPOINTMENT (OUTPATIENT)
Dept: RADIOLOGY | Facility: MEDICAL CENTER | Age: 75
DRG: 871 | End: 2025-03-23
Attending: EMERGENCY MEDICINE
Payer: MEDICARE

## 2025-03-23 ENCOUNTER — HOSPITAL ENCOUNTER (INPATIENT)
Facility: MEDICAL CENTER | Age: 75
LOS: 3 days | DRG: 871 | End: 2025-03-26
Attending: EMERGENCY MEDICINE | Admitting: STUDENT IN AN ORGANIZED HEALTH CARE EDUCATION/TRAINING PROGRAM
Payer: MEDICARE

## 2025-03-23 DIAGNOSIS — F20.9 SCHIZOPHRENIA, UNSPECIFIED TYPE (HCC): ICD-10-CM

## 2025-03-23 DIAGNOSIS — R00.0 TACHYCARDIA: ICD-10-CM

## 2025-03-23 DIAGNOSIS — J18.9 COMMUNITY ACQUIRED PNEUMONIA, UNSPECIFIED LATERALITY: Primary | ICD-10-CM

## 2025-03-23 DIAGNOSIS — R41.82 ALTERED MENTAL STATUS, UNSPECIFIED ALTERED MENTAL STATUS TYPE: ICD-10-CM

## 2025-03-23 DIAGNOSIS — N39.0 ACUTE UTI: ICD-10-CM

## 2025-03-23 LAB
ALBUMIN SERPL BCP-MCNC: 3.5 G/DL (ref 3.2–4.9)
ALBUMIN/GLOB SERPL: 1.3 G/DL
ALP SERPL-CCNC: 100 U/L (ref 30–99)
ALT SERPL-CCNC: 30 U/L (ref 2–50)
ANION GAP SERPL CALC-SCNC: 12 MMOL/L (ref 7–16)
APPEARANCE UR: CLEAR
AST SERPL-CCNC: 43 U/L (ref 12–45)
BACTERIA #/AREA URNS HPF: ABNORMAL /HPF
BASOPHILS # BLD AUTO: 0.4 % (ref 0–1.8)
BASOPHILS # BLD: 0.05 K/UL (ref 0–0.12)
BILIRUB SERPL-MCNC: 0.5 MG/DL (ref 0.1–1.5)
BILIRUB UR QL STRIP.AUTO: NEGATIVE
BUN SERPL-MCNC: 14 MG/DL (ref 8–22)
CALCIUM ALBUM COR SERPL-MCNC: 9.3 MG/DL (ref 8.5–10.5)
CALCIUM SERPL-MCNC: 8.9 MG/DL (ref 8.5–10.5)
CASTS URNS QL MICRO: ABNORMAL /LPF (ref 0–2)
CHLORIDE SERPL-SCNC: 96 MMOL/L (ref 96–112)
CO2 SERPL-SCNC: 22 MMOL/L (ref 20–33)
COLOR UR: YELLOW
CREAT SERPL-MCNC: 0.94 MG/DL (ref 0.5–1.4)
EKG IMPRESSION: NORMAL
EOSINOPHIL # BLD AUTO: 0.03 K/UL (ref 0–0.51)
EOSINOPHIL NFR BLD: 0.2 % (ref 0–6.9)
EPITHELIAL CELLS 1715: ABNORMAL /HPF (ref 0–5)
ERYTHROCYTE [DISTWIDTH] IN BLOOD BY AUTOMATED COUNT: 42 FL (ref 35.9–50)
FLUAV RNA SPEC QL NAA+PROBE: NEGATIVE
FLUBV RNA SPEC QL NAA+PROBE: NEGATIVE
GFR SERPLBLD CREATININE-BSD FMLA CKD-EPI: 64 ML/MIN/1.73 M 2
GLOBULIN SER CALC-MCNC: 2.7 G/DL (ref 1.9–3.5)
GLUCOSE SERPL-MCNC: 131 MG/DL (ref 65–99)
GLUCOSE UR STRIP.AUTO-MCNC: NEGATIVE MG/DL
HCT VFR BLD AUTO: 35.4 % (ref 37–47)
HGB BLD-MCNC: 11.8 G/DL (ref 12–16)
IMM GRANULOCYTES # BLD AUTO: 0.07 K/UL (ref 0–0.11)
IMM GRANULOCYTES NFR BLD AUTO: 0.6 % (ref 0–0.9)
KETONES UR STRIP.AUTO-MCNC: NEGATIVE MG/DL
LACTATE SERPL-SCNC: 0.9 MMOL/L (ref 0.5–2)
LEUKOCYTE ESTERASE UR QL STRIP.AUTO: ABNORMAL
LYMPHOCYTES # BLD AUTO: 0.54 K/UL (ref 1–4.8)
LYMPHOCYTES NFR BLD: 4.2 % (ref 22–41)
MCH RBC QN AUTO: 27.4 PG (ref 27–33)
MCHC RBC AUTO-ENTMCNC: 33.3 G/DL (ref 32.2–35.5)
MCV RBC AUTO: 82.1 FL (ref 81.4–97.8)
MICRO URNS: ABNORMAL
MONOCYTES # BLD AUTO: 1.45 K/UL (ref 0–0.85)
MONOCYTES NFR BLD AUTO: 11.4 % (ref 0–13.4)
NEUTROPHILS # BLD AUTO: 10.57 K/UL (ref 1.82–7.42)
NEUTROPHILS NFR BLD: 83.2 % (ref 44–72)
NITRITE UR QL STRIP.AUTO: NEGATIVE
NRBC # BLD AUTO: 0 K/UL
NRBC BLD-RTO: 0 /100 WBC (ref 0–0.2)
PH UR STRIP.AUTO: 7.5 [PH] (ref 5–8)
PLATELET # BLD AUTO: 218 K/UL (ref 164–446)
PMV BLD AUTO: 10.5 FL (ref 9–12.9)
POTASSIUM SERPL-SCNC: 3.7 MMOL/L (ref 3.6–5.5)
PROCALCITONIN SERPL-MCNC: 9.68 NG/ML
PROT SERPL-MCNC: 6.2 G/DL (ref 6–8.2)
PROT UR QL STRIP: 30 MG/DL
RBC # BLD AUTO: 4.31 M/UL (ref 4.2–5.4)
RBC # URNS HPF: ABNORMAL /HPF (ref 0–2)
RBC UR QL AUTO: ABNORMAL
RSV RNA SPEC QL NAA+PROBE: NEGATIVE
SARS-COV-2 RNA RESP QL NAA+PROBE: NOTDETECTED
SODIUM SERPL-SCNC: 130 MMOL/L (ref 135–145)
SP GR UR STRIP.AUTO: 1.01
UROBILINOGEN UR STRIP.AUTO-MCNC: 1 EU/DL
WBC # BLD AUTO: 12.7 K/UL (ref 4.8–10.8)
WBC #/AREA URNS HPF: ABNORMAL /HPF

## 2025-03-23 PROCEDURE — 0241U HCHG SARS-COV-2 COVID-19 NFCT DS RESP RNA 4 TRGT ED POC: CPT

## 2025-03-23 PROCEDURE — 700111 HCHG RX REV CODE 636 W/ 250 OVERRIDE (IP): Mod: JZ,UD | Performed by: EMERGENCY MEDICINE

## 2025-03-23 PROCEDURE — 83605 ASSAY OF LACTIC ACID: CPT

## 2025-03-23 PROCEDURE — 99285 EMERGENCY DEPT VISIT HI MDM: CPT

## 2025-03-23 PROCEDURE — 80053 COMPREHEN METABOLIC PANEL: CPT

## 2025-03-23 PROCEDURE — 87077 CULTURE AEROBIC IDENTIFY: CPT

## 2025-03-23 PROCEDURE — 71045 X-RAY EXAM CHEST 1 VIEW: CPT

## 2025-03-23 PROCEDURE — 81001 URINALYSIS AUTO W/SCOPE: CPT

## 2025-03-23 PROCEDURE — 87186 SC STD MICRODIL/AGAR DIL: CPT

## 2025-03-23 PROCEDURE — 96375 TX/PRO/DX INJ NEW DRUG ADDON: CPT

## 2025-03-23 PROCEDURE — 96365 THER/PROPH/DIAG IV INF INIT: CPT

## 2025-03-23 PROCEDURE — 85025 COMPLETE CBC W/AUTO DIFF WBC: CPT

## 2025-03-23 PROCEDURE — 51701 INSERT BLADDER CATHETER: CPT

## 2025-03-23 PROCEDURE — 36415 COLL VENOUS BLD VENIPUNCTURE: CPT

## 2025-03-23 PROCEDURE — 700105 HCHG RX REV CODE 258: Performed by: STUDENT IN AN ORGANIZED HEALTH CARE EDUCATION/TRAINING PROGRAM

## 2025-03-23 PROCEDURE — 93005 ELECTROCARDIOGRAM TRACING: CPT | Mod: TC | Performed by: EMERGENCY MEDICINE

## 2025-03-23 PROCEDURE — 770020 HCHG ROOM/CARE - TELE (206)

## 2025-03-23 PROCEDURE — 84145 PROCALCITONIN (PCT): CPT

## 2025-03-23 PROCEDURE — 87040 BLOOD CULTURE FOR BACTERIA: CPT | Mod: 91

## 2025-03-23 PROCEDURE — 87086 URINE CULTURE/COLONY COUNT: CPT

## 2025-03-23 PROCEDURE — 99291 CRITICAL CARE FIRST HOUR: CPT | Performed by: STUDENT IN AN ORGANIZED HEALTH CARE EDUCATION/TRAINING PROGRAM

## 2025-03-23 RX ORDER — SODIUM CHLORIDE, SODIUM LACTATE, POTASSIUM CHLORIDE, AND CALCIUM CHLORIDE .6; .31; .03; .02 G/100ML; G/100ML; G/100ML; G/100ML
500 INJECTION, SOLUTION INTRAVENOUS
Status: COMPLETED | OUTPATIENT
Start: 2025-03-23 | End: 2025-03-23

## 2025-03-23 RX ORDER — IPRATROPIUM BROMIDE AND ALBUTEROL SULFATE 2.5; .5 MG/3ML; MG/3ML
3 SOLUTION RESPIRATORY (INHALATION)
Status: DISCONTINUED | OUTPATIENT
Start: 2025-03-23 | End: 2025-03-26 | Stop reason: HOSPADM

## 2025-03-23 RX ORDER — POLYETHYLENE GLYCOL 3350 17 G/17G
1 POWDER, FOR SOLUTION ORAL
Status: DISCONTINUED | OUTPATIENT
Start: 2025-03-23 | End: 2025-03-26 | Stop reason: HOSPADM

## 2025-03-23 RX ORDER — LEVOTHYROXINE SODIUM 125 UG/1
125 TABLET ORAL
Status: DISCONTINUED | OUTPATIENT
Start: 2025-03-24 | End: 2025-03-26 | Stop reason: HOSPADM

## 2025-03-23 RX ORDER — VITAMIN B COMPLEX
2000 TABLET ORAL DAILY
Status: DISCONTINUED | OUTPATIENT
Start: 2025-03-24 | End: 2025-03-26 | Stop reason: HOSPADM

## 2025-03-23 RX ORDER — ONDANSETRON 2 MG/ML
4 INJECTION INTRAMUSCULAR; INTRAVENOUS EVERY 4 HOURS PRN
Status: DISCONTINUED | OUTPATIENT
Start: 2025-03-23 | End: 2025-03-26 | Stop reason: HOSPADM

## 2025-03-23 RX ORDER — SIMVASTATIN 20 MG
40 TABLET ORAL NIGHTLY
Status: DISCONTINUED | OUTPATIENT
Start: 2025-03-23 | End: 2025-03-26 | Stop reason: HOSPADM

## 2025-03-23 RX ORDER — ONDANSETRON 4 MG/1
4 TABLET, ORALLY DISINTEGRATING ORAL EVERY 4 HOURS PRN
Status: DISCONTINUED | OUTPATIENT
Start: 2025-03-23 | End: 2025-03-26 | Stop reason: HOSPADM

## 2025-03-23 RX ORDER — ALENDRONATE SODIUM 70 MG/1
70 TABLET ORAL
Status: DISCONTINUED | OUTPATIENT
Start: 2025-03-27 | End: 2025-03-26 | Stop reason: HOSPADM

## 2025-03-23 RX ORDER — ASPIRIN 81 MG/1
81 TABLET ORAL DAILY
Status: DISCONTINUED | OUTPATIENT
Start: 2025-03-24 | End: 2025-03-26 | Stop reason: HOSPADM

## 2025-03-23 RX ORDER — AZITHROMYCIN 500 MG/5ML
500 INJECTION, POWDER, LYOPHILIZED, FOR SOLUTION INTRAVENOUS DAILY
Status: DISCONTINUED | OUTPATIENT
Start: 2025-03-24 | End: 2025-03-24

## 2025-03-23 RX ORDER — ESCITALOPRAM OXALATE 10 MG/1
10 TABLET ORAL EVERY MORNING
Status: DISCONTINUED | OUTPATIENT
Start: 2025-03-24 | End: 2025-03-26 | Stop reason: HOSPADM

## 2025-03-23 RX ORDER — LABETALOL HYDROCHLORIDE 5 MG/ML
10 INJECTION, SOLUTION INTRAVENOUS EVERY 4 HOURS PRN
Status: DISCONTINUED | OUTPATIENT
Start: 2025-03-23 | End: 2025-03-26 | Stop reason: HOSPADM

## 2025-03-23 RX ORDER — AZITHROMYCIN MONOHYDRATE 500 MG/5ML
500 INJECTION, POWDER, LYOPHILIZED, FOR SOLUTION INTRAVENOUS ONCE
Status: COMPLETED | OUTPATIENT
Start: 2025-03-23 | End: 2025-03-23

## 2025-03-23 RX ORDER — ASCORBIC ACID 500 MG
500 TABLET ORAL DAILY
Status: DISCONTINUED | OUTPATIENT
Start: 2025-03-24 | End: 2025-03-26 | Stop reason: HOSPADM

## 2025-03-23 RX ORDER — SODIUM CHLORIDE, SODIUM LACTATE, POTASSIUM CHLORIDE, CALCIUM CHLORIDE 600; 310; 30; 20 MG/100ML; MG/100ML; MG/100ML; MG/100ML
INJECTION, SOLUTION INTRAVENOUS CONTINUOUS
Status: DISCONTINUED | OUTPATIENT
Start: 2025-03-23 | End: 2025-03-24

## 2025-03-23 RX ORDER — ACETAMINOPHEN 325 MG/1
650 TABLET ORAL EVERY 6 HOURS PRN
Status: DISCONTINUED | OUTPATIENT
Start: 2025-03-23 | End: 2025-03-26 | Stop reason: HOSPADM

## 2025-03-23 RX ORDER — AZITHROMYCIN 500 MG/5ML
500 INJECTION, POWDER, LYOPHILIZED, FOR SOLUTION INTRAVENOUS EVERY 24 HOURS
Status: DISCONTINUED | OUTPATIENT
Start: 2025-03-24 | End: 2025-03-23

## 2025-03-23 RX ORDER — CLOZAPINE 100 MG/1
100 TABLET ORAL 2 TIMES DAILY
Status: DISCONTINUED | OUTPATIENT
Start: 2025-03-23 | End: 2025-03-26 | Stop reason: HOSPADM

## 2025-03-23 RX ORDER — GUAIFENESIN/DEXTROMETHORPHAN 100-10MG/5
10 SYRUP ORAL EVERY 6 HOURS PRN
Status: DISCONTINUED | OUTPATIENT
Start: 2025-03-23 | End: 2025-03-26 | Stop reason: HOSPADM

## 2025-03-23 RX ORDER — CEFTRIAXONE 2 G/1
2000 INJECTION, POWDER, FOR SOLUTION INTRAMUSCULAR; INTRAVENOUS ONCE
Status: COMPLETED | OUTPATIENT
Start: 2025-03-23 | End: 2025-03-23

## 2025-03-23 RX ORDER — SODIUM CHLORIDE, SODIUM LACTATE, POTASSIUM CHLORIDE, AND CALCIUM CHLORIDE .6; .31; .03; .02 G/100ML; G/100ML; G/100ML; G/100ML
1000 INJECTION, SOLUTION INTRAVENOUS ONCE
Status: COMPLETED | OUTPATIENT
Start: 2025-03-23 | End: 2025-03-23

## 2025-03-23 RX ORDER — BENZONATATE 100 MG/1
100 CAPSULE ORAL 3 TIMES DAILY PRN
Status: DISCONTINUED | OUTPATIENT
Start: 2025-03-23 | End: 2025-03-26 | Stop reason: HOSPADM

## 2025-03-23 RX ORDER — AMOXICILLIN 250 MG
2 CAPSULE ORAL EVERY EVENING
Status: DISCONTINUED | OUTPATIENT
Start: 2025-03-23 | End: 2025-03-26 | Stop reason: HOSPADM

## 2025-03-23 RX ORDER — FERROUS SULFATE 325(65) MG
325 TABLET ORAL DAILY
Status: DISCONTINUED | OUTPATIENT
Start: 2025-03-24 | End: 2025-03-26 | Stop reason: HOSPADM

## 2025-03-23 RX ORDER — DIVALPROEX SODIUM 250 MG/1
250 TABLET, FILM COATED, EXTENDED RELEASE ORAL DAILY
Status: DISCONTINUED | OUTPATIENT
Start: 2025-03-24 | End: 2025-03-26 | Stop reason: HOSPADM

## 2025-03-23 RX ORDER — ALBUTEROL SULFATE 90 UG/1
2 INHALANT RESPIRATORY (INHALATION) EVERY 4 HOURS PRN
Status: DISCONTINUED | OUTPATIENT
Start: 2025-03-23 | End: 2025-03-26 | Stop reason: HOSPADM

## 2025-03-23 RX ORDER — LEVETIRACETAM 250 MG/1
250 TABLET ORAL 3 TIMES DAILY
Status: DISCONTINUED | OUTPATIENT
Start: 2025-03-24 | End: 2025-03-26 | Stop reason: HOSPADM

## 2025-03-23 RX ADMIN — AZITHROMYCIN DIHYDRATE 500 MG: 500 INJECTION, POWDER, LYOPHILIZED, FOR SOLUTION INTRAVENOUS at 20:45

## 2025-03-23 RX ADMIN — SODIUM CHLORIDE, POTASSIUM CHLORIDE, SODIUM LACTATE AND CALCIUM CHLORIDE 500 ML: 600; 310; 30; 20 INJECTION, SOLUTION INTRAVENOUS at 22:54

## 2025-03-23 RX ADMIN — CEFTRIAXONE SODIUM 2000 MG: 2 INJECTION, POWDER, FOR SOLUTION INTRAMUSCULAR; INTRAVENOUS at 20:44

## 2025-03-23 RX ADMIN — SODIUM CHLORIDE, POTASSIUM CHLORIDE, SODIUM LACTATE AND CALCIUM CHLORIDE 1000 ML: 600; 310; 30; 20 INJECTION, SOLUTION INTRAVENOUS at 23:55

## 2025-03-23 RX ADMIN — SODIUM CHLORIDE, POTASSIUM CHLORIDE, SODIUM LACTATE AND CALCIUM CHLORIDE: 600; 310; 30; 20 INJECTION, SOLUTION INTRAVENOUS at 22:52

## 2025-03-23 SDOH — ECONOMIC STABILITY: TRANSPORTATION INSECURITY
IN THE PAST 12 MONTHS, HAS LACK OF RELIABLE TRANSPORTATION KEPT YOU FROM MEDICAL APPOINTMENTS, MEETINGS, WORK OR FROM GETTING THINGS NEEDED FOR DAILY LIVING?: NO

## 2025-03-23 SDOH — ECONOMIC STABILITY: TRANSPORTATION INSECURITY
IN THE PAST 12 MONTHS, HAS THE LACK OF TRANSPORTATION KEPT YOU FROM MEDICAL APPOINTMENTS OR FROM GETTING MEDICATIONS?: NO

## 2025-03-23 ASSESSMENT — SOCIAL DETERMINANTS OF HEALTH (SDOH)
WITHIN THE PAST 12 MONTHS, YOU WORRIED THAT YOUR FOOD WOULD RUN OUT BEFORE YOU GOT THE MONEY TO BUY MORE: NEVER TRUE
IN THE PAST 12 MONTHS, HAS THE ELECTRIC, GAS, OIL, OR WATER COMPANY THREATENED TO SHUT OFF SERVICE IN YOUR HOME?: NO
WITHIN THE LAST YEAR, HAVE YOU BEEN HUMILIATED OR EMOTIONALLY ABUSED IN OTHER WAYS BY YOUR PARTNER OR EX-PARTNER?: PATIENT UNABLE TO ANSWER
WITHIN THE LAST YEAR, HAVE YOU BEEN KICKED, HIT, SLAPPED, OR OTHERWISE PHYSICALLY HURT BY YOUR PARTNER OR EX-PARTNER?: PATIENT UNABLE TO ANSWER
WITHIN THE LAST YEAR, HAVE TO BEEN RAPED OR FORCED TO HAVE ANY KIND OF SEXUAL ACTIVITY BY YOUR PARTNER OR EX-PARTNER?: PATIENT UNABLE TO ANSWER
WITHIN THE PAST 12 MONTHS, THE FOOD YOU BOUGHT JUST DIDN'T LAST AND YOU DIDN'T HAVE MONEY TO GET MORE: NEVER TRUE
WITHIN THE LAST YEAR, HAVE YOU BEEN AFRAID OF YOUR PARTNER OR EX-PARTNER?: PATIENT UNABLE TO ANSWER

## 2025-03-23 ASSESSMENT — COGNITIVE AND FUNCTIONAL STATUS - GENERAL
DRESSING REGULAR LOWER BODY CLOTHING: A LOT
EATING MEALS: A LITTLE
MOBILITY SCORE: 12
DRESSING REGULAR UPPER BODY CLOTHING: A LOT
HELP NEEDED FOR BATHING: A LOT
TOILETING: A LOT
STANDING UP FROM CHAIR USING ARMS: A LOT
PERSONAL GROOMING: A LITTLE
DAILY ACTIVITIY SCORE: 14
TURNING FROM BACK TO SIDE WHILE IN FLAT BAD: A LOT
CLIMB 3 TO 5 STEPS WITH RAILING: A LOT
MOVING TO AND FROM BED TO CHAIR: A LOT
MOVING FROM LYING ON BACK TO SITTING ON SIDE OF FLAT BED: A LOT
SUGGESTED CMS G CODE MODIFIER MOBILITY: CL
SUGGESTED CMS G CODE MODIFIER DAILY ACTIVITY: CK
WALKING IN HOSPITAL ROOM: A LOT

## 2025-03-23 ASSESSMENT — LIFESTYLE VARIABLES
TOTAL SCORE: 0
ALCOHOL_USE: NO
HOW MANY TIMES IN THE PAST YEAR HAVE YOU HAD 5 OR MORE DRINKS IN A DAY: 0
ON A TYPICAL DAY WHEN YOU DRINK ALCOHOL HOW MANY DRINKS DO YOU HAVE: 0
EVER FELT BAD OR GUILTY ABOUT YOUR DRINKING: NO
EVER HAD A DRINK FIRST THING IN THE MORNING TO STEADY YOUR NERVES TO GET RID OF A HANGOVER: NO
TOTAL SCORE: 0
HAVE YOU EVER FELT YOU SHOULD CUT DOWN ON YOUR DRINKING: NO
AVERAGE NUMBER OF DAYS PER WEEK YOU HAVE A DRINK CONTAINING ALCOHOL: 0
HAVE PEOPLE ANNOYED YOU BY CRITICIZING YOUR DRINKING: NO
TOTAL SCORE: 0
CONSUMPTION TOTAL: NEGATIVE

## 2025-03-23 ASSESSMENT — FIBROSIS 4 INDEX
FIB4 SCORE: 1.478260869565217391
FIB4 SCORE: 2.59

## 2025-03-24 PROBLEM — A41.9 SEPSIS (HCC): Status: ACTIVE | Noted: 2025-03-24

## 2025-03-24 PROBLEM — A41.9 SEPSIS (HCC): Status: RESOLVED | Noted: 2025-03-24 | Resolved: 2025-03-24

## 2025-03-24 PROBLEM — R54 FRAILTY SYNDROME IN GERIATRIC PATIENT: Status: ACTIVE | Noted: 2025-03-24

## 2025-03-24 PROBLEM — R65.21 SEPTIC SHOCK (HCC): Status: ACTIVE | Noted: 2025-03-24

## 2025-03-24 PROBLEM — J45.901 REACTIVE AIRWAY DISEASE WITH ACUTE EXACERBATION: Status: ACTIVE | Noted: 2025-03-24

## 2025-03-24 PROBLEM — N39.0 ACUTE UTI: Status: ACTIVE | Noted: 2025-03-24

## 2025-03-24 PROBLEM — J96.21 ACUTE ON CHRONIC RESPIRATORY FAILURE WITH HYPOXIA (HCC): Status: ACTIVE | Noted: 2025-03-24

## 2025-03-24 LAB
ALBUMIN SERPL BCP-MCNC: 2.9 G/DL (ref 3.2–4.9)
ALBUMIN/GLOB SERPL: 1.4 G/DL
ALP SERPL-CCNC: 81 U/L (ref 30–99)
ALT SERPL-CCNC: 25 U/L (ref 2–50)
ANION GAP SERPL CALC-SCNC: 10 MMOL/L (ref 7–16)
AST SERPL-CCNC: 29 U/L (ref 12–45)
BASOPHILS # BLD AUTO: 0.4 % (ref 0–1.8)
BASOPHILS # BLD: 0.06 K/UL (ref 0–0.12)
BILIRUB SERPL-MCNC: 0.3 MG/DL (ref 0.1–1.5)
BUN SERPL-MCNC: 13 MG/DL (ref 8–22)
CALCIUM ALBUM COR SERPL-MCNC: 9.4 MG/DL (ref 8.5–10.5)
CALCIUM SERPL-MCNC: 8.5 MG/DL (ref 8.5–10.5)
CHLORIDE SERPL-SCNC: 100 MMOL/L (ref 96–112)
CK SERPL-CCNC: 32 U/L (ref 0–154)
CO2 SERPL-SCNC: 23 MMOL/L (ref 20–33)
CORTIS SERPL-MCNC: 391 UG/DL (ref 0–23)
CREAT SERPL-MCNC: 0.86 MG/DL (ref 0.5–1.4)
CRP SERPL HS-MCNC: 10.9 MG/DL (ref 0–0.75)
EOSINOPHIL # BLD AUTO: 0.01 K/UL (ref 0–0.51)
EOSINOPHIL NFR BLD: 0.1 % (ref 0–6.9)
ERYTHROCYTE [DISTWIDTH] IN BLOOD BY AUTOMATED COUNT: 44.4 FL (ref 35.9–50)
ERYTHROCYTE [SEDIMENTATION RATE] IN BLOOD BY WESTERGREN METHOD: 54 MM/HOUR (ref 0–25)
GFR SERPLBLD CREATININE-BSD FMLA CKD-EPI: 71 ML/MIN/1.73 M 2
GLOBULIN SER CALC-MCNC: 2.1 G/DL (ref 1.9–3.5)
GLUCOSE SERPL-MCNC: 168 MG/DL (ref 65–99)
HCT VFR BLD AUTO: 29.6 % (ref 37–47)
HGB BLD-MCNC: 9.8 G/DL (ref 12–16)
IMM GRANULOCYTES # BLD AUTO: 0.18 K/UL (ref 0–0.11)
IMM GRANULOCYTES NFR BLD AUTO: 1.1 % (ref 0–0.9)
INR PPP: 1.2 (ref 0.87–1.13)
LYMPHOCYTES # BLD AUTO: 1.49 K/UL (ref 1–4.8)
LYMPHOCYTES NFR BLD: 9.1 % (ref 22–41)
MAGNESIUM SERPL-MCNC: 1.6 MG/DL (ref 1.5–2.5)
MCH RBC QN AUTO: 28 PG (ref 27–33)
MCHC RBC AUTO-ENTMCNC: 33.1 G/DL (ref 32.2–35.5)
MCV RBC AUTO: 84.6 FL (ref 81.4–97.8)
MONOCYTES # BLD AUTO: 1.83 K/UL (ref 0–0.85)
MONOCYTES NFR BLD AUTO: 11.2 % (ref 0–13.4)
NEUTROPHILS # BLD AUTO: 12.75 K/UL (ref 1.82–7.42)
NEUTROPHILS NFR BLD: 78.1 % (ref 44–72)
NRBC # BLD AUTO: 0 K/UL
NRBC BLD-RTO: 0 /100 WBC (ref 0–0.2)
NT-PROBNP SERPL IA-MCNC: 2128 PG/ML (ref 0–125)
PHOSPHATE SERPL-MCNC: 3.4 MG/DL (ref 2.5–4.5)
PLATELET # BLD AUTO: 175 K/UL (ref 164–446)
PMV BLD AUTO: 11.3 FL (ref 9–12.9)
POTASSIUM SERPL-SCNC: 3.8 MMOL/L (ref 3.6–5.5)
PREALB SERPL-MCNC: 7.9 MG/DL (ref 18–38)
PROT SERPL-MCNC: 5 G/DL (ref 6–8.2)
PROTHROMBIN TIME: 15.2 SEC (ref 12–14.6)
RBC # BLD AUTO: 3.5 M/UL (ref 4.2–5.4)
SODIUM SERPL-SCNC: 133 MMOL/L (ref 135–145)
WBC # BLD AUTO: 16.3 K/UL (ref 4.8–10.8)

## 2025-03-24 PROCEDURE — 99233 SBSQ HOSP IP/OBS HIGH 50: CPT | Mod: GC | Performed by: HOSPITALIST

## 2025-03-24 PROCEDURE — 84134 ASSAY OF PREALBUMIN: CPT

## 2025-03-24 PROCEDURE — 36415 COLL VENOUS BLD VENIPUNCTURE: CPT

## 2025-03-24 PROCEDURE — 83880 ASSAY OF NATRIURETIC PEPTIDE: CPT

## 2025-03-24 PROCEDURE — 700105 HCHG RX REV CODE 258: Performed by: STUDENT IN AN ORGANIZED HEALTH CARE EDUCATION/TRAINING PROGRAM

## 2025-03-24 PROCEDURE — 82533 TOTAL CORTISOL: CPT

## 2025-03-24 PROCEDURE — 87086 URINE CULTURE/COLONY COUNT: CPT

## 2025-03-24 PROCEDURE — 99285 EMERGENCY DEPT VISIT HI MDM: CPT

## 2025-03-24 PROCEDURE — 80053 COMPREHEN METABOLIC PANEL: CPT

## 2025-03-24 PROCEDURE — 92610 EVALUATE SWALLOWING FUNCTION: CPT

## 2025-03-24 PROCEDURE — 700105 HCHG RX REV CODE 258

## 2025-03-24 PROCEDURE — 700102 HCHG RX REV CODE 250 W/ 637 OVERRIDE(OP): Performed by: HOSPITALIST

## 2025-03-24 PROCEDURE — 97535 SELF CARE MNGMENT TRAINING: CPT

## 2025-03-24 PROCEDURE — A9270 NON-COVERED ITEM OR SERVICE: HCPCS | Performed by: HOSPITALIST

## 2025-03-24 PROCEDURE — 770001 HCHG ROOM/CARE - MED/SURG/GYN PRIV*

## 2025-03-24 PROCEDURE — 700101 HCHG RX REV CODE 250: Performed by: STUDENT IN AN ORGANIZED HEALTH CARE EDUCATION/TRAINING PROGRAM

## 2025-03-24 PROCEDURE — 700105 HCHG RX REV CODE 258: Performed by: NURSE PRACTITIONER

## 2025-03-24 PROCEDURE — 94669 MECHANICAL CHEST WALL OSCILL: CPT

## 2025-03-24 PROCEDURE — 85025 COMPLETE CBC W/AUTO DIFF WBC: CPT

## 2025-03-24 PROCEDURE — 86140 C-REACTIVE PROTEIN: CPT

## 2025-03-24 PROCEDURE — 700102 HCHG RX REV CODE 250 W/ 637 OVERRIDE(OP): Performed by: STUDENT IN AN ORGANIZED HEALTH CARE EDUCATION/TRAINING PROGRAM

## 2025-03-24 PROCEDURE — 82550 ASSAY OF CK (CPK): CPT

## 2025-03-24 PROCEDURE — 84100 ASSAY OF PHOSPHORUS: CPT

## 2025-03-24 PROCEDURE — 700111 HCHG RX REV CODE 636 W/ 250 OVERRIDE (IP): Performed by: STUDENT IN AN ORGANIZED HEALTH CARE EDUCATION/TRAINING PROGRAM

## 2025-03-24 PROCEDURE — A9270 NON-COVERED ITEM OR SERVICE: HCPCS | Performed by: STUDENT IN AN ORGANIZED HEALTH CARE EDUCATION/TRAINING PROGRAM

## 2025-03-24 PROCEDURE — 85610 PROTHROMBIN TIME: CPT

## 2025-03-24 PROCEDURE — 94640 AIRWAY INHALATION TREATMENT: CPT

## 2025-03-24 PROCEDURE — 700111 HCHG RX REV CODE 636 W/ 250 OVERRIDE (IP): Mod: JZ

## 2025-03-24 PROCEDURE — 85652 RBC SED RATE AUTOMATED: CPT

## 2025-03-24 PROCEDURE — 83735 ASSAY OF MAGNESIUM: CPT

## 2025-03-24 PROCEDURE — 97162 PT EVAL MOD COMPLEX 30 MIN: CPT

## 2025-03-24 RX ORDER — MONTELUKAST SODIUM 10 MG/1
10 TABLET ORAL NIGHTLY
Status: DISCONTINUED | OUTPATIENT
Start: 2025-03-24 | End: 2025-03-26 | Stop reason: HOSPADM

## 2025-03-24 RX ORDER — SODIUM CHLORIDE, SODIUM LACTATE, POTASSIUM CHLORIDE, CALCIUM CHLORIDE 600; 310; 30; 20 MG/100ML; MG/100ML; MG/100ML; MG/100ML
INJECTION, SOLUTION INTRAVENOUS CONTINUOUS
Status: DISCONTINUED | OUTPATIENT
Start: 2025-03-24 | End: 2025-03-25

## 2025-03-24 RX ORDER — MIDODRINE HYDROCHLORIDE 5 MG/1
10 TABLET ORAL EVERY 8 HOURS
Status: DISCONTINUED | OUTPATIENT
Start: 2025-03-24 | End: 2025-03-25

## 2025-03-24 RX ORDER — HYDROCORTISONE SODIUM SUCCINATE 100 MG/2ML
100 INJECTION INTRAMUSCULAR; INTRAVENOUS EVERY 8 HOURS
Status: DISCONTINUED | OUTPATIENT
Start: 2025-03-24 | End: 2025-03-24

## 2025-03-24 RX ORDER — AZITHROMYCIN 250 MG/1
500 TABLET, FILM COATED ORAL DAILY
Status: COMPLETED | OUTPATIENT
Start: 2025-03-24 | End: 2025-03-25

## 2025-03-24 RX ORDER — ENOXAPARIN SODIUM 100 MG/ML
40 INJECTION SUBCUTANEOUS DAILY
Status: DISCONTINUED | OUTPATIENT
Start: 2025-03-24 | End: 2025-03-26 | Stop reason: HOSPADM

## 2025-03-24 RX ORDER — HYDROCORTISONE SODIUM SUCCINATE 100 MG/2ML
50 INJECTION INTRAMUSCULAR; INTRAVENOUS EVERY 6 HOURS
Status: COMPLETED | OUTPATIENT
Start: 2025-03-24 | End: 2025-03-26

## 2025-03-24 RX ORDER — SODIUM CHLORIDE, SODIUM LACTATE, POTASSIUM CHLORIDE, AND CALCIUM CHLORIDE .6; .31; .03; .02 G/100ML; G/100ML; G/100ML; G/100ML
500 INJECTION, SOLUTION INTRAVENOUS ONCE
Status: COMPLETED | OUTPATIENT
Start: 2025-03-24 | End: 2025-03-24

## 2025-03-24 RX ORDER — IPRATROPIUM BROMIDE AND ALBUTEROL SULFATE 2.5; .5 MG/3ML; MG/3ML
3 SOLUTION RESPIRATORY (INHALATION)
Status: DISPENSED | OUTPATIENT
Start: 2025-03-24 | End: 2025-03-25

## 2025-03-24 RX ADMIN — DIVALPROEX SODIUM 250 MG: 250 TABLET, EXTENDED RELEASE ORAL at 05:30

## 2025-03-24 RX ADMIN — CEFTRIAXONE SODIUM 2000 MG: 10 INJECTION, POWDER, FOR SOLUTION INTRAVENOUS at 17:20

## 2025-03-24 RX ADMIN — FERROUS SULFATE TAB 325 MG (65 MG ELEMENTAL FE) 325 MG: 325 (65 FE) TAB at 05:30

## 2025-03-24 RX ADMIN — HYDROCORTISONE SODIUM SUCCINATE 100 MG: 100 INJECTION, POWDER, FOR SOLUTION INTRAMUSCULAR; INTRAVENOUS at 09:20

## 2025-03-24 RX ADMIN — SODIUM CHLORIDE, POTASSIUM CHLORIDE, SODIUM LACTATE AND CALCIUM CHLORIDE: 600; 310; 30; 20 INJECTION, SOLUTION INTRAVENOUS at 03:55

## 2025-03-24 RX ADMIN — LEVETIRACETAM 250 MG: 250 TABLET, FILM COATED ORAL at 17:20

## 2025-03-24 RX ADMIN — SENNOSIDES AND DOCUSATE SODIUM 2 TABLET: 50; 8.6 TABLET ORAL at 17:20

## 2025-03-24 RX ADMIN — MIDODRINE HYDROCHLORIDE 10 MG: 5 TABLET ORAL at 21:03

## 2025-03-24 RX ADMIN — CLOZAPINE 100 MG: 100 TABLET ORAL at 21:04

## 2025-03-24 RX ADMIN — HYDROCORTISONE SODIUM SUCCINATE 100 MG: 100 INJECTION, POWDER, FOR SOLUTION INTRAMUSCULAR; INTRAVENOUS at 00:38

## 2025-03-24 RX ADMIN — IPRATROPIUM BROMIDE AND ALBUTEROL SULFATE 3 ML: .5; 2.5 SOLUTION RESPIRATORY (INHALATION) at 15:18

## 2025-03-24 RX ADMIN — SIMVASTATIN 40 MG: 20 TABLET, FILM COATED ORAL at 21:03

## 2025-03-24 RX ADMIN — HYDROCORTISONE SODIUM SUCCINATE 50 MG: 100 INJECTION, POWDER, FOR SOLUTION INTRAMUSCULAR; INTRAVENOUS at 17:20

## 2025-03-24 RX ADMIN — ENOXAPARIN SODIUM 40 MG: 100 INJECTION SUBCUTANEOUS at 21:04

## 2025-03-24 RX ADMIN — VIBEGRON 75 MG: 75 TABLET, FILM COATED ORAL at 21:03

## 2025-03-24 RX ADMIN — THIAMINE HYDROCHLORIDE 500 MG: 100 INJECTION, SOLUTION INTRAMUSCULAR; INTRAVENOUS at 00:45

## 2025-03-24 RX ADMIN — ESCITALOPRAM OXALATE 10 MG: 10 TABLET ORAL at 05:30

## 2025-03-24 RX ADMIN — SODIUM CHLORIDE, POTASSIUM CHLORIDE, SODIUM LACTATE AND CALCIUM CHLORIDE: 600; 310; 30; 20 INJECTION, SOLUTION INTRAVENOUS at 14:15

## 2025-03-24 RX ADMIN — Medication 3 MG: at 21:04

## 2025-03-24 RX ADMIN — AZITHROMYCIN DIHYDRATE 500 MG: 250 TABLET ORAL at 21:03

## 2025-03-24 RX ADMIN — LEVETIRACETAM 250 MG: 250 TABLET, FILM COATED ORAL at 05:30

## 2025-03-24 RX ADMIN — Medication 2000 UNITS: at 05:30

## 2025-03-24 RX ADMIN — LEVETIRACETAM 250 MG: 250 TABLET, FILM COATED ORAL at 13:12

## 2025-03-24 RX ADMIN — CLOZAPINE 100 MG: 100 TABLET ORAL at 09:20

## 2025-03-24 RX ADMIN — IPRATROPIUM BROMIDE AND ALBUTEROL SULFATE 3 ML: .5; 2.5 SOLUTION RESPIRATORY (INHALATION) at 18:16

## 2025-03-24 RX ADMIN — IPRATROPIUM BROMIDE AND ALBUTEROL SULFATE 3 ML: .5; 2.5 SOLUTION RESPIRATORY (INHALATION) at 06:57

## 2025-03-24 RX ADMIN — HYDROCORTISONE SODIUM SUCCINATE 100 MG: 100 INJECTION, POWDER, FOR SOLUTION INTRAMUSCULAR; INTRAVENOUS at 13:15

## 2025-03-24 RX ADMIN — SODIUM CHLORIDE, POTASSIUM CHLORIDE, SODIUM LACTATE AND CALCIUM CHLORIDE 500 ML: 600; 310; 30; 20 INJECTION, SOLUTION INTRAVENOUS at 00:39

## 2025-03-24 RX ADMIN — MIDODRINE HYDROCHLORIDE 10 MG: 5 TABLET ORAL at 13:12

## 2025-03-24 RX ADMIN — HYDROCORTISONE SODIUM SUCCINATE 50 MG: 100 INJECTION, POWDER, FOR SOLUTION INTRAMUSCULAR; INTRAVENOUS at 23:43

## 2025-03-24 RX ADMIN — MONTELUKAST 10 MG: 10 TABLET, FILM COATED ORAL at 21:03

## 2025-03-24 RX ADMIN — OXYCODONE HYDROCHLORIDE AND ACETAMINOPHEN 500 MG: 500 TABLET ORAL at 05:30

## 2025-03-24 RX ADMIN — ASPIRIN 81 MG: 81 TABLET, COATED ORAL at 05:30

## 2025-03-24 RX ADMIN — MOMETASONE FUROATE AND FORMOTEROL FUMARATE DIHYDRATE 2 PUFF: 200; 5 AEROSOL RESPIRATORY (INHALATION) at 17:22

## 2025-03-24 RX ADMIN — MIDODRINE HYDROCHLORIDE 10 MG: 5 TABLET ORAL at 05:30

## 2025-03-24 RX ADMIN — TIOTROPIUM BROMIDE INHALATION SPRAY 5 MCG: 3.12 SPRAY, METERED RESPIRATORY (INHALATION) at 05:31

## 2025-03-24 RX ADMIN — LEVOTHYROXINE SODIUM 125 MCG: 0.12 TABLET ORAL at 05:30

## 2025-03-24 ASSESSMENT — COGNITIVE AND FUNCTIONAL STATUS - GENERAL
MOBILITY SCORE: 22
CLIMB 3 TO 5 STEPS WITH RAILING: A LITTLE
SUGGESTED CMS G CODE MODIFIER MOBILITY: CJ
WALKING IN HOSPITAL ROOM: A LITTLE

## 2025-03-24 ASSESSMENT — PAIN DESCRIPTION - PAIN TYPE
TYPE: ACUTE PAIN
TYPE: ACUTE PAIN

## 2025-03-24 ASSESSMENT — GAIT ASSESSMENTS
GAIT LEVEL OF ASSIST: SUPERVISED
DISTANCE (FEET): 100
ASSISTIVE DEVICE: FRONT WHEEL WALKER
DEVIATION: BRADYKINETIC

## 2025-03-24 ASSESSMENT — ENCOUNTER SYMPTOMS
COUGH: 1
RESPIRATORY NEGATIVE: 1
FEVER: 1
NEUROLOGICAL NEGATIVE: 1
CARDIOVASCULAR NEGATIVE: 1
SHORTNESS OF BREATH: 1
CONSTITUTIONAL NEGATIVE: 1
GASTROINTESTINAL NEGATIVE: 1
CHILLS: 1
EYES NEGATIVE: 1
MUSCULOSKELETAL NEGATIVE: 1

## 2025-03-24 ASSESSMENT — COPD QUESTIONNAIRES
DURING THE PAST 4 WEEKS HOW MUCH DID YOU FEEL SHORT OF BREATH: NONE/LITTLE OF THE TIME
DO YOU EVER COUGH UP ANY MUCUS OR PHLEGM?: NO/ONLY WITH OCCASIONAL COLDS OR INFECTIONS
COPD SCREENING SCORE: 5
HAVE YOU SMOKED AT LEAST 100 CIGARETTES IN YOUR ENTIRE LIFE: YES

## 2025-03-24 ASSESSMENT — PATIENT HEALTH QUESTIONNAIRE - PHQ9
SUM OF ALL RESPONSES TO PHQ9 QUESTIONS 1 AND 2: 0
2. FEELING DOWN, DEPRESSED, IRRITABLE, OR HOPELESS: NOT AT ALL
1. LITTLE INTEREST OR PLEASURE IN DOING THINGS: NOT AT ALL

## 2025-03-24 ASSESSMENT — FIBROSIS 4 INDEX: FIB4 SCORE: 2.39

## 2025-03-24 NOTE — ASSESSMENT & PLAN NOTE
Dependent on 2 to 3 L at baseline  Initially required 10 L, currently on baseline 2-3L  -wean off as tolerated  Antibiotic for pneumonia

## 2025-03-24 NOTE — ED NOTES
Medication history reviewed with MAR provided by [facility] [phone].   Med rec is complete  Allergies reviewed.     Patient has not had any outpatient antibiotics in the last 30 days.   Anticoagulants: No  Dispense history available in EPIC: Yes    Wilder Edmonds

## 2025-03-24 NOTE — RESPIRATORY CARE
COPD EDUCATION by COPD CLINICAL EDUCATOR  3/24/2025 at 3:35 PM by Lizz Beltrán, RRT     Patient interviewed by COPD education team at the request of her admitting team  Patient unable to complete COPD program at this time. We did review her Oxygen and daily medication. She states her care team (Care Facility) assists with her treatment needs.  Her Action Plan was updated in the EMR to reflect current Respiratory Medication use.                   COPD Screen  COPD Risk Screening  Do you have a history of COPD?: Yes  Do you have a Pulmonologist?: Yes ([facility] last fall 2024)  COPD Population Screener  During the past 4 weeks, how much did you feel short of breath?: None/Little of the time  Do you ever cough up any mucus or phlegm?: No/only with occasional colds or infections  In the past 12 months, you do less than you used to because of your breathing problems: Agree  Have you smoked at least 100 cigarettes in your entire life?: Yes  How old are you?: 60+  COPD Screening Score: 5  COPD Coordinator Not Recommended: Yes    COPD Assessment  COPD Clinical Specialists ONLY  COPD Education Initiated: Yes--Short Intervention (Filtered COPD exac orderset utilized. here with PNA; pleasantly confused (Dementia) reside long term care with guardian; O2 2lpm Incruse by care givers has nebulizer tx available)  DME Company: [facility]  DME Equipment Type: 2-3 lpm 24/7  Physician Name: [facility] visits care facility  Referrals Initiated: Yes  Smoking Cessation: No  Home Health Care:  (Long term care facility)  [facility]: Yes (visit care home)  $ Demo/Eval of SVN's, MDI's and Aerosols:  (has DPI and nebulizer by her care team)    PFT Results  (OP) Pulmonary Function Testing: Yes (last PFT 8/8/2019: FEV1-67, FEV1/FVC Ratio-69)  Interdisciplinary Rounds: Attendance at Rounds (30 Min)    Meds to Beds  [facility] provides bedside medication delivery for all eligible patients at discharge and you have been automatically  "enrolled in the Meds to Beds Program. Would you like to opt out of this program for any reason?: No - Stay Opted In     MY COPD ACTION PLAN     It is recommended that patients and physicians/healthcare providers complete this action plan together. This plan should be discussed at each physician visit and updated as needed.    The green, yellow and red zones show groups of symptoms of COPD. This list of symptoms is not comprehensive, and you may experience other symptoms. In the \"Actions\" column, your healthcare provider has recommended actions for you to take based on your symptoms.    Patient Name: Tracie Rinaldi   YOB: 1950   Last Updated on: 3/24/2025  3:35 PM   Green Zone:  I am doing well today Actions     Usual activitiy and exercise level   Take daily medications     Usual amounts of cough and phlegm/mucus   Use oxygen as prescribed     Sleep well at night   Continue regular exercise/diet plan     Appetite is good   At all times avoid cigarette smoke, inhaled irritants     Daily Medications (these medications are taken every day):   Umeclidinium (Incurse Ellipta) 1 Puff Once daily        Yellow Zone:  I am having a bad day or a COPD flare Actions     More breathless than usual   Continue daily medications     I have less energy for my daily activities   Use quick relief inhaler as ordered     Increased or thicker phlegm/mucus   Use oxygen as prescribed     Using quick relief inhaler/nebulizer more often   Get plenty of rest     Swelling of ankles more than usual   Use pursed lip breathing     More coughing than usual   At all times avoid cigarette smoke, inhaled irritants     I feel like I have a \"chest cold\"     Poor sleep and my symptoms woke me up     My appetite is not good     My medicine is not helping      Call provider immediately if symptoms don’t improve     Continue daily medications, add rescue medications:   Albuterol 3mL via nebulizer PRN       Medications to be used during a " flare up, (as Discussed with Provider):              Red Zone:  I need urgent medical care Actions     Severe shortness of breath even at rest   Call 911 or seek medical care immediately     Not able to do any activity because of breathing      Fever or shaking chills      Feeling confused or very drowsy       Chest pains      Coughing up blood

## 2025-03-24 NOTE — DISCHARGE PLANNING
Care Transition Team Assessment    Obtained collateral information for assessment from legal guardian Yannick Bernstein [phone] and [facility] Home manager Aniceto ([phone]).  Pt is ambulatory and uses a walker occasionally for short distances at baseline.  Pt is on service with [employer] for DME home oxygen, pt uses 2LPM oxygen at night.  Per Aniceto, pt requires total care, GH assists with bathing and meal preparation.  Pt is A&O to person and place at baseline.  Aniceto and Yannick (guardian) confirmed pt is able to return to group home if pt is at baseline.   has small ramp at baseline, caregiver will be able to receive pt at any time.  CM/SW will need to schedule non emergency medical transport when pt discharges.  Yannick (guardian) requesting a call when pt is medically cleared to discharge back to .    Information Source  Orientation Level: Oriented to person, Disoriented to place, Disoriented to time, Disoriented to situation  Information Given By: Legal Guardian, Other (Comments)  Informant's Name: Aniceto ( caregiver)  Who is responsible for making decisions for patient? : Guardian  Name(s) of Primary Decision Maker: Yannick Bernstein    Readmission Evaluation  Is this a readmission?: No    Elopement Risk  Legal Hold: No  Ambulatory or Self Mobile in Wheelchair: No-Not an Elopement Risk  Elopement Risk: Not at Risk for Elopement    Interdisciplinary Discharge Planning  Lives with - Patient's Self Care Capacity: Other (Comments) (lives at group home)  Patient or legal guardian wants to designate a caregiver: Yes  Caregiver name: Yannick Bernstein  Support Systems: Home Care Staff  Housing / Facility: [facility]  Name of Care Facility: [facility]    Discharge Preparedness  What are your discharge supports?: Guardian, Other (comment)  Prior Functional Level: Needs Assist with Activities of Daily Living  Difficulity with IADLs: Cooking, Driving, Keeping track of finances, Laundry, Managing  medication, Shopping    Functional Assesment  Prior Functional Level: Needs Assist with Activities of Daily Living    Vision / Hearing Impairment  Vision Impairment : No  Hearing Impairment : No    Advance Directive  Advance Directive?: Living Will, POLST    Domestic Abuse  Have you ever been the victim of abuse or violence?: No  Possible Abuse/Neglect Reported to:: Not Applicable    Discharge Risks or Barriers  Discharge risks or barriers?: Complex medical needs  Patient risk factors: Complex medical needs    Anticipated Discharge Information  Discharge Disposition: D/T to home under A care in anticipation of covered skilled care (06)  Discharge Address: [address] [place] [id number]  Discharge Contact Phone Number: [phone]

## 2025-03-24 NOTE — CARE PLAN
Problem: Bronchoconstriction  Goal: Improve in air movement and diminished wheezing  Description: 1.  Implement inhaled treatments2.  Evaluate and manage medication effects  Outcome: Progressing     Problem: Hyperinflation  Goal: Prevent or improve atelectasis  Description: 1. Instruct incentive spirometry usage2.  Perform hyperinflation therapy as indicated  Outcome: Progressing   Duoneb Q6  PEP QID

## 2025-03-24 NOTE — ASSESSMENT & PLAN NOTE
This is Sepsis Present on admission  SIRS criteria identified on my evaluation include: Tachycardia, with heart rate greater than 90 BPM, Tachypnea, with respirations greater than 20 per minute, Leukocytosis, with WBC greater than 12,000, and Bandemia, greater than 10% bands  Clinical indicators of end organ dysfunction include Toxic Metabolic Encephalopathy  Source is PNA/UTI  Sepsis protocol initiated  Crystalloid Fluid Administration: Fluid resuscitation ordered per standard protocol - 30 mL/kg per current or ideal body weight  IV antibiotics as appropriate for source of sepsis  Reassessment: I have reassessed the patient's hemodynamic status    Likely due to PNA  Discontinue IVF as patient's oral hydration has improved and not in septic shock anymore  Reduce frequency of midodrine to bid given increasing bp and resolved shock  MRSA nares negative, procal downtrending  Midodrine 10mg every 8hours  Stress dose hydrocortisone 50 Q6 for 4 days  Abx:  ceftriaxone, azithromycin (completed)

## 2025-03-24 NOTE — THERAPY
Physical Therapy   Initial Evaluation     Patient Name: Tracie Rinaldi  Age:  74 y.o., Sex:  female  Medical Record #: [id number]  Today's Date: 3/24/2025     Precautions  Precautions: Fall Risk;Swallow Precautions    Assessment  Patient is a 74 y.o. female with hx of dementia, chronic hypoxic respiratory failure, seizure, HLD, hypothyroidism, schizophrenia. Pt admitted from group home with septic shock, PNA, acute on chronic hypoxic respiratory failure, and UTI. PT eval complete, pt currently presents at/near her functional baseline and completed all mobility at SPV level with FWW. No LE or functional deficits noted on exam. Anticipate safe DC home to group [place] once medically cleared. Patient will not be actively followed for physical therapy services at this time, however may be seen if requested by physician for 1 more visit within 30 days to address any discharge or equipment needs. Pt encouraged to continue ambulating with [facility] staff prior to DC.     Plan    Physical Therapy Initial Treatment Plan   Duration: Discharge Needs Only    DC Equipment Recommendations: None  Discharge Recommendations: Other - (return to group home with assist for IADL's as needed)         Vitals   O2 (LPM) 2   O2 Delivery Device Silicone Nasal Cannula   Vitals Comments VSS   Pain 0 - 10 Group   Therapist Pain Assessment   (no c/o pain)   Non Verbal Descriptors   Non Verbal Scale  Calm   Prior Living Situation   Prior Services Housekeeping / Homemaker Services   Housing / Facility Group Home   Equipment Owned Front-Wheel Walker;Oxygen   Lives with - Patient's Self Care Capacity Attendant / Paid Care Giver   Comments pt lives at group home and reports having assistance as needed   Prior Level of Functional Mobility   Bed Mobility Independent   Transfer Status Independent   Ambulation Independent   Ambulation Distance household distances   Assistive Devices Used Front-Wheel Walker   Comments reports being fully independent at home    Cognition    Level of Consciousness Alert   Comments pleasantly confused, likely at baseline with dementia. receptive to education and redirectable   Active ROM Lower Body    Active ROM Lower Body  WDL   Strength Lower Body   Lower Body Strength  WDL   Coordination Lower Body    Coordination Lower Body  WDL   Balance Assessment   Sitting Balance (Static) Good   Sitting Balance (Dynamic) Good   Standing Balance (Static) Fair +   Standing Balance (Dynamic) Fair +   Weight Shift Sitting Good   Weight Shift Standing Fair   Comments FWW in standing   Bed Mobility    Supine to Sit Supervised   Sit to Supine Supervised   Scooting Supervised   Rolling Supervised   Gait Analysis   Gait Level Of Assist Supervised   Assistive Device Front Wheel Walker   Distance (Feet) 100   # of Times Distance was Traveled 1   Deviation Bradykinetic   # of Stairs Climbed 0   Weight Bearing Status no restrictions   Comments slow and steady with no LOB   Functional Mobility   Sit to Stand Supervised   Bed, Chair, Wheelchair Transfer Supervised   Mobility FWW   6 Clicks Assessment - How much HELP from from another person do you currently need... (If the patient hasn't done an activity recently, how much help from another person do you think he/she would need if he/she tried?)   Turning from your back to your side while in a flat bed without using bedrails? 4   Moving from lying on your back to sitting on the side of a flat bed without using bedrails? 4   Moving to and from a bed to a chair (including a wheelchair)? 4   Standing up from a chair using your arms (e.g., wheelchair, or bedside chair)? 4   Walking in hospital room? 3   Climbing 3-5 steps with a railing? 3   6 clicks Mobility Score 22   Activity Tolerance   Comments no overt pain, SOB, or fatigue   Education Group   Education Provided Role of Physical Therapist   Role of Physical Therapist Patient Response Patient;Acceptance;Explanation;Verbal Demonstration   Additional Comments  education with pt on her CLOF, anticipated safe DC home with continued use of FWW, importance of continued mobility while admitted with ns staff   Physical Therapy Initial Treatment Plan    Duration Discharge Needs Only   Problem List    Problems None   Anticipated Discharge Equipment and Recommendations   DC Equipment Recommendations None   Discharge Recommendations Other -  (return to group home with assist for IADL's as needed)   Interdisciplinary Plan of Care Collaboration   IDT Collaboration with  Nursing   Patient Position at End of Therapy In Bed;Bed Alarm On;Call Light within Reach;Tray Table within Reach   Collaboration Comments RN updated   Session Information   Date / Session Number  3/24- 1x only (DC needs)

## 2025-03-24 NOTE — CARE PLAN
The patient is Stable - Low risk of patient condition declining or worsening    Shift Goals  Clinical Goals: ABX, hemodynamic stability, safety  Patient Goals: Rest  Family Goals: JORDI    Progress made toward(s) clinical / shift goals:        Problem: Self Care  Goal: Patient will have the ability to perform ADLs independently or with assistance (bathe, groom, dress, toilet and feed)  Description: Target End Date:  Prior to discharge or change in level of careDocument on ADL flowsheet1.  Assess the capability and level of deficiency to perform ADLs2.  Encourage family/care giver involvement3.  Provide assistive devices4.  Consider PT/OT evaluations5.  Maintain support, give positive feedback, encourage self-care allowing extra time and verbal cuing as needed6.  Avoid doing something for patients they can do themselves, but provide assistance as needed7.  Assist in anticipating/planning individual needs8.  Collaborate with Case Management and  to meet discharge needs  Outcome: Progressing     Problem: Respiratory  Goal: Patient will achieve/maintain optimum respiratory ventilation and gas exchange  Description: Target End Date:  Prior to discharge or change in level of careDocument on Assessment flowsheet1.  Assess and monitor rate, rhythm, depth and effort of respiration2.  Breath sounds assessed qshift and/or as needed3.  Assess O2 saturation, administer/titrate oxygen as ordered4.  Position patient for maximum ventilatory efficiency5.  Turn, cough, and deep breath with splinting to improve effectiveness6.  Collaborate with RT to administer medication/treatments per order7.  Encourage use of incentive spirometer and encourage patient to cough after use and utilize splinting techniques if applicable8.  Airway suctioning9.  Monitor sputum production for changes in color, consistency and [id number]. Perform frequent oral coiyeim18. Alternate physical activity with rest periods  Outcome: Progressing

## 2025-03-24 NOTE — ED PROVIDER NOTES
ED Provider Note    Scribed for Norbert Laura by Mart Rivera. 3/23/2025  7:34 PM    Primary care provider: VANESSA Ricketts  Means of arrival: EMS  History obtained from: Patient, EMS, and the patient's court appointed guardian.  History limited by: Altered mental status/confusion    CHIEF COMPLAINT  Chief Complaint   Patient presents with    ALOC    Shortness of Breath    Cough       EXTERNAL RECORDS REVIEWED  Inpatient Notes the patient was admitted on 3/3/24 for E coli bacteremia and Outpatient Notes she was last seen by geriatric medicine on 3/19/25 after presenting for 14 pounds of unintentional weight loss. Patient has a history of acquired hypothyroidism on levothyroxine 125 mg, hyperlipidemia on simvastatin 40 mg, and chronic respiratory failure with hypoxia.    HPI/ROS  LIMITATION TO HISTORY   Select: Altered mental status / Confusion  OUTSIDE HISTORIAN(S):  Court appointed guardian and EMS provided helpful and collateral history.    HPI  Tracie Rinaldi is a 74 y.o. female with a history of schizophrenia, recurrent UTIs, and COPD who presents to the Emergency Department via EMS from [facility] for evaluation of altered level of consciousness onset 4 hours ago. Her guardian reports the patient is evaluated periodically by psychiatry, and she is normally A&O x2, but she currently only oriented to herself. EMS reports the patient told staff at her group home that she was complaining of abdominal pain and was not eating. On scene the patient was saturating at 86% on her baseline 2 L, so she was placed on a 10 L nonrebreather. Her temperature was also found to be 102.7 °F. IV Tylenol was started. The patient's saturation and temperature are within normal limits on arrival here. The patient reports associated cough. She has her own room at her group home and lives fairly independently. She is able to ambulate at her baseline.     REVIEW OF SYSTEMS  As above, all other systems reviewed  and are negative.   See HPI for further details.     PAST MEDICAL HISTORY   has a past medical history of Anxiety, Bladder spasm, Dental disorder, Depression, Dermatitis, GERD (gastroesophageal reflux disease), Hearing loss of right ear due to cerumen impaction, Hyperlipidemia, Hypothyroid, Other emphysema ([facility]), Pneumonia, Psychiatric disorder, Schizophrenia ([facility]), Seizure ([facility]), Spells of decreased attentiveness (08/28/2015), Stroke ([facility]), Unspecified urinary incontinence, and Vitamin D deficiency.  SURGICAL HISTORY   has a past surgical history that includes closed reduction (Left, 7/9/2015); pin insertion (7/9/2015); and irrigation & debridement ortho (Left, 10/17/2015).  SOCIAL HISTORY  Social History     Tobacco Use    Smoking status: Former     Current packs/day: 0.50     Average packs/day: 0.5 packs/day for 45.0 years (22.5 ttl pk-yrs)     Types: Cigarettes    Smokeless tobacco: Never    Tobacco comments:     unk   Vaping Use    Vaping status: Never Used   Substance Use Topics    Alcohol use: No     Comment: unk    Drug use: No     Comment: unk      Social History     Substance and Sexual Activity   Drug Use No    Comment: unk     FAMILY HISTORY  Family History   Problem Relation Age of Onset    Alzheimer's Disease Mother     Heart Disease Father      CURRENT MEDICATIONS  Home Medications       Reviewed by Wilder Edmonds (Pharmacy Tech) on 03/23/25 at 2006  Med List Status: Complete     Medication Last Dose Status   alendronate (FOSAMAX) 70 MG Tab 3/20/2025 Active   ascorbic acid (ASCORBIC ACID) 500 MG Tab 3/23/2025 Active   ASPIRIN LOW DOSE 81 MG EC tablet 3/23/2025 Active   cloZAPine (CLOZARIL) 100 MG Tab 3/23/2025 Active   D-Mannose 500 MG Cap 3/23/2025 Active   divalproex ER (DEPAKOTE ER) 250 MG TABLET SR 24 HR 3/23/2025 Active   escitalopram (LEXAPRO) 10 MG Tab 3/23/2025 Active   ferrous sulfate 325 (65 Fe) MG tablet 3/23/2025 Active   hydrophilic ointment (AQUAPHOR) Ointment 3/22/2025 Active  "  Incontinence Supply Disposable (FQ PROTECTIVE UNDERWEAR) Misc  Active   ipratropium-albuterol (DUONEB) 0.5-2.5 (3) MG/3ML nebulizer solution Unknown Active   levETIRAcetam (KEPPRA) 250 MG tablet 3/23/2025 Active   levothyroxine (SYNTHROID) 125 MCG Tab 3/23/2025 Active   Melatonin 3 MG TABLET DISPERSIBLE 3/22/2025 Active   Mirabegron ER (MYRBETRIQ) 50 MG TABLET SR 24 HR 3/22/2025 Active   simvastatin (ZOCOR) 40 MG Tab 3/22/2025 Active   umeclidinium bromide (INCRUSE ELLIPTA) 62.5 MCG/ACT AEROSOL POWDER, BREATH ACTIVATED inhaler 3/23/2025 Active   VENTOLIN  (90 Base) MCG/ACT Aero Soln inhalation aerosol Unknown Active   VITAMIN D-3 SUPER STRENGTH 2000 UNIT Tab 3/23/2025 Active                  Audit from Redirected Encounters    **Home medications have not yet been reviewed for this encounter**       ALLERGIES  No Known Allergies    PHYSICAL EXAM    VITAL SIGNS:   Vitals:    03/23/25 1912 03/23/25 1920 03/23/25 1933 03/23/25 2003   BP: 139/60  103/55 98/56   Pulse: (!) 104  100 97   Resp: (!) 23  18 20   Temp: 37.6 °C (99.6 °F)      TempSrc: Oral      SpO2: 92%  92% 91%   Weight:  83.9 kg (185 lb)     Height:  1.676 m (5' 6\")       Vitals: My interpretation: normotensive, tachycardic, afebrile, not hypoxic    Reinterpretation of vitals: Improving    Cardiac Monitor Interpretation: The cardiac monitor revealed normal Sinus Rhythm as interpreted by me. The cardiac monitor was ordered secondary to the patient's history of altered mental status and to monitor for dysrhythmia and/or tachycardia.    PE:   Gen: sitting comfortably, speaking clearly, appears in no acute distress   ENT: Mucous membranes moist, posterior pharynx clear, uvula midline, nares patent bilaterally   Neck: Supple, FROM  Pulmonary: Lungs are clear to auscultation bilaterally. Tachypneic.  CV:  Tachycardic with regular rhythm, no murmur appreciated, pulses 2+ in both upper and lower extremities  Abdomen: soft, NT/ND; no rebound/guarding  : no " CVA or suprapubic tenderness   Neuro: A&Ox1 (person, only), speech fluent, no focal deficits appreciated  Skin: No rash or lesions.  No pallor or jaundice.  No cyanosis.  Warm and dry.     DIAGNOSTIC STUDIES / PROCEDURES    LABS  Results for orders placed or performed during the hospital encounter of 03/23/25   Lactic Acid    Collection Time: 03/23/25  7:33 PM   Result Value Ref Range    Lactic Acid 0.9 0.5 - 2.0 mmol/L   CBC with Differential    Collection Time: 03/23/25  7:33 PM   Result Value Ref Range    WBC 12.7 (H) 4.8 - 10.8 K/uL    RBC 4.31 4.20 - 5.40 M/uL    Hemoglobin 11.8 (L) 12.0 - 16.0 g/dL    Hematocrit 35.4 (L) 37.0 - 47.0 %    MCV 82.1 81.4 - 97.8 fL    MCH 27.4 27.0 - 33.0 pg    MCHC 33.3 32.2 - 35.5 g/dL    RDW 42.0 35.9 - 50.0 fL    Platelet Count 218 164 - 446 K/uL    MPV 10.5 9.0 - 12.9 fL    Neutrophils-Polys 83.20 (H) 44.00 - 72.00 %    Lymphocytes 4.20 (L) 22.00 - 41.00 %    Monocytes 11.40 0.00 - 13.40 %    Eosinophils 0.20 0.00 - 6.90 %    Basophils 0.40 0.00 - 1.80 %    Immature Granulocytes 0.60 0.00 - 0.90 %    Nucleated RBC 0.00 0.00 - 0.20 /100 WBC    Neutrophils (Absolute) 10.57 (H) 1.82 - 7.42 K/uL    Lymphs (Absolute) 0.54 (L) 1.00 - 4.80 K/uL    Monos (Absolute) 1.45 (H) 0.00 - 0.85 K/uL    Eos (Absolute) 0.03 0.00 - 0.51 K/uL    Baso (Absolute) 0.05 0.00 - 0.12 K/uL    Immature Granulocytes (abs) 0.07 0.00 - 0.11 K/uL    NRBC (Absolute) 0.00 K/uL   Complete Metabolic Panel    Collection Time: 03/23/25  7:33 PM   Result Value Ref Range    Sodium 130 (L) 135 - 145 mmol/L    Potassium 3.7 3.6 - 5.5 mmol/L    Chloride 96 96 - 112 mmol/L    Co2 22 20 - 33 mmol/L    Anion Gap 12.0 7.0 - 16.0    Glucose 131 (H) 65 - 99 mg/dL    Bun 14 8 - 22 mg/dL    Creatinine 0.94 0.50 - 1.40 mg/dL    Calcium 8.9 8.5 - 10.5 mg/dL    Correct Calcium 9.3 8.5 - 10.5 mg/dL    AST(SGOT) 43 12 - 45 U/L    ALT(SGPT) 30 2 - 50 U/L    Alkaline Phosphatase 100 (H) 30 - 99 U/L    Total Bilirubin 0.5 0.1 - 1.5  mg/dL    Albumin 3.5 3.2 - 4.9 g/dL    Total Protein 6.2 6.0 - 8.2 g/dL    Globulin 2.7 1.9 - 3.5 g/dL    A-G Ratio 1.3 g/dL   ESTIMATED GFR    Collection Time: 03/23/25  7:33 PM   Result Value Ref Range    GFR (CKD-EPI) 64 >60 mL/min/1.73 m 2   POC CoV-2, FLU A/B, RSV by PCR    Collection Time: 03/23/25  7:51 PM   Result Value Ref Range    POC Influenza A RNA, PCR Negative Negative    POC Influenza B RNA, PCR Negative Negative    POC RSV, by PCR Negative Negative    POC SARS-CoV-2, PCR NotDetected NotDetected   Urinalysis    Collection Time: 03/23/25  8:23 PM    Specimen: Urine   Result Value Ref Range    Color Yellow     Character Clear     Specific Gravity 1.010 <1.035    Ph 7.5 5.0 - 8.0    Glucose Negative Negative mg/dL    Ketones Negative Negative mg/dL    Protein 30 (A) Negative mg/dL    Bilirubin Negative Negative    Urobilinogen, Urine 1.0 <=1.0 EU/dL    Nitrite Negative Negative    Leukocyte Esterase Moderate (A) Negative    Occult Blood Trace (A) Negative    Micro Urine Req Microscopic    URINE MICROSCOPIC (W/UA)    Collection Time: 03/23/25  8:23 PM   Result Value Ref Range    WBC 21-50 (A) /hpf    RBC 0-2 0 - 2 /hpf    Bacteria None Seen None /hpf    Epithelial Cells 0-2 0 - 5 /hpf    Urine Casts 0-2 0 - 2 /lpf      All labs reviewed by me. Labs were compared to prior labs if they were available. Significant for lactic acid normal, mild leukocytosis of 12.7, no significant anemia, electrolytes show mild hyponatremia otherwise normal, normal glucose, normal renal function, normal liver enzymes, normal bilirubin.  COVID, flu, RSV negative.  Urinalysis negative.    RADIOLOGY  I have independently interpreted the diagnostic imaging associated with this visit and am waiting the final reading from the radiologist.   My preliminary interpretation is a follows: Questionable left sided pneumonia  Radiologist interpretation is as follows:  DX-CHEST-PORTABLE (1 VIEW)   Final Result         Patchy left basilar  opacities, atelectasis or pneumonia.        COURSE & MEDICAL DECISION MAKING  Nursing notes, VS, PMSFHx, labs, imaging, EKG reviewed in chart.    ED Observation Status? No; Patient does not meet criteria for ED Observation.     Ddx: Pneumonia, sepsis, UTI, stroke, PE, MI    MDM: 7:34 PM Tracie Rinaldi is a 74 y.o. female who presented with evaluation for altered mental status and fever at assisted living facility.  Patient is normally alert and oriented x 2 but fully capable of activities of daily living.  Has a history of schizophrenia.  Has a legal guardian assigned to her who is helpful in providing collateral formation today.  Facility states that they noticed patient was more tired than usual, and also was found to be hypoxic to 86% by EMS and transported here for evaluation.  Has had a cough today as well.  But arrival here vital signs are fairly unremarkable, mild tachycardia but patient afebrile, did receive Tylenol and route however.  Fever was at 101.7 Fahrenheit reportedly.  Supplemental oxygen administered and patient is no longer hypoxic.  Upon arrival here patient is obviously confused, knows her name but cannot member date of birth, name what a pen is, or where she is, what the season is or what month it is.  Will undergo workup here as well as straight cath as patient has frequent urinary tract infections.  All labs reviewed by me. Labs were compared to prior labs if they were available. Significant for lactic acid normal, mild leukocytosis of 12.7, no significant anemia, electrolytes show mild hyponatremia otherwise normal, normal glucose, normal renal function, normal liver enzymes, normal bilirubin.  COVID, flu, RSV negative.  Urinalysis negative.  Chest x-ray shows likely left-sided pneumonia and considering patient's leukocytosis, hypoxia, will start on antibiotics with azithromycin and ceftriaxone for commune acquired pneumonia.  As patient has altered mental status, increased supplemental  oxygen requirement, plan to admit her to the hospital for kidney acquired pneumonia and IV antibiotics.  Patient updated and is amenable as it is her court appointed legal guardian.    ADDITIONAL PROBLEM LIST AND DISPOSITION    I have discussed management of the patient with the following physicians and KENNY's: Hospitalist    Discussion of management with other QHP or appropriate source(s): None     Barriers to care at this time, including but not limited to:  None .     Decision tools and prescription drugs considered including, but not limited to: Antibiotics ceftriaxone azithromycin .    FINAL IMPRESSION  1. Community acquired pneumonia, unspecified laterality Acute   2. Altered mental status, unspecified altered mental status type Acute   3. Acute UTI Acute   4. Tachycardia Acute   5. Schizophrenia, unspecified type (HCC) Acute      Mart PAZ (Scribe), am scribing for, and in the presence of, Norbert Laura.    Electronically signed by: Mart Rivera (Scribe), 3/23/2025    INorbert personally performed the services described in this documentation, as scribed by Mart Rivera in my presence, and it is both accurate and complete.    The note accurately reflects work and decisions made by me.  Norbert Laura  3/23/2025  8:26 PM

## 2025-03-24 NOTE — ED NOTES
Pt BIB EMS from [facility]  Per staff pt reported to them that she didn't feel well at 1600 and went to lay down.  EMS called d/t pt alerted.  Temp 102.7f axillary for EMS,  started IV and tylenol infusing on arrival.  Temp 99.6f oral.  Pt able to answer some questions.  Pt was 86% on baseline 2 liters, placed on 10 liter NRB.  Pt reported some SOB and cough to RN.  Recent UTI in January with treatment.  PT A/Ox2 (place and person).  ERP to see.  Report given to MINAL Bourgeois.

## 2025-03-24 NOTE — ASSESSMENT & PLAN NOTE
UA pyuria, leukocytosis, altered mental status  IVF  Antibiotic as noted in pneumonia and septic shock

## 2025-03-24 NOTE — H&P
Hospital Medicine History & Physical Note    Date of Service  3/23/2025    Primary Care Physician  DI Ricketts.    Consultants  None    Code Status  DNAR/DNI    Chief Complaint  Chief Complaint   Patient presents with    ALOC    Shortness of Breath    Cough       History of Presenting Illness  Tracie Rinaldi is a 74 y.o. female resident of [facility] with history of dementia, seizure disorder, chronic hypoxic respiratory failure dependent on 2-3 L, hyperlipidemia, hypothyroidism, recurrent UTI who presented 3/23/2025 with evaluation for altered mental status, shortness of breath.  Patient thought to have been more confused, hypoxic, referred to ER for evaluation.  In ER, required 10 L nasal cannula support upon arrival.  CXR concerning for possible LLL pneumonia.  She also does have UA concerning for pyuria.  Admission requested by ERP.  Therefore, admitted to medicine service for further evaluation and treatment.    I discussed the plan of care with patient, bedside RN, charge RN, pharmacy, and legal guardian caregiver .    Review of Systems  Review of Systems   Constitutional:  Positive for chills, fever and malaise/fatigue.   Respiratory:  Positive for cough and shortness of breath.    Genitourinary:  Positive for urgency.   All other systems reviewed and are negative.  Limited ROS due to patient's acute clinical status     Past Medical History   has a past medical history of Anxiety, Bladder spasm, Dental disorder, Depression, Dermatitis, GERD (gastroesophageal reflux disease), Hearing loss of right ear due to cerumen impaction, Hyperlipidemia, Hypothyroid, Other emphysema ([facility]), Pneumonia, Psychiatric disorder, Schizophrenia ([facility]), Seizure ([facility]), Spells of decreased attentiveness (08/28/2015), Stroke ([facility]), Unspecified urinary incontinence, and Vitamin D deficiency.    Surgical History   has a past surgical history that includes closed reduction (Left, 7/9/2015); pin insertion (7/9/2015); and  irrigation & debridement ortho (Left, 10/17/2015).     Family History  family history includes Alzheimer's Disease in her mother; Heart Disease in her father.   Family history reviewed with patient. There is family history that is pertinent to the chief complaint.     Social History   reports that she has quit smoking. Her smoking use included cigarettes. She has a 22.5 pack-year smoking history. She has never used smokeless tobacco. She reports that she does not drink alcohol and does not use drugs.    Allergies  No Known Allergies    Medications  Prior to Admission Medications   Prescriptions Last Dose Informant Patient Reported? Taking?   ASPIRIN LOW DOSE 81 MG EC tablet 3/23/2025 at  8:00 AM MAR from Other Facility No Yes   Sig: TAKE 1 TABLET BY MOUTH ONCE DAILY   Patient taking differently: Take 81 mg by mouth every day.   D-Mannose 500 MG Cap 3/23/2025 at  6:00 PM MAR from Other Facility No Yes   Sig: Take 1,000 mg by mouth 2 times a day.   Patient taking differently: Take 1,000 mg by mouth 2 times a day. 2 capsules= 1000mg   Incontinence Supply Disposable (FQ PROTECTIVE UNDERWEAR) [facility]  MAR from Other Facility No No   Sig: CHANGE 3 TIMES DAILY AS DIRECTED   Melatonin 3 MG TABLET DISPERSIBLE 3/22/2025 at  8:00 PM MAR from Other Facility No Yes   Sig: Take 3 mg by mouth every evening.   Mirabegron ER (MYRBETRIQ) 50 MG TABLET SR 24 HR 3/22/2025 at  8:00 PM MAR from Other Facility No Yes   Sig: Take 50 mg by mouth every evening.   VENTOLIN  (90 Base) MCG/ACT Aero Soln inhalation aerosol Unknown MAR from Other Facility No No   Sig: INHALE 2 PUFFS BY MOUTH EVERY FOUR HOURS AS NEEDED FOR SHORTNESS OF BREATH.   VITAMIN D-3 SUPER STRENGTH 2000 UNIT Tab 3/23/2025 at  8:00 AM MAR from Other Facility No Yes   Sig: TAKE 1 TABLET BY MOUTH ONCE DAILY   alendronate (FOSAMAX) 70 MG Tab 3/20/2025 at  7:00 AM MAR from Other Facility No Yes   Sig: TAKE 1 TABLET BY MOUTH EVERY 7 DAYS.   ascorbic acid (ASCORBIC ACID) 500 MG  Tab 3/23/2025 at  8:00 AM MAR from Other Facility No Yes   Sig: TAKE 1 TABLET BY MOUTH ONCE DAILY   cloZAPine (CLOZARIL) 100 MG Tab 3/23/2025 at  8:00 AM MAR from Other Facility No Yes   Sig: Take 1.5 Tablets by mouth 2 times a day.   Patient taking differently: Take 100 mg by mouth 2 times a day.   divalproex ER (DEPAKOTE ER) 250 MG TABLET SR 24 HR 3/23/2025 at  8:00 AM MAR from Other Facility No Yes   Sig: Take 1 Tablet by mouth every day.   escitalopram (LEXAPRO) 10 MG Tab 3/23/2025 at  8:00 AM MAR from Other Facility No Yes   Sig: TAKE 1 TABLET BY MOUTH EVERY MORNING   ferrous sulfate 325 (65 Fe) MG tablet 3/23/2025 at  8:00 AM MAR from Other Facility No Yes   Sig: TAKE 1 TABLET BY MOUTH EVERY DAY.   hydrophilic ointment (AQUAPHOR) Ointment 3/22/2025 at  7:00 PM MAR from Other Facility No Yes   Sig: Apply 1 Each topically at bedtime. Apply to hands   ipratropium-albuterol (DUONEB) 0.5-2.5 (3) MG/3ML nebulizer solution Unknown MAR from Other Facility Yes No   Sig: Take 3 mL by nebulization every 6 hours as needed for Shortness of Breath.   levETIRAcetam (KEPPRA) 250 MG tablet 3/23/2025 at  5:00 PM MAR from Other Facility No Yes   Sig: Take 1 Tablet by mouth 3 times a day.   levothyroxine (SYNTHROID) 125 MCG Tab 3/23/2025 at  7:00 AM MAR from Other Facility No Yes   Sig: Take 1 Tablet by mouth every morning on an empty stomach.   simvastatin (ZOCOR) 40 MG Tab 3/22/2025 at  8:00 PM MAR from Other Facility No Yes   Sig: TAKE 1 TABLET BY MOUTH AT BEDTIME FOR HLD   Patient taking differently: Take 40 mg by mouth every evening. TAKE 1 TABLET BY MOUTH AT BEDTIME FOR HLD   umeclidinium bromide (INCRUSE ELLIPTA) 62.5 MCG/ACT AEROSOL POWDER, BREATH ACTIVATED inhaler 3/23/2025 at  8:00 AM MAR from Other Facility No Yes   Sig: INHALE 1 PUFF BY MOUTH ONCE DAILY   Patient taking differently: Inhale 1 Puff every day.      Facility-Administered Medications: None       Physical Exam  Temp:  [35.9 °C (96.6 °F)-37.6 °C (99.6 °F)]  35.9 °C (96.6 °F)  Pulse:  [] 71  Resp:  [18-23] 18  BP: ()/(43-60) 106/53  SpO2:  [91 %-95 %] 95 %  Blood Pressure : 98/56   Temperature: 37.6 °C (99.6 °F)   Pulse: 97   Respiration: 20   Pulse Oximetry: 91 %       Physical Exam  Vitals and nursing note reviewed.   Constitutional:       Appearance: She is ill-appearing.   HENT:      Head: Normocephalic and atraumatic.      Nose: Nose normal.      Mouth/Throat:      Mouth: Mucous membranes are dry.      Pharynx: Oropharynx is clear.   Eyes:      General: No scleral icterus.     Extraocular Movements: Extraocular movements intact.   Cardiovascular:      Rate and Rhythm: Regular rhythm. Tachycardia present.      Pulses: Normal pulses.      Heart sounds:      No friction rub.   Pulmonary:      Effort: No respiratory distress.      Breath sounds: No stridor. Rhonchi and rales present. No wheezing.   Chest:      Chest wall: No tenderness.   Abdominal:      General: There is distension.      Tenderness: There is no abdominal tenderness. There is no guarding or rebound.   Musculoskeletal:         General: Normal range of motion.      Cervical back: Neck supple. No tenderness.      Right lower leg: No edema.      Left lower leg: No edema.   Skin:     General: Skin is warm and dry.      Capillary Refill: Capillary refill takes less than 2 seconds.      Coloration: Skin is pale.   Neurological:      Mental Status: She is alert.      Comments: Minimally verbal  Arousable to hard sternal rub  Able to move extremities on command  Overall appears disoriented   Psychiatric:         Mood and Affect: Mood normal.         Laboratory:  Recent Labs     03/23/25 1933   WBC 12.7*   RBC 4.31   HEMOGLOBIN 11.8*   HEMATOCRIT 35.4*   MCV 82.1   MCH 27.4   MCHC 33.3   RDW 42.0   PLATELETCT 218   MPV 10.5     Recent Labs     03/23/25 1933   SODIUM 130*   POTASSIUM 3.7   CHLORIDE 96   CO2 22   GLUCOSE 131*   BUN 14   CREATININE 0.94   CALCIUM 8.9     Recent Labs     03/23/25 1933  "  ALTSGPT 30   ASTSGOT 43   ALKPHOSPHAT 100*   TBILIRUBIN 0.5   GLUCOSE 131*         No results for input(s): \"NTPROBNP\" in the last 72 hours.      No results for input(s): \"TROPONINT\" in the last 72 hours.    Imaging:  DX-CHEST-PORTABLE (1 VIEW)   Final Result         Patchy left basilar opacities, atelectasis or pneumonia.      EC-ECHOCARDIOGRAM COMPLETE W/O CONT    (Results Pending)         X-Ray:  I have personally reviewed the images and compared with prior images.  EKG:  I have personally reviewed the images and compared with prior images.    Assessment/Plan:  Justification for Admission Status  I anticipate this patient will require at least 2 midnights hospitalization, therefore appropriate for inpatient status.        * Septic shock (HCC)  Assessment & Plan  This is Sepsis Present on admission  SIRS criteria identified on my evaluation include: Tachycardia, with heart rate greater than 90 BPM, Tachypnea, with respirations greater than 20 per minute, Leukocytosis, with WBC greater than 12,000, and Bandemia, greater than 10% bands  Clinical indicators of end organ dysfunction include Toxic Metabolic Encephalopathy  Source is PNA/UTI  Sepsis protocol initiated  Crystalloid Fluid Administration: Fluid resuscitation ordered per standard protocol - 30 mL/kg per current or ideal body weight  IV antibiotics as appropriate for source of sepsis  Reassessment: I have reassessed the patient's hemodynamic status    Likely due to PNA  S/p 2L IVF bolus  Continue LR @150cc/hr  Midodrine 10mg every 8hours  Stress dose hydrocortisone 100mg every 8hours  Abx:  ceftriaxone, azithromycin      Pneumonia due to infectious organism- (present on admission)  Assessment & Plan  LLL opacity, hypoxia, altered mental status, leukocytosis, hypotension  IVF  Antibiotic: Ceftriaxone, azithromycin  Follow cultures  Aspiration precautions, speech eval    Reactive airway disease with acute exacerbation  Assessment & Plan  Pulmonary " hygiene  Antibiotic as above    Acute UTI  Assessment & Plan  UA pyuria, leukocytosis, altered mental status  IVF  Antibiotic as noted in pneumonia and septic shock    Acute on chronic respiratory failure with hypoxia (HCC)  Assessment & Plan  Dependent on 2 to 3 L at baseline  Initially required 10 L-wean off as tolerated  Antibiotic for pneumonia  Check TTE    Acute encephalopathy- (present on admission)  Assessment & Plan  Likely secondary to sepsis secondary to UTI/pneumonia  -Continue antibiotic  Neurocheck every 4 hours  Trial of high-dose IV thiamine    Hyperlipidemia- (present on admission)  Assessment & Plan  Continue statin    Dementia without behavioral disturbance (HCC)- (present on admission)  Assessment & Plan  Per history  Frequent reorientation  Minimize sedative    Seizure disorder (HCC)- (present on admission)  Assessment & Plan  Continue Depakote, Keppra    Acquired hypothyroidism- (present on admission)  Assessment & Plan  Synthroid    Frailty syndrome in geriatric patient  Assessment & Plan  PT/OT/ST    ACP (advance care planning)- (present on admission)  Assessment & Plan  Goal care discussed with patient's legal guardian caregiver at bedside-DNR/DNI per legal guardian        VTE prophylaxis: enoxaparin ppx    Critical care time: 50 minutes spent in chart review, medical management, coordinating care with patient/patient's legal guardian/ER staff/telemetry staff/RN/pharmacy/frequent reevaluation of patient clinical response to treatment

## 2025-03-24 NOTE — PROGRESS NOTES
4 Eyes Skin Assessment Completed by MINAL Davila and MINAL Figueroa.    Head WDL  Ears WDL  Nose WDL  Mouth WDL  Neck WDL  Breast/Chest WDL  Shoulder Blades WDL  Spine WDL  (R) Arm/Elbow/Hand WDL  (L) Arm/Elbow/Hand WDL  Abdomen WDL  Groin WDL  Scrotum/Coccyx/Buttocks Redness and Blanching  (R) Leg WDL  (L) Leg WDL  (R) Heel/Foot/Toe Swelling  (L) Heel/Foot/Toe Swelling          Devices In Places Tele Box, Pulse Ox, and Nasal Cannula      Interventions In Place Gray Ear Foams, Heel Mepilex, Pillows, Heels Loaded W/Pillows, and Pressure Redistribution Mattress    Possible Skin Injury No    Pictures Uploaded Into Epic N/A  Wound Consult Placed N/A  RN Wound Prevention Protocol Ordered No

## 2025-03-24 NOTE — THERAPY
"Speech Language Pathology   Clinical Swallow Evaluation     Patient Name: Tracie Rinaldi  AGE:  74 y.o., SEX:  female  Medical Record #: [id number]  Date of Service: 3/24/2025      History of Present Illness  74 y.o. female resident of [facility] presented 3/23/2025 with evaluation for altered mental status, shortness of breath. CXR concerning for LLL PNA, UA concerning for pyuria.     CMHx: Septic shock, PNA, UTI, acute on chronic respiratory failure, frailty syndrome, acute encephalopathy   PMHx: Dementia, seizure disorder, chronic hypoxic respiratory failure (2-3L), HLD, hypothyroidism, recurrent UTI, schizophrenia, hx smoking    SLP History 3/8/2024:  FEES - 3/8/2024, mild oropharyngeal dysphagia with recommendations for SB6/MT2. Noted to have \"Limited abduction and adduction of left arytenoid. Pt did NOT ACHIEVE vocal fold adduction.\"    CXR 3/23:  \"Patchy left basilar opacities, atelectasis or pneumonia.\"    Esophogram 3/8/2024:  \"1.  Immediate aspiration upon initiation of contrast ingestion. Swallow study is recommended for further evaluation.  2.  Diffuse tertiary dysmotility.\"    General Information:  Vitals  Pulse: 80  Respiration: 17  Pulse Oximetry: 94 %  O2 (LPM): 3  O2 Delivery Device: Silicone Nasal Cannula  Patient Behaviors: Confused  Orientation: Self, , General place  Follows Directives: Inconsistent    Prior Living Situation & Level of Function:  Housing / Facility: Group Home  Lives with - Patient's Self Care Capacity: Other (Comments) (lives at group home)  Communication: Hx dementia  Swallowing: Hx mild oropharyngeal dysphagia; diffuse tertiary dysmotility per esophogram      Oral Mechanism Evaluation:  Dentition: Full dentures   Facial Symmetry: Equal  Facial Sensation: Pt did not follow commands to assess     Labial Observations: WFL   Lingual Observations: Midline  Motor Speech: WFL    Laryngeal Function:  Secretion Management: Adequate  Voice Quality: WFL (presbyphonic)  Cough: " Perceptually WNL     Subjective  Pt was seen at bedside for CSE. She exhibited resistance to SLP tasks initially. Consented to tasks after clinician explained purpose x3. Agreeable for remainder of session.    Assessment  Current Method of Nutrition: Oral diet (pureed solids / slightly thick liquids)  Of note - slightly thick liquids are not available for adult trays at [facility]. Pt was/will be served mildly thick liquids on their trays   Positioning: Sidelying  Bolus Administration: SLP, Patient  O2 (LPM): 3 O2 Delivery Device: Silicone Nasal Cannula  Factor(s) Affecting Performance: Impaired mental status, Impaired command following  Tracheostomy : No     Swallowing Trials:  Ice: WFL  Thin Liquid (TN0): WFL  Liquidised (LQ3): WFL  Easy to Chew (EC7): WFL    Comments: Pt presented w/ PO trials of thin liquid sip w/ straw, thin liquid rapid, sequential sips w/ straw, LQ3, and EC7. No overt s/sx of respiratory invasion or respiratory distress consistent across all PO trials. Complete stripping of bolus from spoon and complete oral containment. Effective mastication w/ min-no oral residue appreciated. Denied odynophagia and globus across all trials. Education provided on s/sx of dysphagia and to notify staff should sx present.    Clinical Impressions  Pt may upgrade to regular solids/thin liquids and meds administered whole in liquid as STACY, given no overt s/sx of respiratory invasion or respiratory distress grossly and perceptually across PO trials. Please provide close spv during meals w/ monitoring for impulsive behaviors and meal tray set up assist. SLP to follow to check tolerance of regular solids / thin liquids given hx of dysphagia and current PNA.     Recommendations  Regular soilds / thin liquids  Instrumentation: None indicated at this time  Medication: Whole with liquid, As tolerated  Supervision: Close supervision - patient may be left alone for less than 5 minutes at a time, Monitor due to impulsive  "behavior, Assist with meal tray set up  Positioning: Fully upright and midline during oral intake, Meals sitting upright in a chair, as tolerated  Risk Management : Small bites/sips, Alternate bites and sips, Slow rate of intake, Reduce environmental distractions, Physical mobility, as tolerated  Oral Care: Pre- and post-meals  Consult Referral(s): Gastroenterologist    SLP Treatment Plan  Dysphagia Treatment, Patient/Family/Caregiver Training  SLP Frequency: 3x Per Week  Estimated Duration: Until Therapy Goals Met    Anticipated Discharge Needs  Recommend post-acute placement for additional speech therapy services prior to discharge home   Therapy Recommendations Upon DC: Dysphagia Training, Community Re-Integration, Patient / Family / Caregiver Education      Patient / Family Goal #1: \"I need mroe water\"  Short Term Goals  Short Term Goal # 1: Pt will tolerate a least restrictive diet w/o worsening of pulmonary status and w/o overt s/sx of respiratory invasion or respiratory distress      Dayne Castaneda, Student   "

## 2025-03-24 NOTE — ASSESSMENT & PLAN NOTE
Likely secondary to sepsis secondary to UTI/pneumonia  -Continue antibiotic  Neurocheck every 4 hours  Trial of high-dose IV thiamine

## 2025-03-24 NOTE — PROGRESS NOTES
[facility] Daily Progress Note    Date of Service  3/24/2025    [facility] Team: [facility] Purple Team   Attending: MUNDO Couch M.d.  Senior Resident: Dr. Norbert Hensley  Intern:  Dr. Madhuri Morales  Contact Number: [phone]    Chief Complaint  Tracie Rinaldi is a 74 y.o. female admitted 3/23/2025 with AMS and SOB    Hospital Course  74 y.o. female resident of SNF with history of dementia, seizure disorder, chronic hypoxic respiratory failure dependent on 2-3 L, hyperlipidemia, hypothyroidism, recurrent UTI who presented 3/23/2025 with evaluation for altered mental status, shortness of breath.  Patient thought to have been more confused, hypoxic, referred to ER for evaluation.  In ER, required 10 L nasal cannula support upon arrival.  CXR concerning for possible LLL pneumonia.  She also does have UA concerning for pyuria.  Admitted to medicine service for further evaluation and treatment.   She was started on Ceftriaxone and azithromycin for septic shock secondary to LLL pneumonia and UTI. She was also started on hydrocortisone for CAP.    Interval Problem Update  -Switch hydrocortisone for ongoing CAP to 50 Q6 as bp is tolerated  -Continue IVF at 83cc/hr due to ongoing sepsis  -SLP recs thin diet  -PT recs return to group home, OT recs pending at the time of the note    I have discussed this patient's plan of care and discharge plan at IDT rounds today with Case Management, Nursing, Nursing leadership, and other members of the IDT team.    Code Status  DNAR/DNI    Disposition  The patient is not medically cleared for discharge to home or a post-acute facility.      I have placed the appropriate orders for post-discharge needs.    Review of Systems  Review of Systems   Constitutional: Negative.    HENT: Negative.     Eyes: Negative.    Respiratory: Negative.     Cardiovascular: Negative.    Gastrointestinal: Negative.    Genitourinary: Negative.    Musculoskeletal: Negative.    Skin: Negative.     Neurological: Negative.         Physical Exam  Temp:  [35.9 °C (96.6 °F)-37.6 °C (99.6 °F)] 37 °C (98.6 °F)  Pulse:  [] 82  Resp:  [16-23] 17  BP: ()/(36-65) 140/62  SpO2:  [88 %-98 %] 88 %    Physical Exam  Constitutional:       Appearance: Normal appearance.   HENT:      Head: Normocephalic.      Mouth/Throat:      Mouth: Mucous membranes are moist.   Eyes:      Extraocular Movements: Extraocular movements intact.      Pupils: Pupils are equal, round, and reactive to light.   Cardiovascular:      Rate and Rhythm: Normal rate and regular rhythm.      Pulses: Normal pulses.      Heart sounds: Normal heart sounds.   Abdominal:      General: Abdomen is flat.   Musculoskeletal:      Cervical back: Normal range of motion.   Skin:     General: Skin is warm.      Capillary Refill: Capillary refill takes 2 to 3 seconds.   Neurological:      General: No focal deficit present.      Mental Status: She is oriented to person, place, and time.         Fluids    Intake/Output Summary (Last 24 hours) at 3/24/2025 1647  Last data filed at 3/24/2025 1600  Gross per 24 hour   Intake 3300 ml   Output 2180 ml   Net 1120 ml       Laboratory  Recent Labs     03/23/25  1933 03/24/25  0114   WBC 12.7* 16.3*   RBC 4.31 3.50*   HEMOGLOBIN 11.8* 9.8*   HEMATOCRIT 35.4* 29.6*   MCV 82.1 84.6   MCH 27.4 28.0   MCHC 33.3 33.1   RDW 42.0 44.4   PLATELETCT 218 175   MPV 10.5 11.3     Recent Labs     03/23/25  1933 03/24/25  0114   SODIUM 130* 133*   POTASSIUM 3.7 3.8   CHLORIDE 96 100   CO2 22 23   GLUCOSE 131* 168*   BUN 14 13   CREATININE 0.94 0.86   CALCIUM 8.9 8.5     Recent Labs     03/24/25  0114   INR 1.20*               Imaging  DX-CHEST-PORTABLE (1 VIEW)   Final Result         Patchy left basilar opacities, atelectasis or pneumonia.      EC-ECHOCARDIOGRAM COMPLETE W/O CONT    (Results Pending)        Assessment/Plan  Problem Representation:    * Septic shock (HCC)  Assessment & Plan  This is Sepsis Present on admission  SIRS  criteria identified on my evaluation include: Tachycardia, with heart rate greater than 90 BPM, Tachypnea, with respirations greater than 20 per minute, Leukocytosis, with WBC greater than 12,000, and Bandemia, greater than 10% bands  Clinical indicators of end organ dysfunction include Toxic Metabolic Encephalopathy  Source is PNA/UTI  Sepsis protocol initiated  Crystalloid Fluid Administration: Fluid resuscitation ordered per standard protocol - 30 mL/kg per current or ideal body weight  IV antibiotics as appropriate for source of sepsis  Reassessment: I have reassessed the patient's hemodynamic status    Likely due to PNA  S/p 2L IVF bolus  Continue LR @150cc/hr  Midodrine 10mg every 8hours  Stress dose hydrocortisone 100mg every 8hours  Abx:  ceftriaxone, azithromycin      Reactive airway disease with acute exacerbation  Assessment & Plan  Pulmonary hygiene  Antibiotic as above    Acute UTI  Assessment & Plan  UA pyuria, leukocytosis, altered mental status  IVF  Antibiotic as noted in pneumonia and septic shock    Acute on chronic respiratory failure with hypoxia (HCC)  Assessment & Plan  Dependent on 2 to 3 L at baseline  Initially required 10 L, currently on baseline 2-3L  -wean off as tolerated  Antibiotic for pneumonia    Frailty syndrome in geriatric patient  Assessment & Plan  PT recs return to group home  -OT recs pending  -SLP recs thin liuids and regular solids    Pneumonia due to infectious organism- (present on admission)  Assessment & Plan  LLL opacity, hypoxia, altered mental status, leukocytosis, hypotension  IVF RL @ 83cc/hr  Antibiotic: Ceftriaxone, azithromycin  Blood culture negative  Aspiration precautions placed    Acute encephalopathy- (present on admission)  Assessment & Plan  Likely secondary to sepsis secondary to UTI/pneumonia  -Continue antibiotic  Neurocheck every 4 hours  Trial of high-dose IV thiamine    ACP (advance care planning)- (present on admission)  Assessment & Plan  Goal  care discussed with patient's legal guardian caregiver at bedside-DNR/DNI per legal guardian    Hyperlipidemia- (present on admission)  Assessment & Plan  Continue home statin    Dementia without behavioral disturbance (HCC)- (present on admission)  Assessment & Plan  Per history  Frequent reorientation  Minimize sedative    Seizure disorder (HCC)- (present on admission)  Assessment & Plan  Continue Depakote, Keppra    Acquired hypothyroidism- (present on admission)  Assessment & Plan  Synthroid         VTE prophylaxis: enoxaparin ppx    I have performed a physical exam and reviewed and updated ROS and Plan today (3/24/2025). In review of yesterday's note (3/23/2025), there are no changes except as documented above.

## 2025-03-24 NOTE — ASSESSMENT & PLAN NOTE
PT recs return to group home  - OT dc'ed as patient is at baseline and comes from a group home.  -SLP recs thin liuids and regular solids

## 2025-03-24 NOTE — PROGRESS NOTES
Patient arrived to [facility] via stretcher, accompanied by transport and patient's court appointed guardian. Patient was a total assist sliding from the stretcher to the bed. Telemetry monitor applied. Admission profile completed with patient's guardian.      Patient is lethargic Aox1, BP 87/46. Currently holding patient's PO meds due to lethargy. MD notified, LR bolus ordered.     Bed left in lowest position, wheels locked, call light left within reach, and bed alarm on.

## 2025-03-24 NOTE — ED NOTES
Bedside report received from off going RN Rachel, assumed care of patient.  POC discussed with patient. Call light within reach, all needs addressed at this time.       Fall risk interventions in place: Keep floor surfaces clean and dry and Accompanied to restroom (all applicable per [place] Fall risk assessment)   Continuous monitoring: Cardiac Leads, Pulse Ox, or Blood Pressure  IVF/IV medications: Not Applicable   Oxygen: 10 NRM  Bedside sitter: Not Applicable   Isolation: Not Applicable    Sepsis code activated.    Trauma nurse at bedside

## 2025-03-24 NOTE — ASSESSMENT & PLAN NOTE
LLL opacity currently at baseline oxygen  DC IVF  Antibiotic: Ceftriaxone, completed 3 day course of azithromycin  Blood culture negative  Aspiration precautions placed

## 2025-03-25 LAB
ALBUMIN SERPL BCP-MCNC: 3.5 G/DL (ref 3.2–4.9)
ALBUMIN/GLOB SERPL: 1.3 G/DL
ALP SERPL-CCNC: 91 U/L (ref 30–99)
ALT SERPL-CCNC: 26 U/L (ref 2–50)
ANION GAP SERPL CALC-SCNC: 13 MMOL/L (ref 7–16)
AST SERPL-CCNC: 21 U/L (ref 12–45)
BASOPHILS # BLD AUTO: 0.2 % (ref 0–1.8)
BASOPHILS # BLD: 0.03 K/UL (ref 0–0.12)
BILIRUB SERPL-MCNC: <0.2 MG/DL (ref 0.1–1.5)
BUN SERPL-MCNC: 12 MG/DL (ref 8–22)
CALCIUM ALBUM COR SERPL-MCNC: 10.2 MG/DL (ref 8.5–10.5)
CALCIUM SERPL-MCNC: 9.8 MG/DL (ref 8.5–10.5)
CHLORIDE SERPL-SCNC: 97 MMOL/L (ref 96–112)
CO2 SERPL-SCNC: 23 MMOL/L (ref 20–33)
CREAT SERPL-MCNC: 0.71 MG/DL (ref 0.5–1.4)
EOSINOPHIL # BLD AUTO: 0.04 K/UL (ref 0–0.51)
EOSINOPHIL NFR BLD: 0.2 % (ref 0–6.9)
ERYTHROCYTE [DISTWIDTH] IN BLOOD BY AUTOMATED COUNT: 42 FL (ref 35.9–50)
GFR SERPLBLD CREATININE-BSD FMLA CKD-EPI: 90 ML/MIN/1.73 M 2
GLOBULIN SER CALC-MCNC: 2.8 G/DL (ref 1.9–3.5)
GLUCOSE SERPL-MCNC: 239 MG/DL (ref 65–99)
HCT VFR BLD AUTO: 32.3 % (ref 37–47)
HGB BLD-MCNC: 11 G/DL (ref 12–16)
IMM GRANULOCYTES # BLD AUTO: 0.14 K/UL (ref 0–0.11)
IMM GRANULOCYTES NFR BLD AUTO: 0.8 % (ref 0–0.9)
LYMPHOCYTES # BLD AUTO: 1.27 K/UL (ref 1–4.8)
LYMPHOCYTES NFR BLD: 7.1 % (ref 22–41)
MCH RBC QN AUTO: 28 PG (ref 27–33)
MCHC RBC AUTO-ENTMCNC: 34.1 G/DL (ref 32.2–35.5)
MCV RBC AUTO: 82.2 FL (ref 81.4–97.8)
MONOCYTES # BLD AUTO: 0.94 K/UL (ref 0–0.85)
MONOCYTES NFR BLD AUTO: 5.3 % (ref 0–13.4)
NEUTROPHILS # BLD AUTO: 15.37 K/UL (ref 1.82–7.42)
NEUTROPHILS NFR BLD: 86.4 % (ref 44–72)
NRBC # BLD AUTO: 0 K/UL
NRBC BLD-RTO: 0 /100 WBC (ref 0–0.2)
PLATELET # BLD AUTO: 217 K/UL (ref 164–446)
PMV BLD AUTO: 11 FL (ref 9–12.9)
POTASSIUM SERPL-SCNC: 3.5 MMOL/L (ref 3.6–5.5)
PROCALCITONIN SERPL-MCNC: 8.37 NG/ML
PROT SERPL-MCNC: 6.3 G/DL (ref 6–8.2)
RBC # BLD AUTO: 3.93 M/UL (ref 4.2–5.4)
SCCMEC + MECA PNL NOSE NAA+PROBE: NEGATIVE
SODIUM SERPL-SCNC: 133 MMOL/L (ref 135–145)
WBC # BLD AUTO: 17.8 K/UL (ref 4.8–10.8)

## 2025-03-25 PROCEDURE — 700102 HCHG RX REV CODE 250 W/ 637 OVERRIDE(OP): Performed by: HOSPITALIST

## 2025-03-25 PROCEDURE — 700101 HCHG RX REV CODE 250: Performed by: STUDENT IN AN ORGANIZED HEALTH CARE EDUCATION/TRAINING PROGRAM

## 2025-03-25 PROCEDURE — 700105 HCHG RX REV CODE 258

## 2025-03-25 PROCEDURE — A9270 NON-COVERED ITEM OR SERVICE: HCPCS | Mod: JZ

## 2025-03-25 PROCEDURE — 85025 COMPLETE CBC W/AUTO DIFF WBC: CPT

## 2025-03-25 PROCEDURE — 84145 PROCALCITONIN (PCT): CPT

## 2025-03-25 PROCEDURE — 700111 HCHG RX REV CODE 636 W/ 250 OVERRIDE (IP): Mod: JZ

## 2025-03-25 PROCEDURE — 700111 HCHG RX REV CODE 636 W/ 250 OVERRIDE (IP): Performed by: STUDENT IN AN ORGANIZED HEALTH CARE EDUCATION/TRAINING PROGRAM

## 2025-03-25 PROCEDURE — A9270 NON-COVERED ITEM OR SERVICE: HCPCS | Performed by: STUDENT IN AN ORGANIZED HEALTH CARE EDUCATION/TRAINING PROGRAM

## 2025-03-25 PROCEDURE — 770001 HCHG ROOM/CARE - MED/SURG/GYN PRIV*

## 2025-03-25 PROCEDURE — A9270 NON-COVERED ITEM OR SERVICE: HCPCS | Performed by: HOSPITALIST

## 2025-03-25 PROCEDURE — 94669 MECHANICAL CHEST WALL OSCILL: CPT

## 2025-03-25 PROCEDURE — 36415 COLL VENOUS BLD VENIPUNCTURE: CPT

## 2025-03-25 PROCEDURE — 700105 HCHG RX REV CODE 258: Performed by: STUDENT IN AN ORGANIZED HEALTH CARE EDUCATION/TRAINING PROGRAM

## 2025-03-25 PROCEDURE — 87641 MR-STAPH DNA AMP PROBE: CPT

## 2025-03-25 PROCEDURE — 80053 COMPREHEN METABOLIC PANEL: CPT

## 2025-03-25 PROCEDURE — 94640 AIRWAY INHALATION TREATMENT: CPT

## 2025-03-25 PROCEDURE — 700102 HCHG RX REV CODE 250 W/ 637 OVERRIDE(OP): Mod: JZ

## 2025-03-25 PROCEDURE — 99233 SBSQ HOSP IP/OBS HIGH 50: CPT | Mod: GC | Performed by: INTERNAL MEDICINE

## 2025-03-25 PROCEDURE — 700102 HCHG RX REV CODE 250 W/ 637 OVERRIDE(OP): Performed by: STUDENT IN AN ORGANIZED HEALTH CARE EDUCATION/TRAINING PROGRAM

## 2025-03-25 RX ORDER — POTASSIUM CHLORIDE 1500 MG/1
40 TABLET, EXTENDED RELEASE ORAL ONCE
Status: COMPLETED | OUTPATIENT
Start: 2025-03-25 | End: 2025-03-25

## 2025-03-25 RX ORDER — MIDODRINE HYDROCHLORIDE 5 MG/1
10 TABLET ORAL 2 TIMES DAILY
Status: DISCONTINUED | OUTPATIENT
Start: 2025-03-25 | End: 2025-03-26

## 2025-03-25 RX ADMIN — ENOXAPARIN SODIUM 40 MG: 100 INJECTION SUBCUTANEOUS at 17:55

## 2025-03-25 RX ADMIN — HYDROCORTISONE SODIUM SUCCINATE 50 MG: 100 INJECTION, POWDER, FOR SOLUTION INTRAMUSCULAR; INTRAVENOUS at 17:54

## 2025-03-25 RX ADMIN — CLOZAPINE 100 MG: 100 TABLET ORAL at 09:10

## 2025-03-25 RX ADMIN — MIDODRINE HYDROCHLORIDE 10 MG: 5 TABLET ORAL at 05:57

## 2025-03-25 RX ADMIN — TIOTROPIUM BROMIDE INHALATION SPRAY 5 MCG: 3.12 SPRAY, METERED RESPIRATORY (INHALATION) at 06:02

## 2025-03-25 RX ADMIN — OXYCODONE HYDROCHLORIDE AND ACETAMINOPHEN 500 MG: 500 TABLET ORAL at 05:57

## 2025-03-25 RX ADMIN — CEFTRIAXONE SODIUM 2000 MG: 10 INJECTION, POWDER, FOR SOLUTION INTRAVENOUS at 17:54

## 2025-03-25 RX ADMIN — SIMVASTATIN 40 MG: 20 TABLET, FILM COATED ORAL at 20:34

## 2025-03-25 RX ADMIN — MOMETASONE FUROATE AND FORMOTEROL FUMARATE DIHYDRATE 2 PUFF: 200; 5 AEROSOL RESPIRATORY (INHALATION) at 06:01

## 2025-03-25 RX ADMIN — SODIUM CHLORIDE, POTASSIUM CHLORIDE, SODIUM LACTATE AND CALCIUM CHLORIDE: 600; 310; 30; 20 INJECTION, SOLUTION INTRAVENOUS at 05:45

## 2025-03-25 RX ADMIN — HYDROCORTISONE SODIUM SUCCINATE 50 MG: 100 INJECTION, POWDER, FOR SOLUTION INTRAMUSCULAR; INTRAVENOUS at 05:58

## 2025-03-25 RX ADMIN — VIBEGRON 75 MG: 75 TABLET, FILM COATED ORAL at 20:35

## 2025-03-25 RX ADMIN — MIDODRINE HYDROCHLORIDE 10 MG: 5 TABLET ORAL at 17:55

## 2025-03-25 RX ADMIN — IPRATROPIUM BROMIDE AND ALBUTEROL SULFATE 3 ML: .5; 2.5 SOLUTION RESPIRATORY (INHALATION) at 07:24

## 2025-03-25 RX ADMIN — HYDROCORTISONE SODIUM SUCCINATE 50 MG: 100 INJECTION, POWDER, FOR SOLUTION INTRAMUSCULAR; INTRAVENOUS at 12:01

## 2025-03-25 RX ADMIN — LEVETIRACETAM 250 MG: 250 TABLET, FILM COATED ORAL at 05:57

## 2025-03-25 RX ADMIN — HYDROCORTISONE SODIUM SUCCINATE 50 MG: 100 INJECTION, POWDER, FOR SOLUTION INTRAMUSCULAR; INTRAVENOUS at 23:03

## 2025-03-25 RX ADMIN — THIAMINE HYDROCHLORIDE 500 MG: 100 INJECTION, SOLUTION INTRAMUSCULAR; INTRAVENOUS at 05:54

## 2025-03-25 RX ADMIN — LEVOTHYROXINE SODIUM 125 MCG: 0.12 TABLET ORAL at 05:57

## 2025-03-25 RX ADMIN — SENNOSIDES AND DOCUSATE SODIUM 2 TABLET: 50; 8.6 TABLET ORAL at 17:55

## 2025-03-25 RX ADMIN — MONTELUKAST 10 MG: 10 TABLET, FILM COATED ORAL at 20:35

## 2025-03-25 RX ADMIN — ESCITALOPRAM OXALATE 10 MG: 10 TABLET ORAL at 05:55

## 2025-03-25 RX ADMIN — LEVETIRACETAM 250 MG: 250 TABLET, FILM COATED ORAL at 12:01

## 2025-03-25 RX ADMIN — DIVALPROEX SODIUM 250 MG: 250 TABLET, EXTENDED RELEASE ORAL at 06:01

## 2025-03-25 RX ADMIN — Medication 3 MG: at 20:34

## 2025-03-25 RX ADMIN — ASPIRIN 81 MG: 81 TABLET, COATED ORAL at 05:54

## 2025-03-25 RX ADMIN — MOMETASONE FUROATE AND FORMOTEROL FUMARATE DIHYDRATE 2 PUFF: 200; 5 AEROSOL RESPIRATORY (INHALATION) at 17:57

## 2025-03-25 RX ADMIN — CLOZAPINE 100 MG: 100 TABLET ORAL at 21:27

## 2025-03-25 RX ADMIN — AZITHROMYCIN DIHYDRATE 500 MG: 250 TABLET ORAL at 20:34

## 2025-03-25 RX ADMIN — Medication 2000 UNITS: at 05:55

## 2025-03-25 RX ADMIN — LEVETIRACETAM 250 MG: 250 TABLET, FILM COATED ORAL at 17:55

## 2025-03-25 RX ADMIN — POTASSIUM CHLORIDE 40 MEQ: 1500 TABLET, EXTENDED RELEASE ORAL at 09:10

## 2025-03-25 RX ADMIN — FERROUS SULFATE TAB 325 MG (65 MG ELEMENTAL FE) 325 MG: 325 (65 FE) TAB at 05:57

## 2025-03-25 ASSESSMENT — ENCOUNTER SYMPTOMS
CONSTITUTIONAL NEGATIVE: 1
NEUROLOGICAL NEGATIVE: 1
GASTROINTESTINAL NEGATIVE: 1
RESPIRATORY NEGATIVE: 1
CARDIOVASCULAR NEGATIVE: 1
MUSCULOSKELETAL NEGATIVE: 1
EYES NEGATIVE: 1

## 2025-03-25 ASSESSMENT — PAIN DESCRIPTION - PAIN TYPE
TYPE: ACUTE PAIN
TYPE: ACUTE PAIN

## 2025-03-25 NOTE — THERAPY
Occupational Therapy Contact Note    Patient Name: Tracie Rinaldi  Age:  74 y.o., Sex:  female  Medical Record #: [id number]  Today's Date: 3/25/2025       03/25/25 1322   Treatment Variance   Reason For Missed Therapy Non-Medical - Other (Please Comment)   Interdisciplinary Plan of Care Collaboration   IDT Collaboration with  Nursing   Collaboration Comments OT consult rec'd, discussed with RN and PT. Pt is at her baseline level of functioning, A&Ox2 at baseline, lives at group home. Will dc OT order.   Session Information   Date / Session Number  3/25, DC order

## 2025-03-25 NOTE — CARE PLAN
The patient is Stable - Low risk of patient condition declining or worsening    Shift Goals  Clinical Goals: maintain skin integrity, ensure safety and comfort through out the shift  Patient Goals: rest  Family Goals: JORDI      Problem: Self Care  Goal: Patient will have the ability to perform ADLs independently or with assistance (bathe, groom, dress, toilet and feed)  Outcome: Progressing     Problem: Fluid Volume  Goal: Fluid volume balance will be maintained  Outcome: Progressing       Progress made toward(s) clinical / shift goals:  Tracie is  A&O*2. oriented to self and place. Able to make needs known. Pt constantly asking for water, educated on the current 2000ml fluid restriction need, needs constant reminder. Hourly rounding. Denies pain. Call light within reach.     Patient is not progressing towards the following goals:

## 2025-03-25 NOTE — PROGRESS NOTES
[facility] Daily Progress Note    Date of Service  3/25/2025    [facility] Team: [facility]   Attending: Libertad Cho M.d.  Senior Resident: Dr. Norbert Hensley  Intern:  Dr. Madhuri Morales  Contact Number: [phone]    Chief Complaint  Tracie Rinaldi is a 74 y.o. female admitted 3/23/2025 with AMS and SOB    Hospital Course  74 y.o. female resident of SNF with history of dementia, seizure disorder, chronic hypoxic respiratory failure dependent on 2-3 L, hyperlipidemia, hypothyroidism, recurrent UTI who presented 3/23/2025 with evaluation for altered mental status, shortness of breath.  Patient thought to have been more confused, hypoxic, referred to ER for evaluation.  In ER, required 10 L nasal cannula support upon arrival.  CXR concerning for possible LLL pneumonia.  She also does have UA concerning for pyuria.  Admitted to medicine service for further evaluation and treatment.   She was started on Ceftriaxone and completed 3 day course of azithromycin for septic shock secondary to LLL pneumonia and UTI. She was also started on hydrocortisone for CAP.    Interval Problem Update  -Discontinue IVF as patient's oral hydration has improved and not in septic shock anymore  -Reduce frequency of midodrine to bid given increasing bp and resolved shock  -MRSA nares negative, procal downtrending  -SLP recs thin diet  -PT recs return to group home, OT dc'ed as patient is at baseline and comes from a group home.    I have discussed this patient's plan of care and discharge plan at IDT rounds today with Case Management, Nursing, Nursing leadership, and other members of the IDT team.    Code Status  DNAR/DNI    Disposition  The patient is not medically cleared for discharge to home or a post-acute facility.      I have placed the appropriate orders for post-discharge needs.    Review of Systems  Review of Systems   Constitutional: Negative.    HENT: Negative.     Eyes: Negative.    Respiratory:  Negative.     Cardiovascular: Negative.    Gastrointestinal: Negative.    Genitourinary: Negative.    Musculoskeletal: Negative.    Skin: Negative.    Neurological: Negative.         Physical Exam  Temp:  [35.7 °C (96.3 °F)-37 °C (98.6 °F)] 36.6 °C (97.9 °F)  Pulse:  [78-88] 82  Resp:  [15-18] 18  BP: (124-168)/(62-80) 124/76  SpO2:  [88 %-97 %] 92 %    Physical Exam  Constitutional:       Appearance: Normal appearance.   HENT:      Head: Normocephalic.      Mouth/Throat:      Mouth: Mucous membranes are moist.   Eyes:      Extraocular Movements: Extraocular movements intact.      Pupils: Pupils are equal, round, and reactive to light.   Cardiovascular:      Rate and Rhythm: Normal rate and regular rhythm.      Pulses: Normal pulses.      Heart sounds: Normal heart sounds.   Abdominal:      General: Abdomen is flat.   Musculoskeletal:      Cervical back: Normal range of motion.   Skin:     General: Skin is warm.      Capillary Refill: Capillary refill takes 2 to 3 seconds.   Neurological:      General: No focal deficit present.      Mental Status: She is oriented to person, place, and time.         Fluids    Intake/Output Summary (Last 24 hours) at 3/25/2025 1416  Last data filed at 3/25/2025 1200  Gross per 24 hour   Intake 1500 ml   Output 3550 ml   Net -2050 ml       Laboratory  Recent Labs     03/23/25 1933 03/24/25 0114 03/25/25  0033   WBC 12.7* 16.3* 17.8*   RBC 4.31 3.50* 3.93*   HEMOGLOBIN 11.8* 9.8* 11.0*   HEMATOCRIT 35.4* 29.6* 32.3*   MCV 82.1 84.6 82.2   MCH 27.4 28.0 28.0   MCHC 33.3 33.1 34.1   RDW 42.0 44.4 42.0   PLATELETCT 218 175 217   MPV 10.5 11.3 11.0     Recent Labs     03/23/25 1933 03/24/25 0114 03/25/25  0033   SODIUM 130* 133* 133*   POTASSIUM 3.7 3.8 3.5*   CHLORIDE 96 100 97   CO2 22 23 23   GLUCOSE 131* 168* 239*   BUN 14 13 12   CREATININE 0.94 0.86 0.71   CALCIUM 8.9 8.5 9.8     Recent Labs     03/24/25  0114   INR 1.20*               Imaging  DX-CHEST-PORTABLE (1 VIEW)   Final  Result         Patchy left basilar opacities, atelectasis or pneumonia.      EC-ECHOCARDIOGRAM COMPLETE W/O CONT    (Results Pending)        Assessment/Plan  Problem Representation:    * Septic shock (HCC)  Assessment & Plan  This is Sepsis Present on admission  SIRS criteria identified on my evaluation include: Tachycardia, with heart rate greater than 90 BPM, Tachypnea, with respirations greater than 20 per minute, Leukocytosis, with WBC greater than 12,000, and Bandemia, greater than 10% bands  Clinical indicators of end organ dysfunction include Toxic Metabolic Encephalopathy  Source is PNA/UTI  Sepsis protocol initiated  Crystalloid Fluid Administration: Fluid resuscitation ordered per standard protocol - 30 mL/kg per current or ideal body weight  IV antibiotics as appropriate for source of sepsis  Reassessment: I have reassessed the patient's hemodynamic status    Likely due to PNA  Discontinue IVF as patient's oral hydration has improved and not in septic shock anymore  Reduce frequency of midodrine to bid given increasing bp and resolved shock  MRSA nares negative, procal downtrending  Midodrine 10mg every 8hours  Stress dose hydrocortisone 50 Q6 for 4 days  Abx:  ceftriaxone, azithromycin (completed)      Reactive airway disease with acute exacerbation  Assessment & Plan  Pulmonary hygiene  Antibiotic as above    Acute UTI  Assessment & Plan  UA pyuria, leukocytosis, altered mental status  IVF  Antibiotic as noted in pneumonia and septic shock    Acute on chronic respiratory failure with hypoxia (HCC)  Assessment & Plan  Dependent on 2 to 3 L at baseline  Initially required 10 L, currently on baseline 2-3L  -wean off as tolerated  Antibiotic for pneumonia    Frailty syndrome in geriatric patient  Assessment & Plan  PT recs return to group home  - OT dc'ed as patient is at baseline and comes from a group home.  -SLP recs thin liuids and regular solids    Pneumonia due to infectious organism- (present on  admission)  Assessment & Plan  LLL opacity currently at baseline oxygen  DC IVF  Antibiotic: Ceftriaxone, completed 3 day course of azithromycin  Blood culture negative  Aspiration precautions placed    Acute encephalopathy- (present on admission)  Assessment & Plan  Likely secondary to sepsis secondary to UTI/pneumonia  -Continue antibiotic  Neurocheck every 4 hours  Trial of high-dose IV thiamine    ACP (advance care planning)- (present on admission)  Assessment & Plan  Goal care discussed with patient's legal guardian caregiver at bedside-DNR/DNI per legal guardian    Hyperlipidemia- (present on admission)  Assessment & Plan  Continue home statin    Dementia without behavioral disturbance (HCC)- (present on admission)  Assessment & Plan  Per history  Frequent reorientation  Minimize sedative    Seizure disorder (HCC)- (present on admission)  Assessment & Plan  Continue Depakote Keppra    Acquired hypothyroidism- (present on admission)  Assessment & Plan  Synthroid         VTE prophylaxis: enoxaparin ppx    I have performed a physical exam and reviewed and updated ROS and Plan today (3/25/2025). In review of yesterday's note (3/24/2025), there are no changes except as documented above.

## 2025-03-25 NOTE — CARE PLAN
The patient is Stable - Low risk of patient condition declining or worsening    Shift Goals  Clinical Goals: ensure safety, hemodynamics, fluid restriction  Patient Goals: rest, communication  Family Goals: JORDI    Progress made toward(s) clinical / shift goals:      Problem: Respiratory  Goal: Patient will achieve/maintain optimum respiratory ventilation and gas exchange  Description: Target End Date:  Prior to discharge or change in level of careDocument on Assessment flowsheet1.  Assess and monitor rate, rhythm, depth and effort of respiration2.  Breath sounds assessed qshift and/or as needed3.  Assess O2 saturation, administer/titrate oxygen as ordered4.  Position patient for maximum ventilatory efficiency5.  Turn, cough, and deep breath with splinting to improve effectiveness6.  Collaborate with RT to administer medication/treatments per order7.  Encourage use of incentive spirometer and encourage patient to cough after use and utilize splinting techniques if applicable8.  Airway suctioning9.  Monitor sputum production for changes in color, consistency and [id number]. Perform frequent oral [id number]. Alternate physical activity with rest periods  Outcome: Progressing     Problem: Skin Integrity  Goal: Skin integrity is maintained or improved  Description: Target End Date:  Prior to discharge or change in level of careDocument interventions on Skin Risk/Sebastian flowsheet groups and corresponding LDA1.  Assess and monitor skin integrity, appearance and/or temperature2.  Assess risk factors for impaired skin integrity and/or pressures ulcers3.  Implement precautions to protect skin integrity in collaboration with interdisciplinary team4.  Implement pressure ulcer prevention protocol if at risk for skin breakdown5.  Confirm wound care consult if at risk for skin breakdown6.  Ensure patient use of pressure relieving devices  (Low air loss bed, waffle overlay, heel protectors, ROHO cushion, etc)  Outcome:  Progressing       Patient is not progressing towards the following goals:

## 2025-03-26 ENCOUNTER — APPOINTMENT (OUTPATIENT)
Dept: CARDIOLOGY | Facility: MEDICAL CENTER | Age: 75
DRG: 871 | End: 2025-03-26
Attending: STUDENT IN AN ORGANIZED HEALTH CARE EDUCATION/TRAINING PROGRAM
Payer: MEDICARE

## 2025-03-26 VITALS
RESPIRATION RATE: 18 BRPM | TEMPERATURE: 97.2 F | DIASTOLIC BLOOD PRESSURE: 77 MMHG | WEIGHT: 174.6 LBS | HEIGHT: 66 IN | OXYGEN SATURATION: 90 % | BODY MASS INDEX: 28.06 KG/M2 | HEART RATE: 81 BPM | SYSTOLIC BLOOD PRESSURE: 152 MMHG

## 2025-03-26 LAB
ALBUMIN SERPL BCP-MCNC: 3.6 G/DL (ref 3.2–4.9)
ALBUMIN/GLOB SERPL: 1.2 G/DL
ALP SERPL-CCNC: 97 U/L (ref 30–99)
ALT SERPL-CCNC: 42 U/L (ref 2–50)
ANION GAP SERPL CALC-SCNC: 13 MMOL/L (ref 7–16)
AST SERPL-CCNC: 30 U/L (ref 12–45)
BACTERIA UR CULT: ABNORMAL
BACTERIA UR CULT: ABNORMAL
BACTERIA UR CULT: NORMAL
BASOPHILS # BLD AUTO: 0.2 % (ref 0–1.8)
BASOPHILS # BLD: 0.03 K/UL (ref 0–0.12)
BILIRUB SERPL-MCNC: <0.2 MG/DL (ref 0.1–1.5)
BUN SERPL-MCNC: 13 MG/DL (ref 8–22)
CALCIUM ALBUM COR SERPL-MCNC: 10.5 MG/DL (ref 8.5–10.5)
CALCIUM SERPL-MCNC: 10.2 MG/DL (ref 8.5–10.5)
CHLORIDE SERPL-SCNC: 97 MMOL/L (ref 96–112)
CO2 SERPL-SCNC: 25 MMOL/L (ref 20–33)
CREAT SERPL-MCNC: 0.79 MG/DL (ref 0.5–1.4)
EOSINOPHIL # BLD AUTO: 0.01 K/UL (ref 0–0.51)
EOSINOPHIL NFR BLD: 0.1 % (ref 0–6.9)
ERYTHROCYTE [DISTWIDTH] IN BLOOD BY AUTOMATED COUNT: 43.9 FL (ref 35.9–50)
GFR SERPLBLD CREATININE-BSD FMLA CKD-EPI: 79 ML/MIN/1.73 M 2
GLOBULIN SER CALC-MCNC: 2.9 G/DL (ref 1.9–3.5)
GLUCOSE SERPL-MCNC: 216 MG/DL (ref 65–99)
HCT VFR BLD AUTO: 35 % (ref 37–47)
HGB BLD-MCNC: 11.5 G/DL (ref 12–16)
IMM GRANULOCYTES # BLD AUTO: 0.13 K/UL (ref 0–0.11)
IMM GRANULOCYTES NFR BLD AUTO: 1 % (ref 0–0.9)
LV EJECT FRACT  99904: 60
LYMPHOCYTES # BLD AUTO: 1.27 K/UL (ref 1–4.8)
LYMPHOCYTES NFR BLD: 9.9 % (ref 22–41)
MCH RBC QN AUTO: 27.5 PG (ref 27–33)
MCHC RBC AUTO-ENTMCNC: 32.9 G/DL (ref 32.2–35.5)
MCV RBC AUTO: 83.7 FL (ref 81.4–97.8)
MONOCYTES # BLD AUTO: 0.55 K/UL (ref 0–0.85)
MONOCYTES NFR BLD AUTO: 4.3 % (ref 0–13.4)
NEUTROPHILS # BLD AUTO: 10.88 K/UL (ref 1.82–7.42)
NEUTROPHILS NFR BLD: 84.5 % (ref 44–72)
NRBC # BLD AUTO: 0 K/UL
NRBC BLD-RTO: 0 /100 WBC (ref 0–0.2)
PLATELET # BLD AUTO: 250 K/UL (ref 164–446)
PMV BLD AUTO: 10.9 FL (ref 9–12.9)
POTASSIUM SERPL-SCNC: 3.9 MMOL/L (ref 3.6–5.5)
PROT SERPL-MCNC: 6.5 G/DL (ref 6–8.2)
RBC # BLD AUTO: 4.18 M/UL (ref 4.2–5.4)
SIGNIFICANT IND 70042: ABNORMAL
SIGNIFICANT IND 70042: NORMAL
SITE SITE: ABNORMAL
SITE SITE: NORMAL
SODIUM SERPL-SCNC: 135 MMOL/L (ref 135–145)
SOURCE SOURCE: ABNORMAL
SOURCE SOURCE: NORMAL
T4 FREE SERPL-MCNC: 1.12 NG/DL (ref 0.93–1.7)
TSH SERPL DL<=0.005 MIU/L-ACNC: 0.26 UIU/ML (ref 0.38–5.33)
WBC # BLD AUTO: 12.9 K/UL (ref 4.8–10.8)

## 2025-03-26 PROCEDURE — 93306 TTE W/DOPPLER COMPLETE: CPT

## 2025-03-26 PROCEDURE — A9270 NON-COVERED ITEM OR SERVICE: HCPCS | Performed by: STUDENT IN AN ORGANIZED HEALTH CARE EDUCATION/TRAINING PROGRAM

## 2025-03-26 PROCEDURE — 700105 HCHG RX REV CODE 258: Performed by: STUDENT IN AN ORGANIZED HEALTH CARE EDUCATION/TRAINING PROGRAM

## 2025-03-26 PROCEDURE — A9270 NON-COVERED ITEM OR SERVICE: HCPCS

## 2025-03-26 PROCEDURE — 700102 HCHG RX REV CODE 250 W/ 637 OVERRIDE(OP)

## 2025-03-26 PROCEDURE — 700102 HCHG RX REV CODE 250 W/ 637 OVERRIDE(OP): Performed by: STUDENT IN AN ORGANIZED HEALTH CARE EDUCATION/TRAINING PROGRAM

## 2025-03-26 PROCEDURE — 36415 COLL VENOUS BLD VENIPUNCTURE: CPT

## 2025-03-26 PROCEDURE — 700111 HCHG RX REV CODE 636 W/ 250 OVERRIDE (IP): Mod: JZ

## 2025-03-26 PROCEDURE — 99239 HOSP IP/OBS DSCHRG MGMT >30: CPT | Mod: GC | Performed by: INTERNAL MEDICINE

## 2025-03-26 PROCEDURE — 84439 ASSAY OF FREE THYROXINE: CPT

## 2025-03-26 PROCEDURE — 85025 COMPLETE CBC W/AUTO DIFF WBC: CPT

## 2025-03-26 PROCEDURE — 93306 TTE W/DOPPLER COMPLETE: CPT | Mod: 26 | Performed by: INTERNAL MEDICINE

## 2025-03-26 PROCEDURE — 84443 ASSAY THYROID STIM HORMONE: CPT

## 2025-03-26 PROCEDURE — 80053 COMPREHEN METABOLIC PANEL: CPT

## 2025-03-26 PROCEDURE — 700111 HCHG RX REV CODE 636 W/ 250 OVERRIDE (IP): Performed by: STUDENT IN AN ORGANIZED HEALTH CARE EDUCATION/TRAINING PROGRAM

## 2025-03-26 RX ORDER — BENZONATATE 100 MG/1
100 CAPSULE ORAL 3 TIMES DAILY PRN
Qty: 60 CAPSULE | Refills: 0 | Status: SHIPPED | OUTPATIENT
Start: 2025-03-26 | End: 2025-04-24

## 2025-03-26 RX ORDER — GAUZE BANDAGE 2" X 2"
100 BANDAGE TOPICAL DAILY
Status: DISCONTINUED | OUTPATIENT
Start: 2025-03-26 | End: 2025-03-26 | Stop reason: HOSPADM

## 2025-03-26 RX ORDER — LOSARTAN POTASSIUM 25 MG/1
25 TABLET ORAL DAILY
Qty: 100 TABLET | Refills: 3 | Status: SHIPPED | OUTPATIENT
Start: 2025-03-27 | End: 2026-05-01

## 2025-03-26 RX ORDER — ACETAMINOPHEN 325 MG/1
650 TABLET ORAL EVERY 6 HOURS PRN
Qty: 30 TABLET | Refills: 0 | Status: SHIPPED | OUTPATIENT
Start: 2025-03-26 | End: 2025-04-01

## 2025-03-26 RX ORDER — LOSARTAN POTASSIUM 50 MG/1
25 TABLET ORAL
Status: DISCONTINUED | OUTPATIENT
Start: 2025-03-26 | End: 2025-03-26 | Stop reason: HOSPADM

## 2025-03-26 RX ADMIN — LEVETIRACETAM 250 MG: 250 TABLET, FILM COATED ORAL at 12:07

## 2025-03-26 RX ADMIN — HYDROCORTISONE SODIUM SUCCINATE 50 MG: 100 INJECTION, POWDER, FOR SOLUTION INTRAMUSCULAR; INTRAVENOUS at 05:02

## 2025-03-26 RX ADMIN — ESCITALOPRAM OXALATE 10 MG: 10 TABLET ORAL at 05:07

## 2025-03-26 RX ADMIN — MOMETASONE FUROATE AND FORMOTEROL FUMARATE DIHYDRATE 2 PUFF: 200; 5 AEROSOL RESPIRATORY (INHALATION) at 05:04

## 2025-03-26 RX ADMIN — CLOZAPINE 100 MG: 100 TABLET ORAL at 12:07

## 2025-03-26 RX ADMIN — OXYCODONE HYDROCHLORIDE AND ACETAMINOPHEN 500 MG: 500 TABLET ORAL at 05:07

## 2025-03-26 RX ADMIN — FERROUS SULFATE TAB 325 MG (65 MG ELEMENTAL FE) 325 MG: 325 (65 FE) TAB at 05:07

## 2025-03-26 RX ADMIN — MIDODRINE HYDROCHLORIDE 10 MG: 5 TABLET ORAL at 05:07

## 2025-03-26 RX ADMIN — LEVOTHYROXINE SODIUM 125 MCG: 0.12 TABLET ORAL at 05:08

## 2025-03-26 RX ADMIN — THIAMINE HYDROCHLORIDE 500 MG: 100 INJECTION, SOLUTION INTRAMUSCULAR; INTRAVENOUS at 05:02

## 2025-03-26 RX ADMIN — ASPIRIN 81 MG: 81 TABLET, COATED ORAL at 05:07

## 2025-03-26 RX ADMIN — Medication 100 MG: at 09:21

## 2025-03-26 RX ADMIN — DIVALPROEX SODIUM 250 MG: 250 TABLET, EXTENDED RELEASE ORAL at 05:07

## 2025-03-26 RX ADMIN — HYDROCORTISONE SODIUM SUCCINATE 50 MG: 100 INJECTION, POWDER, FOR SOLUTION INTRAMUSCULAR; INTRAVENOUS at 12:07

## 2025-03-26 RX ADMIN — LOSARTAN POTASSIUM 25 MG: 50 TABLET, FILM COATED ORAL at 13:10

## 2025-03-26 RX ADMIN — Medication 2000 UNITS: at 05:08

## 2025-03-26 RX ADMIN — LEVETIRACETAM 250 MG: 250 TABLET, FILM COATED ORAL at 05:08

## 2025-03-26 RX ADMIN — TIOTROPIUM BROMIDE INHALATION SPRAY 5 MCG: 3.12 SPRAY, METERED RESPIRATORY (INHALATION) at 05:06

## 2025-03-26 ASSESSMENT — PAIN DESCRIPTION - PAIN TYPE: TYPE: ACUTE PAIN

## 2025-03-26 NOTE — PROGRESS NOTES
Patient IV infiltrated upon giving morning medication, IV removed, three IV attempts made. MD aware, okay for no IV at this time, MD ordered oral abx and thiamine.

## 2025-03-26 NOTE — HOSPITAL COURSE
74 y.o. female resident of long term with history of dementia, seizure disorder, chronic hypoxic respiratory failure dependent on 2-3 L, hyperlipidemia, hypothyroidism, recurrent UTI who was admitted 3/23/2025 for septic shock secondary to UTI vs LLL pneumonia. Patient was started on Ceftriaxone eventually transitioned to Augmentin based on sensitivities at discharge for remainder of course. She completed 3 day course of azithromycin and hydrocortisone for CAP.  Was started on losartan for Hypertension.

## 2025-03-26 NOTE — DISCHARGE INSTRUCTIONS
You were admitted to the hospital due to severe infection from a pneumonia and urinary tract infection for which you received antibiotics. Continue to take the antibiotics as prescribed throughout the entire course.  You will need to follow-up with your PCP for thyroid function monitoring.

## 2025-03-26 NOTE — DISCHARGE SUMMARY
[facility] Discharge Summary    Attending: Libertad Cho M.d.  Senior Resident: Dr. Norbert Hensley  Intern:  Dr. Madhuri Morales  Contact Number: [phone]    CHIEF COMPLAINT ON ADMISSION  Chief Complaint   Patient presents with    ALOC    Shortness of Breath    Cough       Reason for Admission  Septic shock 2/2 UTI and PNA    Admission Date  3/23/2025    CODE STATUS  DNAR/DNI    HPI & HOSPITAL COURSE    74 y.o. female resident of [facility] with history of dementia, seizure disorder, chronic hypoxic respiratory failure dependent on 2-3 L, hyperlipidemia, hypothyroidism, recurrent UTI who was admitted 3/23/2025 for septic shock secondary to UTI vs LLL pneumonia. Patient was started on Ceftriaxone eventually transitioned to Augmentin based on sensitivities at discharge for remainder of course. She completed 3 day course of azithromycin and hydrocortisone for CAP.  Was started on losartan for Hypertension.      Therefore, she is discharged in fair and stable condition to home with close outpatient follow-up.    The patient met 2-midnight criteria for an inpatient stay at the time of discharge.    Discharge Date  3/26/2025    Physical Exam on Day of Discharge  Physical Exam  HENT:      Mouth/Throat:      Mouth: Mucous membranes are moist.   Eyes:      Extraocular Movements: Extraocular movements intact.      Pupils: Pupils are equal, round, and reactive to light.   Cardiovascular:      Rate and Rhythm: Normal rate and regular rhythm.   Pulmonary:      Effort: Pulmonary effort is normal.      Breath sounds: Normal breath sounds.   Abdominal:      General: Abdomen is flat.      Palpations: Abdomen is soft.   Musculoskeletal:         General: Normal range of motion.   Skin:     General: Skin is warm.      Capillary Refill: Capillary refill takes less than 2 seconds.   Neurological:      General: No focal deficit present.      Mental Status: She is alert.         FOLLOW UP ITEMS POST  DISCHARGE  None    DISCHARGE DIAGNOSES  Principal Problem:    Septic shock (HCC) (POA: Unknown)  Active Problems:    Acquired hypothyroidism (POA: Yes)      Overview: TSH 0.73 on 8/28/2023 On levothyroxine 125 mcg daily    Seizure disorder (HCC) (POA: Yes)    Dementia without behavioral disturbance (HCC) (POA: Yes)    Hyperlipidemia (POA: Yes)      Overview: , TG 85, HDL 27, LDL 76 on 9/12/2023 on simvastatin 40 mg daily    ACP (advance care planning) (POA: Yes)    Acute encephalopathy (POA: Yes)    Pneumonia due to infectious organism (POA: Yes)    Frailty syndrome in geriatric patient (POA: Unknown)    Acute on chronic respiratory failure with hypoxia (HCC) (POA: Unknown)    Acute UTI (POA: Unknown)    Reactive airway disease with acute exacerbation (POA: Unknown)  Resolved Problems:    * No resolved hospital problems. *      FOLLOW UP  Future Appointments   Date Time Provider Department Center   4/24/2025 10:00 AM Jesus Rand M.D. [facility] None     Dayne Rosario P.JOSE.  [address] [place] [id number]  [phone]    Follow up    PCP to follow up with TSH about 4 weeks from discharge. ( Patient mentioned cold intolerance).    MEDICATIONS ON DISCHARGE     Medication List        START taking these medications        Instructions   acetaminophen 325 MG Tabs  Commonly known as: Tylenol   Take 2 Tablets by mouth every 6 hours as needed for Moderate Pain for up to 6 days.  Dose: 650 mg     amoxicillin-clavulanate 875-125 MG Tabs  Commonly known as: Augmentin   Take 1 Tablet by mouth 2 times a day for 2 days.  Dose: 1 Tablet     benzonatate 100 MG Caps  Commonly known as: Tessalon   Take 1 Capsule by mouth 3 times a day as needed for Cough.  Dose: 100 mg     losartan 25 MG Tabs  Start taking on: March 27, 2025  Commonly known as: Cozaar   Take 1 Tablet by mouth every day.  Dose: 25 mg            CHANGE how you take these medications        Instructions   Aspirin Low Dose 81 MG EC tablet  What changed:  how much to take  Generic drug: aspirin   TAKE 1 TABLET BY MOUTH ONCE DAILY     Incruse Ellipta 62.5 MCG/ACT Aepb inhaler  What changed:   how much to take  how to take this  when to take this  additional instructions  Generic drug: umeclidinium bromide   INHALE 1 PUFF BY MOUTH ONCE DAILY     simvastatin 40 MG Tabs  What changed: See the new instructions.  Commonly known as: Zocor   TAKE 1 TABLET BY MOUTH AT BEDTIME FOR HLD            CONTINUE taking these medications        Instructions   alendronate 70 MG Tabs  Commonly known as: Fosamax   TAKE 1 TABLET BY MOUTH EVERY 7 DAYS.  Dose: 70 mg     ascorbic acid 500 MG Tabs  Commonly known as: Ascorbic Acid   TAKE 1 TABLET BY MOUTH ONCE DAILY  Dose: 500 mg     cloZAPine 100 MG Tabs  Commonly known as: Clozaril   Take 1.5 Tablets by mouth 2 times a day.  Dose: 150 mg     D-Mannose 500 MG Caps   Take 1,000 mg by mouth 2 times a day.  Dose: 1,000 mg     divalproex  MG Tb24  Commonly known as: Depakote ER   Take 1 Tablet by mouth every day.  Dose: 250 mg     escitalopram 10 MG Tabs  Commonly known as: Lexapro   TAKE 1 TABLET BY MOUTH EVERY MORNING  Dose: 10 mg     ferrous sulfate 325 (65 Fe) MG tablet   TAKE 1 TABLET BY MOUTH EVERY DAY.  Dose: 325 mg     FQ Protective Underwear Misc   CHANGE 3 TIMES DAILY AS DIRECTED     hydrophilic ointment Oint   Apply 1 Each topically at bedtime. Apply to hands  Dose: 1 Each     ipratropium-albuterol 0.5-2.5 (3) MG/3ML nebulizer solution  Commonly known as: Duoneb   Take 3 mL by nebulization every 6 hours as needed for Shortness of Breath.  Dose: 3 mL     levETIRAcetam 250 MG tablet  Commonly known as: Keppra   Take 1 Tablet by mouth 3 times a day.  Dose: 250 mg     levothyroxine 125 MCG Tabs  Commonly known as: Synthroid   Take 1 Tablet by mouth every morning on an empty stomach.  Dose: 125 mcg     Melatonin 3 MG Tbdp   Take 3 mg by mouth every evening.  Dose: 3 mg     Mirabegron ER 50 MG Tb24  Commonly known as: Myrbetriq   Take  50 mg by mouth every evening.  Dose: 50 mg     Ventolin  (90 Base) MCG/ACT Aers inhalation aerosol  Generic drug: albuterol   INHALE 2 PUFFS BY MOUTH EVERY FOUR HOURS AS NEEDED FOR SHORTNESS OF BREATH.  Dose: 2 Puff     Vitamin D-3 Super Strength 2000 UNIT Tabs  Generic drug: Cholecalciferol   TAKE 1 TABLET BY MOUTH ONCE DAILY  Dose: 1 Tablet              Allergies  No Known Allergies    DIET  Orders Placed This Encounter   Procedures    Diet Order Diet: Regular; Fluid modifications: (optional): 2000 ml Fluid Restriction     Standing Status:   Standing     Number of Occurrences:   1     Diet::   Regular [1]     Fluid modifications: (optional):   2000 ml Fluid Restriction [11]       ACTIVITY  As tolerated.  Weight bearing as tolerated    CONSULTATIONS  None    PROCEDURES  None    LABORATORY  Lab Results   Component Value Date    SODIUM 135 03/26/2025    POTASSIUM 3.9 03/26/2025    CHLORIDE 97 03/26/2025    CO2 25 03/26/2025    GLUCOSE 216 (H) 03/26/2025    BUN 13 03/26/2025    CREATININE 0.79 03/26/2025    CREATININE 0.9 12/31/2008        Lab Results   Component Value Date    WBC 12.9 (H) 03/26/2025    HEMOGLOBIN 11.5 (L) 03/26/2025    HEMATOCRIT 35.0 (L) 03/26/2025    PLATELETCT 250 03/26/2025        Total time of the discharge process exceeds 45 minutes.

## 2025-03-26 NOTE — PROGRESS NOTES
IV taken out. Discharge paperwork with patient. Patient with all belongings. Guardian notified of patient returning to group home by case management. Awaiting transport for patient.

## 2025-03-26 NOTE — DISCHARGE PLANNING
DC Transport Scheduled    Transport Company Scheduled:  [employer]  Spoke with Sanket at [facility] to schedule transport.    Scheduled Date: 3/26/2025  Scheduled Time: 1630    Transport Type: [facility]  Destination: [facility]   Destination address: [address] [place] [address] [place] [address]    Notified care team of scheduled transport via [employer].     If there are any changes needed to the DC transportation scheduled, please contact [facility] at ext. [phone] between the hours of 1790-4596. If outside those hours, contact the ED Case Manager at ext. [phone].

## 2025-03-26 NOTE — DISCHARGE PLANNING
Care Transition Team Final Discharge Disposition    Actual Discharge Information  Discharge Disposition: Discharged to home/self care (01)    Case Management Discharge Planning    Admission Date: 3/23/2025  GMLOS: 4.9  ALOS: 3    6-Clicks ADL Score: 14  6-Clicks Mobility Score: 22  PT and/or OT Eval ordered: No  Post-acute Referrals Ordered: No  Post-acute Choice Obtained: No  Has referral(s) been sent to post-acute provider:  No      Anticipated Discharge Dispo: Discharge Disposition: Discharged to home/self care (01)  Discharge Address: [address] [place] [id number]  Discharge Contact Phone Number: [phone]    DME Needed: No    Action(s) Taken: Updated Provider/Nurse on Discharge Plan, Transport Arranged , and OTHER    Pt discussed in IDT rounds, pt is medically cleared to return to [facility] [phone]. TC placed to pt's legal guardian Yannick [phone] and  to confirm that pt is okay to return to .  IMM delivered to legal guardian Yannick, obtained verbal authorization via telephone.  [facility] transportation scheduled for 1630  [place].  Packet with POLST, ACP documents, face sheet and DNAR/DNI included and provided to bedside RN.    Escalations Completed: None    Medically Clear: Yes    Barriers to Discharge: None    Is the patient up for discharge tomorrow: No

## 2025-03-26 NOTE — CARE PLAN
The patient is Stable - Low risk of patient condition declining or worsening    Shift Goals  Clinical Goals: safety, fluid restriction, monitor I/O's  Patient Goals: rest  Family Goals: JORDI    Progress made toward(s) clinical / shift goals:        Problem: Impaired Gas Exchange  Goal: Patient will demonstrate improved ventilation and adequate oxygenation and participate in treatment regimen within the level of ability/situation.  Outcome: Progressing  Note: Patient on 1-2L NC, which is patient baseline, continued to monitor for changes in breathing pattern or any shortness of breath, no increased work of breathing.     Problem: Knowledge Deficit - Standard  Goal: Patient and family/care givers will demonstrate understanding of plan of care, disease process/condition, diagnostic tests and medications  Outcome: Progressing  Note:   1.  Patient and family/caregiver oriented to unit, equipment, visitation policy and means for communicating concern2.  Complete/review Learning Assessment3.  Assess knowledge level of disease process/condition, treatment plan, diagnostic tests and medications4.  Explain disease process/condition, treatment plan, diagnostic tests and medications  Patient alert and oriented 1-2, reinforced plan of care and diet/fluid restriction, continued to assess intake and output.        Problem: Skin Integrity  Goal: Skin integrity is maintained or improved  Outcome: Progressing  Note: Encouraged turning and repositioning, pure wick in place. Pillows in use for support and positioning.        Patient is not progressing towards the following goals: n/a

## 2025-04-08 PROBLEM — R74.8 ELEVATED ALKALINE PHOSPHATASE LEVEL: Status: RESOLVED | Noted: 2023-02-27 | Resolved: 2025-04-08

## 2025-04-16 PROBLEM — N39.0 ACUTE UTI: Status: RESOLVED | Noted: 2025-03-24 | Resolved: 2025-04-16

## 2025-04-16 PROBLEM — G93.40 ACUTE ENCEPHALOPATHY: Status: RESOLVED | Noted: 2024-03-06 | Resolved: 2025-04-16

## 2025-04-16 PROBLEM — J96.21 ACUTE ON CHRONIC RESPIRATORY FAILURE WITH HYPOXIA (HCC): Status: RESOLVED | Noted: 2025-03-24 | Resolved: 2025-04-16

## 2025-04-24 ENCOUNTER — OFFICE VISIT (OUTPATIENT)
Dept: NEUROLOGY | Facility: MEDICAL CENTER | Age: 75
End: 2025-04-24
Attending: PSYCHIATRY & NEUROLOGY
Payer: MEDICARE

## 2025-04-24 VITALS
SYSTOLIC BLOOD PRESSURE: 104 MMHG | HEART RATE: 96 BPM | DIASTOLIC BLOOD PRESSURE: 52 MMHG | RESPIRATION RATE: 12 BRPM | OXYGEN SATURATION: 95 % | TEMPERATURE: 98 F | HEIGHT: 66 IN | BODY MASS INDEX: 28.06 KG/M2 | WEIGHT: 174.6 LBS

## 2025-04-24 DIAGNOSIS — F03.90 DEMENTIA WITHOUT BEHAVIORAL DISTURBANCE (HCC): ICD-10-CM

## 2025-04-24 DIAGNOSIS — G40.909 SEIZURE DISORDER (HCC): ICD-10-CM

## 2025-04-24 PROCEDURE — 99212 OFFICE O/P EST SF 10 MIN: CPT | Performed by: PSYCHIATRY & NEUROLOGY

## 2025-04-24 RX ORDER — LEVETIRACETAM 250 MG/1
250 TABLET ORAL 3 TIMES DAILY
Qty: 270 TABLET | Refills: 3 | Status: SHIPPED | OUTPATIENT
Start: 2025-04-24

## 2025-04-24 ASSESSMENT — PATIENT HEALTH QUESTIONNAIRE - PHQ9
SUM OF ALL RESPONSES TO PHQ QUESTIONS 1-9: 10
5. POOR APPETITE OR OVEREATING: 0 - NOT AT ALL
CLINICAL INTERPRETATION OF PHQ2 SCORE: 3

## 2025-04-24 ASSESSMENT — ENCOUNTER SYMPTOMS
SEIZURES: 0
INSOMNIA: 0
FALLS: 0
CONSTIPATION: 0
MEMORY LOSS: 1
LOSS OF CONSCIOUSNESS: 0
DEPRESSION: 0
TREMORS: 0

## 2025-04-24 ASSESSMENT — FIBROSIS 4 INDEX: FIB4 SCORE: 1.33

## 2025-04-24 NOTE — ASSESSMENT & PLAN NOTE
Keppra will be continued.  Orders:    levETIRAcetam (KEPPRA) 250 MG tablet; Take 1 Tablet by mouth 3 times a day.

## 2025-04-24 NOTE — ASSESSMENT & PLAN NOTE
Things are slightly worse, nothing too profound.  Whether this proves to be Lewy Body Disease or other cortical degenerative illnesses remains to be seen.  The workup for treatable causes of cognitive impairment has been completed and proven unremarkable.  The parkinsonian features are clearly related to her Clozaril.  There is no need for symptomatic relief at this time.    Orders:    Patient has been identified as having a positive depression screening. Appropriate orders and counseling have been given.

## 2025-04-24 NOTE — PROGRESS NOTES
Subjective     Tracie Rinaldi is a 74 y.o. female who presents for follow-up, with a history of dementia and seizures.  History is gotten from discussions with the patient but also her guardian, Yannick, who presents with her today.     HPI    Seizures: Tracie and Yannick both confirm that she has had no seizures.  Medications are given to her, she takes them consistently.  She has never had issues with drowsiness, irritability, or tremor with headache.    Dementia: Cognitively, she has slowed down a little bit further.  She does require little more assistance with her ADLs.  They do use her own individual calendar on a daily basis, this does help her throughout the day, but she still has difficulty going between days.  She now has outside aids 4 days a week for activities.  She looks forward to being with the 2 women.  She certainly identifies them by name.  Otherwise she will simply sit and watch TV, she has become a bit more withdrawn.  She tends to sleep on those days when she is not active.    There is no bowel or bladder control issue, she has a commode at the bedside in case she cannot get to the bathroom in time.  She is not incontinent.  She is not hallucinating.  There are no issues with irritability or agitation at night.  Balance is still curtailed, she uses a walker just when she is out and about, she gets around the group home without the need.    Her psychotropic medication regimen has been altered slightly, though she remains on Depakote, Lexapro and Clozaril.  He still has the EPS side effects including the bradycardia, postural instability, and some loss of dexterity with the hands.  There is no tremor, she has never had issues with swallowing, change in voice volume quality, or excessive constipation.    Medical, surgical and family histories are reviewed, there are no new drug allergies.  She is now on Clozaril 100 mg twice daily, also Lexapro 10 mg daily, Cozaar 25 mg daily, baby aspirin daily,  "Keppra 250 mg 3 times daily, vitamin D with calcium, melatonin, Myrbetriq, Synthroid, Zocor, Depakote  mg daily, Fosamax weekly.    Review of Systems   Gastrointestinal:  Negative for constipation.   Musculoskeletal:  Negative for falls.   Neurological:  Negative for tremors, seizures and loss of consciousness.   Psychiatric/Behavioral:  Positive for memory loss. Negative for depression and suicidal ideas. The patient does not have insomnia.    All other systems reviewed and are negative.    Objective     /52 (BP Location: Left arm, Patient Position: Sitting, BP Cuff Size: Adult)   Pulse 96   Temp 36.7 °C (98 °F) (Temporal)   Resp 12   Ht 1.676 m (5' 6\") Comment: pt reported  Wt 79.2 kg (174 lb 9.7 oz)   SpO2 95%   BMI 28.18 kg/m²      Physical Exam    She appears in no acute distress.  Vital signs are stable.  There is no malar rash or sialorrhea.  The neck is supple, range of motion is full.  Cardiac evaluation reveals a regular rhythm.     Neurological Exam    She is fully oriented, though her mental processing speed is slowed, she answers direct questions correctly.  Multistep commands are little bit more difficult for her to negotiate.  There is no aphasia, agnosia, apraxia, or inattention.  She spells the word \"world\" forwards and backwards.  Her affect is blunted, but her mood is euthymic.    PERRLA/EOMI, eyeblink frequency is diminished, there is mild hypophonia but no dysarthria or tachyphemia.  Facial movements are symmetric, sensory exam is intact to temperature.  The tongue and uvula are midline.  Shoulder shrug and head rotation are symmetric.    Musculoskeletal exam again reveals bradykinesia, only mild rigidity in the upper extremities.  There are axial dyskinetic movements seen throughout the interview.  There is no asterixis or drift.  Strength is intact.  There is no tremor either at rest or with action.    Reflexes are found easily in the upper extremities without asymmetry, " knee jerks as well, ankle jerks diminished to absent bilaterally.  Both toes are equivocal to downgoing.    She stands very slowly, stride length is shortened, she does not shuffle.  Repetitive movements in the feet are diminished in amplitude, amplitudes are greater in the hands and fingers.  Frequencies are slightly increased throughout.  There is no appendicular dystaxia.    Sensory exam is intact to vibration and temperature.  Assessment & Plan  Dementia without behavioral disturbance (HCC)  Things are slightly worse, nothing too profound.  Whether this proves to be Lewy Body Disease or other cortical degenerative illnesses remains to be seen.  The workup for treatable causes of cognitive impairment has been completed and proven unremarkable.  The parkinsonian features are clearly related to her Clozaril.  There is no need for symptomatic relief at this time.    Orders:    Patient has been identified as having a positive depression screening. Appropriate orders and counseling have been given.    Seizure disorder (HCC)  Keppra will be continued.  Orders:    levETIRAcetam (KEPPRA) 250 MG tablet; Take 1 Tablet by mouth 3 times a day.    Time: 40 minutes in total spent on patient care including pre-charting, record review, discussion with healthcare staff and documentation.  This includes face-to-face time for exam, review, discussion, as well as counseling and coordinating care.

## 2025-05-14 PROBLEM — J45.901 REACTIVE AIRWAY DISEASE WITH ACUTE EXACERBATION: Status: RESOLVED | Noted: 2025-03-24 | Resolved: 2025-05-14

## 2025-05-14 PROBLEM — J18.9 PNEUMONIA DUE TO INFECTIOUS ORGANISM: Status: RESOLVED | Noted: 2025-03-23 | Resolved: 2025-05-14

## 2025-05-14 PROBLEM — A41.9 SEPTIC SHOCK (HCC): Status: RESOLVED | Noted: 2025-03-24 | Resolved: 2025-05-14

## 2025-05-14 PROBLEM — R65.21 SEPTIC SHOCK (HCC): Status: RESOLVED | Noted: 2025-03-24 | Resolved: 2025-05-14

## 2025-05-21 ENCOUNTER — APPOINTMENT (OUTPATIENT)
Dept: URBAN - METROPOLITAN AREA CLINIC 6 | Facility: CLINIC | Age: 75
Setting detail: DERMATOLOGY
End: 2025-05-21

## 2025-05-21 DIAGNOSIS — L57.0 ACTINIC KERATOSIS: ICD-10-CM | Status: RESOLVED

## 2025-05-21 DIAGNOSIS — L30.8 OTHER SPECIFIED DERMATITIS: ICD-10-CM | Status: RESOLVED

## 2025-05-21 PROCEDURE — ? COUNSELING

## 2025-05-21 PROCEDURE — 99213 OFFICE O/P EST LOW 20 MIN: CPT

## 2025-05-21 ASSESSMENT — LOCATION DETAILED DESCRIPTION DERM: LOCATION DETAILED: RIGHT INFERIOR FOREHEAD

## 2025-05-21 ASSESSMENT — LOCATION ZONE DERM: LOCATION ZONE: FACE

## 2025-05-21 ASSESSMENT — LOCATION SIMPLE DESCRIPTION DERM: LOCATION SIMPLE: RIGHT FOREHEAD

## 2025-08-04 PROBLEM — Z79.899 HIGH RISK MEDICATION USE: Status: ACTIVE | Noted: 2025-08-04

## 2025-08-25 DIAGNOSIS — G40.909 SEIZURE DISORDER (HCC): ICD-10-CM

## 2025-08-26 RX ORDER — LEVETIRACETAM 250 MG/1
250 TABLET ORAL 3 TIMES DAILY
Qty: 90 TABLET | Refills: 11 | Status: SHIPPED | OUTPATIENT
Start: 2025-08-26